# Patient Record
Sex: MALE | Race: WHITE | Employment: OTHER | URBAN - METROPOLITAN AREA
[De-identification: names, ages, dates, MRNs, and addresses within clinical notes are randomized per-mention and may not be internally consistent; named-entity substitution may affect disease eponyms.]

---

## 2017-01-17 RX ORDER — PAROXETINE HYDROCHLORIDE 40 MG/1
TABLET, FILM COATED ORAL
Qty: 30 TABLET | Refills: 11 | Status: SHIPPED | OUTPATIENT
Start: 2017-01-17 | End: 2017-09-06

## 2017-02-09 ENCOUNTER — CARE COORDINATION (OUTPATIENT)
Dept: CARE COORDINATION | Age: 67
End: 2017-02-09

## 2017-02-20 DIAGNOSIS — J43.9 PULMONARY EMPHYSEMA, UNSPECIFIED EMPHYSEMA TYPE (HCC): Primary | ICD-10-CM

## 2017-02-20 RX ORDER — BUDESONIDE AND FORMOTEROL FUMARATE DIHYDRATE 160; 4.5 UG/1; UG/1
2 AEROSOL RESPIRATORY (INHALATION) 2 TIMES DAILY
Qty: 2 INHALER | Refills: 3 | COMMUNITY
Start: 2017-02-20 | End: 2018-08-29

## 2017-06-13 ENCOUNTER — HOSPITAL ENCOUNTER (OUTPATIENT)
Dept: LAB | Age: 67
Discharge: HOME | End: 2017-06-13
Payer: MEDICARE

## 2017-06-13 DIAGNOSIS — G47.10 INCREASED SLEEPING: ICD-10-CM

## 2017-06-13 LAB
ANION GAP SERPL CALCULATED.3IONS-SCNC: 13 MEQ/L (ref 7–13)
BUN BLDV-MCNC: 17 MG/DL (ref 8–23)
CALCIUM SERPL-MCNC: 9.1 MG/DL (ref 8.6–10.2)
CHLORIDE BLD-SCNC: 101 MEQ/L (ref 98–107)
CO2: 25 MEQ/L (ref 22–29)
CREAT SERPL-MCNC: 0.78 MG/DL (ref 0.7–1.2)
GFR AFRICAN AMERICAN: >60
GFR NON-AFRICAN AMERICAN: >60
GLUCOSE BLD-MCNC: 88 MG/DL (ref 74–109)
HCT VFR BLD CALC: 40.8 % (ref 42–52)
HEMOGLOBIN: 13.8 G/DL (ref 14–18)
INR BLD: 1
MCH RBC QN AUTO: 31.6 PG (ref 27–31.3)
MCHC RBC AUTO-ENTMCNC: 33.8 % (ref 33–37)
MCV RBC AUTO: 93.4 FL (ref 80–100)
PDW BLD-RTO: 14.1 % (ref 11.5–14.5)
PLATELET # BLD: 130 K/UL (ref 130–400)
POTASSIUM SERPL-SCNC: 3.9 MEQ/L (ref 3.5–5.1)
PROTHROMBIN TIME: 11.2 SEC (ref 8.1–13.7)
RBC # BLD: 4.37 M/UL (ref 4.7–6.1)
SODIUM BLD-SCNC: 139 MEQ/L (ref 132–144)
WBC # BLD: 5 K/UL (ref 4.8–10.8)

## 2017-06-13 PROCEDURE — 85027 COMPLETE CBC AUTOMATED: CPT

## 2017-06-13 PROCEDURE — 36415 COLL VENOUS BLD VENIPUNCTURE: CPT

## 2017-06-13 PROCEDURE — 85610 PROTHROMBIN TIME: CPT

## 2017-06-13 PROCEDURE — 80048 BASIC METABOLIC PNL TOTAL CA: CPT

## 2017-08-19 ENCOUNTER — HOSPITAL ENCOUNTER (EMERGENCY)
Age: 67
Discharge: HOME | End: 2017-08-19
Attending: EMERGENCY MEDICINE
Payer: MEDICARE

## 2017-08-19 VITALS — TEMPERATURE: 97.8 F | HEIGHT: 73 IN | RESPIRATION RATE: 16 BRPM | OXYGEN SATURATION: 97 % | HEART RATE: 86 BPM

## 2017-08-19 DIAGNOSIS — N39.0 ACUTE UTI: Primary | ICD-10-CM

## 2017-08-19 LAB
BACTERIA: ABNORMAL /HPF
BILIRUBIN URINE: NEGATIVE
BLOOD, URINE: ABNORMAL
CLARITY: ABNORMAL
COLOR: YELLOW
EPITHELIAL CELLS, UA: ABNORMAL /HPF
GLUCOSE URINE: NEGATIVE MG/DL
KETONES, URINE: NEGATIVE MG/DL
LEUKOCYTE ESTERASE, URINE: ABNORMAL
NITRITE, URINE: POSITIVE
PH UA: 5.5 (ref 5–9)
PROTEIN UA: 100 MG/DL
RBC UA: ABNORMAL /HPF (ref 0–2)
RENAL EPITHELIAL, UA: ABNORMAL /HPF
SPECIFIC GRAVITY UA: 1.02 (ref 1–1.03)
URINE REFLEX TO CULTURE: YES
UROBILINOGEN, URINE: 1 E.U./DL
WBC UA: >100 /HPF (ref 0–5)

## 2017-08-19 PROCEDURE — 99283 EMERGENCY DEPT VISIT LOW MDM: CPT

## 2017-08-19 PROCEDURE — 6360000002 HC RX W HCPCS: Performed by: EMERGENCY MEDICINE

## 2017-08-19 PROCEDURE — 87077 CULTURE AEROBIC IDENTIFY: CPT

## 2017-08-19 PROCEDURE — 96372 THER/PROPH/DIAG INJ SC/IM: CPT

## 2017-08-19 PROCEDURE — 87086 URINE CULTURE/COLONY COUNT: CPT

## 2017-08-19 PROCEDURE — 2500000003 HC RX 250 WO HCPCS

## 2017-08-19 PROCEDURE — 81001 URINALYSIS AUTO W/SCOPE: CPT

## 2017-08-19 PROCEDURE — 87186 SC STD MICRODIL/AGAR DIL: CPT

## 2017-08-19 RX ORDER — PHENAZOPYRIDINE HYDROCHLORIDE 200 MG/1
200 TABLET, FILM COATED ORAL 3 TIMES DAILY PRN
Qty: 6 TABLET | Refills: 0 | Status: SHIPPED | OUTPATIENT
Start: 2017-08-19 | End: 2017-08-22

## 2017-08-19 RX ORDER — AMOXICILLIN AND CLAVULANATE POTASSIUM 875; 125 MG/1; MG/1
1 TABLET, FILM COATED ORAL 2 TIMES DAILY
Qty: 20 TABLET | Refills: 0 | Status: SHIPPED | OUTPATIENT
Start: 2017-08-19 | End: 2017-08-29

## 2017-08-19 RX ORDER — CEFTRIAXONE 1 G/1
1 INJECTION, POWDER, FOR SOLUTION INTRAMUSCULAR; INTRAVENOUS ONCE
Status: COMPLETED | OUTPATIENT
Start: 2017-08-19 | End: 2017-08-19

## 2017-08-19 RX ORDER — LIDOCAINE HYDROCHLORIDE 20 MG/ML
INJECTION, SOLUTION EPIDURAL; INFILTRATION; INTRACAUDAL; PERINEURAL
Status: COMPLETED
Start: 2017-08-19 | End: 2017-08-19

## 2017-08-19 RX ADMIN — LIDOCAINE HYDROCHLORIDE 100 MG: 20 INJECTION, SOLUTION EPIDURAL; INFILTRATION; INTRACAUDAL; PERINEURAL at 18:17

## 2017-08-19 RX ADMIN — CEFTRIAXONE 1 G: 1 INJECTION, POWDER, FOR SOLUTION INTRAMUSCULAR; INTRAVENOUS at 18:16

## 2017-08-19 ASSESSMENT — ENCOUNTER SYMPTOMS
NAUSEA: 0
SHORTNESS OF BREATH: 0
RHINORRHEA: 0
PHOTOPHOBIA: 0
VOMITING: 0
WHEEZING: 0
ABDOMINAL DISTENTION: 0
SINUS PRESSURE: 0
ABDOMINAL PAIN: 0
BACK PAIN: 0
EYE PAIN: 0
COUGH: 0
DIARRHEA: 0
COLOR CHANGE: 0
CONSTIPATION: 0
SORE THROAT: 0
APNEA: 0

## 2017-08-19 ASSESSMENT — PAIN DESCRIPTION - DESCRIPTORS: DESCRIPTORS: ACHING

## 2017-08-19 ASSESSMENT — PAIN SCALES - GENERAL: PAINLEVEL_OUTOF10: 9

## 2017-08-19 ASSESSMENT — PAIN DESCRIPTION - LOCATION: LOCATION: BACK

## 2017-08-22 LAB
ORGANISM: ABNORMAL
URINE CULTURE, ROUTINE: ABNORMAL

## 2017-08-31 ENCOUNTER — HOSPITAL ENCOUNTER (OUTPATIENT)
Dept: LAB | Age: 67
Discharge: HOME | End: 2017-08-31
Payer: MEDICARE

## 2017-08-31 LAB
INR BLD: 1.1
PROTHROMBIN TIME: 11.9 SEC (ref 8.1–13.7)

## 2017-08-31 PROCEDURE — 36415 COLL VENOUS BLD VENIPUNCTURE: CPT

## 2017-08-31 PROCEDURE — 85610 PROTHROMBIN TIME: CPT

## 2017-09-06 ENCOUNTER — HOSPITAL ENCOUNTER (OUTPATIENT)
Age: 67
Setting detail: SPECIMEN
Discharge: HOME | End: 2017-09-06
Payer: MEDICARE

## 2017-09-06 ENCOUNTER — OFFICE VISIT (OUTPATIENT)
Dept: FAMILY MEDICINE CLINIC | Age: 67
End: 2017-09-06

## 2017-09-06 VITALS
DIASTOLIC BLOOD PRESSURE: 60 MMHG | WEIGHT: 238 LBS | TEMPERATURE: 97.8 F | SYSTOLIC BLOOD PRESSURE: 118 MMHG | BODY MASS INDEX: 31.54 KG/M2 | HEIGHT: 73 IN | HEART RATE: 75 BPM | OXYGEN SATURATION: 96 %

## 2017-09-06 DIAGNOSIS — M25.561 CHRONIC PAIN OF BOTH KNEES: ICD-10-CM

## 2017-09-06 DIAGNOSIS — I48.91 ATRIAL FIBRILLATION, UNSPECIFIED TYPE (HCC): ICD-10-CM

## 2017-09-06 DIAGNOSIS — R39.9 LOWER URINARY TRACT SYMPTOMS (LUTS): Primary | ICD-10-CM

## 2017-09-06 DIAGNOSIS — Z23 NEED FOR VACCINATION: ICD-10-CM

## 2017-09-06 DIAGNOSIS — J44.9 CHRONIC OBSTRUCTIVE PULMONARY DISEASE, UNSPECIFIED COPD TYPE (HCC): ICD-10-CM

## 2017-09-06 DIAGNOSIS — R39.9 LOWER URINARY TRACT SYMPTOMS (LUTS): ICD-10-CM

## 2017-09-06 DIAGNOSIS — F32.A DEPRESSION, UNSPECIFIED DEPRESSION TYPE: ICD-10-CM

## 2017-09-06 DIAGNOSIS — M25.562 CHRONIC PAIN OF BOTH KNEES: ICD-10-CM

## 2017-09-06 DIAGNOSIS — G89.29 CHRONIC PAIN OF BOTH KNEES: ICD-10-CM

## 2017-09-06 DIAGNOSIS — I50.9 CHRONIC CONGESTIVE HEART FAILURE, UNSPECIFIED CONGESTIVE HEART FAILURE TYPE: ICD-10-CM

## 2017-09-06 LAB
APPEARANCE FLUID: ABNORMAL
BILIRUBIN, POC: ABNORMAL
BLOOD URINE, POC: ABNORMAL
CLARITY, POC: CLEAR
COLOR, POC: ABNORMAL
GLUCOSE URINE, POC: ABNORMAL
KETONES, POC: ABNORMAL
LEUKOCYTE EST, POC: ABNORMAL
NITRITE, POC: ABNORMAL
PH, POC: 5.5
PROSTATE SPECIFIC ANTIGEN: 0.04 NG/ML (ref 0–5.4)
PROTEIN, POC: ABNORMAL
SPECIFIC GRAVITY, POC: 1.02
UROBILINOGEN, POC: ABNORMAL

## 2017-09-06 PROCEDURE — 4040F PNEUMOC VAC/ADMIN/RCVD: CPT | Performed by: FAMILY MEDICINE

## 2017-09-06 PROCEDURE — 3017F COLORECTAL CA SCREEN DOC REV: CPT | Performed by: FAMILY MEDICINE

## 2017-09-06 PROCEDURE — 3023F SPIROM DOC REV: CPT | Performed by: FAMILY MEDICINE

## 2017-09-06 PROCEDURE — G8417 CALC BMI ABV UP PARAM F/U: HCPCS | Performed by: FAMILY MEDICINE

## 2017-09-06 PROCEDURE — 1123F ACP DISCUSS/DSCN MKR DOCD: CPT | Performed by: FAMILY MEDICINE

## 2017-09-06 PROCEDURE — 84153 ASSAY OF PSA TOTAL: CPT

## 2017-09-06 PROCEDURE — 99214 OFFICE O/P EST MOD 30 MIN: CPT | Performed by: FAMILY MEDICINE

## 2017-09-06 PROCEDURE — G8926 SPIRO NO PERF OR DOC: HCPCS | Performed by: FAMILY MEDICINE

## 2017-09-06 PROCEDURE — G8427 DOCREV CUR MEDS BY ELIG CLIN: HCPCS | Performed by: FAMILY MEDICINE

## 2017-09-06 PROCEDURE — 87086 URINE CULTURE/COLONY COUNT: CPT

## 2017-09-06 PROCEDURE — 1036F TOBACCO NON-USER: CPT | Performed by: FAMILY MEDICINE

## 2017-09-06 PROCEDURE — 81002 URINALYSIS NONAUTO W/O SCOPE: CPT | Performed by: FAMILY MEDICINE

## 2017-09-06 RX ORDER — ESCITALOPRAM OXALATE 20 MG/1
20 TABLET ORAL DAILY
Qty: 30 TABLET | Refills: 3 | Status: SHIPPED | OUTPATIENT
Start: 2017-09-06 | End: 2018-08-29

## 2017-09-06 RX ORDER — TAMSULOSIN HYDROCHLORIDE 0.4 MG/1
0.4 CAPSULE ORAL DAILY
Qty: 30 CAPSULE | Refills: 3 | Status: SHIPPED | OUTPATIENT
Start: 2017-09-06 | End: 2017-11-01 | Stop reason: ALTCHOICE

## 2017-09-06 RX ORDER — BUDESONIDE AND FORMOTEROL FUMARATE DIHYDRATE 160; 4.5 UG/1; UG/1
2 AEROSOL RESPIRATORY (INHALATION) 2 TIMES DAILY
Qty: 1 INHALER | Refills: 0 | COMMUNITY
Start: 2017-09-06

## 2017-09-06 ASSESSMENT — ENCOUNTER SYMPTOMS
CONSTIPATION: 0
DIARRHEA: 0
COUGH: 0
RHINORRHEA: 0
SORE THROAT: 0
ABDOMINAL PAIN: 0
WHEEZING: 0
SHORTNESS OF BREATH: 0

## 2017-09-08 LAB — URINE CULTURE, ROUTINE: NORMAL

## 2017-09-20 ENCOUNTER — OFFICE VISIT (OUTPATIENT)
Dept: FAMILY MEDICINE CLINIC | Age: 67
End: 2017-09-20

## 2017-09-20 ENCOUNTER — HOSPITAL ENCOUNTER (OUTPATIENT)
Age: 67
Setting detail: SPECIMEN
Discharge: HOME | End: 2017-09-20
Payer: MEDICARE

## 2017-09-20 VITALS
SYSTOLIC BLOOD PRESSURE: 116 MMHG | TEMPERATURE: 97.4 F | OXYGEN SATURATION: 95 % | DIASTOLIC BLOOD PRESSURE: 72 MMHG | RESPIRATION RATE: 12 BRPM | HEIGHT: 73 IN | HEART RATE: 73 BPM

## 2017-09-20 DIAGNOSIS — Z23 ENCOUNTER FOR VACCINATION: ICD-10-CM

## 2017-09-20 DIAGNOSIS — R35.0 URINARY FREQUENCY: Primary | ICD-10-CM

## 2017-09-20 DIAGNOSIS — R31.9 HEMATURIA: ICD-10-CM

## 2017-09-20 DIAGNOSIS — N30.01 ACUTE CYSTITIS WITH HEMATURIA: ICD-10-CM

## 2017-09-20 DIAGNOSIS — B37.2 CANDIDAL SKIN INFECTION: ICD-10-CM

## 2017-09-20 DIAGNOSIS — R35.0 URINARY FREQUENCY: ICD-10-CM

## 2017-09-20 PROCEDURE — 87186 SC STD MICRODIL/AGAR DIL: CPT

## 2017-09-20 PROCEDURE — G8427 DOCREV CUR MEDS BY ELIG CLIN: HCPCS | Performed by: FAMILY MEDICINE

## 2017-09-20 PROCEDURE — G0009 ADMIN PNEUMOCOCCAL VACCINE: HCPCS | Performed by: FAMILY MEDICINE

## 2017-09-20 PROCEDURE — 99214 OFFICE O/P EST MOD 30 MIN: CPT | Performed by: FAMILY MEDICINE

## 2017-09-20 PROCEDURE — 90662 IIV NO PRSV INCREASED AG IM: CPT | Performed by: FAMILY MEDICINE

## 2017-09-20 PROCEDURE — 4040F PNEUMOC VAC/ADMIN/RCVD: CPT | Performed by: FAMILY MEDICINE

## 2017-09-20 PROCEDURE — G0008 ADMIN INFLUENZA VIRUS VAC: HCPCS | Performed by: FAMILY MEDICINE

## 2017-09-20 PROCEDURE — G8417 CALC BMI ABV UP PARAM F/U: HCPCS | Performed by: FAMILY MEDICINE

## 2017-09-20 PROCEDURE — 90670 PCV13 VACCINE IM: CPT | Performed by: FAMILY MEDICINE

## 2017-09-20 PROCEDURE — 87077 CULTURE AEROBIC IDENTIFY: CPT

## 2017-09-20 PROCEDURE — 1123F ACP DISCUSS/DSCN MKR DOCD: CPT | Performed by: FAMILY MEDICINE

## 2017-09-20 PROCEDURE — 87086 URINE CULTURE/COLONY COUNT: CPT

## 2017-09-20 PROCEDURE — 1036F TOBACCO NON-USER: CPT | Performed by: FAMILY MEDICINE

## 2017-09-20 PROCEDURE — 3017F COLORECTAL CA SCREEN DOC REV: CPT | Performed by: FAMILY MEDICINE

## 2017-09-20 RX ORDER — NYSTATIN 100000 U/G
CREAM TOPICAL
Qty: 1 TUBE | Refills: 3 | Status: SHIPPED | OUTPATIENT
Start: 2017-09-20 | End: 2018-08-29

## 2017-09-20 ASSESSMENT — ENCOUNTER SYMPTOMS
COUGH: 0
DIARRHEA: 0
ABDOMINAL PAIN: 0
CONSTIPATION: 0
WHEEZING: 0
SORE THROAT: 0
RHINORRHEA: 0
SHORTNESS OF BREATH: 0

## 2017-09-20 ASSESSMENT — PATIENT HEALTH QUESTIONNAIRE - PHQ9
2. FEELING DOWN, DEPRESSED OR HOPELESS: 0
1. LITTLE INTEREST OR PLEASURE IN DOING THINGS: 0
SUM OF ALL RESPONSES TO PHQ9 QUESTIONS 1 & 2: 0
SUM OF ALL RESPONSES TO PHQ QUESTIONS 1-9: 0

## 2017-09-22 DIAGNOSIS — N30.01 ACUTE CYSTITIS WITH HEMATURIA: Primary | ICD-10-CM

## 2017-09-22 RX ORDER — NITROFURANTOIN 25; 75 MG/1; MG/1
100 CAPSULE ORAL 2 TIMES DAILY
Qty: 14 CAPSULE | Refills: 0 | Status: SHIPPED | OUTPATIENT
Start: 2017-09-22 | End: 2017-09-29

## 2017-09-22 RX ORDER — SULFAMETHOXAZOLE AND TRIMETHOPRIM 800; 160 MG/1; MG/1
1 TABLET ORAL 2 TIMES DAILY
Qty: 10 TABLET | Refills: 0 | Status: SHIPPED | OUTPATIENT
Start: 2017-09-22 | End: 2017-09-27

## 2017-09-23 LAB
ORGANISM: ABNORMAL
URINE CULTURE, ROUTINE: ABNORMAL

## 2017-10-02 ENCOUNTER — OFFICE VISIT (OUTPATIENT)
Dept: UROLOGY | Age: 67
End: 2017-10-02

## 2017-10-02 VITALS
HEIGHT: 73 IN | SYSTOLIC BLOOD PRESSURE: 110 MMHG | WEIGHT: 238 LBS | BODY MASS INDEX: 31.54 KG/M2 | DIASTOLIC BLOOD PRESSURE: 80 MMHG | HEART RATE: 69 BPM

## 2017-10-02 DIAGNOSIS — R33.9 URINARY RETENTION: Primary | ICD-10-CM

## 2017-10-02 LAB
BILIRUBIN, POC: ABNORMAL
BLOOD URINE, POC: ABNORMAL
CLARITY, POC: CLEAR
COLOR, POC: YELLOW
GLUCOSE URINE, POC: ABNORMAL
KETONES, POC: ABNORMAL
LEUKOCYTE EST, POC: ABNORMAL
NITRITE, POC: ABNORMAL
PH, POC: 5
POST VOID RESIDUAL (PVR): 141 ML
PROTEIN, POC: ABNORMAL
SPECIFIC GRAVITY, POC: 1.01
UROBILINOGEN, POC: 1

## 2017-10-02 PROCEDURE — 99214 OFFICE O/P EST MOD 30 MIN: CPT | Performed by: UROLOGY

## 2017-10-02 PROCEDURE — 51798 US URINE CAPACITY MEASURE: CPT | Performed by: UROLOGY

## 2017-10-02 PROCEDURE — 1036F TOBACCO NON-USER: CPT | Performed by: UROLOGY

## 2017-10-02 PROCEDURE — 1123F ACP DISCUSS/DSCN MKR DOCD: CPT | Performed by: UROLOGY

## 2017-10-02 PROCEDURE — G8417 CALC BMI ABV UP PARAM F/U: HCPCS | Performed by: UROLOGY

## 2017-10-02 PROCEDURE — 3017F COLORECTAL CA SCREEN DOC REV: CPT | Performed by: UROLOGY

## 2017-10-02 PROCEDURE — 81003 URINALYSIS AUTO W/O SCOPE: CPT | Performed by: UROLOGY

## 2017-10-02 PROCEDURE — G8427 DOCREV CUR MEDS BY ELIG CLIN: HCPCS | Performed by: UROLOGY

## 2017-10-02 PROCEDURE — 4040F PNEUMOC VAC/ADMIN/RCVD: CPT | Performed by: UROLOGY

## 2017-10-02 PROCEDURE — G8484 FLU IMMUNIZE NO ADMIN: HCPCS | Performed by: UROLOGY

## 2017-10-02 NOTE — PROGRESS NOTES
[facility] EVALUATION NOTE                                                 H&P                                                                                                                                                 Reason for Visit  Post infectious cystitis with microhematuria    History of Present Illness  Patient recently treated for UTI  Greater than 100,000 E. Coli  Residual hematuria most likely secondary to the UTI  Patient also has detrusor instability      Urologic Review of Systems/Symptoms  Denies hematuria  Denies dysuria  Denies incontinence  Denies flank pain  Other Urologic: Urge incontinence troubles does not get wet does not use pads  Long-term history of back issues  Long-term history of narcotic use for severe chronic back pain    Review of Systems  Head and neck: No issues/reviewed  Cardiac: No recent issues/reviewed  Pulmonary: No issues/reviewed  Gastrointestinal: No issues/reviewed  Neurologic: No recent issues/reviewed  Extremities: No issues/reviewed  Lymphatics: No lymphadenopathy no change  Genitourinary: See above  Skin: No issues/reviewed  Hospitalization: Long hospitalization history  Medications reviewed  All 14 categories of Review of Systems otherwise reviewed no other findings reported.     Past Medical History:   Diagnosis Date    Allergic rhinitis     Asthma     asthmatic bronchitis    Atrial fibrillation (HCC)     CAD (coronary artery disease)     Chronic back pain     COPD (chronic obstructive pulmonary disease) (HCC)     CVA (cerebral infarction)     Depression     Edema     GERD (gastroesophageal reflux disease)     ulcer    Glucose intolerance (impaired glucose tolerance) 11/06    Hypertension     ICD (implantable cardiac defibrillator) in place 2010    OMAR (obstructive sleep apnea)     UTI (urinary tract infection)      Past Surgical History:   Procedure Laterality Date    BACK SURGERY      ensed   Katie Reddish FRACTURE SURGERY  7/06    l/shoulder   Katie Reddish GLOSSECTOMY  12/09/14    nasal endoscopy    GLOSSECTOMY  07/31/15    W/COBLATION,NASAL ENDOSCOPY    PACEMAKER PLACEMENT  2010    SKIN BIOPSY  2/13/12    right leg-Dr. Gil Hernandez     Social History     Social History    Marital status:      Spouse name: N/A    Number of children: N/A    Years of education: N/A     Social History Main Topics    Smoking status: Never Smoker    Smokeless tobacco: Never Used    Alcohol use No    Drug use: No    Sexual activity: Not Asked     Other Topics Concern    None     Social History Narrative     Family History   Problem Relation Age of Onset    Hypertension Mother     Stroke Mother     Coronary Art Dis Mother     High Cholesterol Mother      Current Outpatient Prescriptions   Medication Sig Dispense Refill    nystatin (MYCOSTATIN) 484571 UNIT/GM cream Apply topically 2 times daily. 1 Tube 3    escitalopram (LEXAPRO) 20 MG tablet Take 1 tablet by mouth daily 30 tablet 3    budesonide-formoterol (SYMBICORT) 160-4.5 MCG/ACT AERO Inhale 2 puffs into the lungs 2 times daily LOT [id number] exp 10/2018 1 Inhaler 0    budesonide-formoterol (SYMBICORT) 160-4.5 MCG/ACT AERO Inhale 2 puffs into the lungs 2 times daily Lot # [id number] Exp 11/2017 2 Inhaler 3    metoclopramide (REGLAN) 10 MG tablet Take 1 tablet by mouth 3 times daily 90 tablet 5    morphine (MS CONTIN) 60 MG extended release tablet       tiotropium (SPIRIVA RESPIMAT) 2.5 MCG/ACT AERS inhaler Lot: [id number] EXP: 2/2018 1 Inhaler 0    diclofenac (VOLTAREN) 75 MG EC tablet TAKE ONE TABLET BY MOUTH TWICE DAILY 180 tablet 1    lidocaine (XYLOCAINE) 5 % ointment Apply topically every 2 hours as needed.  1 Tube 2    albuterol (PROVENTIL) (2.5 MG/3ML) 0.083% nebulizer solution USE ONE VIAL IN NEBULIZER EVERY 4 HOURS AS NEEDED FOR WHEEZING AND FOR SHORTNESS OF BREATH 75 vial 5    magnesium hydroxide (MILK OF MAGNESIA) 400 MG/5ML suspension 30-60 ml PO daily prn constipation 900 mL 0    on today's visit    PSA   Date Value Ref Range Status   09/06/2017 0.04 0.00 - 5.40 ng/mL Final   02/10/2015 0.49 0.00 - 5.40 ng/mL Final   02/04/2014 0.42 0.00 - 5.40 ng/mL Final     Comment:     When the Total PSA is between 3.00 and 10.00 ng/mL, consider  requesting a Free PSA to aid in diagnosis. Effective 12/4/2013  Methodology and/or Reference Range has changed. 02/27/2013 0.21 0.00 - 4.00 ng/mL Final     Comment:     When the Total PSA is between 3.00 and 10.00 ng/mL, consider requesting a  Free PSA to aid in diagnosis. Free PSA is measured, as necessary, when Directed PSA is requested. Results for POC orders placed in visit on 10/02/17   POCT Urinalysis No Micro (Auto)   Result Value Ref Range    Color, UA yellow     Clarity, UA clear     Glucose, UA POC neg     Bilirubin, UA small     Ketones, UA trace     Spec Grav, UA 1.010     Blood, UA POC large     pH, UA 5.0     Protein, UA POC 30 mg/dL     Urobilinogen, UA 1.0     Leukocytes, UA neg     Nitrite, UA neg    poct post void residual   Result Value Ref Range    post void residual 141 ml    Narrative    A point of care test   Post Void Residual was completed by performing  ultrasound scan of the bladder and  reviewed by Dr Colby Ayon       Physical Exam  Vitals:    10/02/17 1510   BP: 110/80   Pulse: 69   Weight: 238 lb (108 kg)   Height: 6' 1\" (1.854 m)     Constitutional: patient is oriented to person, place, and time. patient appears well-developed. patient not in distress and wheelchair difficult to ambulate. Ears: Adequate hearing/no hearing loss  Head: Normocephalic. Atraumatic  Neck: Normal range of motion. Cardiovascular: Normal rate, BP reviewed. Pulmonary/Chest: Normal respiratory effort  no shortness of breath  Abdominal: Not distended.   Patient has significant amount of skin secondary to weight loss large pannus no evidence of inguinal hernia  Urologic Exam  Buried penis and fat pad cannot be exposed the meatus not visualized. Testis are atrophic. Poor rectal tone, minimal prostatic tissue no nodules post residual 141 mL  Urinalysis shows blood most likely secondary to postinfectious cystitis  Last PSA was essentially normal at 0 . Musculoskeletal: Normal range of motion. Patient  ambulates with difficulty. Extremities: No evidence of lymphedema   Neurological: Cranial nerves intact no deficits patient does have movement disorder most likely secondary to back issues   Skin: Skin is warm and dry. No lesions. Significant amount of loose skin secondary to weight loss   Psychiatric:  Alert and oriented ×3 flat affect. Assessment  Detrusor instability  No evidence of urinary retention  Post infectious cystitis with mild microhematuria  Plan  Recommend rechecking urinalysis in 3 months if clear no further workup is needed  Urgency and urge incontinence probable neurogenic cause from spinal stenosis  Follow-up p.r.n. Greater than 50% of 30 minutes spent consulting patient face-to-face  Orders Placed This Encounter   Procedures    poct post void residual    POCT Urinalysis No Micro (Auto)     No orders of the defined types were placed in this encounter. Ramila Asher MD       Please note this report has been partially produced using speech recognition software  And may cause contain errors related to that system including grammar, punctuation and spelling as well as words and phrases that may seem inappropriate. If there are questions or concerns please feel free to contact me to clarify.

## 2017-10-02 NOTE — MR AVS SNAPSHOT
After Visit Summary             Henrik Lara   10/2/2017 3:00 PM   Office Visit    Description:  Male : 1950   Provider:  Arina Molina MD   Department:  [facility] EMERGENCY [facility] AT [facility]              Your Follow-Up and Future Appointments         Below is a list of your follow-up and future appointments. This may not be a complete list as you may have made appointments directly with providers that we are not aware of or your providers may have made some for you. Please call your providers to confirm appointments. It is important to keep your appointments. Please bring your current insurance card, photo ID, co-pay, and all medication bottles to your appointment. If self-pay, payment is expected at the time of service. Information from Your Visit        Department     Name Address Phone Fax    [facility] AT [facility] [address]  [address],[facility] Floor [phone]      You Were Seen for:         Comments    Urinary retention   [[id number]]         Vital Signs     Blood Pressure Pulse Height Weight Body Mass Index Smoking Status    110/80 71 6' 1\" (1.854 m) 238 lb (108 kg) 31.4 kg/m2 Never Smoker      Additional Information about your Body Mass Index (BMI)           Your BMI as listed above is considered obese (30 or more). BMI is an estimate of body fat, calculated from your height and weight. The higher your BMI, the greater your risk of heart disease, high blood pressure, type 2 diabetes, stroke, gallstones, arthritis, sleep apnea, and certain cancers. BMI is not perfect. It may overestimate body fat in athletes and people who are more muscular. Even a small weight loss (between 5 and 10 percent of your current weight) by decreasing your calorie intake and becoming more physically active will help lower your risk of developing or worsening diseases associated with obesity.      Learn more at: [email or web address] Medications and Orders      Your Current Medications Are              nystatin (MYCOSTATIN) 522293 UNIT/GM cream Apply topically 2 times daily. escitalopram (LEXAPRO) 20 MG tablet Take 1 tablet by mouth daily    budesonide-formoterol (SYMBICORT) 160-4.5 MCG/ACT AERO Inhale 2 puffs into the lungs 2 times daily LOT [id number] exp 10/2018    budesonide-formoterol (SYMBICORT) 160-4.5 MCG/ACT AERO Inhale 2 puffs into the lungs 2 times daily Lot # [id number] Exp 11/2017    metoclopramide (REGLAN) 10 MG tablet Take 1 tablet by mouth 3 times daily    morphine (MS CONTIN) 60 MG extended release tablet     tiotropium (SPIRIVA RESPIMAT) 2.5 MCG/ACT AERS inhaler Lot: [id number] EXP: 2/2018    diclofenac (VOLTAREN) 75 MG EC tablet TAKE ONE TABLET BY MOUTH TWICE DAILY    lidocaine (XYLOCAINE) 5 % ointment Apply topically every 2 hours as needed. albuterol (PROVENTIL) (2.5 MG/3ML) 0.083% nebulizer solution USE ONE VIAL IN NEBULIZER EVERY 4 HOURS AS NEEDED FOR WHEEZING AND FOR SHORTNESS OF BREATH    magnesium hydroxide (MILK OF MAGNESIA) 400 MG/5ML suspension 30-60 ml PO daily prn constipation    budesonide-formoterol (SYMBICORT) 160-4.5 MCG/ACT AERO Inhale 2 puffs into the lungs 2 times daily    cetirizine (ZYRTEC ALLERGY) 10 MG tablet Paient takes 40 mg daily. Cholecalciferol (VITAMIN D3) 2000 UNITS CAPS Take  by mouth.    gabapentin (NEURONTIN) 300 MG capsule Take 300 mg by mouth 3 times daily. lactulose 20 GM/30ML SOLN Take  by mouth. OMEGA-3 FATTY ACIDS PO Take  by mouth. Compression Stockings MISC Knee high compression stockings  Dx: LE edema  20-30 mm pressure    SYRINGE-NEEDLE, DISP, 3 ML (B-D INTEGRA SYRINGE) 22G X 1-1/2\" 3 ML MISC 1 each by Does not apply route daily. carvedilol (COREG) 25 MG tablet Take 1 tablet by mouth 2 times daily (with meals). furosemide (LASIX) 20 MG tablet Take 40 mg by mouth daily. Sennosides (SENOKOT PO) Take  by mouth. OMAR (obstructive sleep apnea)    Pulmonary emphysema ([facility]    Pacemaker    Morbid obesity due to excess calories [facility])    Atrial fibrillation (HCC)    CKD (chronic kidney disease)    Hypothyroidism    Hypogonadism male    Congestive heart failure ([facility]      Immunizations as of 10/2/2017     Name Date    H1N1 1/1/2010    Influenza Whole 10/1/2008    Influenza, High Dose 9/20/2017    Influenza, MDCK, Preservative free 9/30/2015    Pneumococcal 13-valent Conjugate (Jvypcut25) 9/20/2017    Pneumococcal Polysaccharide (Imjigwhxu61) 1/1/2008      Preventive Care        Date Due    HIV screening is recommended for all people regardless of risk factors  aged 15-65 years at least once (lifetime) who have never been HIV tested. 8/24/1967    Tetanus Combination Vaccine (1 - Tdap) 8/24/1971    Pneumococcal Vaccines (two) for all adults aged 72 and over (2 of 2 - PPSV23) 9/20/2018    Cholesterol Screening 10/15/2018    Colonoscopy 7/9/2020            MyChart Signup           [employer] allows you to send messages to your doctor, view your test results, renew your prescriptions, schedule appointments, view visit notes, and more. How Do I Sign Up? 1. In your Internet browser, go to [email or web address]  2. Click on the Sign Up Now link in the Sign In box. You will see the New Member Sign Up page. 3. Enter your [employer] Access Code exactly as it appears below. You will not need to use this code after youve completed the sign-up process. If you do not sign up before the expiration date, you must request a new code. [employer] Access Code: [email or web address]  Expires: 10/18/2017  6:11 PM    4. Enter your Social Security Number ([id number]) and Date of Birth (mm/dd/yyyy) as indicated and click Submit. You will be taken to the next sign-up page. 5. Create a [employer] ID. This will be your [employer] login ID and cannot be changed, so think of one that is secure and easy to remember. 6. Create a [employer] password. You can change your password at any time. 7. Enter your Password Reset Question and Answer. This can be used at a later time if you forget your password. 8. Enter your e-mail address. You will receive e-mail notification when new information is available in [address] 9. Click Sign Up. You can now view your medical record. Additional Information  If you have questions, please contact the physician practice where you receive care. Remember, [employer] is NOT to be used for urgent needs. For medical emergencies, dial 911. For questions regarding your [employer] account call [phone]. If you have a clinical question, please call your doctor's office.

## 2017-11-01 ENCOUNTER — OFFICE VISIT (OUTPATIENT)
Dept: FAMILY MEDICINE CLINIC | Age: 67
End: 2017-11-01

## 2017-11-01 ENCOUNTER — HOSPITAL ENCOUNTER (OUTPATIENT)
Dept: LAB | Age: 67
Discharge: HOME | End: 2017-11-01
Payer: MEDICARE

## 2017-11-01 VITALS
SYSTOLIC BLOOD PRESSURE: 110 MMHG | BODY MASS INDEX: 31.38 KG/M2 | HEART RATE: 73 BPM | DIASTOLIC BLOOD PRESSURE: 64 MMHG | HEIGHT: 73 IN | TEMPERATURE: 97.1 F | WEIGHT: 236.8 LBS | OXYGEN SATURATION: 95 % | RESPIRATION RATE: 16 BRPM

## 2017-11-01 DIAGNOSIS — R39.15 URINARY URGENCY: Primary | ICD-10-CM

## 2017-11-01 DIAGNOSIS — N02.9 IDIOPATHIC HEMATURIA, UNSPECIFIED WHETHER GLOMERULAR MORPHOLOGIC CHANGES PRESENT: ICD-10-CM

## 2017-11-01 DIAGNOSIS — E03.9 ACQUIRED HYPOTHYROIDISM: ICD-10-CM

## 2017-11-01 DIAGNOSIS — R61 NIGHT SWEATS: ICD-10-CM

## 2017-11-01 DIAGNOSIS — R63.4 WEIGHT LOSS: ICD-10-CM

## 2017-11-01 LAB
INR BLD: 1.3
PROTHROMBIN TIME: 13.6 SEC (ref 8.1–13.7)

## 2017-11-01 PROCEDURE — G8417 CALC BMI ABV UP PARAM F/U: HCPCS | Performed by: FAMILY MEDICINE

## 2017-11-01 PROCEDURE — 3017F COLORECTAL CA SCREEN DOC REV: CPT | Performed by: FAMILY MEDICINE

## 2017-11-01 PROCEDURE — 36415 COLL VENOUS BLD VENIPUNCTURE: CPT

## 2017-11-01 PROCEDURE — G8427 DOCREV CUR MEDS BY ELIG CLIN: HCPCS | Performed by: FAMILY MEDICINE

## 2017-11-01 PROCEDURE — 1123F ACP DISCUSS/DSCN MKR DOCD: CPT | Performed by: FAMILY MEDICINE

## 2017-11-01 PROCEDURE — 85610 PROTHROMBIN TIME: CPT

## 2017-11-01 PROCEDURE — 1036F TOBACCO NON-USER: CPT | Performed by: FAMILY MEDICINE

## 2017-11-01 PROCEDURE — 99214 OFFICE O/P EST MOD 30 MIN: CPT | Performed by: FAMILY MEDICINE

## 2017-11-01 PROCEDURE — G8484 FLU IMMUNIZE NO ADMIN: HCPCS | Performed by: FAMILY MEDICINE

## 2017-11-01 PROCEDURE — 4040F PNEUMOC VAC/ADMIN/RCVD: CPT | Performed by: FAMILY MEDICINE

## 2017-11-01 RX ORDER — LEVOTHYROXINE SODIUM 0.1 MG/1
100 TABLET ORAL DAILY
Qty: 90 TABLET | Refills: 3 | Status: SHIPPED | OUTPATIENT
Start: 2017-11-01 | End: 2019-09-11 | Stop reason: ALTCHOICE

## 2017-11-01 RX ORDER — OXYBUTYNIN CHLORIDE 10 MG/1
10 TABLET, EXTENDED RELEASE ORAL DAILY
Qty: 30 TABLET | Refills: 3 | Status: SHIPPED | OUTPATIENT
Start: 2017-11-01 | End: 2018-02-27 | Stop reason: SDUPTHER

## 2017-11-01 ASSESSMENT — ENCOUNTER SYMPTOMS
RHINORRHEA: 0
SHORTNESS OF BREATH: 0
WHEEZING: 0
SORE THROAT: 0
ABDOMINAL PAIN: 0
COUGH: 0
DIARRHEA: 0
CONSTIPATION: 0

## 2017-11-01 NOTE — PROGRESS NOTES
[address] [place] [address] PRIMARY CARE [address] [id number]  Dept: [phone]  Dept Fax: : [phone]   Chief Complaint  Chief Complaint   Patient presents with    Urinary Frequency     Presents for f/u on the urinary frequencies - Informs is still not able to hold in urine and would like to discuss a different Rx     Sweats     Also informs has been having sweats in the day and night that are on and off - States has tried Aleve with little to no relief       HPI:  74 y.o. male who presents for LUTS:  (here with son)    LUTS: seen by urology; thinks the hematuria is postinfectious. No hematuria that he has noticed. Weight loss: decreased appetite. Much less eating. He has felt down lately because of a friend's health problems. Decreased hearing: worsening over the year. Nightsweats: new over the past few days. Drenching sweats.      Past Medical History:   Diagnosis Date    Allergic rhinitis     Asthma     asthmatic bronchitis    Atrial fibrillation (HCC)     CAD (coronary artery disease)     Chronic back pain     COPD (chronic obstructive pulmonary disease) (HCC)     CVA (cerebral infarction)     Depression     Edema     GERD (gastroesophageal reflux disease)     ulcer    Glucose intolerance (impaired glucose tolerance) 11/06    Hypertension     ICD (implantable cardiac defibrillator) in place 2010    OMAR (obstructive sleep apnea)     UTI (urinary tract infection)      Past Surgical History:   Procedure Laterality Date    BACK SURGERY      ensed    FRACTURE SURGERY  7/06    l/shoulder    GLOSSECTOMY  12/09/14    nasal endoscopy    GLOSSECTOMY  07/31/15    W/COBLATION,NASAL ENDOSCOPY    PACEMAKER PLACEMENT  2010    SKIN BIOPSY  2/13/12    right leg-Dr. Ruben Hedrick     Social History     Social History    Marital status:      Spouse name: N/A    Number of children: N/A    Years of daily (with meals). 180 tablet 1    furosemide (LASIX) 20 MG tablet Take 40 mg by mouth daily.  sotalol (BETAPACE) 80 MG tablet Take 80 mg by mouth 2 times daily. Take 1/2 tablet twice daily      WARFARIN SODIUM by Does not apply route.  FISH OIL Take  by mouth 2 times daily.  GLUCOSAMINE PO Take  by mouth 2 times daily.  budesonide-formoterol (SYMBICORT) 160-4.5 MCG/ACT AERO Inhale 2 puffs into the lungs 2 times daily LOT [id number] exp 10/2018 1 Inhaler 0    budesonide-formoterol (SYMBICORT) 160-4.5 MCG/ACT AERO Inhale 2 puffs into the lungs 2 times daily Lot # [id number] Exp 11/2017 2 Inhaler 3    budesonide-formoterol (SYMBICORT) 160-4.5 MCG/ACT AERO Inhale 2 puffs into the lungs 2 times daily 2 Inhaler 0    Compression Stockings MISC Knee high compression stockings  Dx: LE edema  20-30 mm pressure 2 each 0    SYRINGE-NEEDLE, DISP, 3 ML (B-D INTEGRA SYRINGE) 22G X 1-1/2\" 3 ML MISC 1 each by Does not apply route daily. 20 each 6     Current Facility-Administered Medications   Medication Dose Route Frequency Provider Last Rate Last Dose    testosterone cypionate (DEPOTESTOTERONE CYPIONATE) injection 200 mg  200 mg Intramuscular Once         testosterone cypionate (DEPOTESTOTERONE CYPIONATE) injection 200 mg  200 mg Intramuscular Once         testosterone cypionate (DEPOTESTOTERONE CYPIONATE) injection 200 mg  200 mg Intramuscular Once            ROS:  Review of Systems   Constitutional: Positive for appetite change and unexpected weight change. Negative for chills and fever. Night sweats   HENT: Negative for rhinorrhea and sore throat. Respiratory: Negative for cough, shortness of breath and wheezing. Gastrointestinal: Negative for abdominal pain, constipation and diarrhea. Endocrine: Negative for polydipsia and polyuria. Genitourinary: Negative for dysuria, frequency and urgency. Neurological: Negative for syncope, light-headedness, numbness and headaches. Psychiatric/Behavioral: Negative for sleep disturbance. The patient is not nervous/anxious. Vitals:    11/01/17 1531   BP: 110/64   Site: Left Arm   Position: Sitting   Cuff Size: Medium Adult   Pulse: 73   Resp: 16   Temp: 97.1 °F (36.2 °C)   TempSrc: Temporal   SpO2: 95%   Weight: 236 lb 12.8 oz (107.4 kg)   Height: 6' 1\" (1.854 m)       Physical exam:  Physical Exam   Constitutional: He is oriented to person, place, and time. He appears well-developed and well-nourished. No distress. HENT:   Head: Normocephalic and atraumatic. Right Ear: Tympanic membrane, external ear and ear canal normal.   Left Ear: Tympanic membrane, external ear and ear canal normal.   Mouth/Throat: No oropharyngeal exudate. Eyes: EOM are normal.   Neck: Normal range of motion. No thyromegaly present. Cardiovascular: Normal rate, regular rhythm and normal heart sounds. No murmur heard. Pulmonary/Chest: Effort normal and breath sounds normal. No respiratory distress. He has no wheezes. Abdominal: Soft. He exhibits no distension. There is no tenderness. There is no rebound and no guarding. Lymphadenopathy:     He has no cervical adenopathy. Neurological: He is alert and oriented to person, place, and time. Skin: Skin is warm and dry. Psychiatric: He has a normal mood and affect. His behavior is normal.   Vitals reviewed. Assessment/Plan:  74 y.o. male here mainly for LUTS:  - LUTS: per urology; probably urge incontinence from the spinal stenosis; will start ditropan trial  - Hematuria: will get repeat urine in 3 months with urology to determine if further w/u is necessary.  - Weight loss and night sweats: I discussed my concerns about this although weight appears to have stabilized. He appeared indifferent. Offered appetite stimulant or to begin w/u for signs of malignancy. He would be ok with imaging and labs. It's possible that his mood is part of the problem.  If night sweats continue then may start w/u with CXR/?CT chest, PPD, CBC diff, TSH, HIV, blood cultures. Otherwise, will wait for urology to complete the investigation of his hematuria. - decreased hearing: he declined referral for hearing aids. 1. Urinary urgency  oxybutynin (DITROPAN XL) 10 MG extended release tablet   2. Acquired hypothyroidism  levothyroxine (SYNTHROID) 100 MCG tablet   3. Weight loss     4. Idiopathic hematuria, unspecified whether glomerular morphologic changes present          Return if symptoms worsen or fail to improve.     Helen Franklin MD

## 2017-11-07 ENCOUNTER — TELEPHONE (OUTPATIENT)
Dept: FAMILY MEDICINE CLINIC | Age: 67
End: 2017-11-07

## 2017-11-07 DIAGNOSIS — R61 NIGHT SWEATS: Primary | ICD-10-CM

## 2017-11-07 DIAGNOSIS — R63.4 WEIGHT LOSS: ICD-10-CM

## 2017-11-08 DIAGNOSIS — R63.4 ABNORMAL WEIGHT LOSS: ICD-10-CM

## 2017-11-08 DIAGNOSIS — R61 NIGHT SWEATS: Primary | ICD-10-CM

## 2017-11-08 NOTE — TELEPHONE ENCOUNTER
Spoke with pt's son and he is aware of the recommendations and states he wants to discuss this with his father and he will call us back.

## 2017-11-15 ENCOUNTER — HOSPITAL ENCOUNTER (OUTPATIENT)
Dept: CT IMAGING | Age: 67
Discharge: HOME | End: 2017-11-15
Payer: MEDICARE

## 2017-11-15 ENCOUNTER — HOSPITAL ENCOUNTER (OUTPATIENT)
Dept: LAB | Age: 67
Discharge: HOME | End: 2017-11-15
Payer: MEDICARE

## 2017-11-15 DIAGNOSIS — R61 NIGHT SWEATS: ICD-10-CM

## 2017-11-15 DIAGNOSIS — R63.4 ABNORMAL WEIGHT LOSS: ICD-10-CM

## 2017-11-15 DIAGNOSIS — R63.4 WEIGHT LOSS: ICD-10-CM

## 2017-11-15 LAB
ALBUMIN SERPL-MCNC: 3.2 G/DL (ref 3.9–4.9)
ALP BLD-CCNC: 86 U/L (ref 35–104)
ALT SERPL-CCNC: 20 U/L (ref 0–41)
ANION GAP SERPL CALCULATED.3IONS-SCNC: 14 MEQ/L (ref 7–13)
AST SERPL-CCNC: 20 U/L (ref 0–40)
BASOPHILS ABSOLUTE: 0.1 K/UL (ref 0–0.2)
BASOPHILS RELATIVE PERCENT: 0.5 %
BILIRUB SERPL-MCNC: 0.5 MG/DL (ref 0–1.2)
BUN BLDV-MCNC: 37 MG/DL (ref 8–23)
CALCIUM SERPL-MCNC: 9.2 MG/DL (ref 8.6–10.2)
CHLORIDE BLD-SCNC: 99 MEQ/L (ref 98–107)
CO2: 25 MEQ/L (ref 22–29)
CREAT SERPL-MCNC: 1.24 MG/DL (ref 0.7–1.2)
EOSINOPHILS ABSOLUTE: 0.2 K/UL (ref 0–0.7)
EOSINOPHILS RELATIVE PERCENT: 2 %
GFR AFRICAN AMERICAN: >60
GFR NON-AFRICAN AMERICAN: 58.5
GLOBULIN: 3.1 G/DL (ref 2.3–3.5)
GLUCOSE BLD-MCNC: 121 MG/DL (ref 74–109)
HCT VFR BLD CALC: 40.9 % (ref 42–52)
HEMOGLOBIN: 13.7 G/DL (ref 14–18)
LYMPHOCYTES ABSOLUTE: 1.9 K/UL (ref 1–4.8)
LYMPHOCYTES RELATIVE PERCENT: 19.2 %
MCH RBC QN AUTO: 31 PG (ref 27–31.3)
MCHC RBC AUTO-ENTMCNC: 33.6 % (ref 33–37)
MCV RBC AUTO: 92.3 FL (ref 80–100)
MONOCYTES ABSOLUTE: 0.6 K/UL (ref 0.2–0.8)
MONOCYTES RELATIVE PERCENT: 5.8 %
NEUTROPHILS ABSOLUTE: 7 K/UL (ref 1.4–6.5)
NEUTROPHILS RELATIVE PERCENT: 72.5 %
PDW BLD-RTO: 13.5 % (ref 11.5–14.5)
PLATELET # BLD: 220 K/UL (ref 130–400)
POTASSIUM SERPL-SCNC: 4.2 MEQ/L (ref 3.5–5.1)
RBC # BLD: 4.43 M/UL (ref 4.7–6.1)
SODIUM BLD-SCNC: 138 MEQ/L (ref 132–144)
TOTAL PROTEIN: 6.3 G/DL (ref 6.4–8.1)
TSH REFLEX: 2.81 UIU/ML (ref 0.27–4.2)
WBC # BLD: 9.7 K/UL (ref 4.8–10.8)

## 2017-11-15 PROCEDURE — 87040 BLOOD CULTURE FOR BACTERIA: CPT

## 2017-11-15 PROCEDURE — 36415 COLL VENOUS BLD VENIPUNCTURE: CPT

## 2017-11-15 PROCEDURE — 71260 CT THORAX DX C+: CPT

## 2017-11-15 PROCEDURE — 84443 ASSAY THYROID STIM HORMONE: CPT

## 2017-11-15 PROCEDURE — 6360000004 HC RX CONTRAST MEDICATION: Performed by: FAMILY MEDICINE

## 2017-11-15 PROCEDURE — 86703 HIV-1/HIV-2 1 RESULT ANTBDY: CPT

## 2017-11-15 PROCEDURE — 80053 COMPREHEN METABOLIC PANEL: CPT

## 2017-11-15 PROCEDURE — 85025 COMPLETE CBC W/AUTO DIFF WBC: CPT

## 2017-11-15 RX ORDER — METOCLOPRAMIDE 10 MG/1
TABLET ORAL
Qty: 90 TABLET | Refills: 5 | Status: SHIPPED | OUTPATIENT
Start: 2017-11-15 | End: 2018-08-29

## 2017-11-15 RX ADMIN — IOPAMIDOL 100 ML: 755 INJECTION, SOLUTION INTRAVENOUS at 15:30

## 2017-11-17 LAB — HIV-1 AND HIV-2 ANTIBODIES: NEGATIVE

## 2017-11-20 LAB
BLOOD CULTURE, ROUTINE: NORMAL
CULTURE, BLOOD 2: NORMAL

## 2017-11-21 PROBLEM — K74.69 OTHER CIRRHOSIS OF LIVER (HCC): Status: ACTIVE | Noted: 2017-11-21

## 2018-01-30 RX ORDER — PAROXETINE HYDROCHLORIDE 40 MG/1
TABLET, FILM COATED ORAL
Qty: 30 TABLET | Refills: 11 | Status: SHIPPED | OUTPATIENT
Start: 2018-01-30 | End: 2018-08-29

## 2018-02-26 ENCOUNTER — TELEPHONE (OUTPATIENT)
Dept: FAMILY MEDICINE CLINIC | Age: 68
End: 2018-02-26

## 2018-02-27 DIAGNOSIS — R39.15 URINARY URGENCY: ICD-10-CM

## 2018-02-28 RX ORDER — OXYBUTYNIN CHLORIDE 10 MG/1
TABLET, EXTENDED RELEASE ORAL
Qty: 30 TABLET | Refills: 8 | Status: SHIPPED | OUTPATIENT
Start: 2018-02-28 | End: 2018-08-29

## 2018-07-16 ENCOUNTER — OFFICE VISIT (OUTPATIENT)
Dept: FAMILY MEDICINE CLINIC | Age: 68
End: 2018-07-16
Payer: MEDICARE

## 2018-07-16 VITALS
BODY MASS INDEX: 33.19 KG/M2 | WEIGHT: 250.4 LBS | SYSTOLIC BLOOD PRESSURE: 116 MMHG | HEIGHT: 73 IN | TEMPERATURE: 98.1 F | DIASTOLIC BLOOD PRESSURE: 70 MMHG | HEART RATE: 72 BPM | OXYGEN SATURATION: 95 %

## 2018-07-16 DIAGNOSIS — R42 LIGHTHEADEDNESS: ICD-10-CM

## 2018-07-16 DIAGNOSIS — J43.9 PULMONARY EMPHYSEMA, UNSPECIFIED EMPHYSEMA TYPE (HCC): ICD-10-CM

## 2018-07-16 DIAGNOSIS — M25.511 CHRONIC RIGHT SHOULDER PAIN: ICD-10-CM

## 2018-07-16 DIAGNOSIS — M25.562 CHRONIC PAIN OF BOTH KNEES: Primary | ICD-10-CM

## 2018-07-16 DIAGNOSIS — G89.29 CHRONIC RIGHT SHOULDER PAIN: ICD-10-CM

## 2018-07-16 DIAGNOSIS — I50.9 CONGESTIVE HEART FAILURE, UNSPECIFIED CONGESTIVE HEART FAILURE CHRONICITY, UNSPECIFIED CONGESTIVE HEART FAILURE TYPE: ICD-10-CM

## 2018-07-16 DIAGNOSIS — G89.29 CHRONIC PAIN OF BOTH KNEES: Primary | ICD-10-CM

## 2018-07-16 DIAGNOSIS — I48.20 CHRONIC ATRIAL FIBRILLATION (HCC): ICD-10-CM

## 2018-07-16 DIAGNOSIS — M25.561 CHRONIC PAIN OF BOTH KNEES: Primary | ICD-10-CM

## 2018-07-16 DIAGNOSIS — K08.9 POOR DENTITION: ICD-10-CM

## 2018-07-16 DIAGNOSIS — K74.69 OTHER CIRRHOSIS OF LIVER (HCC): ICD-10-CM

## 2018-07-16 PROCEDURE — G8926 SPIRO NO PERF OR DOC: HCPCS | Performed by: FAMILY MEDICINE

## 2018-07-16 PROCEDURE — G8417 CALC BMI ABV UP PARAM F/U: HCPCS | Performed by: FAMILY MEDICINE

## 2018-07-16 PROCEDURE — 3023F SPIROM DOC REV: CPT | Performed by: FAMILY MEDICINE

## 2018-07-16 PROCEDURE — 99214 OFFICE O/P EST MOD 30 MIN: CPT | Performed by: FAMILY MEDICINE

## 2018-07-16 PROCEDURE — 4040F PNEUMOC VAC/ADMIN/RCVD: CPT | Performed by: FAMILY MEDICINE

## 2018-07-16 PROCEDURE — 3017F COLORECTAL CA SCREEN DOC REV: CPT | Performed by: FAMILY MEDICINE

## 2018-07-16 PROCEDURE — G8427 DOCREV CUR MEDS BY ELIG CLIN: HCPCS | Performed by: FAMILY MEDICINE

## 2018-07-16 PROCEDURE — 1123F ACP DISCUSS/DSCN MKR DOCD: CPT | Performed by: FAMILY MEDICINE

## 2018-07-16 PROCEDURE — 1101F PT FALLS ASSESS-DOCD LE1/YR: CPT | Performed by: FAMILY MEDICINE

## 2018-07-16 PROCEDURE — 1036F TOBACCO NON-USER: CPT | Performed by: FAMILY MEDICINE

## 2018-07-16 RX ORDER — AMOXICILLIN 500 MG/1
500 CAPSULE ORAL 3 TIMES DAILY
COMMUNITY
End: 2018-08-29

## 2018-07-16 ASSESSMENT — ENCOUNTER SYMPTOMS
ABDOMINAL PAIN: 0
WHEEZING: 0
COUGH: 0
SORE THROAT: 0
DIARRHEA: 0
SHORTNESS OF BREATH: 0
CONSTIPATION: 0
RHINORRHEA: 0

## 2018-07-16 NOTE — PROGRESS NOTES
Number of children: N/A    Years of education: N/A     Occupational History    Not on file. Social History Main Topics    Smoking status: Never Smoker    Smokeless tobacco: Never Used    Alcohol use No    Drug use: No    Sexual activity: Not on file     Other Topics Concern    Not on file     Social History Narrative    No narrative on file     Allergies   Allergen Reactions    Cashew Nut Oil     Dust Mite Extract     Hctz     Other      cashews    Pollen Extract     Thiazide-Type Diuretics      itching     Current Outpatient Prescriptions   Medication Sig Dispense Refill    amoxicillin (AMOXIL) 500 MG capsule Take 500 mg by mouth 3 times daily      oxybutynin (DITROPAN-XL) 10 MG extended release tablet TAKE ONE TABLET BY MOUTH ONCE DAILY 30 tablet 8    PARoxetine (PAXIL) 40 MG tablet TAKE ONE TABLET BY MOUTH ONCE DAILY 30 tablet 11    metoclopramide (REGLAN) 10 MG tablet TAKE ONE TABLET BY MOUTH THREE TIMES DAILY 90 tablet 5    levothyroxine (SYNTHROID) 100 MCG tablet Take 1 tablet by mouth daily 90 tablet 3    nystatin (MYCOSTATIN) 082582 UNIT/GM cream Apply topically 2 times daily. 1 Tube 3    escitalopram (LEXAPRO) 20 MG tablet Take 1 tablet by mouth daily 30 tablet 3    budesonide-formoterol (SYMBICORT) 160-4.5 MCG/ACT AERO Inhale 2 puffs into the lungs 2 times daily LOT [id number] exp 10/2018 1 Inhaler 0    budesonide-formoterol (SYMBICORT) 160-4.5 MCG/ACT AERO Inhale 2 puffs into the lungs 2 times daily Lot # [id number] Exp 11/2017 2 Inhaler 3    morphine (MS CONTIN) 60 MG extended release tablet       tiotropium (SPIRIVA RESPIMAT) 2.5 MCG/ACT AERS inhaler Lot: [id number] EXP: 2/2018 1 Inhaler 0    diclofenac (VOLTAREN) 75 MG EC tablet TAKE ONE TABLET BY MOUTH TWICE DAILY 180 tablet 1    lidocaine (XYLOCAINE) 5 % ointment Apply topically every 2 hours as needed.  1 Tube 2    albuterol (PROVENTIL) (2.5 MG/3ML) 0.083% nebulizer solution USE ONE VIAL IN NEBULIZER EVERY 4 HOURS AS

## 2018-08-23 ENCOUNTER — HOSPITAL ENCOUNTER (OUTPATIENT)
Dept: LAB | Age: 68
Discharge: HOME | End: 2018-08-23
Payer: MEDICARE

## 2018-08-23 LAB
INR BLD: 1.7
PROTHROMBIN TIME: 17.4 SEC (ref 9.6–12.3)

## 2018-08-23 PROCEDURE — 36415 COLL VENOUS BLD VENIPUNCTURE: CPT

## 2018-08-23 PROCEDURE — 85610 PROTHROMBIN TIME: CPT

## 2018-08-29 ENCOUNTER — OFFICE VISIT (OUTPATIENT)
Dept: FAMILY MEDICINE CLINIC | Age: 68
End: 2018-08-29
Payer: MEDICARE

## 2018-08-29 ENCOUNTER — TELEPHONE (OUTPATIENT)
Dept: FAMILY MEDICINE CLINIC | Age: 68
End: 2018-08-29

## 2018-08-29 VITALS
BODY MASS INDEX: 33.13 KG/M2 | HEIGHT: 73 IN | SYSTOLIC BLOOD PRESSURE: 112 MMHG | HEART RATE: 72 BPM | OXYGEN SATURATION: 93 % | TEMPERATURE: 97.8 F | DIASTOLIC BLOOD PRESSURE: 62 MMHG | WEIGHT: 250 LBS

## 2018-08-29 DIAGNOSIS — N30.01 ACUTE CYSTITIS WITH HEMATURIA: ICD-10-CM

## 2018-08-29 DIAGNOSIS — H04.122 INSUFFICIENCY OF TEAR FILM OF LEFT EYE: ICD-10-CM

## 2018-08-29 DIAGNOSIS — R39.15 URINARY URGENCY: Primary | ICD-10-CM

## 2018-08-29 LAB
APPEARANCE FLUID: ABNORMAL
BILIRUBIN, POC: ABNORMAL
BLOOD URINE, POC: ABNORMAL
CLARITY, POC: ABNORMAL
COLOR, POC: YELLOW
GLUCOSE URINE, POC: ABNORMAL
KETONES, POC: ABNORMAL
LEUKOCYTE EST, POC: ABNORMAL
NITRITE, POC: ABNORMAL
PH, POC: 6
PROTEIN, POC: ABNORMAL
SPECIFIC GRAVITY, POC: 1.01
UROBILINOGEN, POC: ABNORMAL

## 2018-08-29 PROCEDURE — 1101F PT FALLS ASSESS-DOCD LE1/YR: CPT | Performed by: FAMILY MEDICINE

## 2018-08-29 PROCEDURE — 1036F TOBACCO NON-USER: CPT | Performed by: FAMILY MEDICINE

## 2018-08-29 PROCEDURE — 4040F PNEUMOC VAC/ADMIN/RCVD: CPT | Performed by: FAMILY MEDICINE

## 2018-08-29 PROCEDURE — G8417 CALC BMI ABV UP PARAM F/U: HCPCS | Performed by: FAMILY MEDICINE

## 2018-08-29 PROCEDURE — G8427 DOCREV CUR MEDS BY ELIG CLIN: HCPCS | Performed by: FAMILY MEDICINE

## 2018-08-29 PROCEDURE — 81002 URINALYSIS NONAUTO W/O SCOPE: CPT | Performed by: FAMILY MEDICINE

## 2018-08-29 PROCEDURE — 99214 OFFICE O/P EST MOD 30 MIN: CPT | Performed by: FAMILY MEDICINE

## 2018-08-29 PROCEDURE — 3017F COLORECTAL CA SCREEN DOC REV: CPT | Performed by: FAMILY MEDICINE

## 2018-08-29 PROCEDURE — 1123F ACP DISCUSS/DSCN MKR DOCD: CPT | Performed by: FAMILY MEDICINE

## 2018-08-29 RX ORDER — OXYBUTYNIN CHLORIDE 10 MG/1
TABLET, EXTENDED RELEASE ORAL
Qty: 30 TABLET | Refills: 8 | Status: CANCELLED | OUTPATIENT
Start: 2018-08-29

## 2018-08-29 RX ORDER — NITROFURANTOIN 25; 75 MG/1; MG/1
100 CAPSULE ORAL 2 TIMES DAILY
Qty: 14 CAPSULE | Refills: 0 | Status: SHIPPED | OUTPATIENT
Start: 2018-08-29 | End: 2018-09-05

## 2018-08-29 RX ORDER — SULFAMETHOXAZOLE AND TRIMETHOPRIM 800; 160 MG/1; MG/1
1 TABLET ORAL 2 TIMES DAILY
Qty: 10 TABLET | Refills: 0 | Status: SHIPPED | OUTPATIENT
Start: 2018-08-29 | End: 2018-08-29

## 2018-08-29 RX ORDER — OXYBUTYNIN CHLORIDE 15 MG/1
15 TABLET, EXTENDED RELEASE ORAL 2 TIMES DAILY
Qty: 60 TABLET | Refills: 3 | Status: SHIPPED | OUTPATIENT
Start: 2018-08-29 | End: 2018-10-02

## 2018-08-29 ASSESSMENT — ENCOUNTER SYMPTOMS
ABDOMINAL PAIN: 0
EYE DISCHARGE: 1
WHEEZING: 0
CONSTIPATION: 0
SORE THROAT: 0
COUGH: 0
SHORTNESS OF BREATH: 0
DIARRHEA: 0
RHINORRHEA: 0

## 2018-08-29 ASSESSMENT — PATIENT HEALTH QUESTIONNAIRE - PHQ9
SUM OF ALL RESPONSES TO PHQ9 QUESTIONS 1 & 2: 0
1. LITTLE INTEREST OR PLEASURE IN DOING THINGS: 0
2. FEELING DOWN, DEPRESSED OR HOPELESS: 0
SUM OF ALL RESPONSES TO PHQ QUESTIONS 1-9: 0
SUM OF ALL RESPONSES TO PHQ QUESTIONS 1-9: 0

## 2018-08-29 NOTE — PROGRESS NOTES
normal mood and affect. His behavior is normal.   Vitals reviewed. Assessment/Plan:  79 y.o. male here mainly for LUTS:  - LUTS: UA suggestive uti; course of macrobid; he is on warfarin; also increased the ditropan to 15mg BID  - Eye problem: encouraged otc wetting drops; benign exam     Diagnosis Orders   1. Urinary urgency  oxybutynin (DITROPAN XL) 15 MG extended release tablet    POCT Urinalysis no Micro   2. Acute cystitis with hematuria  nitrofurantoin, macrocrystal-monohydrate, (MACROBID) 100 MG capsule    Urine Culture    DISCONTINUED: sulfamethoxazole-trimethoprim (BACTRIM DS) 800-160 MG per tablet   3. 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     Josef Amaya MD

## 2018-08-30 ENCOUNTER — HOSPITAL ENCOUNTER (OUTPATIENT)
Age: 68
Setting detail: SPECIMEN
Discharge: HOME | End: 2018-08-30
Payer: MEDICARE

## 2018-08-30 DIAGNOSIS — N30.01 ACUTE CYSTITIS WITH HEMATURIA: ICD-10-CM

## 2018-08-30 PROCEDURE — 87077 CULTURE AEROBIC IDENTIFY: CPT

## 2018-08-30 PROCEDURE — 87186 SC STD MICRODIL/AGAR DIL: CPT

## 2018-08-30 PROCEDURE — 87086 URINE CULTURE/COLONY COUNT: CPT

## 2018-09-02 LAB
ORGANISM: ABNORMAL
URINE CULTURE, ROUTINE: ABNORMAL
URINE CULTURE, ROUTINE: ABNORMAL

## 2018-09-06 ENCOUNTER — OFFICE VISIT (OUTPATIENT)
Dept: FAMILY MEDICINE CLINIC | Age: 68
End: 2018-09-06
Payer: MEDICARE

## 2018-09-06 VITALS
OXYGEN SATURATION: 98 % | DIASTOLIC BLOOD PRESSURE: 58 MMHG | HEART RATE: 71 BPM | TEMPERATURE: 97.8 F | SYSTOLIC BLOOD PRESSURE: 104 MMHG

## 2018-09-06 DIAGNOSIS — Z01.818 PREOP EXAMINATION: Primary | ICD-10-CM

## 2018-09-06 PROCEDURE — 1101F PT FALLS ASSESS-DOCD LE1/YR: CPT | Performed by: FAMILY MEDICINE

## 2018-09-06 PROCEDURE — 3017F COLORECTAL CA SCREEN DOC REV: CPT | Performed by: FAMILY MEDICINE

## 2018-09-06 PROCEDURE — G8427 DOCREV CUR MEDS BY ELIG CLIN: HCPCS | Performed by: FAMILY MEDICINE

## 2018-09-06 PROCEDURE — G8417 CALC BMI ABV UP PARAM F/U: HCPCS | Performed by: FAMILY MEDICINE

## 2018-09-06 PROCEDURE — 99212 OFFICE O/P EST SF 10 MIN: CPT | Performed by: FAMILY MEDICINE

## 2018-09-06 ASSESSMENT — ENCOUNTER SYMPTOMS
RHINORRHEA: 0
WHEEZING: 0
SORE THROAT: 0
ABDOMINAL PAIN: 0
DIARRHEA: 0
SHORTNESS OF BREATH: 0
CONSTIPATION: 0
COUGH: 0

## 2018-09-06 NOTE — PROGRESS NOTES
[address] [facility] PRIMARY CARE  [address] [facility] [address]  Dept: [phone]  Dept Fax: : [phone]   Chief Complaint  Chief Complaint   Patient presents with    Pre-op Exam     Left knee replacement. HPI:  79 y.o. male who presents for preop exam:    Preop exam: planning L TKR w Dr. Rowdy Koenig 9/17/18; Has PSE scheduled for tomorrow. No SOB or CP. Has knee pain. Already seen by cardiology and deemed reasonable risk. Past Medical History:   Diagnosis Date    Allergic rhinitis     Asthma     asthmatic bronchitis    Atrial fibrillation (HCC)     CAD (coronary artery disease)     Chronic back pain     COPD (chronic obstructive pulmonary disease) (HCC)     CVA (cerebral infarction)     Depression     Edema     GERD (gastroesophageal reflux disease)     ulcer    Glucose intolerance (impaired glucose tolerance) 11/06    Hypertension     ICD (implantable cardiac defibrillator) in place 2010    OMAR (obstructive sleep apnea)     UTI (urinary tract infection)      Past Surgical History:   Procedure Laterality Date    BACK SURGERY      ensed    FRACTURE SURGERY  7/06    l/shoulder    GLOSSECTOMY  12/09/14    nasal endoscopy    GLOSSECTOMY  07/31/15    W/COBLATION,NASAL ENDOSCOPY    PACEMAKER PLACEMENT  2010    SKIN BIOPSY  2/13/12    right leg-Dr. Irish Sagastume     Social History     Social History    Marital status:      Spouse name: N/A    Number of children: N/A    Years of education: N/A     Occupational History    Not on file.      Social History Main Topics    Smoking status: Never Smoker    Smokeless tobacco: Never Used    Alcohol use No    Drug use: No    Sexual activity: Not on file     Other Topics Concern    Not on file     Social History Narrative    No narrative on file     Allergies   Allergen Reactions    Cashew Nut Oil     Dust Mite Extract     Hctz     Other

## 2018-09-14 ENCOUNTER — OFFICE VISIT (OUTPATIENT)
Dept: FAMILY MEDICINE CLINIC | Age: 68
End: 2018-09-14
Payer: MEDICARE

## 2018-09-14 VITALS — OXYGEN SATURATION: 98 % | HEART RATE: 72 BPM | SYSTOLIC BLOOD PRESSURE: 110 MMHG | DIASTOLIC BLOOD PRESSURE: 62 MMHG

## 2018-09-14 DIAGNOSIS — F41.0 PANIC ATTACK: Primary | ICD-10-CM

## 2018-09-14 PROCEDURE — 3017F COLORECTAL CA SCREEN DOC REV: CPT | Performed by: FAMILY MEDICINE

## 2018-09-14 PROCEDURE — 1036F TOBACCO NON-USER: CPT | Performed by: FAMILY MEDICINE

## 2018-09-14 PROCEDURE — 1101F PT FALLS ASSESS-DOCD LE1/YR: CPT | Performed by: FAMILY MEDICINE

## 2018-09-14 PROCEDURE — 4040F PNEUMOC VAC/ADMIN/RCVD: CPT | Performed by: FAMILY MEDICINE

## 2018-09-14 PROCEDURE — 1123F ACP DISCUSS/DSCN MKR DOCD: CPT | Performed by: FAMILY MEDICINE

## 2018-09-14 PROCEDURE — G8417 CALC BMI ABV UP PARAM F/U: HCPCS | Performed by: FAMILY MEDICINE

## 2018-09-14 PROCEDURE — G8427 DOCREV CUR MEDS BY ELIG CLIN: HCPCS | Performed by: FAMILY MEDICINE

## 2018-09-14 PROCEDURE — 99213 OFFICE O/P EST LOW 20 MIN: CPT | Performed by: FAMILY MEDICINE

## 2018-09-14 RX ORDER — CLONAZEPAM 0.5 MG/1
0.25 TABLET ORAL DAILY PRN
Qty: 10 TABLET | Refills: 0 | Status: SHIPPED | OUTPATIENT
Start: 2018-09-14 | End: 2019-06-14 | Stop reason: SDUPTHER

## 2018-09-14 ASSESSMENT — ENCOUNTER SYMPTOMS
RHINORRHEA: 0
WHEEZING: 0
SORE THROAT: 0
COUGH: 0
ABDOMINAL PAIN: 0
SHORTNESS OF BREATH: 0
DIARRHEA: 0
CONSTIPATION: 0

## 2018-09-14 NOTE — PROGRESS NOTES
[address] [id number] [facility] PRIMARY CARE  [address] [place] [phone]  Dept: [phone]  Dept Fax: [phone]  Loc: [phone]   Chief Complaint  Chief Complaint   Patient presents with    Anxiety     Pt very anxious for surgery and wants \"nerve pills\"       HPI:  79 y.o. male who presents for anxiety:    Anxiety: planning knee surgery and has been very worried thinking about this over the past week; requesting something to help ease his anxiety until his surgery. Past Medical History:   Diagnosis Date    Allergic rhinitis     Asthma     asthmatic bronchitis    Atrial fibrillation (HCC)     CAD (coronary artery disease)     Chronic back pain     COPD (chronic obstructive pulmonary disease) (HCC)     CVA (cerebral infarction)     Depression     Edema     GERD (gastroesophageal reflux disease)     ulcer    Glucose intolerance (impaired glucose tolerance) 11/06    Hypertension     ICD (implantable cardiac defibrillator) in place 2010    OMAR (obstructive sleep apnea)     UTI (urinary tract infection)      Past Surgical History:   Procedure Laterality Date    BACK SURGERY      ensed    FRACTURE SURGERY  7/06    l/shoulder    GLOSSECTOMY  12/09/14    nasal endoscopy    GLOSSECTOMY  07/31/15    W/COBLATION,NASAL ENDOSCOPY    PACEMAKER PLACEMENT  2010    SKIN BIOPSY  2/13/12    right leg-Dr. Susana Rushing     Social History     Social History    Marital status:      Spouse name: N/A    Number of children: N/A    Years of education: N/A     Occupational History    Not on file.      Social History Main Topics    Smoking status: Never Smoker    Smokeless tobacco: Never Used    Alcohol use No    Drug use: No    Sexual activity: Not on file     Other Topics Concern    Not on file     Social History Narrative    No narrative on file     Allergies   Allergen Reactions    Cashew Nut Oil     Dust Mite Extract     Hctz  Other      cashews    Pollen Extract     Thiazide-Type Diuretics      itching     Current Outpatient Prescriptions   Medication Sig Dispense Refill    clonazePAM (KLONOPIN) 0.5 MG tablet Take 0.5 tablets by mouth daily as needed for Anxiety for up to 30 days. . 10 tablet 0    oxybutynin (DITROPAN XL) 15 MG extended release tablet Take 1 tablet by mouth 2 times daily 60 tablet 3    levothyroxine (SYNTHROID) 100 MCG tablet Take 1 tablet by mouth daily 90 tablet 3    budesonide-formoterol (SYMBICORT) 160-4.5 MCG/ACT AERO Inhale 2 puffs into the lungs 2 times daily LOT [id number] exp 10/2018 1 Inhaler 0    diclofenac (VOLTAREN) 75 MG EC tablet TAKE ONE TABLET BY MOUTH TWICE DAILY 180 tablet 1    lidocaine (XYLOCAINE) 5 % ointment Apply topically every 2 hours as needed. 1 Tube 2    albuterol (PROVENTIL) (2.5 MG/3ML) 0.083% nebulizer solution USE ONE VIAL IN NEBULIZER EVERY 4 HOURS AS NEEDED FOR WHEEZING AND FOR SHORTNESS OF BREATH 75 vial 5    lactulose 20 GM/30ML SOLN Take  by mouth.  Compression Stockings MISC Knee high compression stockings  Dx: LE edema  20-30 mm pressure 2 each 0    SYRINGE-NEEDLE, DISP, 3 ML (B-D INTEGRA SYRINGE) 22G X 1-1/2\" 3 ML MISC 1 each by Does not apply route daily. 20 each 6    carvedilol (COREG) 25 MG tablet Take 1 tablet by mouth 2 times daily (with meals). 180 tablet 1    sotalol (BETAPACE) 80 MG tablet Take 80 mg by mouth 2 times daily. Take 1/2 tablet twice daily      WARFARIN SODIUM by Does not apply route.          Current Facility-Administered Medications   Medication Dose Route Frequency Provider Last Rate Last Dose    testosterone cypionate (DEPOTESTOTERONE CYPIONATE) injection 200 mg  200 mg Intramuscular Once         testosterone cypionate (DEPOTESTOTERONE CYPIONATE) injection 200 mg  200 mg Intramuscular Once         testosterone cypionate (DEPOTESTOTERONE CYPIONATE) injection 200 mg  200 mg Intramuscular Once            ROS:  Review of Systems Constitutional: Negative for chills and fever. HENT: Negative for rhinorrhea and sore throat. Respiratory: Negative for cough, shortness of breath and wheezing. Gastrointestinal: Negative for abdominal pain, constipation and diarrhea. Endocrine: Negative for polydipsia and polyuria. Genitourinary: Negative for dysuria, frequency and urgency. Neurological: Negative for syncope, light-headedness, numbness and headaches. Psychiatric/Behavioral: Negative for sleep disturbance. The patient is nervous/anxious. Vitals:    09/14/18 1352   BP: 110/62   Pulse: 72   SpO2: 98%       Physical exam:  Physical Exam   Constitutional: He is oriented to person, place, and time. He appears well-developed and well-nourished. No distress. HENT:   Head: Normocephalic and atraumatic. Mouth/Throat: No oropharyngeal exudate. Eyes: EOM are normal.   Neck: Normal range of motion. Cardiovascular: Normal rate, regular rhythm and normal heart sounds. No murmur heard. Pulmonary/Chest: Effort normal and breath sounds normal. No respiratory distress. He has no wheezes. Abdominal: Soft. He exhibits no distension. There is no tenderness. There is no rebound and no guarding. Neurological: He is alert and oriented to person, place, and time. Skin: Skin is warm and dry. Psychiatric: He has a normal mood and affect. His behavior is normal.   Vitals reviewed. Assessment/Plan:  79 y.o. male here mainly for panic attacks:  - provided limited clonazepam; discussed risks on this med; should try 1/2 tab first and then full tab if needed. Diagnosis Orders   1. Panic attack  clonazePAM (KLONOPIN) 0.5 MG tablet        Return if symptoms worsen or fail to improve.     Mick Villareal MD

## 2018-09-20 ENCOUNTER — TELEPHONE (OUTPATIENT)
Dept: GERIATRIC MEDICINE | Age: 68
End: 2018-09-20

## 2018-09-20 ENCOUNTER — OFFICE VISIT (OUTPATIENT)
Dept: GERIATRIC MEDICINE | Age: 68
End: 2018-09-20
Payer: MEDICARE

## 2018-09-20 DIAGNOSIS — J43.9 PULMONARY EMPHYSEMA, UNSPECIFIED EMPHYSEMA TYPE (HCC): Primary | ICD-10-CM

## 2018-09-20 DIAGNOSIS — Z96.652 STATUS POST TOTAL LEFT KNEE REPLACEMENT: ICD-10-CM

## 2018-09-20 DIAGNOSIS — I50.9 CONGESTIVE HEART FAILURE, UNSPECIFIED HF CHRONICITY, UNSPECIFIED HEART FAILURE TYPE (HCC): ICD-10-CM

## 2018-09-20 PROCEDURE — 3017F COLORECTAL CA SCREEN DOC REV: CPT | Performed by: NURSE PRACTITIONER

## 2018-09-20 PROCEDURE — 1101F PT FALLS ASSESS-DOCD LE1/YR: CPT | Performed by: NURSE PRACTITIONER

## 2018-09-20 PROCEDURE — 1123F ACP DISCUSS/DSCN MKR DOCD: CPT | Performed by: NURSE PRACTITIONER

## 2018-09-20 PROCEDURE — 99309 SBSQ NF CARE MODERATE MDM 30: CPT | Performed by: NURSE PRACTITIONER

## 2018-09-21 LAB
ANION GAP SERPL CALCULATED.3IONS-SCNC: 11 MEQ/L (ref 7–13)
BUN BLDV-MCNC: 16 MG/DL (ref 8–23)
CALCIUM SERPL-MCNC: 8.6 MG/DL (ref 8.6–10.2)
CHLORIDE BLD-SCNC: 98 MEQ/L (ref 98–107)
CHOLESTEROL, TOTAL: 100 MG/DL (ref 0–199)
CO2: 26 MEQ/L (ref 22–29)
CREAT SERPL-MCNC: 0.88 MG/DL (ref 0.7–1.2)
GFR AFRICAN AMERICAN: >60
GFR NON-AFRICAN AMERICAN: >60
GLUCOSE BLD-MCNC: 82 MG/DL (ref 74–109)
HCT VFR BLD CALC: 32.6 % (ref 42–52)
HDLC SERPL-MCNC: 21 MG/DL (ref 40–59)
HEMOGLOBIN: 11.5 G/DL (ref 14–18)
INR BLD: 2.5
LDL CHOLESTEROL CALCULATED: 56 MG/DL (ref 0–129)
MCH RBC QN AUTO: 31.4 PG (ref 27–31.3)
MCHC RBC AUTO-ENTMCNC: 35.1 % (ref 33–37)
MCV RBC AUTO: 89.4 FL (ref 80–100)
PDW BLD-RTO: 13.7 % (ref 11.5–14.5)
PLATELET # BLD: 96 K/UL (ref 130–400)
PLATELET SLIDE REVIEW: ABNORMAL
POTASSIUM SERPL-SCNC: 3.8 MEQ/L (ref 3.5–5.1)
PROTHROMBIN TIME: 26.6 SEC (ref 9.6–12.3)
RBC # BLD: 3.65 M/UL (ref 4.7–6.1)
SODIUM BLD-SCNC: 135 MEQ/L (ref 132–144)
TRIGL SERPL-MCNC: 114 MG/DL (ref 0–200)
WBC # BLD: 4.8 K/UL (ref 4.8–10.8)

## 2018-09-24 ENCOUNTER — OFFICE VISIT (OUTPATIENT)
Dept: GERIATRIC MEDICINE | Age: 68
End: 2018-09-24
Payer: MEDICARE

## 2018-09-24 DIAGNOSIS — K59.00 CONSTIPATION, UNSPECIFIED CONSTIPATION TYPE: ICD-10-CM

## 2018-09-24 DIAGNOSIS — M15.9 OSTEOARTHRITIS OF MULTIPLE JOINTS, UNSPECIFIED OSTEOARTHRITIS TYPE: Primary | ICD-10-CM

## 2018-09-24 DIAGNOSIS — I10 ESSENTIAL HYPERTENSION: ICD-10-CM

## 2018-09-24 LAB
HCT VFR BLD CALC: 34.9 % (ref 42–52)
HEMOGLOBIN: 12 G/DL (ref 14–18)
INR BLD: 3.2
MCH RBC QN AUTO: 31.2 PG (ref 27–31.3)
MCHC RBC AUTO-ENTMCNC: 34.4 % (ref 33–37)
MCV RBC AUTO: 90.9 FL (ref 80–100)
PDW BLD-RTO: 14 % (ref 11.5–14.5)
PLATELET # BLD: 124 K/UL (ref 130–400)
PROTHROMBIN TIME: 34.1 SEC (ref 9.6–12.3)
RBC # BLD: 3.83 M/UL (ref 4.7–6.1)
WBC # BLD: 5.1 K/UL (ref 4.8–10.8)

## 2018-09-24 PROCEDURE — 1101F PT FALLS ASSESS-DOCD LE1/YR: CPT | Performed by: INTERNAL MEDICINE

## 2018-09-24 PROCEDURE — 99305 1ST NF CARE MODERATE MDM 35: CPT | Performed by: INTERNAL MEDICINE

## 2018-09-24 PROCEDURE — 3017F COLORECTAL CA SCREEN DOC REV: CPT | Performed by: INTERNAL MEDICINE

## 2018-09-24 PROCEDURE — 1123F ACP DISCUSS/DSCN MKR DOCD: CPT | Performed by: INTERNAL MEDICINE

## 2018-10-02 ENCOUNTER — OFFICE VISIT (OUTPATIENT)
Dept: FAMILY MEDICINE CLINIC | Age: 68
End: 2018-10-02
Payer: MEDICARE

## 2018-10-02 ENCOUNTER — HOSPITAL ENCOUNTER (OUTPATIENT)
Age: 68
Setting detail: SPECIMEN
Discharge: HOME | End: 2018-10-02
Payer: MEDICARE

## 2018-10-02 VITALS
OXYGEN SATURATION: 96 % | SYSTOLIC BLOOD PRESSURE: 100 MMHG | DIASTOLIC BLOOD PRESSURE: 60 MMHG | TEMPERATURE: 97.2 F | HEART RATE: 71 BPM

## 2018-10-02 DIAGNOSIS — Z23 NEED FOR PNEUMOCOCCAL VACCINATION: ICD-10-CM

## 2018-10-02 DIAGNOSIS — H10.30 ACUTE BACTERIAL CONJUNCTIVITIS, UNSPECIFIED LATERALITY: ICD-10-CM

## 2018-10-02 DIAGNOSIS — R30.0 DYSURIA: Primary | ICD-10-CM

## 2018-10-02 DIAGNOSIS — Z23 NEED FOR INFLUENZA VACCINATION: ICD-10-CM

## 2018-10-02 DIAGNOSIS — R30.0 DYSURIA: ICD-10-CM

## 2018-10-02 DIAGNOSIS — R39.15 URINARY URGENCY: ICD-10-CM

## 2018-10-02 PROBLEM — R35.81 NOCTURNAL POLYURIA: Status: ACTIVE | Noted: 2018-10-02

## 2018-10-02 LAB
BILIRUBIN, POC: ABNORMAL
BLOOD URINE, POC: ABNORMAL
CLARITY, POC: ABNORMAL
COLOR, POC: YELLOW
GLUCOSE URINE, POC: ABNORMAL
KETONES, POC: ABNORMAL
LEUKOCYTE EST, POC: ABNORMAL
NITRITE, POC: ABNORMAL
PH, POC: 6
PROTEIN, POC: ABNORMAL
SPECIFIC GRAVITY, POC: 1.02
UROBILINOGEN, POC: ABNORMAL

## 2018-10-02 PROCEDURE — G8427 DOCREV CUR MEDS BY ELIG CLIN: HCPCS | Performed by: FAMILY MEDICINE

## 2018-10-02 PROCEDURE — 87186 SC STD MICRODIL/AGAR DIL: CPT

## 2018-10-02 PROCEDURE — 87077 CULTURE AEROBIC IDENTIFY: CPT

## 2018-10-02 PROCEDURE — G8482 FLU IMMUNIZE ORDER/ADMIN: HCPCS | Performed by: FAMILY MEDICINE

## 2018-10-02 PROCEDURE — 87086 URINE CULTURE/COLONY COUNT: CPT

## 2018-10-02 PROCEDURE — 3017F COLORECTAL CA SCREEN DOC REV: CPT | Performed by: FAMILY MEDICINE

## 2018-10-02 PROCEDURE — 81003 URINALYSIS AUTO W/O SCOPE: CPT | Performed by: FAMILY MEDICINE

## 2018-10-02 PROCEDURE — 1101F PT FALLS ASSESS-DOCD LE1/YR: CPT | Performed by: FAMILY MEDICINE

## 2018-10-02 PROCEDURE — G0008 ADMIN INFLUENZA VIRUS VAC: HCPCS | Performed by: FAMILY MEDICINE

## 2018-10-02 PROCEDURE — 1123F ACP DISCUSS/DSCN MKR DOCD: CPT | Performed by: FAMILY MEDICINE

## 2018-10-02 PROCEDURE — G8417 CALC BMI ABV UP PARAM F/U: HCPCS | Performed by: FAMILY MEDICINE

## 2018-10-02 PROCEDURE — 90732 PPSV23 VACC 2 YRS+ SUBQ/IM: CPT | Performed by: FAMILY MEDICINE

## 2018-10-02 PROCEDURE — G0009 ADMIN PNEUMOCOCCAL VACCINE: HCPCS | Performed by: FAMILY MEDICINE

## 2018-10-02 PROCEDURE — 1036F TOBACCO NON-USER: CPT | Performed by: FAMILY MEDICINE

## 2018-10-02 PROCEDURE — 90662 IIV NO PRSV INCREASED AG IM: CPT | Performed by: FAMILY MEDICINE

## 2018-10-02 PROCEDURE — 4040F PNEUMOC VAC/ADMIN/RCVD: CPT | Performed by: FAMILY MEDICINE

## 2018-10-02 PROCEDURE — 99214 OFFICE O/P EST MOD 30 MIN: CPT | Performed by: FAMILY MEDICINE

## 2018-10-02 RX ORDER — SULFAMETHOXAZOLE AND TRIMETHOPRIM 800; 160 MG/1; MG/1
1 TABLET ORAL 2 TIMES DAILY
Qty: 10 TABLET | Refills: 0 | Status: SHIPPED | OUTPATIENT
Start: 2018-10-02 | End: 2018-10-05

## 2018-10-02 RX ORDER — CEPHALEXIN 500 MG/1
500 CAPSULE ORAL 4 TIMES DAILY
Qty: 28 CAPSULE | Refills: 0 | Status: SHIPPED | OUTPATIENT
Start: 2018-10-02 | End: 2018-10-09

## 2018-10-02 RX ORDER — POLYMYXIN B SULFATE AND TRIMETHOPRIM 1; 10000 MG/ML; [USP'U]/ML
1 SOLUTION OPHTHALMIC EVERY 4 HOURS
Qty: 10 ML | Refills: 0 | Status: SHIPPED | OUTPATIENT
Start: 2018-10-02 | End: 2018-10-09

## 2018-10-02 ASSESSMENT — ENCOUNTER SYMPTOMS
RHINORRHEA: 0
EYE DISCHARGE: 1
ABDOMINAL PAIN: 0
EYE PAIN: 0
SHORTNESS OF BREATH: 0
EYE REDNESS: 1
COUGH: 0
CONSTIPATION: 0
PHOTOPHOBIA: 0
WHEEZING: 0
DIARRHEA: 0
SORE THROAT: 0

## 2018-10-03 ENCOUNTER — HOSPITAL ENCOUNTER (OUTPATIENT)
Dept: LAB | Age: 68
Discharge: HOME | End: 2018-10-03
Payer: MEDICARE

## 2018-10-03 ENCOUNTER — HOSPITAL ENCOUNTER (OUTPATIENT)
Dept: PHYSICAL THERAPY | Age: 68
Setting detail: THERAPIES SERIES
Discharge: HOME | End: 2018-10-03
Payer: MEDICARE

## 2018-10-03 LAB
INR BLD: 2
PROTHROMBIN TIME: 20.8 SEC (ref 9.6–12.3)

## 2018-10-03 PROCEDURE — 85610 PROTHROMBIN TIME: CPT

## 2018-10-03 PROCEDURE — 97162 PT EVAL MOD COMPLEX 30 MIN: CPT

## 2018-10-03 PROCEDURE — 36415 COLL VENOUS BLD VENIPUNCTURE: CPT

## 2018-10-03 PROCEDURE — G8978 MOBILITY CURRENT STATUS: HCPCS

## 2018-10-03 PROCEDURE — G8979 MOBILITY GOAL STATUS: HCPCS

## 2018-10-03 PROCEDURE — 97110 THERAPEUTIC EXERCISES: CPT

## 2018-10-03 PROCEDURE — 97116 GAIT TRAINING THERAPY: CPT

## 2018-10-03 ASSESSMENT — PAIN DESCRIPTION - DESCRIPTORS: DESCRIPTORS: ACHING

## 2018-10-03 ASSESSMENT — PAIN DESCRIPTION - ONSET: ONSET: AWAKENED FROM SLEEP

## 2018-10-03 ASSESSMENT — PAIN DESCRIPTION - PAIN TYPE: TYPE: SURGICAL PAIN

## 2018-10-03 ASSESSMENT — PAIN DESCRIPTION - LOCATION: LOCATION: KNEE

## 2018-10-03 ASSESSMENT — PAIN DESCRIPTION - ORIENTATION: ORIENTATION: LEFT

## 2018-10-03 ASSESSMENT — PAIN DESCRIPTION - FREQUENCY: FREQUENCY: CONTINUOUS

## 2018-10-03 ASSESSMENT — PAIN SCALES - GENERAL: PAINLEVEL_OUTOF10: 7

## 2018-10-03 NOTE — PROGRESS NOTES
impaired, limited or restricted    OutComes Score                                           Goals  Short term goals  Time Frame for Short term goals: 1-2 weeks  Short term goal 1: Pt reports L knee pain as 5/10 or less with ADLs and walking in his home  Long term goals  Time Frame for Long term goals : 4-6 weeks  Long term goal 1: Pt reports L knee pain as 2/10 or less with community ambulation  Long term goal 2: Increase L knee AROM 0-120 so pt can do steps reciprocally  Long term goal 3: Increase L knee strength to 4+/5 so pt can ambulate up/down steps reciprocally and safely  Long term goal 4: Improve LEFS to <20% or better  Long term goal 5:  Indep with HEP  Patient Goals   Patient goals : Be able to walk better       Therapy Time   Individual Concurrent Group Co-treatment   Time In  11:00am         Time Out  12:00pm         Minutes  60 min                 Lobito Jo, TR#[id number]

## 2018-10-05 DIAGNOSIS — N30.00 ACUTE CYSTITIS WITHOUT HEMATURIA: Primary | ICD-10-CM

## 2018-10-05 LAB
ORGANISM: ABNORMAL
URINE CULTURE, ROUTINE: ABNORMAL
URINE CULTURE, ROUTINE: ABNORMAL

## 2018-10-05 RX ORDER — NITROFURANTOIN 25; 75 MG/1; MG/1
100 CAPSULE ORAL 2 TIMES DAILY
Qty: 14 CAPSULE | Refills: 0 | Status: SHIPPED | OUTPATIENT
Start: 2018-10-05 | End: 2018-10-05

## 2018-10-08 ENCOUNTER — HOSPITAL ENCOUNTER (OUTPATIENT)
Dept: PHYSICAL THERAPY | Age: 68
Setting detail: THERAPIES SERIES
Discharge: HOME | End: 2018-10-08
Payer: MEDICARE

## 2018-10-08 PROCEDURE — 97116 GAIT TRAINING THERAPY: CPT

## 2018-10-08 PROCEDURE — 97110 THERAPEUTIC EXERCISES: CPT

## 2018-10-08 PROCEDURE — 97140 MANUAL THERAPY 1/> REGIONS: CPT

## 2018-10-08 ASSESSMENT — PAIN DESCRIPTION - LOCATION: LOCATION: KNEE

## 2018-10-08 ASSESSMENT — PAIN DESCRIPTION - DESCRIPTORS: DESCRIPTORS: SORE

## 2018-10-08 ASSESSMENT — PAIN DESCRIPTION - PAIN TYPE: TYPE: SURGICAL PAIN

## 2018-10-08 ASSESSMENT — PAIN SCALES - GENERAL: PAINLEVEL_OUTOF10: 5

## 2018-10-08 ASSESSMENT — PAIN DESCRIPTION - ORIENTATION: ORIENTATION: LEFT

## 2018-10-08 NOTE — PROGRESS NOTES
Physical Therapy  Daily Treatment Note  Date: 10/8/2018  Patient Name: Varsha Beltran  MRN: [id number]     :   1950    Treatment Diagnosis: s/p L TKR on 18     Restrictions: Pacemaker - no estim    Subjective:   General  Chart Reviewed: Yes  Additional Pertinent Hx: **Pt HAS PACEMAKER - NO ESTIM**  Family / Caregiver Present: Yes (son)  Referring Practitioner: Dr Mario Wolf  PT Visit Information  Onset Date: 10/17/18  Total # of Visits Approved: 12  Total # of Visits to Date: 2  Plan of Care/Certification Expiration Date: 18  No Show: 0  Canceled Appointment: 0  Subjective  Subjective: Pt. states his L knee is sore rates 5/10. \"That car ride got me. \" Pt. denies medication prior to PT session. Pain Screening  Patient Currently in Pain: Yes  Pain Assessment  Pain Assessment: 0-10  Pain Level: 5  Pain Type: Surgical pain  Pain Location: Knee  Pain Orientation: Left  Pain Descriptors: Sore  Vital Signs  Patient Currently in Pain: Yes       Treatment Activities:   Manual therapy  Joint mobilization: seated hooklying long leg distraction with oscillations to decrease pain               Ambulation  Ambulation?: Yes  Ambulation 1  Surface: level tile;carpet  Device: Rolling Walker  Assistance: Stand by assistance  Quality of Gait: Pt ambulated WBAT L LE with decreased step length L LE using WW with a flexed forward posture. Pt reports that he normally walks flexed forward do to having a fusion of L5/SI. Distance: 200' and 100'   Comments: decreased step length with increased fatigue.   Pt. requires 1 seated rest, difficulty with upright posture due to fusion           AROM LLE (degrees)  L Knee Flexion 0-145: 111 degrees  L Knee Extension 0: 7 degrees from full extension       Exercises  Exercise 1: Heel slides 2 x10 hold 5 sec   Exercise 2: QS 2x 10 H5   Exercise 3: SLR with abdominal bracing  L x10 hold 3 sec   Exercise 4: VMO SLR x10 hold 2-3 sec   Exercise 5: LAQ x10 hold 3 sec   Exercise 6: Seated

## 2018-10-10 ENCOUNTER — HOSPITAL ENCOUNTER (OUTPATIENT)
Dept: PHYSICAL THERAPY | Age: 68
Setting detail: THERAPIES SERIES
Discharge: HOME | End: 2018-10-10
Payer: MEDICARE

## 2018-10-10 PROCEDURE — 97140 MANUAL THERAPY 1/> REGIONS: CPT

## 2018-10-10 PROCEDURE — 97110 THERAPEUTIC EXERCISES: CPT

## 2018-10-10 ASSESSMENT — PAIN DESCRIPTION - DESCRIPTORS: DESCRIPTORS: ACHING

## 2018-10-10 ASSESSMENT — PAIN DESCRIPTION - ORIENTATION: ORIENTATION: LEFT

## 2018-10-10 ASSESSMENT — PAIN SCALES - GENERAL: PAINLEVEL_OUTOF10: 6

## 2018-10-10 ASSESSMENT — PAIN DESCRIPTION - PAIN TYPE: TYPE: SURGICAL PAIN

## 2018-10-10 ASSESSMENT — PAIN DESCRIPTION - LOCATION: LOCATION: KNEE

## 2018-10-10 NOTE — PROGRESS NOTES
structures, Functions, Activity limitations: Decreased functional mobility ; Decreased ADL status; Decreased ROM; Decreased strength;Decreased endurance  Assessment: Trialed some standing therexthis date to improve endurance and strength, pt. limited by endurance and requires frequent seated RB. Pt. difficulty with upright posture secondary to hx of fusion in lumbosacral spine. Pt. with pain level at 7/10 posyt session. Issued Ice to go. Treatment Diagnosis: s/p L TKR on 9/17/18  Prognosis: Good  REQUIRES PT FOLLOW UP: Yes      G-Code:     OutComes Score                                           Goals:  Short term goals  Time Frame for Short term goals: 1-2 weeks  Short term goal 1: Pt reports L knee pain as 5/10 or less with ADLs and walking in his home  Long term goals  Time Frame for Long term goals : 4-6 weeks  Long term goal 1: Pt reports L knee pain as 2/10 or less with community ambulation  Long term goal 2: Increase L knee AROM 0-120 so pt can do steps reciprocally  Long term goal 3: Increase L knee strength to 4+/5 so pt can ambulate up/down steps reciprocally and safely  Long term goal 4: Improve LEFS to <20% or better  Long term goal 5:  Indep with HEP  Patient Goals   Patient goals : Be able to walk better    Plan:          Times per week: 2-3 x per week  Plan weeks: 4-6 weeks  Current Treatment Recommendations: Strengthening, ROM, Balance Training, Functional Mobility Training, Transfer Training, Gait Training, Stair training, Neuromuscular Re-education, Pain Management, Manual Therapy - Soft Tissue Mobilization, Manual Therapy - Joint Manipulation, Home Exercise Program, Patient/Caregiver Education & Training, Modalities  Plan Comment: **NO ESTIM DUE TO PACEMAKER**  Okay for KT tape, CP/HP    Therapy Time   Individual Concurrent Group Co-treatment   Time In  1300         Time Out  1400         Minutes  Deodnre Mcnair PTA  License and Documentation Cosign  Therapy License Number:

## 2018-10-12 ENCOUNTER — HOSPITAL ENCOUNTER (OUTPATIENT)
Dept: PHYSICAL THERAPY | Age: 68
Setting detail: THERAPIES SERIES
Discharge: HOME | End: 2018-10-12
Payer: MEDICARE

## 2018-10-12 PROCEDURE — 97110 THERAPEUTIC EXERCISES: CPT

## 2018-10-12 PROCEDURE — 97116 GAIT TRAINING THERAPY: CPT

## 2018-10-12 PROCEDURE — 97140 MANUAL THERAPY 1/> REGIONS: CPT

## 2018-10-12 ASSESSMENT — PAIN DESCRIPTION - LOCATION: LOCATION: KNEE

## 2018-10-12 ASSESSMENT — PAIN DESCRIPTION - ORIENTATION: ORIENTATION: LEFT

## 2018-10-12 ASSESSMENT — PAIN DESCRIPTION - DESCRIPTORS: DESCRIPTORS: SORE

## 2018-10-12 ASSESSMENT — PAIN DESCRIPTION - PAIN TYPE: TYPE: SURGICAL PAIN

## 2018-10-12 ASSESSMENT — PAIN SCALES - GENERAL: PAINLEVEL_OUTOF10: 2

## 2018-10-12 NOTE — PROGRESS NOTES
Physical Therapy  Daily Treatment Note  Date: 10/12/2018  Patient Name: Laure Marie  MRN: [id number]     :   1950    Treatment Diagnosis: s/p L TKR on 18     Restrictions: Pacemaker - no estim    Subjective:   General  Chart Reviewed: Yes  Family / Caregiver Present: Yes (son)  Referring Practitioner: Dr Andrew Rankin  PT Visit Information  Onset Date: 10/17/18  Total # of Visits Approved: 12  Total # of Visits to Date: 4  Plan of Care/Certification Expiration Date: 18  No Show: 0  Canceled Appointment: 0  Subjective  Subjective: Pt. states he comes in W/C for conveinience due to R knee and back pain, limited ambulation secondary to pain. Pain Screening  Patient Currently in Pain: Yes  Pain Assessment  Pain Assessment: 0-10  Pain Level: 2  Pain Type: Surgical pain  Pain Location: Knee  Pain Orientation: Left  Pain Descriptors: Sore  Vital Signs  Patient Currently in Pain: Yes       Treatment Activities:   Manual therapy  Joint mobilization: Patellar Mobilizations t o L knee to improve mobility medial/lateral/post/inf/superion  Pt. with mos t restriction with medial and superior glide   Soft Tissue Mobalization: MFR L peroneals to decrease tightness and pain in L knee               Ambulation  Ambulation?: Yes  Ambulation 1  Surface: level tile;carpet  Device: Rolling Walker  Assistance: Stand by assistance  Quality of Gait: Pt. demos decreased step length LLE, limited heelstrike B and toe out positioning  with [address] slightly flexed foward posture secondary t o h/o fusion L5-S1.     Distance: 400'           PROM LLE (degrees)  LLE General PROM: lacking 4 deg with overpressure   AROM LLE (degrees)  L Knee Flexion 0-145: 114 degrees  L Knee Extension 0: 10 deg       Exercises  Exercise 1: Heelslides 2x10 hold 10 sec   Exercise 2: QS 2x10 hold 10 sec   Exercise 3: SLR with abdominal bracing  L 2x10 hold 5  sec   Exercise 4: VMO SLR 2x10 hold 3 sec   Exercise 5: LAQ 2x10 hold 5 sec with ADD  Exercise 7:

## 2018-10-17 ENCOUNTER — HOSPITAL ENCOUNTER (OUTPATIENT)
Dept: PHYSICAL THERAPY | Age: 68
Setting detail: THERAPIES SERIES
Discharge: HOME | End: 2018-10-17
Payer: MEDICARE

## 2018-10-17 PROCEDURE — 97110 THERAPEUTIC EXERCISES: CPT

## 2018-10-17 PROCEDURE — 97140 MANUAL THERAPY 1/> REGIONS: CPT

## 2018-10-17 ASSESSMENT — PAIN DESCRIPTION - DESCRIPTORS: DESCRIPTORS: SORE

## 2018-10-17 ASSESSMENT — PAIN DESCRIPTION - LOCATION: LOCATION: KNEE

## 2018-10-17 ASSESSMENT — PAIN DESCRIPTION - PAIN TYPE: TYPE: SURGICAL PAIN

## 2018-10-17 ASSESSMENT — PAIN DESCRIPTION - ORIENTATION: ORIENTATION: LEFT

## 2018-10-17 ASSESSMENT — PAIN SCALES - GENERAL: PAINLEVEL_OUTOF10: 5

## 2018-10-17 NOTE — PROGRESS NOTES
Physical Therapy  Daily Treatment Note  Date: 10/17/2018  Patient Name: Sharita Huang  MRN: [id number]     :   1950    Treatment Diagnosis: s/p L TKR on 18     Restrictions: Pacemaker - no estim    Subjective:   General  Chart Reviewed: Yes  Family / Caregiver Present: Yes (son)  Referring Practitioner: Dr Marilyn Ramos  PT Visit Information  Onset Date: 10/17/18  Total # of Visits Approved: 12  Total # of Visits to Date: 5  Plan of Care/Certification Expiration Date: 18  No Show: 0  Canceled Appointment: 0  Subjective  Subjective: \"We're not going to push down on my knee no more, that hurt all weekend. \"   Pain Screening  Patient Currently in Pain: Yes  Pain Assessment  Pain Assessment: 0-10  Pain Level: 5  Pain Type: Surgical pain  Pain Location: Knee  Pain Orientation: Left  Pain Descriptors: Sore  Vital Signs  Patient Currently in Pain: Yes       Treatment Activities:   Manual therapy  Joint mobilization: Patellar Mobilizations t o L knee to improve mobility medial/lateral/post/inf/superion  Pt. with mos t restriction with medial and superior glide   Soft Tissue Mobalization: MFR L peroneals to decrease tightness and pain in L knee, Cross Friction massage to L distal HS and IT Band to decrease tightness.                             AROM LLE (degrees)  L Knee Flexion 0-145: 111 degrees  L Knee Extension 0: 8 degrees        Exercises  Exercise 1: Heelslides x10 hold 10 sec   Exercise 2: QS 2x10 hold 10 sec   Exercise 3: SLR with abdominal bracing  L 2x10 hold 3-5  sec 1.5#  Exercise 4: VMO SLR x10 hold 5 sec 1.5#  Exercise 5: LAQ x20 hold 5 sec with ADD  Exercise 7: Seated L HS curl x20 hold 5 sec  GTB (heavy)   Exercise 8: B 90/90 HS stretch hold 60 sec x 3   Exercise 11: Supine L IT band Stretch with sheet hold 60 sec x3   Exercise 20: Recumbant Bike Seat 7 x5 min      Modalities  Cryotherapy (Minutes\Location): issued to go  Other: KT taped 4 V strips surrounding patella to decrease edema 0% Management, Manual Therapy - Soft Tissue Mobilization, Manual Therapy - Joint Manipulation, Home Exercise Program, Patient/Caregiver Education & Training, Modalities  Plan Comment: **NO ESTIM DUE TO PACEMAKER**  Okay for KT tape, CP/HP        Therapy Time   Individual Concurrent Group Co-treatment   Time In  1300         Time Out  1400         Minutes  [facility]  License and [facility] 33 Number: [id number]

## 2018-10-19 ENCOUNTER — HOSPITAL ENCOUNTER (OUTPATIENT)
Dept: PHYSICAL THERAPY | Age: 68
Setting detail: THERAPIES SERIES
Discharge: HOME | End: 2018-10-19
Payer: MEDICARE

## 2018-10-19 PROCEDURE — 97140 MANUAL THERAPY 1/> REGIONS: CPT

## 2018-10-19 PROCEDURE — 97116 GAIT TRAINING THERAPY: CPT

## 2018-10-19 PROCEDURE — 97110 THERAPEUTIC EXERCISES: CPT

## 2018-10-19 ASSESSMENT — PAIN DESCRIPTION - LOCATION: LOCATION: KNEE

## 2018-10-19 ASSESSMENT — PAIN DESCRIPTION - ORIENTATION: ORIENTATION: LEFT

## 2018-10-19 ASSESSMENT — PAIN SCALES - GENERAL: PAINLEVEL_OUTOF10: 5

## 2018-10-19 ASSESSMENT — PAIN DESCRIPTION - PAIN TYPE: TYPE: SURGICAL PAIN

## 2018-10-19 ASSESSMENT — PAIN DESCRIPTION - DESCRIPTORS: DESCRIPTORS: SORE

## 2018-10-19 NOTE — PROGRESS NOTES
(RLE lacking 37 deg , LLE lacking 28 deg )  Exercise 20: Recumbant Bike Seat 7 x5 min      Modalities  Cryotherapy (Minutes\Location): issued to go  Other: KT tape intact   Manual therapy  Joint mobilization: Patellar Mobilizations t o L knee to improve mobility medial/lateral/post/inf/superior . AP pobs to imporve knee extension   PROM: Passive Stretching L HS with contract relax to improve knee extension                           Assessment:   Conditions Requiring Skilled Therapeutic Intervention  Body structures, Functions, Activity limitations: Decreased functional mobility ; Decreased ADL status; Decreased ROM; Decreased strength;Decreased endurance  Assessment: 10 post session. Issued Ice to go. Treatment Diagnosis: s/p L TKR on 9/17/18  Prognosis: Good  Patient Education: Issued Seated HS stretch   REQUIRES PT FOLLOW UP: Yes      G-Code:     OutComes Score                                           Goals:  Short term goals  Time Frame for Short term goals: 1-2 weeks  Short term goal 1: Pt reports L knee pain as 5/10 or less with ADLs and walking in his home  Long term goals  Time Frame for Long term goals : 4-6 weeks  Long term goal 1: Pt reports L knee pain as 2/10 or less with community ambulation  Long term goal 2: Increase L knee AROM 0-120 so pt can do steps reciprocally  Long term goal 3: Increase L knee strength to 4+/5 so pt can ambulate up/down steps reciprocally and safely  Long term goal 4: Improve LEFS to <20% or better  Long term goal 5:  Indep with HEP  Patient Goals   Patient goals : Be able to walk better    Plan:      Times per week: 2-3 x per week  Plan weeks: 4-6 weeks  Current Treatment Recommendations: Strengthening, ROM, Balance Training, Functional Mobility Training, Transfer Training, Gait Training, Stair training, Neuromuscular Re-education, Pain Management, Manual Therapy - Soft Tissue Mobilization, Manual Therapy - Joint Manipulation, Home Exercise Program, Patient/Caregiver

## 2018-10-22 ENCOUNTER — HOSPITAL ENCOUNTER (OUTPATIENT)
Dept: LAB | Age: 68
Discharge: HOME | End: 2018-10-22
Payer: MEDICARE

## 2018-10-22 ENCOUNTER — HOSPITAL ENCOUNTER (OUTPATIENT)
Dept: PHYSICAL THERAPY | Age: 68
Setting detail: THERAPIES SERIES
Discharge: HOME | End: 2018-10-22
Payer: MEDICARE

## 2018-10-22 LAB
INR BLD: 1.7
PROTHROMBIN TIME: 18.1 SEC (ref 9.6–12.3)

## 2018-10-22 PROCEDURE — 85610 PROTHROMBIN TIME: CPT

## 2018-10-22 PROCEDURE — 97110 THERAPEUTIC EXERCISES: CPT

## 2018-10-22 PROCEDURE — 97140 MANUAL THERAPY 1/> REGIONS: CPT

## 2018-10-22 PROCEDURE — 36415 COLL VENOUS BLD VENIPUNCTURE: CPT

## 2018-10-22 ASSESSMENT — PAIN DESCRIPTION - DESCRIPTORS: DESCRIPTORS: SORE;TIGHTNESS

## 2018-10-22 ASSESSMENT — PAIN DESCRIPTION - ORIENTATION: ORIENTATION: LEFT

## 2018-10-22 ASSESSMENT — PAIN DESCRIPTION - LOCATION: LOCATION: KNEE

## 2018-10-22 ASSESSMENT — PAIN SCALES - GENERAL: PAINLEVEL_OUTOF10: 6

## 2018-10-22 NOTE — PROGRESS NOTES
x5 min         Manual therapy  Other: KT tape to L IT band anchored proximal to distal to dec tightness with 50% tension. I strip across distral HS with 100% tension and across distal IT band to dec pain. Assessment:   Conditions Requiring Skilled Therapeutic Intervention  Body structures, Functions, Activity limitations: Decreased functional mobility ; Decreased ADL status; Decreased ROM; Decreased strength;Decreased endurance  Assessment: Pt continues to progress with knee ROM but declined slightly with knee extension. KT tape applied to IT band and HS to dec tightness and inc ROM. Pt able to perform inc reps of LE ther ex seated to inc strength. Pt declined standing this date secondary to back pain. Pain level 5/10 after session. Issued ice to go. Continue to progress as tolerated. Treatment Diagnosis: s/p L TKR on 9/17/18  REQUIRES PT FOLLOW UP: Yes      G-Code:     OutComes Score                                           Goals:  Short term goals  Time Frame for Short term goals: 1-2 weeks  Short term goal 1: Pt reports L knee pain as 5/10 or less with ADLs and walking in his home  Long term goals  Time Frame for Long term goals : 4-6 weeks  Long term goal 1: Pt reports L knee pain as 2/10 or less with community ambulation  Long term goal 2: Increase L knee AROM 0-120 so pt can do steps reciprocally  Long term goal 3: Increase L knee strength to 4+/5 so pt can ambulate up/down steps reciprocally and safely  Long term goal 4: Improve LEFS to <20% or better  Long term goal 5:  Indep with HEP  Patient Goals   Patient goals : Be able to walk better    Plan:      Times per week: 2-3 x per week  Plan weeks: 4-6 weeks  Current Treatment Recommendations: Strengthening, ROM, Balance Training, Functional Mobility Training, Transfer Training, Gait Training, Stair training, Neuromuscular Re-education, Pain Management, Manual Therapy - Soft Tissue Mobilization, Manual Therapy - Joint Manipulation,

## 2018-10-23 VITALS — SYSTOLIC BLOOD PRESSURE: 134 MMHG | TEMPERATURE: 97.7 F | HEART RATE: 82 BPM | DIASTOLIC BLOOD PRESSURE: 68 MMHG

## 2018-10-25 ENCOUNTER — HOSPITAL ENCOUNTER (OUTPATIENT)
Dept: PHYSICAL THERAPY | Age: 68
Setting detail: THERAPIES SERIES
Discharge: HOME | End: 2018-10-25
Payer: MEDICARE

## 2018-10-25 PROBLEM — Z96.652 STATUS POST TOTAL LEFT KNEE REPLACEMENT: Status: ACTIVE | Noted: 2018-09-20

## 2018-10-25 PROCEDURE — 97116 GAIT TRAINING THERAPY: CPT

## 2018-10-25 PROCEDURE — 97110 THERAPEUTIC EXERCISES: CPT

## 2018-10-25 ASSESSMENT — PAIN DESCRIPTION - LOCATION: LOCATION: KNEE

## 2018-10-25 ASSESSMENT — PAIN SCALES - GENERAL: PAINLEVEL_OUTOF10: 3

## 2018-10-25 ASSESSMENT — PAIN DESCRIPTION - ORIENTATION: ORIENTATION: LEFT

## 2018-10-29 ENCOUNTER — HOSPITAL ENCOUNTER (OUTPATIENT)
Dept: PHYSICAL THERAPY | Age: 68
Setting detail: THERAPIES SERIES
Discharge: HOME | End: 2018-10-29
Payer: MEDICARE

## 2018-10-29 PROCEDURE — 97140 MANUAL THERAPY 1/> REGIONS: CPT

## 2018-10-29 PROCEDURE — 97110 THERAPEUTIC EXERCISES: CPT

## 2018-10-29 ASSESSMENT — PAIN DESCRIPTION - PAIN TYPE: TYPE: SURGICAL PAIN

## 2018-10-29 ASSESSMENT — PAIN SCALES - GENERAL: PAINLEVEL_OUTOF10: 5

## 2018-10-29 ASSESSMENT — PAIN DESCRIPTION - LOCATION: LOCATION: KNEE

## 2018-10-29 ASSESSMENT — PAIN DESCRIPTION - ORIENTATION: ORIENTATION: LEFT

## 2018-10-29 NOTE — PROGRESS NOTES
Times per week: 2-3 x per week  Plan weeks: 4-6 weeks  Current Treatment Recommendations: Strengthening, ROM, Balance Training, Functional Mobility Training, Transfer Training, Gait Training, Stair training, Neuromuscular Re-education, Pain Management, Manual Therapy - Soft Tissue Mobilization, Manual Therapy - Joint Manipulation, Home Exercise Program, Patient/Caregiver Education & Training, Modalities  Plan Comment: **NO ESTIM DUE TO PACEMAKER**  Okay for KT tape, CP/HP        Therapy Time   Individual Concurrent Group Co-treatment   Time In  1700         Time Out  1800         Minutes  [address], PTA  License and [place] [address] Number: [id number]

## 2018-10-31 DIAGNOSIS — J43.9 PULMONARY EMPHYSEMA, UNSPECIFIED EMPHYSEMA TYPE (HCC): Primary | ICD-10-CM

## 2018-10-31 RX ORDER — ALBUTEROL SULFATE 2.5 MG/3ML
SOLUTION RESPIRATORY (INHALATION)
Qty: 75 VIAL | Refills: 5 | Status: SHIPPED | OUTPATIENT
Start: 2018-10-31

## 2018-11-01 ENCOUNTER — HOSPITAL ENCOUNTER (OUTPATIENT)
Dept: PHYSICAL THERAPY | Age: 68
Setting detail: THERAPIES SERIES
Discharge: HOME | End: 2018-11-01
Payer: MEDICARE

## 2018-11-01 PROCEDURE — 97110 THERAPEUTIC EXERCISES: CPT

## 2018-11-01 PROCEDURE — 97140 MANUAL THERAPY 1/> REGIONS: CPT

## 2018-11-01 ASSESSMENT — PAIN DESCRIPTION - ORIENTATION: ORIENTATION: LEFT

## 2018-11-01 ASSESSMENT — PAIN SCALES - GENERAL: PAINLEVEL_OUTOF10: 5

## 2018-11-01 ASSESSMENT — PAIN DESCRIPTION - PAIN TYPE: TYPE: SURGICAL PAIN

## 2018-11-01 ASSESSMENT — PAIN DESCRIPTION - LOCATION: LOCATION: KNEE

## 2018-11-05 ENCOUNTER — HOSPITAL ENCOUNTER (OUTPATIENT)
Dept: PHYSICAL THERAPY | Age: 68
Setting detail: THERAPIES SERIES
Discharge: HOME | End: 2018-11-05
Payer: MEDICARE

## 2018-11-07 ENCOUNTER — HOSPITAL ENCOUNTER (OUTPATIENT)
Dept: LAB | Age: 68
Discharge: HOME | End: 2018-11-07
Payer: MEDICARE

## 2018-11-07 ENCOUNTER — HOSPITAL ENCOUNTER (OUTPATIENT)
Dept: PHYSICAL THERAPY | Age: 68
Setting detail: THERAPIES SERIES
Discharge: HOME | End: 2018-11-07
Payer: MEDICARE

## 2018-11-07 LAB
INR BLD: 2.6
PROTHROMBIN TIME: 27.7 SEC (ref 9.6–12.3)

## 2018-11-07 PROCEDURE — 97110 THERAPEUTIC EXERCISES: CPT

## 2018-11-07 PROCEDURE — G8979 MOBILITY GOAL STATUS: HCPCS

## 2018-11-07 PROCEDURE — 97530 THERAPEUTIC ACTIVITIES: CPT

## 2018-11-07 PROCEDURE — 97116 GAIT TRAINING THERAPY: CPT

## 2018-11-07 PROCEDURE — G8978 MOBILITY CURRENT STATUS: HCPCS

## 2018-11-07 PROCEDURE — 85610 PROTHROMBIN TIME: CPT

## 2018-11-07 PROCEDURE — 36415 COLL VENOUS BLD VENIPUNCTURE: CPT

## 2018-11-07 ASSESSMENT — PAIN SCALES - GENERAL: PAINLEVEL_OUTOF10: 4

## 2018-11-07 ASSESSMENT — PAIN DESCRIPTION - ORIENTATION: ORIENTATION: LEFT

## 2018-11-07 ASSESSMENT — PAIN DESCRIPTION - DESCRIPTORS: DESCRIPTORS: ACHING;DULL

## 2018-11-07 ASSESSMENT — PAIN DESCRIPTION - LOCATION: LOCATION: KNEE

## 2018-11-07 ASSESSMENT — PAIN DESCRIPTION - PAIN TYPE: TYPE: SURGICAL PAIN

## 2018-11-21 ENCOUNTER — HOSPITAL ENCOUNTER (OUTPATIENT)
Dept: LAB | Age: 68
Discharge: HOME | End: 2018-11-21
Payer: MEDICARE

## 2018-11-21 ENCOUNTER — HOSPITAL ENCOUNTER (OUTPATIENT)
Dept: PHYSICAL THERAPY | Age: 68
Setting detail: THERAPIES SERIES
Discharge: HOME | End: 2018-11-21
Payer: MEDICARE

## 2018-11-21 LAB
INR BLD: 2.8
PROTHROMBIN TIME: 30.1 SEC (ref 9.6–12.3)

## 2018-11-21 PROCEDURE — 97110 THERAPEUTIC EXERCISES: CPT

## 2018-11-21 PROCEDURE — 85610 PROTHROMBIN TIME: CPT

## 2018-11-21 PROCEDURE — 36415 COLL VENOUS BLD VENIPUNCTURE: CPT

## 2018-11-21 PROCEDURE — 97140 MANUAL THERAPY 1/> REGIONS: CPT

## 2018-11-21 ASSESSMENT — PAIN DESCRIPTION - PAIN TYPE: TYPE: CHRONIC PAIN

## 2018-11-21 ASSESSMENT — PAIN SCALES - GENERAL: PAINLEVEL_OUTOF10: 5

## 2018-11-21 ASSESSMENT — PAIN DESCRIPTION - LOCATION: LOCATION: BACK

## 2018-11-27 ENCOUNTER — HOSPITAL ENCOUNTER (OUTPATIENT)
Dept: PHYSICAL THERAPY | Age: 68
Setting detail: THERAPIES SERIES
Discharge: HOME | End: 2018-11-27
Payer: MEDICARE

## 2018-11-27 PROCEDURE — 97116 GAIT TRAINING THERAPY: CPT

## 2018-11-27 PROCEDURE — 97140 MANUAL THERAPY 1/> REGIONS: CPT

## 2018-11-27 PROCEDURE — 97110 THERAPEUTIC EXERCISES: CPT

## 2018-11-27 ASSESSMENT — PAIN SCALES - GENERAL: PAINLEVEL_OUTOF10: 2

## 2018-11-27 ASSESSMENT — PAIN DESCRIPTION - PAIN TYPE: TYPE: CHRONIC PAIN;SURGICAL PAIN

## 2018-11-27 ASSESSMENT — PAIN DESCRIPTION - LOCATION: LOCATION: BACK;KNEE

## 2018-11-29 ENCOUNTER — HOSPITAL ENCOUNTER (OUTPATIENT)
Dept: PHYSICAL THERAPY | Age: 68
Setting detail: THERAPIES SERIES
Discharge: HOME | End: 2018-11-29
Payer: MEDICARE

## 2018-11-29 PROCEDURE — 97110 THERAPEUTIC EXERCISES: CPT

## 2018-11-29 PROCEDURE — 97140 MANUAL THERAPY 1/> REGIONS: CPT

## 2018-11-29 ASSESSMENT — PAIN DESCRIPTION - LOCATION: LOCATION: KNEE

## 2018-11-29 ASSESSMENT — PAIN SCALES - GENERAL: PAINLEVEL_OUTOF10: 2

## 2018-11-29 ASSESSMENT — PAIN DESCRIPTION - PAIN TYPE: TYPE: CHRONIC PAIN

## 2018-12-02 ENCOUNTER — CARE COORDINATION (OUTPATIENT)
Dept: CARE COORDINATION | Age: 68
End: 2018-12-02

## 2018-12-03 ENCOUNTER — HOSPITAL ENCOUNTER (OUTPATIENT)
Dept: PHYSICAL THERAPY | Age: 68
Setting detail: THERAPIES SERIES
Discharge: HOME | End: 2018-12-03
Payer: MEDICARE

## 2018-12-03 PROCEDURE — 97116 GAIT TRAINING THERAPY: CPT

## 2018-12-03 PROCEDURE — 97140 MANUAL THERAPY 1/> REGIONS: CPT

## 2018-12-03 PROCEDURE — 97110 THERAPEUTIC EXERCISES: CPT

## 2018-12-03 ASSESSMENT — PAIN DESCRIPTION - LOCATION: LOCATION: KNEE

## 2018-12-03 ASSESSMENT — PAIN SCALES - GENERAL: PAINLEVEL_OUTOF10: 2

## 2018-12-03 ASSESSMENT — PAIN DESCRIPTION - PAIN TYPE: TYPE: SURGICAL PAIN

## 2018-12-07 ENCOUNTER — HOSPITAL ENCOUNTER (OUTPATIENT)
Dept: PHYSICAL THERAPY | Age: 68
Setting detail: THERAPIES SERIES
Discharge: HOME | End: 2018-12-07
Payer: MEDICARE

## 2018-12-07 PROCEDURE — G8978 MOBILITY CURRENT STATUS: HCPCS

## 2018-12-07 PROCEDURE — G8979 MOBILITY GOAL STATUS: HCPCS

## 2018-12-07 PROCEDURE — 97116 GAIT TRAINING THERAPY: CPT

## 2018-12-07 PROCEDURE — G8980 MOBILITY D/C STATUS: HCPCS

## 2018-12-07 PROCEDURE — 97530 THERAPEUTIC ACTIVITIES: CPT

## 2018-12-07 NOTE — PROGRESS NOTES
Physical Therapy  Discharge Note   Date: 2018  Patient Name: Yakelin Austin  MRN: [id number]  : 1950     Treatment Diagnosis: s/p L TKR on 18    Subjective   General  Chart Reviewed: Yes  Patient assessed for rehabilitation services?: Yes  Additional Pertinent Hx: **Pt HAS PACEMAKER - NO ESTIM**  Family / Caregiver Present: Yes  Referring Practitioner: Dr Kay Liu  Referral Date : 10/03/18  Diagnosis: s/p Left TKR on 18  General Comment  Comments: Completed DC 18 due to meeting goals and ready for DC  PT Visit Information  Onset Date: 10/17/18  Total # of Visits Approved: 20  Total # of Visits to Date: 12  Plan of Care/Certification Expiration Date: 18  No Show: 0  Canceled Appointment: 1  Subjective  Subjective: Pt reports no c/o of Left knee currently and that his appt with Dr Amanda Morelos  Patient Currently in Pain: No  Pain Assessment  Pain Assessment: 0-10  Vital Signs  Patient Currently in Pain: No    Objective     AROM LLE (degrees)  L Knee Flexion 0-145: 3-120 AROM in sitting    Strength LLE  L Hip Flexion: 4+/5  L Hip Extension: 4+/5  L Hip ABduction: 4+/5  L Hip ADduction: 4+/5  L Knee Flexion: 4+/5  L Knee Extension: 4+/5  L Ankle Dorsiflexion: 4+/5  L Ankle Plantar Flexion: 4+/5                 Ambulation  Ambulation?: Yes  Ambulation 1  Surface: level tile;carpet  Device: No Device  Assistance: Modified Independent  Quality of Gait: Pt ambulated with equal step length B LE and equal stance time but flexed forward at his hip due to pts back issues. Distance: 150' x 1  Comments: Reports of back pain but no left knee pain \"My right knee is sore but that needs replaced too\"  Stairs/Curb  Stairs?: Yes  Stairs  # Steps : 10  Stairs Height:  (7\")  Rails:  (Left on descend)  Device: No Device  Assistance: Supervision  Comment: Pt was able to ambulate up/down 10 steps with a reciprocal gait pattern with slow but steady gait and balance.  Pt reports back pain vs knee pain with steps. Assessment   Conditions Requiring Skilled Therapeutic Intervention  Body structures, Functions, Activity limitations: Decreased functional mobility ; Decreased ADL status; Decreased ROM; Decreased strength;Decreased endurance  Assessment: Completed DC today due to pt meeting his goals and wanting to continue on his own. PT advised pt continue to stay active and keep moving his knee to decrease L knee stiffness and to maintain his strength. Treatment Diagnosis: s/p L TKR on 9/17/18  Prognosis: Good  REQUIRES PT FOLLOW UP: No         Plan DC to HEP        G-Code  PT G-Codes  Functional Assessment Tool Used: LEFs at DC   Score: 29/64=55%   Functional Limitation: Mobility: Walking and moving around  Mobility: Walking and Moving Around Current Status (): At least 40 percent but less than 60 percent impaired, limited or restricted  Mobility: Walking and Moving Around Goal Status (): At least 1 percent but less than 20 percent impaired, limited or restricted  Mobility: Walking and Moving Around Discharge Status (): At least 40 percent but less than 60 percent impaired, limited or restricted    OutComes Score                                           Goals  Short term goals  Time Frame for Short term goals: 1-2 weeks  Short term goal 1: Pt reports L knee pain as 5/10 or less with ADLs and walking in his home-GOAL MET (AChy pain 1-2/10 - GOAL MET)  Long term goals  Time Frame for Long term goals : 4-6 weeks  Long term goal 1: Pt reports L knee pain as 2/10 or less with community ambulation-GOAL MET (GOAL MET)  Long term goal 2: Increase L knee AROM 0-120 so pt can do steps reciprocally (Pt progressing: L knee AROM 3-120 deg -Mostly met)  Long term goal 3:  Increase L knee strength to 4+/5 so pt can ambulate up/down steps reciprocally and safely (Strength is 4+/5 and able to do steps reciproc -GOAL MET)  Long term goal 4: Improve LEFS to <20% or better (LEFS at DC = 29/64=55% -NOT

## 2018-12-12 ENCOUNTER — CARE COORDINATION (OUTPATIENT)
Dept: CARE COORDINATION | Age: 68
End: 2018-12-12

## 2018-12-27 ENCOUNTER — HOSPITAL ENCOUNTER (OUTPATIENT)
Dept: LAB | Age: 68
Discharge: HOME | End: 2018-12-27
Payer: MEDICARE

## 2018-12-27 LAB
INR BLD: 1.4
PROTHROMBIN TIME: 14.4 SEC (ref 9–11.5)

## 2018-12-27 PROCEDURE — 36415 COLL VENOUS BLD VENIPUNCTURE: CPT

## 2018-12-27 PROCEDURE — 85610 PROTHROMBIN TIME: CPT

## 2018-12-31 ENCOUNTER — OFFICE VISIT (OUTPATIENT)
Dept: FAMILY MEDICINE CLINIC | Age: 68
End: 2018-12-31
Payer: MEDICARE

## 2018-12-31 ENCOUNTER — HOSPITAL ENCOUNTER (OUTPATIENT)
Age: 68
Setting detail: SPECIMEN
Discharge: HOME | End: 2018-12-31
Payer: MEDICARE

## 2018-12-31 VITALS
RESPIRATION RATE: 14 BRPM | DIASTOLIC BLOOD PRESSURE: 60 MMHG | HEIGHT: 73 IN | TEMPERATURE: 97.7 F | OXYGEN SATURATION: 97 % | SYSTOLIC BLOOD PRESSURE: 120 MMHG | HEART RATE: 72 BPM | BODY MASS INDEX: 35.12 KG/M2 | WEIGHT: 265 LBS

## 2018-12-31 DIAGNOSIS — N30.01 ACUTE CYSTITIS WITH HEMATURIA: ICD-10-CM

## 2018-12-31 DIAGNOSIS — N30.01 ACUTE CYSTITIS WITH HEMATURIA: Primary | ICD-10-CM

## 2018-12-31 PROCEDURE — 1123F ACP DISCUSS/DSCN MKR DOCD: CPT | Performed by: FAMILY MEDICINE

## 2018-12-31 PROCEDURE — 87086 URINE CULTURE/COLONY COUNT: CPT

## 2018-12-31 PROCEDURE — 4040F PNEUMOC VAC/ADMIN/RCVD: CPT | Performed by: FAMILY MEDICINE

## 2018-12-31 PROCEDURE — 81003 URINALYSIS AUTO W/O SCOPE: CPT | Performed by: FAMILY MEDICINE

## 2018-12-31 PROCEDURE — 87077 CULTURE AEROBIC IDENTIFY: CPT

## 2018-12-31 PROCEDURE — 1036F TOBACCO NON-USER: CPT | Performed by: FAMILY MEDICINE

## 2018-12-31 PROCEDURE — G8427 DOCREV CUR MEDS BY ELIG CLIN: HCPCS | Performed by: FAMILY MEDICINE

## 2018-12-31 PROCEDURE — 99213 OFFICE O/P EST LOW 20 MIN: CPT | Performed by: FAMILY MEDICINE

## 2018-12-31 PROCEDURE — 3017F COLORECTAL CA SCREEN DOC REV: CPT | Performed by: FAMILY MEDICINE

## 2018-12-31 PROCEDURE — G8482 FLU IMMUNIZE ORDER/ADMIN: HCPCS | Performed by: FAMILY MEDICINE

## 2018-12-31 PROCEDURE — G8417 CALC BMI ABV UP PARAM F/U: HCPCS | Performed by: FAMILY MEDICINE

## 2018-12-31 PROCEDURE — 1101F PT FALLS ASSESS-DOCD LE1/YR: CPT | Performed by: FAMILY MEDICINE

## 2018-12-31 RX ORDER — SULFAMETHOXAZOLE AND TRIMETHOPRIM 800; 160 MG/1; MG/1
1 TABLET ORAL 2 TIMES DAILY
Qty: 10 TABLET | Refills: 0 | Status: SHIPPED | OUTPATIENT
Start: 2018-12-31 | End: 2019-01-02

## 2018-12-31 RX ORDER — ERYTHROMYCIN 5 MG/G
OINTMENT OPHTHALMIC
COMMUNITY
Start: 2018-10-16

## 2019-01-02 DIAGNOSIS — N30.01 ACUTE CYSTITIS WITH HEMATURIA: Primary | ICD-10-CM

## 2019-01-02 LAB
ORGANISM: ABNORMAL
URINE CULTURE, ROUTINE: ABNORMAL
URINE CULTURE, ROUTINE: ABNORMAL

## 2019-01-02 RX ORDER — NITROFURANTOIN 25; 75 MG/1; MG/1
100 CAPSULE ORAL 2 TIMES DAILY
Qty: 14 CAPSULE | Refills: 0 | Status: SHIPPED | OUTPATIENT
Start: 2019-01-02 | End: 2019-01-09

## 2019-01-28 ENCOUNTER — HOSPITAL ENCOUNTER (OUTPATIENT)
Dept: LAB | Age: 69
Discharge: HOME | End: 2019-01-28
Payer: MEDICARE

## 2019-01-28 LAB
INR BLD: 2.9
PROTHROMBIN TIME: 27.9 SEC (ref 9–11.5)

## 2019-01-28 PROCEDURE — 85610 PROTHROMBIN TIME: CPT

## 2019-01-28 PROCEDURE — 36415 COLL VENOUS BLD VENIPUNCTURE: CPT

## 2019-02-18 DIAGNOSIS — H10.30 ACUTE BACTERIAL CONJUNCTIVITIS, UNSPECIFIED LATERALITY: ICD-10-CM

## 2019-02-18 RX ORDER — POLYMYXIN B SULFATE AND TRIMETHOPRIM 1; 10000 MG/ML; [USP'U]/ML
SOLUTION OPHTHALMIC
Refills: 0 | OUTPATIENT
Start: 2019-02-18

## 2019-03-20 ENCOUNTER — HOSPITAL ENCOUNTER (OUTPATIENT)
Dept: LAB | Age: 69
Discharge: HOME | End: 2019-03-20
Payer: MEDICARE

## 2019-03-20 LAB
INR BLD: 2
PROTHROMBIN TIME: 19.8 SEC (ref 9–11.5)

## 2019-03-20 PROCEDURE — 85610 PROTHROMBIN TIME: CPT

## 2019-03-20 PROCEDURE — 36415 COLL VENOUS BLD VENIPUNCTURE: CPT

## 2019-04-03 ENCOUNTER — TELEPHONE (OUTPATIENT)
Dept: FAMILY MEDICINE CLINIC | Age: 69
End: 2019-04-03

## 2019-04-03 ENCOUNTER — OFFICE VISIT (OUTPATIENT)
Dept: FAMILY MEDICINE CLINIC | Age: 69
End: 2019-04-03
Payer: MEDICARE

## 2019-04-03 VITALS
TEMPERATURE: 98 F | OXYGEN SATURATION: 97 % | SYSTOLIC BLOOD PRESSURE: 136 MMHG | WEIGHT: 275.4 LBS | BODY MASS INDEX: 36.33 KG/M2 | DIASTOLIC BLOOD PRESSURE: 90 MMHG | HEART RATE: 89 BPM

## 2019-04-03 DIAGNOSIS — B35.1 ONYCHOMYCOSIS OF TOENAIL: ICD-10-CM

## 2019-04-03 DIAGNOSIS — I48.19 PERSISTENT ATRIAL FIBRILLATION (HCC): Primary | ICD-10-CM

## 2019-04-03 DIAGNOSIS — L21.9 SEBORRHEIC DERMATITIS: ICD-10-CM

## 2019-04-03 DIAGNOSIS — J43.9 PULMONARY EMPHYSEMA, UNSPECIFIED EMPHYSEMA TYPE (HCC): ICD-10-CM

## 2019-04-03 DIAGNOSIS — N30.01 ACUTE CYSTITIS WITH HEMATURIA: ICD-10-CM

## 2019-04-03 DIAGNOSIS — K21.9 GASTROESOPHAGEAL REFLUX DISEASE, ESOPHAGITIS PRESENCE NOT SPECIFIED: ICD-10-CM

## 2019-04-03 DIAGNOSIS — R35.81 NOCTURNAL POLYURIA: ICD-10-CM

## 2019-04-03 DIAGNOSIS — R39.9 LOWER URINARY TRACT SYMPTOMS (LUTS): ICD-10-CM

## 2019-04-03 DIAGNOSIS — I50.9 CONGESTIVE HEART FAILURE, UNSPECIFIED HF CHRONICITY, UNSPECIFIED HEART FAILURE TYPE (HCC): ICD-10-CM

## 2019-04-03 DIAGNOSIS — R43.2 DYSGEUSIA: ICD-10-CM

## 2019-04-03 DIAGNOSIS — N30.01 ACUTE CYSTITIS WITH HEMATURIA: Primary | ICD-10-CM

## 2019-04-03 DIAGNOSIS — I48.20 CHRONIC ATRIAL FIBRILLATION (HCC): ICD-10-CM

## 2019-04-03 DIAGNOSIS — R31.29 OTHER MICROSCOPIC HEMATURIA: Primary | ICD-10-CM

## 2019-04-03 DIAGNOSIS — Z13.31 POSITIVE DEPRESSION SCREENING: ICD-10-CM

## 2019-04-03 DIAGNOSIS — K74.69 OTHER CIRRHOSIS OF LIVER (HCC): ICD-10-CM

## 2019-04-03 PROCEDURE — 4040F PNEUMOC VAC/ADMIN/RCVD: CPT | Performed by: FAMILY MEDICINE

## 2019-04-03 PROCEDURE — 3017F COLORECTAL CA SCREEN DOC REV: CPT | Performed by: FAMILY MEDICINE

## 2019-04-03 PROCEDURE — G8417 CALC BMI ABV UP PARAM F/U: HCPCS | Performed by: FAMILY MEDICINE

## 2019-04-03 PROCEDURE — 1036F TOBACCO NON-USER: CPT | Performed by: FAMILY MEDICINE

## 2019-04-03 PROCEDURE — 1123F ACP DISCUSS/DSCN MKR DOCD: CPT | Performed by: FAMILY MEDICINE

## 2019-04-03 PROCEDURE — G8427 DOCREV CUR MEDS BY ELIG CLIN: HCPCS | Performed by: FAMILY MEDICINE

## 2019-04-03 PROCEDURE — 99214 OFFICE O/P EST MOD 30 MIN: CPT | Performed by: FAMILY MEDICINE

## 2019-04-03 PROCEDURE — G0444 DEPRESSION SCREEN ANNUAL: HCPCS | Performed by: FAMILY MEDICINE

## 2019-04-03 PROCEDURE — G8431 POS CLIN DEPRES SCRN F/U DOC: HCPCS | Performed by: FAMILY MEDICINE

## 2019-04-03 PROCEDURE — 81003 URINALYSIS AUTO W/O SCOPE: CPT | Performed by: FAMILY MEDICINE

## 2019-04-03 PROCEDURE — 3023F SPIROM DOC REV: CPT | Performed by: FAMILY MEDICINE

## 2019-04-03 PROCEDURE — G8926 SPIRO NO PERF OR DOC: HCPCS | Performed by: FAMILY MEDICINE

## 2019-04-03 RX ORDER — SULFAMETHOXAZOLE AND TRIMETHOPRIM 800; 160 MG/1; MG/1
1 TABLET ORAL 2 TIMES DAILY
Qty: 10 TABLET | Refills: 0 | Status: SHIPPED | OUTPATIENT
Start: 2019-04-03 | End: 2019-04-03

## 2019-04-03 RX ORDER — ACYCLOVIR 400 MG/1
400 TABLET ORAL
COMMUNITY
Start: 2019-03-19 | End: 2019-04-18

## 2019-04-03 RX ORDER — WARFARIN SODIUM 5 MG/1
TABLET ORAL
Qty: 1 TABLET | Refills: 0 | COMMUNITY
Start: 2019-04-03

## 2019-04-03 RX ORDER — AMOXICILLIN AND CLAVULANATE POTASSIUM 875; 125 MG/1; MG/1
1 TABLET, FILM COATED ORAL 2 TIMES DAILY
Qty: 10 TABLET | Refills: 0 | Status: SHIPPED | OUTPATIENT
Start: 2019-04-03 | End: 2019-04-08

## 2019-04-03 RX ORDER — OXYBUTYNIN CHLORIDE 15 MG/1
15 TABLET, EXTENDED RELEASE ORAL DAILY
Qty: 30 TABLET | Refills: 3 | Status: SHIPPED | OUTPATIENT
Start: 2019-04-03 | End: 2019-04-08

## 2019-04-03 RX ORDER — MORPHINE SULFATE 30 MG/1
TABLET, FILM COATED, EXTENDED RELEASE ORAL
COMMUNITY
Start: 2019-04-02 | End: 2021-01-04 | Stop reason: ALTCHOICE

## 2019-04-03 RX ORDER — OMEPRAZOLE 20 MG/1
20 CAPSULE, DELAYED RELEASE ORAL
Qty: 30 CAPSULE | Refills: 3 | Status: SHIPPED | OUTPATIENT
Start: 2019-04-03 | End: 2019-08-03 | Stop reason: SDUPTHER

## 2019-04-03 ASSESSMENT — ENCOUNTER SYMPTOMS
RHINORRHEA: 0
ABDOMINAL PAIN: 0
CONSTIPATION: 0
SORE THROAT: 0
COUGH: 0
WHEEZING: 0
SHORTNESS OF BREATH: 0
DIARRHEA: 0

## 2019-04-03 ASSESSMENT — PATIENT HEALTH QUESTIONNAIRE - PHQ9
SUM OF ALL RESPONSES TO PHQ9 QUESTIONS 1 & 2: 6
2. FEELING DOWN, DEPRESSED OR HOPELESS: 3
6. FEELING BAD ABOUT YOURSELF - OR THAT YOU ARE A FAILURE OR HAVE LET YOURSELF OR YOUR FAMILY DOWN: 1
9. THOUGHTS THAT YOU WOULD BE BETTER OFF DEAD, OR OF HURTING YOURSELF: 0
10. IF YOU CHECKED OFF ANY PROBLEMS, HOW DIFFICULT HAVE THESE PROBLEMS MADE IT FOR YOU TO DO YOUR WORK, TAKE CARE OF THINGS AT HOME, OR GET ALONG WITH OTHER PEOPLE: 0
7. TROUBLE CONCENTRATING ON THINGS, SUCH AS READING THE NEWSPAPER OR WATCHING TELEVISION: 1
1. LITTLE INTEREST OR PLEASURE IN DOING THINGS: 3
SUM OF ALL RESPONSES TO PHQ QUESTIONS 1-9: 17
5. POOR APPETITE OR OVEREATING: 3
4. FEELING TIRED OR HAVING LITTLE ENERGY: 3
SUM OF ALL RESPONSES TO PHQ QUESTIONS 1-9: 17
3. TROUBLE FALLING OR STAYING ASLEEP: 3
8. MOVING OR SPEAKING SO SLOWLY THAT OTHER PEOPLE COULD HAVE NOTICED. OR THE OPPOSITE, BEING SO FIGETY OR RESTLESS THAT YOU HAVE BEEN MOVING AROUND A LOT MORE THAN USUAL: 0

## 2019-04-03 NOTE — TELEPHONE ENCOUNTER
Pt's son is aware of the message and recommendations. States he will bring him back in a month to give sample.

## 2019-04-03 NOTE — PROGRESS NOTES
2/13/12    right leg-Dr. Nba Leiberman     Social History     Socioeconomic History    Marital status:      Spouse name: Not on file    Number of children: Not on file    Years of education: Not on file    Highest education level: Not on file   Occupational History    Not on file   Social Needs    Financial resource strain: Not on file    Food insecurity:     Worry: Not on file     Inability: Not on file    Transportation needs:     Medical: Not on file     Non-medical: Not on file   Tobacco Use    Smoking status: Never Smoker    Smokeless tobacco: Never Used   Substance and Sexual Activity    Alcohol use: No     Alcohol/week: 0.0 oz    Drug use: No    Sexual activity: Not on file   Lifestyle    Physical activity:     Days per week: Not on file     Minutes per session: Not on file    Stress: Not on file   Relationships    Social connections:     Talks on phone: Not on file     Gets together: Not on file     Attends Church service: Not on file     Active member of club or organization: Not on file     Attends meetings of clubs or organizations: Not on file     Relationship status: Not on file    Intimate partner violence:     Fear of current or ex partner: Not on file     Emotionally abused: Not on file     Physically abused: Not on file     Forced sexual activity: Not on file   Other Topics Concern    Not on file   Social History Narrative    Not on file     Allergies   Allergen Reactions    Cashew Nut Oil     Dust Mite Extract     Hctz     Other      cashews    Pollen Extract     Thiazide-Type Diuretics      itching     Current Outpatient Medications   Medication Sig Dispense Refill    acyclovir (ZOVIRAX) 400 MG tablet Take 400 mg by mouth      morphine (MS CONTIN) 30 MG extended release tablet       triamcinolone (KENALOG) 0.1 % ointment Apply topically 2 times daily for 7 days 30 g 2    omeprazole (PRILOSEC) 20 MG delayed release capsule Take 1 capsule by mouth every morning (before breakfast) 30 capsule 3    oxybutynin (DITROPAN XL) 15 MG extended release tablet Take 1 tablet by mouth daily 30 tablet 3    sulfamethoxazole-trimethoprim (BACTRIM DS;SEPTRA DS) 800-160 MG per tablet Take 1 tablet by mouth 2 times daily for 5 days 10 tablet 0    erythromycin (ROMYCIN) 5 MG/GM ophthalmic ointment Use 1 application in both eyes daily at bedtime.  loteprednol (ALREX) 0.2 % SUSP Use 1 Drop in both eyes three times daily.  albuterol (PROVENTIL) (2.5 MG/3ML) 0.083% nebulizer solution USE ONE VIAL IN NEBULIZER EVERY 4 HOURS AS NEEDED FOR WHEEZING AND FOR SHORTNESS OF BREATH 75 vial 5    budesonide-formoterol (SYMBICORT) 160-4.5 MCG/ACT AERO Inhale 2 puffs into the lungs 2 times daily LOT [id number] exp 10/2018 1 Inhaler 0    diclofenac (VOLTAREN) 75 MG EC tablet TAKE ONE TABLET BY MOUTH TWICE DAILY 180 tablet 1    lidocaine (XYLOCAINE) 5 % ointment Apply topically every 2 hours as needed. 1 Tube 2    lactulose 20 GM/30ML SOLN Take  by mouth.  Compression Stockings MISC Knee high compression stockings  Dx: LE edema  20-30 mm pressure 2 each 0    carvedilol (COREG) 25 MG tablet Take 1 tablet by mouth 2 times daily (with meals). 180 tablet 1    sotalol (BETAPACE) 80 MG tablet Take 80 mg by mouth 2 times daily. Take 1/2 tablet twice daily      WARFARIN SODIUM by Does not apply route.  clonazePAM (KLONOPIN) 0.5 MG tablet Take 0.5 tablets by mouth daily as needed for Anxiety for up to 30 days. . 10 tablet 0    levothyroxine (SYNTHROID) 100 MCG tablet Take 1 tablet by mouth daily 90 tablet 3     Current Facility-Administered Medications   Medication Dose Route Frequency Provider Last Rate Last Dose    testosterone cypionate (DEPOTESTOTERONE CYPIONATE) injection 200 mg  200 mg Intramuscular Once         testosterone cypionate (DEPOTESTOTERONE CYPIONATE) injection 200 mg  200 mg Intramuscular Once         testosterone cypionate (DEPOTESTOTERONE CYPIONATE) injection 200 mg 200 mg Intramuscular Once            ROS:  Review of Systems   Constitutional: Negative for chills and fever. HENT: Negative for rhinorrhea and sore throat. Respiratory: Negative for cough, shortness of breath and wheezing. Gastrointestinal: Negative for abdominal pain, constipation and diarrhea. Endocrine: Negative for polydipsia and polyuria. Genitourinary: Positive for frequency. Negative for dysuria and urgency. Skin: Positive for rash. Neurological: Negative for syncope, light-headedness, numbness and headaches. Psychiatric/Behavioral: Negative for sleep disturbance. The patient is not nervous/anxious. Vitals:    04/03/19 1305 04/03/19 1358   BP: (!) 136/90 (!) 136/90   Site: Right Upper Arm    Position: Sitting    Cuff Size: Large Adult    Pulse: 89    Temp: 98 °F (36.7 °C)    TempSrc: Oral    SpO2: 97%    Weight: 275 lb 6.4 oz (124.9 kg)        Physical exam:  Physical Exam   Constitutional: He is oriented to person, place, and time. He appears well-developed and well-nourished. No distress. HENT:   Head: Normocephalic and atraumatic. Mouth/Throat: No oropharyngeal exudate. Eyes: EOM are normal.   Neck: Normal range of motion. Cardiovascular: Normal rate, regular rhythm and normal heart sounds. No murmur heard. Pulmonary/Chest: Effort normal and breath sounds normal. No respiratory distress. He has no wheezes. Abdominal: Soft. He exhibits no distension. There is no tenderness. There is no rebound and no guarding. Neurological: He is alert and oriented to person, place, and time. Skin: Skin is warm and dry. Psychiatric: He has a normal mood and affect. His behavior is normal.   Vitals reviewed. Assessment/Plan:  79 y.o. male here mainly for LUTS:  - LUTS: POCT UA with 3+ leuk/ 3+ blood; Will treat as UTI. This has happened before. Will get repeat UA w culture in 1mo and if still having blood then will send back to urology to w/u for mass;  Sounds like he still isn't emptying completely. Increased the ditropan from 10 to 15.  - Onychomycosis: sending to podiatry; pt is interested in getting toenails removed  - Face rash: possibly seborrheic dermatitis; med potency steroid  - Dysgeusia: likely part of his reflux; trial of omeprazole     Diagnosis Orders   1. Acute cystitis with hematuria  sulfamethoxazole-trimethoprim (BACTRIM DS;SEPTRA DS) 800-160 MG per tablet   2. Positive depression screening  Positive Screen for Clinical Depression with a Documented Follow-up Plan    3. Pulmonary emphysema, unspecified emphysema type ([facility] [address]     4. Other cirrhosis of liver ([facility] [address]     5. Congestive heart failure, unspecified HF chronicity, unspecified heart failure type ([facility] [address]     6. Chronic atrial fibrillation (HCC)     7. Onychomycosis of toenail  Amb External Referral To Podiatry   8. Lower urinary tract symptoms (LUTS)  POCT Urinalysis No Micro ([facility])   9. Seborrheic dermatitis  triamcinolone (KENALOG) 0.1 % ointment   10. Gastroesophageal reflux disease, esophagitis presence not specified  omeprazole (PRILOSEC) 20 MG delayed release capsule   11. Nocturnal polyuria  oxybutynin (DITROPAN XL) 15 MG extended release tablet   12. Dysgeusia          Return if symptoms worsen or fail to improve. Bowen Melvin MD On the basis of positive PHQ-9 screening (PHQ-9 Total Score: 17), the following plan was implemented: patient declines further evaluation/treatment for depression. Patient will follow-up in with PCP.

## 2019-04-03 NOTE — TELEPHONE ENCOUNTER
Bactrim sent for the possible UTI. There is again blood in the urine. Probably related to the uti but will need a new UA w culture in 1 month (already ordered).

## 2019-04-03 NOTE — PROGRESS NOTES
[address] (Student Note)  [address] [facility] [address] [id number]  Dept: [phone]  Dept Fax: [phone]  Loc: [phone]     Chief Complaint   Patient presents with    Urinary Frequency     x 2 weeks     Nail Problem     B/L great toe pain, would like to talk about getting them removed    Rash     x awhile, red, itches    Taste Change     metalic, acid, bitter       HPI: 79 y.o. male who presents mainly for     Urinary frequency: noticed 2 weeks ago. Going every 1-2 hours, no pain, burning associated with urination. No increased thirst.     Bilateral big toe fungal issues: used topical fungal a couple years ago without relief. Has sen podiatry in the past would like to see someone new. Rash: Facial rash around beard on chin. Noticed it a couple weeks ago. C/o itching. No treatment at home. Taste change: C/o indigestion with acid reflux and alters taste in mouth and cant seem to get rid of it despite brushing teeth.      -    Vitals:    04/03/19 1305   BP: (!) 136/90   Site: Right Upper Arm   Position: Sitting   Cuff Size: Large Adult   Pulse: 89   Temp: 98 °F (36.7 °C)   TempSrc: Oral   SpO2: 97%   Weight: 275 lb 6.4 oz (124.9 kg)       Pertinent Physical exam findings:  -     Tentative plan:  -     Alison Thompson

## 2019-04-08 ENCOUNTER — TELEPHONE (OUTPATIENT)
Dept: FAMILY MEDICINE CLINIC | Age: 69
End: 2019-04-08

## 2019-04-08 DIAGNOSIS — R39.15 URINARY URGENCY: Primary | ICD-10-CM

## 2019-04-08 RX ORDER — OXYBUTYNIN CHLORIDE 15 MG/1
30 TABLET, EXTENDED RELEASE ORAL DAILY
Qty: 60 TABLET | Refills: 3 | Status: SHIPPED | OUTPATIENT
Start: 2019-04-08 | End: 2019-04-12

## 2019-04-08 NOTE — TELEPHONE ENCOUNTER
Pt's son called in today asking if the Oxybutin could be increased or changed? States it not helping .

## 2019-04-11 ENCOUNTER — TELEPHONE (OUTPATIENT)
Dept: FAMILY MEDICINE CLINIC | Age: 69
End: 2019-04-11

## 2019-04-11 DIAGNOSIS — R39.15 URINARY URGENCY: Primary | ICD-10-CM

## 2019-04-11 NOTE — TELEPHONE ENCOUNTER
He was taking 15mg of oxybutinin daily. I have doubled this to 30mg daily. Since there is no 30mg tab, he will have to take 2 tabs at a time. This should be on the new Rx.

## 2019-04-11 NOTE — TELEPHONE ENCOUNTER
In that case, he is already on the max dose. Only other option is to try a different med. The oxybutinin is likely the cheapest one.

## 2019-04-11 NOTE — TELEPHONE ENCOUNTER
Pt last seen by Dr José Miguel Carrillo on 4/3/2019, Pt called requesting a review on his oxybutynin, They thought the dosage was being increased, and when they picked up the Rx it was the same as the old Rx.

## 2019-04-12 RX ORDER — SOLIFENACIN SUCCINATE 5 MG/1
5 TABLET, FILM COATED ORAL DAILY
Qty: 30 TABLET | Refills: 2 | Status: SHIPPED | OUTPATIENT
Start: 2019-04-12 | End: 2019-07-17 | Stop reason: SDUPTHER

## 2019-04-12 NOTE — TELEPHONE ENCOUNTER
I've sent in solifenacin to try. Below is a list of the different meds we can try.  You can also check with your insurance to price check them:  (Darifenacin, fesoterodine, oxybutynin, solifenacin, tolterodine, trospium)

## 2019-04-12 NOTE — TELEPHONE ENCOUNTER
Pt's son called in today to let you know that the pt would like to try a different medication. Pt's son states that Myrbetriq cost to much.

## 2019-04-14 ENCOUNTER — HOSPITAL ENCOUNTER (EMERGENCY)
Age: 69
Discharge: HOME | End: 2019-04-14
Attending: INTERNAL MEDICINE
Payer: MEDICARE

## 2019-04-14 VITALS
SYSTOLIC BLOOD PRESSURE: 154 MMHG | DIASTOLIC BLOOD PRESSURE: 76 MMHG | BODY MASS INDEX: 36.45 KG/M2 | TEMPERATURE: 98.3 F | HEART RATE: 74 BPM | RESPIRATION RATE: 18 BRPM | OXYGEN SATURATION: 96 % | HEIGHT: 73 IN | WEIGHT: 275 LBS

## 2019-04-14 DIAGNOSIS — H54.62 VISION LOSS, LEFT EYE: Primary | ICD-10-CM

## 2019-04-14 DIAGNOSIS — F41.1 ANXIETY STATE: ICD-10-CM

## 2019-04-14 DIAGNOSIS — H54.61 VISION LOSS OF RIGHT EYE: ICD-10-CM

## 2019-04-14 DIAGNOSIS — I10 ESSENTIAL HYPERTENSION: ICD-10-CM

## 2019-04-14 PROCEDURE — 99283 EMERGENCY DEPT VISIT LOW MDM: CPT

## 2019-04-14 PROCEDURE — 6370000000 HC RX 637 (ALT 250 FOR IP): Performed by: INTERNAL MEDICINE

## 2019-04-14 RX ORDER — LORAZEPAM 0.5 MG/1
0.5 TABLET ORAL ONCE
Status: COMPLETED | OUTPATIENT
Start: 2019-04-14 | End: 2019-04-14

## 2019-04-14 RX ORDER — ALPRAZOLAM 0.25 MG/1
0.25 TABLET ORAL NIGHTLY PRN
Qty: 8 TABLET | Refills: 0 | Status: SHIPPED | OUTPATIENT
Start: 2019-04-14 | End: 2019-04-24

## 2019-04-14 RX ORDER — TETRACAINE HYDROCHLORIDE 5 MG/ML
2 SOLUTION OPHTHALMIC ONCE
Status: COMPLETED | OUTPATIENT
Start: 2019-04-14 | End: 2019-04-14

## 2019-04-14 RX ADMIN — TETRACAINE HYDROCHLORIDE 2 DROP: 5 SOLUTION OPHTHALMIC at 14:59

## 2019-04-14 RX ADMIN — LORAZEPAM 0.5 MG: 0.5 TABLET ORAL at 16:05

## 2019-04-14 ASSESSMENT — VISUAL ACUITY
OS: 20/200
OD: 20/200
OU: 20/200

## 2019-04-14 NOTE — ED TRIAGE NOTES
Pt has been using Fluoromethol Hcl 0.1% eye drops since Thursday night. Pt now has problems with his vision and stopped the medication today. Pt to ED 10 with family. Pt is also out of his Klonopin 0.5 mg and wants it filled today.

## 2019-04-16 NOTE — ED PROVIDER NOTES
[address] [place] [address] ED  eMERGENCY dEPARTMENT eNCOUnter      Pt Name: Luda Morrison  MRN: [id number]  [place] 1950  Date of evaluation: 4/14/2019  Provider: Joana Figueroa MD    CHIEF COMPLAINT       Chief Complaint   Patient presents with    Eye Problem     bilateral blurred vision x2 days. pt states he was given eye drops for dry eye then given steroids. pt thinks the steroids are causing him to go \"blind\". HISTORY OF PRESENT ILLNESS   (Location/Symptom, Timing/Onset, Context/Setting, Quality, Duration, Modifying Factors, Severity)  Note limiting factors. Luda Morrison is a 79 y.o. male who presents to the emergency department for evaluation and management of 2 days of decreased vision in his left eye which explains as cloudiness. He was started on Fluoromethalone by his ophthalmologist on Thursday for \"some type of infection\". He has had chronic vision loss on the right side due to a viral infection. He explains that he takes eyedrops for this virus. He denies headache, eye pain and discharge. He denies a foreign body sensation and mechanism of injury. 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 He also states that he has a follow-up department with his ophthalmologist in the middle of next week. He has not contacted his ophthalmologist yet. He stopped his new drops. HPI    Nursing Notes were reviewed. REVIEW OF SYSTEMS    (2-9 systems for level 4, 10 or more for level 5)       REVIEW OF SYSTEMS    Constitutional: Negative for fatigue and fever. HENT: Negative for dental problem, ear pain and sore throat. Eyes: Positive for left eye cloudiness, Negative for pain, drainage, foreign body sensation, and redness. Musculoskeletal: Negative for arthralgias, back pain and neck pain. Neurological: Negative for dizziness, speech difficulty, weakness, numbness and headaches. Hematological: Negative for adenopathy.  Does not bruise/bleed easily. All other systems reviewed and are negative. Except as noted above theremainder of the review of systems was reviewed and negative. PASTMEDICAL HISTORY     Past Medical History:   Diagnosis Date    Allergic rhinitis     Asthma     asthmatic bronchitis    Atrial fibrillation (HCC)     CAD (coronary artery disease)     Chronic back pain     COPD (chronic obstructive pulmonary disease) (HCC)     CVA (cerebral infarction)     Depression     Edema     GERD (gastroesophageal reflux disease)     ulcer    Glucose intolerance (impaired glucose tolerance) 11/06    Hypertension     ICD (implantable cardiac defibrillator) in place 2010    OMAR (obstructive sleep apnea)     UTI (urinary tract infection)          SURGICAL HISTORY       Past Surgical History:   Procedure Laterality Date    BACK SURGERY      ensed    FRACTURE SURGERY  7/06    l/shoulder    GLOSSECTOMY  12/09/14    nasal endoscopy    GLOSSECTOMY  07/31/15    W/COBLATION,NASAL ENDOSCOPY    PACEMAKER PLACEMENT  2010    SKIN BIOPSY  2/13/12    right leg-Dr. Carmen Isabel         CURRENT MEDICATIONS       Discharge Medication List as of 4/14/2019  4:36 PM      CONTINUE these medications which have NOT CHANGED    Details   morphine (MS CONTIN) 30 MG extended release tablet Historical Med      warfarin (COUMADIN) 5 MG tablet Regimen per Dr. Sj Lombardi in cardiology, Disp-1 tablet, R-0Historical Med      albuterol (PROVENTIL) (2.5 MG/3ML) 0.083% nebulizer solution USE ONE VIAL IN NEBULIZER EVERY 4 HOURS AS NEEDED FOR WHEEZING AND FOR SHORTNESS OF BREATH, Disp-75 vial, R-5Normal      budesonide-formoterol (SYMBICORT) 160-4.5 MCG/ACT AERO Inhale 2 puffs into the lungs 2 times daily LOT [id number] exp 10/2018, Disp-1 Inhaler, R-0Sample      lactulose 20 GM/30ML SOLN Take  by mouth. carvedilol (COREG) 25 MG tablet Take 1 tablet by mouth 2 times daily (with meals). , Disp-180 tablet, R-1      sotalol (BETAPACE) 80 MG tablet Take 80 mg Smoker    Smokeless tobacco: Never Used   Substance and Sexual Activity    Alcohol use: No     Alcohol/week: 0.0 oz    Drug use: No    Sexual activity: None   Lifestyle    Physical activity:     Days per week: None     Minutes per session: None    Stress: None   Relationships    Social connections:     Talks on phone: None     Gets together: None     Attends Moravian service: None     Active member of club or organization: None     Attends meetings of clubs or organizations: None     Relationship status: None    Intimate partner violence:     Fear of current or ex partner: None     Emotionally abused: None     Physically abused: None     Forced sexual activity: None   Other Topics Concern    None   Social History Narrative    None       SCREENINGS             PHYSICAL EXAM    (up to 7 for level 4, 8 or more for level 5)     ED Triage Vitals [04/14/19 1307]   BP Temp Temp Source Pulse Resp SpO2 Height Weight   (!) 154/76 98.3 °F (36.8 °C) Oral 74 18 96 % 6' 1\" (1.854 m) 275 lb (124.7 kg)       Physical Exam  Physical Exam   Constitutional:  Appears well, well-developed and well-nourished. No distress noted. Non toxic in appearance. Morbidly obese. Walks with a cane. HENT:     Head: Normocephalic and atraumatic. Right Ear: External ear normal. TM normal pearly light reflex. LeftEar: External ear normal. TM normal pearly light reflex. Nose: Nose normal.     Mouth/Throat: Oropharynx is clear and mucosa moist. No oropharyngeal exudate noted. Posterior pharynx is pink and noninjected. Eyes: Conjunctivae and EOM are normal. He states that his vision bilaterally is limited to making out people and shapes with extensive cloudiness. No papiledema. Pupils are equal, round, and reactive to light. No scleral icterus. Tim-Pen shows a pressure of 45 on the right and 25 on the left. Visual acuity 20/200 bilaterally. Neck: Normal range of motion. Neck supple. No tracheal deviation present.    Cardiovascular: Normal rate, regular rhythm, normal heartsounds and intact distal pulses. Exam reveals no gallop or friction rub. No murmur heard. Pulmonary/Chest: Effort normal and breath sounds are symmetric and normal. No respiratory distress. There are no wheezes, rales or rhonchi. No tenderness is exhibited upon palpation of the chest wall. Abdominal: Soft. Bowel sounds are normal. No distension or no mass exhibitted. There is no tenderness, rebound,rigidity or guarding. Genitourinary:   No CVA tenderness noted on examination. Musculoskeletal: Normal range of motion. No edema, tenderness or deformity. Lymphadenopathy:  No cervical adenopathy. Neurological:   alert and oriented to person, place, and time. Reflexes are normal.  There are no cranial nerve deficits. Normal muscle tone, motor and sensory function exhibitted. Coordination and gait normal.   Skin: Skin is warm and dry. No rash noted. No diaphoresis. No erythema. No pallor. Psychiatric: Pleasant and cooperative. normal mood and affect. Behavior is  normal. Judgment and thought content normal.     DIAGNOSTIC RESULTS     EKG: All EKG's are interpreted by the Emergency Department Physician who either signs or Co-signs this chart in the absence of a cardiologist.     Not indicated. RADIOLOGY:   Non-plain film images such as CT, Ultrasoundand MRI are read by the radiologist. Plain radiographic images are visualized and preliminarily interpreted by the emergency physician with the below findings:     Not indicated. Interpretation per the Radiologist below, if available at the time of this note:    No orders to display         ED BEDSIDE ULTRASOUND:   Performed by ED Physician - none    LABS:  Labs Reviewed - No data to display    All other labs were within normal range or not returned as of this dictation.     EMERGENCY DEPARTMENT COURSE and DIFFERENTIAL DIAGNOSIS/MDM:   Vitals:    Vitals:    04/14/19 1307   BP: (!) 154/76   Pulse: 74   Resp: 18   Temp: 98.3 °F (36.8 °C)   TempSrc: Oral   SpO2: 96%   Weight: 275 lb (124.7 kg)   Height: 6' 1\" (1.854 m)       Noted    MDM    CRITICAL CARE TIME   Total Critical Care time was 0 minutes. [facility]  ED Course as of Apr 16 2249   Sun Apr 14, 2019   1449 Last cleared to contact Dr. Jay Rodriguez    [MS]   [id number] I was informed that Dr. Renee Aquino is covering for Dr. Mayte Romo.    [MS]   [phone] Right 39, L 35    [MS]   [phone] Dr. Renee Aquino returned call. Explained to him that this patient presented with new left-sided vision clouding of 2 days' duration with a history of chronic right-sided vision loss due to a \"viral infection\". He instructed the patient to follow-up in the [place] clinic at 12 noon tomorrow. I also told him that his pressures were increased and He instructed the patient to be given Diamox 250 mg twice a day. [MS]   4624 I contacted Dr. Renee Aquino to inform him that the patient is allergic to hydrochlorothiazide and will not be able to take Diamox. He instructed the patient not to use the Diamox but to continue with the original plan to proceed to the [place] clinic tomorrow afternoon. [MS]      ED Course User Index  [MS] Joana Figueroa MD       CONSULTS:  None    PROCEDURES:  Unlessotherwise noted below, none     Procedures      Summation    Patient with chronic right vision loss due to a viral infection now presents with worsening vision in his left eye. Due to the chronicity of his illness no immediate management was pursued . He was instructed to continue his other medications as prescribed. He is otherwise well, well hydrated, nontoxic, hemodynamically stable and satisfactory for discharge for outpatient management. I instructed the patient to followup with Dr. Soy Romero tomorrow at 11 AM at the [place] office. This information was also conveyed to his son who is present at bedside. He was instructed to follow-up with his primary care provider for further evaluation and management of hypertension.     I instructed the patient to return to the ER if his condition worsens, if there is any concern for altered mental status, difficulty breathing, dehydration or loss of function. Patient Course:   ED Course as of Apr 16 2249   Sun Apr 14, 2019   1449 Last cleared to contact Dr. Poncho Carcamo    [MS]   [id number] I was informed that Dr. Tori Aguilar is covering for Dr. Vidhya Carlos.    [MS]   [phone] Right 39, L 35    [MS]   [phone] Dr. Tori Aguilar returned call. Explained to him that this patient presented with new left-sided vision clouding of 2 days' duration with a history of chronic right-sided vision loss due to a \"viral infection\". He instructed the patient to follow-up in the [place] clinic at 12 noon tomorrow. I also told him that his pressures were increased and He instructed the patient to be given Diamox 250 mg twice a day. [MS]   4284 I contacted Dr. Tori Aguilar to inform him that the patient is allergic to hydrochlorothiazide and will not be able to take Diamox. He instructed the patient not to use the Diamox but to continue with the original plan to proceed to the [place] clinic tomorrow afternoon. [MS]      ED Course User Index  [MS] Tash Woods MD         ED Medicationsadministered this visit:    Medications   tetracaine (TETRAVISC) 0.5 % ophthalmic solution 2 drop (2 drops Both Eyes Given 4/14/19 2405)   LORazepam (ATIVAN) tablet 0.5 mg (0.5 mg Oral Given 4/14/19 1609)       New Prescriptions from this visit:    Discharge Medication List as of 4/14/2019  4:36 PM      START taking these medications    Details   ALPRAZolam (XANAX) 0.25 MG tablet Take 1 tablet by mouth nightly as needed for Sleep for up to 30 days. , Disp-8 tablet, R-0Print             Follow-up:  [facility] ED  [address] [address]  [phone]    As needed, If symptoms worsen      Dr. Vidhya Carlos, tomorrow at [facility] [place]  In 1 day      Ben Meneses MD  [address] [phone]    In 3 days          Final Impression: 1. Vision loss, left eye New Problem   2. Vision loss of right eye Stable   3. Essential hypertension Stable   4. Anxiety state               (Please note that portions of this note werecompleted with a voice recognition program.  Efforts were made to edit the dictations but occasionally words are mis-transcribed.)    FINAL IMPRESSION      1. Vision loss, left eye New Problem   2. Vision loss of right eye Stable   3. Essential hypertension Stable   4. Anxiety state          DISPOSITION/PLAN   DISPOSITION Decision To Discharge 04/14/2019 03:57:48 PM      PATIENT REFERRED TO:  [facility] ED  [address] [address]  [phone]    As needed, If symptoms worsen      Dr. Rhys Morin, tomorrow at [facility] [place]  In 1 day      Terrance Resendez MD  [address] [facility] [phone]    In 3 days        DISCHARGE MEDICATIONS:  Discharge Medication List as of 4/14/2019  4:36 PM      START taking these medications    Details   ALPRAZolam (XANAX) 0.25 MG tablet Take 1 tablet by mouth nightly as needed for Sleep for up to 30 days. , Disp-8 tablet, R-0Print                (Please note that portions of this note were completed with a voice recognition program.  Efforts were made to edit the dictations but occasionally words are mis-transcribed.)    Kun Landeros MD (electronically signed)  Attending Emergency Physician            Kun Landeros MD  04/16/19 3162

## 2019-04-19 ENCOUNTER — OFFICE VISIT (OUTPATIENT)
Dept: FAMILY MEDICINE CLINIC | Age: 69
End: 2019-04-19
Payer: MEDICARE

## 2019-04-19 VITALS — HEART RATE: 88 BPM | OXYGEN SATURATION: 98 % | DIASTOLIC BLOOD PRESSURE: 72 MMHG | SYSTOLIC BLOOD PRESSURE: 124 MMHG

## 2019-04-19 DIAGNOSIS — H53.9 VISION DISTURBANCE: ICD-10-CM

## 2019-04-19 DIAGNOSIS — Z09 HOSPITAL DISCHARGE FOLLOW-UP: Primary | ICD-10-CM

## 2019-04-19 DIAGNOSIS — F41.9 ANXIETY: ICD-10-CM

## 2019-04-19 PROCEDURE — 1123F ACP DISCUSS/DSCN MKR DOCD: CPT | Performed by: FAMILY MEDICINE

## 2019-04-19 PROCEDURE — G8417 CALC BMI ABV UP PARAM F/U: HCPCS | Performed by: FAMILY MEDICINE

## 2019-04-19 PROCEDURE — 99214 OFFICE O/P EST MOD 30 MIN: CPT | Performed by: FAMILY MEDICINE

## 2019-04-19 PROCEDURE — 3017F COLORECTAL CA SCREEN DOC REV: CPT | Performed by: FAMILY MEDICINE

## 2019-04-19 PROCEDURE — 4040F PNEUMOC VAC/ADMIN/RCVD: CPT | Performed by: FAMILY MEDICINE

## 2019-04-19 PROCEDURE — 1036F TOBACCO NON-USER: CPT | Performed by: FAMILY MEDICINE

## 2019-04-19 PROCEDURE — 1111F DSCHRG MED/CURRENT MED MERGE: CPT | Performed by: FAMILY MEDICINE

## 2019-04-19 PROCEDURE — G8427 DOCREV CUR MEDS BY ELIG CLIN: HCPCS | Performed by: FAMILY MEDICINE

## 2019-04-19 RX ORDER — DIAZEPAM 5 MG/1
5 TABLET ORAL 2 TIMES DAILY PRN
Qty: 10 TABLET | Refills: 0 | Status: SHIPPED | OUTPATIENT
Start: 2019-04-19 | End: 2019-04-24

## 2019-04-19 ASSESSMENT — ENCOUNTER SYMPTOMS
COUGH: 0
WHEEZING: 0
EYE PAIN: 1
RHINORRHEA: 0
ABDOMINAL PAIN: 0
EYE REDNESS: 1
CONSTIPATION: 0
EYE DISCHARGE: 1
DIARRHEA: 0
SHORTNESS OF BREATH: 0
SORE THROAT: 0

## 2019-04-19 NOTE — PROGRESS NOTES
Occupational History    Not on file   Social Needs    Financial resource strain: Not on file    Food insecurity:     Worry: Not on file     Inability: Not on file    Transportation needs:     Medical: Not on file     Non-medical: Not on file   Tobacco Use    Smoking status: Never Smoker    Smokeless tobacco: Never Used   Substance and Sexual Activity    Alcohol use: No     Alcohol/week: 0.0 oz    Drug use: No    Sexual activity: Not on file   Lifestyle    Physical activity:     Days per week: Not on file     Minutes per session: Not on file    Stress: Not on file   Relationships    Social connections:     Talks on phone: Not on file     Gets together: Not on file     Attends Latter day service: Not on file     Active member of club or organization: Not on file     Attends meetings of clubs or organizations: Not on file     Relationship status: Not on file    Intimate partner violence:     Fear of current or ex partner: Not on file     Emotionally abused: Not on file     Physically abused: Not on file     Forced sexual activity: Not on file   Other Topics Concern    Not on file   Social History Narrative    Not on file     Allergies   Allergen Reactions    Cashew Nut Oil     Dust Mite Extract     Hctz     Other      cashews    Pollen Extract     Thiazide-Type Diuretics      itching     Current Outpatient Medications   Medication Sig Dispense Refill    diazepam (VALIUM) 5 MG tablet Take 1 tablet by mouth 2 times daily as needed for Anxiety for up to 10 days. 10 tablet 0    ALPRAZolam (XANAX) 0.25 MG tablet Take 1 tablet by mouth nightly as needed for Sleep for up to 30 days.  8 tablet 0    solifenacin (VESICARE) 5 MG tablet Take 1 tablet by mouth daily 30 tablet 2    morphine (MS CONTIN) 30 MG extended release tablet       omeprazole (PRILOSEC) 20 MG delayed release capsule Take 1 capsule by mouth every morning (before breakfast) 30 capsule 3    warfarin (COUMADIN) 5 MG tablet Regimen per OUTPATIENT MED LIST        Return if symptoms worsen or fail to improve.     Angie Katz MD

## 2019-04-23 ENCOUNTER — TELEPHONE (OUTPATIENT)
Dept: FAMILY MEDICINE CLINIC | Age: 69
End: 2019-04-23

## 2019-04-23 DIAGNOSIS — F41.9 ANXIETY: Primary | ICD-10-CM

## 2019-04-24 RX ORDER — DIAZEPAM 5 MG/1
5 TABLET ORAL DAILY PRN
Qty: 30 TABLET | Refills: 0 | Status: SHIPPED | OUTPATIENT
Start: 2019-04-24 | End: 2019-05-24

## 2019-04-24 NOTE — TELEPHONE ENCOUNTER
Pt's son called in stating the pharmacy said the medication cannot be filled until 4/28/2019 unless it is ok with physician. Is it ok to call and let them know its ok for a early refill? Please advise.

## 2019-04-24 NOTE — TELEPHONE ENCOUNTER
I can provide 5mg daily for the next 30 days if needed as a temporary measure. Any additional intervention for the eye discomfort will have to come from the eye clinic.

## 2019-06-13 ENCOUNTER — HOSPITAL ENCOUNTER (OUTPATIENT)
Dept: LAB | Age: 69
Discharge: HOME | End: 2019-06-13
Payer: MEDICARE

## 2019-06-13 DIAGNOSIS — F41.0 PANIC ATTACK: ICD-10-CM

## 2019-06-13 LAB
INR BLD: 2.1
PROTHROMBIN TIME: 20.7 SEC (ref 9–11.5)

## 2019-06-13 PROCEDURE — 85610 PROTHROMBIN TIME: CPT

## 2019-06-13 PROCEDURE — 36415 COLL VENOUS BLD VENIPUNCTURE: CPT

## 2019-06-14 RX ORDER — CLONAZEPAM 0.5 MG/1
0.25 TABLET ORAL DAILY PRN
Qty: 10 TABLET | Refills: 0 | Status: SHIPPED | OUTPATIENT
Start: 2019-06-14 | End: 2019-07-05 | Stop reason: SDUPTHER

## 2019-07-05 DIAGNOSIS — F41.0 PANIC ATTACK: ICD-10-CM

## 2019-07-05 RX ORDER — CLONAZEPAM 0.5 MG/1
0.25 TABLET ORAL DAILY PRN
Qty: 10 TABLET | Refills: 0 | Status: SHIPPED | OUTPATIENT
Start: 2019-07-05 | End: 2019-08-13 | Stop reason: SDUPTHER

## 2019-07-17 DIAGNOSIS — R39.15 URINARY URGENCY: ICD-10-CM

## 2019-07-18 RX ORDER — SOLIFENACIN SUCCINATE 5 MG/1
TABLET, FILM COATED ORAL
Qty: 30 TABLET | Refills: 2 | Status: SHIPPED | OUTPATIENT
Start: 2019-07-18 | End: 2019-11-13 | Stop reason: SDUPTHER

## 2019-08-03 DIAGNOSIS — K21.9 GASTROESOPHAGEAL REFLUX DISEASE, ESOPHAGITIS PRESENCE NOT SPECIFIED: ICD-10-CM

## 2019-08-05 RX ORDER — OMEPRAZOLE 20 MG/1
CAPSULE, DELAYED RELEASE ORAL
Qty: 30 CAPSULE | Refills: 3 | Status: SHIPPED | OUTPATIENT
Start: 2019-08-05 | End: 2019-11-22 | Stop reason: SDUPTHER

## 2019-08-05 NOTE — TELEPHONE ENCOUNTER
Rx requested:  Requested Prescriptions     Pending Prescriptions Disp Refills    omeprazole (PRILOSEC) 20 MG delayed release capsule [Pharmacy Med Name: OMEPRAZOLE 20MG CAP] 30 capsule 3     Sig: TAKE 1 CAPSULE BY MOUTH IN THE MORNING BEFORE BREAKFAST       Last Office Visit:   4/19/2019    Last Labs:  4/3/19    Last filled:  4/3/19    Next Visit Date:  No future appointments.

## 2019-08-13 DIAGNOSIS — F41.0 PANIC ATTACK: ICD-10-CM

## 2019-08-13 RX ORDER — CLONAZEPAM 0.5 MG/1
0.25 TABLET ORAL DAILY PRN
Qty: 10 TABLET | Refills: 0 | Status: SHIPPED | OUTPATIENT
Start: 2019-08-13 | End: 2019-09-11 | Stop reason: SDUPTHER

## 2019-08-13 NOTE — TELEPHONE ENCOUNTER
Rx requested:  Requested Prescriptions     Pending Prescriptions Disp Refills    clonazePAM (KLONOPIN) 0.5 MG tablet 10 tablet 0     Sig: Take 0.5 tablets by mouth daily as needed for Anxiety for up to 30 days. Last Office Visit:   4/19/2019    Last Labs:  9/24/18    Last filled:  7/5/19    Next Visit Date:  No future appointments.

## 2019-08-29 ENCOUNTER — HOSPITAL ENCOUNTER (OUTPATIENT)
Dept: LAB | Age: 69
Discharge: HOME | End: 2019-08-29
Payer: MEDICARE

## 2019-08-29 LAB
INR BLD: 2.3
PROTHROMBIN TIME: 25.9 SEC (ref 12.3–14.9)

## 2019-08-29 PROCEDURE — 36415 COLL VENOUS BLD VENIPUNCTURE: CPT

## 2019-08-29 PROCEDURE — 85610 PROTHROMBIN TIME: CPT

## 2019-09-11 ENCOUNTER — HOSPITAL ENCOUNTER (OUTPATIENT)
Age: 69
Setting detail: SPECIMEN
Discharge: HOME | End: 2019-09-11
Payer: MEDICARE

## 2019-09-11 ENCOUNTER — OFFICE VISIT (OUTPATIENT)
Dept: FAMILY MEDICINE CLINIC | Age: 69
End: 2019-09-11
Payer: MEDICARE

## 2019-09-11 VITALS
DIASTOLIC BLOOD PRESSURE: 62 MMHG | OXYGEN SATURATION: 96 % | HEIGHT: 73 IN | WEIGHT: 286.2 LBS | HEART RATE: 73 BPM | SYSTOLIC BLOOD PRESSURE: 112 MMHG | BODY MASS INDEX: 37.93 KG/M2

## 2019-09-11 DIAGNOSIS — F51.04 PSYCHOPHYSIOLOGICAL INSOMNIA: ICD-10-CM

## 2019-09-11 DIAGNOSIS — F41.0 PANIC ATTACK: ICD-10-CM

## 2019-09-11 DIAGNOSIS — H25.9 AGE-RELATED CATARACT OF BOTH EYES, UNSPECIFIED AGE-RELATED CATARACT TYPE: ICD-10-CM

## 2019-09-11 DIAGNOSIS — Z01.818 PREOP EXAMINATION: Primary | ICD-10-CM

## 2019-09-11 LAB
ALBUMIN SERPL-MCNC: 3.6 G/DL (ref 3.5–4.6)
ALP BLD-CCNC: 117 U/L (ref 35–104)
ALT SERPL-CCNC: 13 U/L (ref 0–41)
ANION GAP SERPL CALCULATED.3IONS-SCNC: 12 MEQ/L (ref 9–15)
AST SERPL-CCNC: 20 U/L (ref 0–40)
BILIRUB SERPL-MCNC: 0.8 MG/DL (ref 0.2–0.7)
BUN BLDV-MCNC: 18 MG/DL (ref 8–23)
CALCIUM SERPL-MCNC: 9.2 MG/DL (ref 8.5–9.9)
CHLORIDE BLD-SCNC: 104 MEQ/L (ref 95–107)
CO2: 27 MEQ/L (ref 20–31)
CREAT SERPL-MCNC: 1.12 MG/DL (ref 0.7–1.2)
GFR AFRICAN AMERICAN: >60
GFR NON-AFRICAN AMERICAN: >60
GLOBULIN: 3.5 G/DL (ref 2.3–3.5)
GLUCOSE BLD-MCNC: 108 MG/DL (ref 70–99)
POTASSIUM SERPL-SCNC: 4.2 MEQ/L (ref 3.4–4.9)
SODIUM BLD-SCNC: 143 MEQ/L (ref 135–144)
TOTAL PROTEIN: 7.1 G/DL (ref 6.3–8)

## 2019-09-11 PROCEDURE — G8417 CALC BMI ABV UP PARAM F/U: HCPCS | Performed by: FAMILY MEDICINE

## 2019-09-11 PROCEDURE — 99212 OFFICE O/P EST SF 10 MIN: CPT | Performed by: FAMILY MEDICINE

## 2019-09-11 PROCEDURE — 80053 COMPREHEN METABOLIC PANEL: CPT

## 2019-09-11 PROCEDURE — G8427 DOCREV CUR MEDS BY ELIG CLIN: HCPCS | Performed by: FAMILY MEDICINE

## 2019-09-11 RX ORDER — ZONISAMIDE 50 MG/1
CAPSULE ORAL
Refills: 1 | COMMUNITY
Start: 2019-07-16

## 2019-09-11 RX ORDER — CLONAZEPAM 0.5 MG/1
0.25 TABLET ORAL DAILY PRN
Qty: 15 TABLET | Refills: 0 | Status: SHIPPED | OUTPATIENT
Start: 2019-09-11 | End: 2019-10-11 | Stop reason: SDUPTHER

## 2019-09-11 RX ORDER — HYDROXYZINE 50 MG/1
50 TABLET, FILM COATED ORAL NIGHTLY PRN
Qty: 30 TABLET | Refills: 1 | Status: SHIPPED | OUTPATIENT
Start: 2019-09-11 | End: 2019-10-11

## 2019-09-11 RX ORDER — DEXAMETHASONE SODIUM PHOSPHATE 1 MG/ML
1 SOLUTION/ DROPS OPHTHALMIC
COMMUNITY
Start: 2019-08-27 | End: 2021-01-04 | Stop reason: ALTCHOICE

## 2019-09-11 ASSESSMENT — ENCOUNTER SYMPTOMS
COUGH: 0
CONSTIPATION: 0
DIARRHEA: 0
RHINORRHEA: 0
SORE THROAT: 0
ABDOMINAL PAIN: 0
WHEEZING: 0
SHORTNESS OF BREATH: 0

## 2019-09-11 NOTE — PROGRESS NOTES
Smokeless tobacco: Never Used   Substance and Sexual Activity    Alcohol use: No     Alcohol/week: 0.0 standard drinks    Drug use: No    Sexual activity: Not on file   Lifestyle    Physical activity:     Days per week: Not on file     Minutes per session: Not on file    Stress: Not on file   Relationships    Social connections:     Talks on phone: Not on file     Gets together: Not on file     Attends Confucianist service: Not on file     Active member of club or organization: Not on file     Attends meetings of clubs or organizations: Not on file     Relationship status: Not on file    Intimate partner violence:     Fear of current or ex partner: Not on file     Emotionally abused: Not on file     Physically abused: Not on file     Forced sexual activity: Not on file   Other Topics Concern    Not on file   Social History Narrative    Not on file     Allergies   Allergen Reactions    Cashew Nut Oil     Dust Mite Extract     Hctz     Other      cashews    Pollen Extract     Thiazide-Type Diuretics      itching     Current Outpatient Medications   Medication Sig Dispense Refill    dexamethasone (DECADRON) 0.1 % ophthalmic solution 1 drop      zonisamide (ZONEGRAN) 50 MG capsule TAKE 2 CAPSULES BY MOUTH AT BEDTIME  1    clonazePAM (KLONOPIN) 0.5 MG tablet Take 0.5 tablets by mouth daily as needed for Anxiety for up to 30 days. 15 tablet 0    hydrOXYzine (ATARAX) 50 MG tablet Take 1 tablet by mouth nightly as needed for Anxiety (insmonia or nighttime allergy) 30 tablet 1    omeprazole (PRILOSEC) 20 MG delayed release capsule TAKE 1 CAPSULE BY MOUTH IN THE MORNING BEFORE BREAKFAST 30 capsule 3    VESICARE 5 MG tablet TAKE 1 TABLET BY MOUTH ONCE DAILY 30 tablet 2    morphine (MS CONTIN) 30 MG extended release tablet       warfarin (COUMADIN) 5 MG tablet Regimen per Dr. Sofia Solano in cardiology 1 tablet 0    erythromycin (ROMYCIN) 5 MG/GM ophthalmic ointment Use 1 application in both eyes daily at bedtime.

## 2019-10-01 ENCOUNTER — OFFICE VISIT (OUTPATIENT)
Dept: INTERNAL MEDICINE | Age: 69
End: 2019-10-01
Payer: MEDICARE

## 2019-10-01 VITALS
BODY MASS INDEX: 38.04 KG/M2 | SYSTOLIC BLOOD PRESSURE: 115 MMHG | OXYGEN SATURATION: 97 % | DIASTOLIC BLOOD PRESSURE: 62 MMHG | WEIGHT: 287 LBS | HEART RATE: 71 BPM | TEMPERATURE: 98.1 F | HEIGHT: 73 IN | RESPIRATION RATE: 20 BRPM

## 2019-10-01 DIAGNOSIS — R09.81 NASAL CONGESTION: Primary | ICD-10-CM

## 2019-10-01 DIAGNOSIS — R05.9 COUGH: ICD-10-CM

## 2019-10-01 PROBLEM — H25.812 COMBINED FORMS OF AGE-RELATED CATARACT OF LEFT EYE: Status: ACTIVE | Noted: 2019-04-15

## 2019-10-01 PROBLEM — R55 SYNCOPE AND COLLAPSE: Status: ACTIVE | Noted: 2019-03-20

## 2019-10-01 PROBLEM — I10 ESSENTIAL HYPERTENSION: Status: ACTIVE | Noted: 2018-08-21

## 2019-10-01 PROBLEM — R42 DIZZINESS AND GIDDINESS: Status: ACTIVE | Noted: 2018-08-21

## 2019-10-01 PROBLEM — F41.0 PANIC ATTACK: Status: ACTIVE | Noted: 2019-10-01

## 2019-10-01 PROCEDURE — 1123F ACP DISCUSS/DSCN MKR DOCD: CPT | Performed by: NURSE PRACTITIONER

## 2019-10-01 PROCEDURE — 3017F COLORECTAL CA SCREEN DOC REV: CPT | Performed by: NURSE PRACTITIONER

## 2019-10-01 PROCEDURE — 99213 OFFICE O/P EST LOW 20 MIN: CPT | Performed by: NURSE PRACTITIONER

## 2019-10-01 PROCEDURE — G8484 FLU IMMUNIZE NO ADMIN: HCPCS | Performed by: NURSE PRACTITIONER

## 2019-10-01 PROCEDURE — G8417 CALC BMI ABV UP PARAM F/U: HCPCS | Performed by: NURSE PRACTITIONER

## 2019-10-01 PROCEDURE — 1036F TOBACCO NON-USER: CPT | Performed by: NURSE PRACTITIONER

## 2019-10-01 PROCEDURE — G8427 DOCREV CUR MEDS BY ELIG CLIN: HCPCS | Performed by: NURSE PRACTITIONER

## 2019-10-01 PROCEDURE — 4040F PNEUMOC VAC/ADMIN/RCVD: CPT | Performed by: NURSE PRACTITIONER

## 2019-10-01 ASSESSMENT — ENCOUNTER SYMPTOMS
SINUS PRESSURE: 1
SINUS PAIN: 0
WHEEZING: 0
COUGH: 1
DIARRHEA: 0
ABDOMINAL PAIN: 0
RHINORRHEA: 0
SORE THROAT: 1
SHORTNESS OF BREATH: 0
NAUSEA: 0
CHEST TIGHTNESS: 0
VOMITING: 0

## 2019-10-09 ENCOUNTER — TELEPHONE (OUTPATIENT)
Dept: FAMILY MEDICINE CLINIC | Age: 69
End: 2019-10-09

## 2019-10-09 ENCOUNTER — OFFICE VISIT (OUTPATIENT)
Dept: FAMILY MEDICINE CLINIC | Age: 69
End: 2019-10-09
Payer: MEDICARE

## 2019-10-09 VITALS
WEIGHT: 285.6 LBS | HEART RATE: 71 BPM | DIASTOLIC BLOOD PRESSURE: 70 MMHG | HEIGHT: 73 IN | SYSTOLIC BLOOD PRESSURE: 132 MMHG | OXYGEN SATURATION: 97 % | BODY MASS INDEX: 37.85 KG/M2 | TEMPERATURE: 98.7 F

## 2019-10-09 DIAGNOSIS — Z23 NEED FOR IMMUNIZATION AGAINST INFLUENZA: ICD-10-CM

## 2019-10-09 DIAGNOSIS — I48.91 ATRIAL FIBRILLATION, UNSPECIFIED TYPE (HCC): ICD-10-CM

## 2019-10-09 DIAGNOSIS — R21 RASH: Primary | ICD-10-CM

## 2019-10-09 PROCEDURE — G0008 ADMIN INFLUENZA VIRUS VAC: HCPCS | Performed by: FAMILY MEDICINE

## 2019-10-09 PROCEDURE — G8482 FLU IMMUNIZE ORDER/ADMIN: HCPCS | Performed by: FAMILY MEDICINE

## 2019-10-09 PROCEDURE — 3017F COLORECTAL CA SCREEN DOC REV: CPT | Performed by: FAMILY MEDICINE

## 2019-10-09 PROCEDURE — G8417 CALC BMI ABV UP PARAM F/U: HCPCS | Performed by: FAMILY MEDICINE

## 2019-10-09 PROCEDURE — 1036F TOBACCO NON-USER: CPT | Performed by: FAMILY MEDICINE

## 2019-10-09 PROCEDURE — 90653 IIV ADJUVANT VACCINE IM: CPT | Performed by: FAMILY MEDICINE

## 2019-10-09 PROCEDURE — 1123F ACP DISCUSS/DSCN MKR DOCD: CPT | Performed by: FAMILY MEDICINE

## 2019-10-09 PROCEDURE — 99214 OFFICE O/P EST MOD 30 MIN: CPT | Performed by: FAMILY MEDICINE

## 2019-10-09 PROCEDURE — 4040F PNEUMOC VAC/ADMIN/RCVD: CPT | Performed by: FAMILY MEDICINE

## 2019-10-09 PROCEDURE — G8427 DOCREV CUR MEDS BY ELIG CLIN: HCPCS | Performed by: FAMILY MEDICINE

## 2019-10-09 ASSESSMENT — ENCOUNTER SYMPTOMS
ABDOMINAL PAIN: 0
RHINORRHEA: 0
SORE THROAT: 0
WHEEZING: 0
DIARRHEA: 0
COUGH: 0
SHORTNESS OF BREATH: 0
CONSTIPATION: 0

## 2019-10-11 DIAGNOSIS — F41.0 PANIC ATTACK: ICD-10-CM

## 2019-10-11 RX ORDER — CLONAZEPAM 0.5 MG/1
TABLET ORAL
Qty: 15 TABLET | Refills: 0 | Status: SHIPPED | OUTPATIENT
Start: 2019-10-11 | End: 2019-10-17 | Stop reason: SDUPTHER

## 2019-10-16 DIAGNOSIS — F41.0 PANIC ATTACK: ICD-10-CM

## 2019-10-16 DIAGNOSIS — F51.04 PSYCHOPHYSIOLOGICAL INSOMNIA: ICD-10-CM

## 2019-10-16 RX ORDER — HYDROXYZINE 50 MG/1
TABLET, FILM COATED ORAL
Qty: 30 TABLET | Refills: 1 | OUTPATIENT
Start: 2019-10-16

## 2019-10-16 RX ORDER — CLONAZEPAM 0.5 MG/1
TABLET ORAL
Qty: 15 TABLET | Refills: 0 | OUTPATIENT
Start: 2019-10-16 | End: 2019-11-15

## 2019-10-17 DIAGNOSIS — F41.0 PANIC ATTACK: ICD-10-CM

## 2019-10-17 RX ORDER — CLONAZEPAM 0.5 MG/1
0.5 TABLET ORAL 2 TIMES DAILY PRN
Qty: 30 TABLET | Refills: 0 | Status: SHIPPED | OUTPATIENT
Start: 2019-10-17 | End: 2022-03-09

## 2019-11-13 DIAGNOSIS — R39.15 URINARY URGENCY: ICD-10-CM

## 2019-11-13 RX ORDER — SOLIFENACIN SUCCINATE 5 MG/1
TABLET, FILM COATED ORAL
Qty: 30 TABLET | Refills: 2 | Status: SHIPPED | OUTPATIENT
Start: 2019-11-13 | End: 2019-12-16 | Stop reason: SDUPTHER

## 2019-11-19 ENCOUNTER — TELEPHONE (OUTPATIENT)
Dept: ENDOCRINOLOGY | Age: 69
End: 2019-11-19

## 2019-11-22 DIAGNOSIS — K21.9 GASTROESOPHAGEAL REFLUX DISEASE, ESOPHAGITIS PRESENCE NOT SPECIFIED: ICD-10-CM

## 2019-11-22 DIAGNOSIS — F41.0 PANIC ATTACK: ICD-10-CM

## 2019-11-22 RX ORDER — CLONAZEPAM 0.5 MG/1
TABLET ORAL
Qty: 30 TABLET | Refills: 0 | OUTPATIENT
Start: 2019-11-22

## 2019-11-22 RX ORDER — OMEPRAZOLE 20 MG/1
CAPSULE, DELAYED RELEASE ORAL
Qty: 30 CAPSULE | Refills: 3 | Status: SHIPPED | OUTPATIENT
Start: 2019-11-22 | End: 2020-04-14

## 2019-12-16 DIAGNOSIS — R39.15 URINARY URGENCY: ICD-10-CM

## 2019-12-16 RX ORDER — SOLIFENACIN SUCCINATE 5 MG/1
5 TABLET, FILM COATED ORAL DAILY
Qty: 90 TABLET | Refills: 3 | Status: SHIPPED | OUTPATIENT
Start: 2019-12-16 | End: 2021-01-11

## 2019-12-16 RX ORDER — SOLIFENACIN SUCCINATE 5 MG/1
TABLET, FILM COATED ORAL
Qty: 30 TABLET | Refills: 2 | Status: SHIPPED | OUTPATIENT
Start: 2019-12-16 | End: 2019-12-16

## 2019-12-26 ENCOUNTER — HOSPITAL ENCOUNTER (OUTPATIENT)
Dept: LAB | Age: 69
Discharge: HOME | End: 2019-12-26
Payer: MEDICARE

## 2019-12-26 LAB
INR BLD: 1.7
PROTHROMBIN TIME: 20.5 SEC (ref 12.3–14.9)

## 2019-12-26 PROCEDURE — 85610 PROTHROMBIN TIME: CPT

## 2019-12-26 PROCEDURE — 36415 COLL VENOUS BLD VENIPUNCTURE: CPT

## 2019-12-27 ENCOUNTER — OFFICE VISIT (OUTPATIENT)
Dept: FAMILY MEDICINE CLINIC | Age: 69
End: 2019-12-27
Payer: MEDICARE

## 2019-12-27 DIAGNOSIS — F41.0 PANIC ATTACK: ICD-10-CM

## 2019-12-27 DIAGNOSIS — S60.511A ABRASION OF RIGHT HAND, INITIAL ENCOUNTER: Primary | ICD-10-CM

## 2019-12-27 DIAGNOSIS — F51.02 ADJUSTMENT INSOMNIA: Primary | ICD-10-CM

## 2019-12-27 PROCEDURE — 1123F ACP DISCUSS/DSCN MKR DOCD: CPT | Performed by: FAMILY MEDICINE

## 2019-12-27 PROCEDURE — G8417 CALC BMI ABV UP PARAM F/U: HCPCS | Performed by: FAMILY MEDICINE

## 2019-12-27 PROCEDURE — 1036F TOBACCO NON-USER: CPT | Performed by: FAMILY MEDICINE

## 2019-12-27 PROCEDURE — 3017F COLORECTAL CA SCREEN DOC REV: CPT | Performed by: FAMILY MEDICINE

## 2019-12-27 PROCEDURE — 99212 OFFICE O/P EST SF 10 MIN: CPT | Performed by: FAMILY MEDICINE

## 2019-12-27 PROCEDURE — G8428 CUR MEDS NOT DOCUMENT: HCPCS | Performed by: FAMILY MEDICINE

## 2019-12-27 PROCEDURE — 4040F PNEUMOC VAC/ADMIN/RCVD: CPT | Performed by: FAMILY MEDICINE

## 2019-12-27 PROCEDURE — G8482 FLU IMMUNIZE ORDER/ADMIN: HCPCS | Performed by: FAMILY MEDICINE

## 2019-12-27 RX ORDER — CLONAZEPAM 0.5 MG/1
0.5 TABLET ORAL 2 TIMES DAILY PRN
Qty: 30 TABLET | Refills: 0 | Status: CANCELLED | OUTPATIENT
Start: 2019-12-27 | End: 2020-01-26

## 2019-12-27 RX ORDER — TRAZODONE HYDROCHLORIDE 50 MG/1
50 TABLET ORAL NIGHTLY PRN
Qty: 30 TABLET | Refills: 0 | Status: SHIPPED | OUTPATIENT
Start: 2019-12-27 | End: 2020-01-28 | Stop reason: SDUPTHER

## 2020-01-23 ENCOUNTER — HOSPITAL ENCOUNTER (OUTPATIENT)
Dept: LAB | Age: 70
Discharge: HOME | End: 2020-01-23
Payer: MEDICARE

## 2020-01-23 LAB
INR BLD: 2.7
PROTHROMBIN TIME: 29.9 SEC (ref 12.3–14.9)

## 2020-01-23 PROCEDURE — 36415 COLL VENOUS BLD VENIPUNCTURE: CPT

## 2020-01-23 PROCEDURE — 85610 PROTHROMBIN TIME: CPT

## 2020-01-28 RX ORDER — TRAZODONE HYDROCHLORIDE 50 MG/1
50 TABLET ORAL NIGHTLY PRN
Qty: 30 TABLET | Refills: 5 | Status: SHIPPED | OUTPATIENT
Start: 2020-01-28 | End: 2020-07-28 | Stop reason: SDUPTHER

## 2020-01-28 NOTE — TELEPHONE ENCOUNTER
Rx requested:  Requested Prescriptions     Pending Prescriptions Disp Refills    traZODone (DESYREL) 50 MG tablet 30 tablet      Sig: Take 1 tablet by mouth nightly as needed for Sleep       Last Office Visit:   12/27/2019      Last filled:  12/27/2019    Next Visit Date:  No future appointments.

## 2020-02-27 ENCOUNTER — HOSPITAL ENCOUNTER (OUTPATIENT)
Dept: LAB | Age: 70
Discharge: HOME | End: 2020-02-27
Payer: MEDICARE

## 2020-02-27 LAB
INR BLD: 2.4
PROTHROMBIN TIME: 26.9 SEC (ref 12.3–14.9)

## 2020-02-27 PROCEDURE — 85610 PROTHROMBIN TIME: CPT

## 2020-02-27 PROCEDURE — 36415 COLL VENOUS BLD VENIPUNCTURE: CPT

## 2020-04-14 RX ORDER — OMEPRAZOLE 20 MG/1
CAPSULE, DELAYED RELEASE ORAL
Qty: 30 CAPSULE | Refills: 0 | Status: SHIPPED | OUTPATIENT
Start: 2020-04-14 | End: 2020-05-18

## 2020-05-18 RX ORDER — OMEPRAZOLE 20 MG/1
CAPSULE, DELAYED RELEASE ORAL
Qty: 30 CAPSULE | Refills: 0 | Status: SHIPPED | OUTPATIENT
Start: 2020-05-18 | End: 2020-06-17

## 2020-06-17 RX ORDER — OMEPRAZOLE 20 MG/1
CAPSULE, DELAYED RELEASE ORAL
Qty: 30 CAPSULE | Refills: 0 | Status: SHIPPED | OUTPATIENT
Start: 2020-06-17 | End: 2020-07-20

## 2020-06-17 NOTE — TELEPHONE ENCOUNTER
Rx requested:  Requested Prescriptions     Pending Prescriptions Disp Refills    omeprazole (PRILOSEC) 20 MG delayed release capsule [Pharmacy Med Name: Omeprazole 20 MG Oral Capsule Delayed Release] 30 capsule 0     Sig: TAKE 1 CAPSULE BY MOUTH IN THE MORNING BEFORE BREAKFAST       Last Office Visit:   12/27/2019      Last filled:  5/18/2020    Next Visit Date:  No future appointments.

## 2020-07-20 RX ORDER — OMEPRAZOLE 20 MG/1
CAPSULE, DELAYED RELEASE ORAL
Qty: 30 CAPSULE | Refills: 0 | Status: SHIPPED | OUTPATIENT
Start: 2020-07-20 | End: 2020-08-17

## 2020-07-20 NOTE — TELEPHONE ENCOUNTER
Rx requested:  Requested Prescriptions     Pending Prescriptions Disp Refills    omeprazole (PRILOSEC) 20 MG delayed release capsule [Pharmacy Med Name: Omeprazole 20 MG Oral Capsule Delayed Release] 30 capsule 0     Sig: TAKE 1 CAPSULE BY MOUTH IN THE MORNING BEFORE BREAKFAST       Last Office Visit:   12/27/2019      Last filled:  6/17/2020    Next Visit Date:  No future appointments.

## 2020-07-28 RX ORDER — TRAZODONE HYDROCHLORIDE 50 MG/1
50 TABLET ORAL NIGHTLY PRN
Qty: 30 TABLET | Refills: 5 | Status: SHIPPED | OUTPATIENT
Start: 2020-07-28 | End: 2021-01-18

## 2020-07-30 ENCOUNTER — HOSPITAL ENCOUNTER (OUTPATIENT)
Dept: LAB | Age: 70
Discharge: HOME | End: 2020-07-30
Payer: MEDICARE

## 2020-07-30 LAB
INR BLD: 3
PROTHROMBIN TIME: 31.1 SEC (ref 12.3–14.9)

## 2020-07-30 PROCEDURE — 36415 COLL VENOUS BLD VENIPUNCTURE: CPT

## 2020-07-30 PROCEDURE — 85610 PROTHROMBIN TIME: CPT

## 2020-08-17 RX ORDER — OMEPRAZOLE 20 MG/1
CAPSULE, DELAYED RELEASE ORAL
Qty: 30 CAPSULE | Refills: 0 | Status: SHIPPED | OUTPATIENT
Start: 2020-08-17 | End: 2020-09-14

## 2020-08-17 NOTE — TELEPHONE ENCOUNTER
Rx requested:  Requested Prescriptions     Pending Prescriptions Disp Refills    omeprazole (PRILOSEC) 20 MG delayed release capsule [Pharmacy Med Name: Omeprazole 20 MG Oral Capsule Delayed Release] 30 capsule 0     Sig: TAKE 1 CAPSULE BY MOUTH IN THE MORNING BEFORE BREAKFAST       Last Office Visit:   12/27/2019      Last filled:  7/20/2020    Next Visit Date:  No future appointments.

## 2020-08-26 ENCOUNTER — TELEPHONE (OUTPATIENT)
Dept: FAMILY MEDICINE CLINIC | Age: 70
End: 2020-08-26

## 2020-09-21 ENCOUNTER — TELEPHONE (OUTPATIENT)
Dept: FAMILY MEDICINE CLINIC | Age: 70
End: 2020-09-21

## 2020-09-21 NOTE — TELEPHONE ENCOUNTER
Scheduling outreach call to review Health Maintenance and / or schedule appointment. AWV due  Colonoscopy due  [facility] [address] GAP YES  Lab work CMP-fasting glucose-TSH  Mammogram n/a  PHQ-9  due  Last appointment 12-  Upcoming appointment no  Scanned and updated HM yes    Spoke with male he will have patient call office for an appointment.

## 2020-10-05 ENCOUNTER — OFFICE VISIT (OUTPATIENT)
Dept: FAMILY MEDICINE CLINIC | Age: 70
End: 2020-10-05
Payer: MEDICARE

## 2020-10-05 VITALS
TEMPERATURE: 96.2 F | WEIGHT: 254 LBS | SYSTOLIC BLOOD PRESSURE: 104 MMHG | OXYGEN SATURATION: 96 % | DIASTOLIC BLOOD PRESSURE: 62 MMHG | HEART RATE: 74 BPM | BODY MASS INDEX: 35.56 KG/M2 | HEIGHT: 71 IN

## 2020-10-05 PROCEDURE — 3017F COLORECTAL CA SCREEN DOC REV: CPT | Performed by: FAMILY MEDICINE

## 2020-10-05 PROCEDURE — 1123F ACP DISCUSS/DSCN MKR DOCD: CPT | Performed by: FAMILY MEDICINE

## 2020-10-05 PROCEDURE — G0438 PPPS, INITIAL VISIT: HCPCS | Performed by: FAMILY MEDICINE

## 2020-10-05 PROCEDURE — G0008 ADMIN INFLUENZA VIRUS VAC: HCPCS | Performed by: FAMILY MEDICINE

## 2020-10-05 PROCEDURE — G8484 FLU IMMUNIZE NO ADMIN: HCPCS | Performed by: FAMILY MEDICINE

## 2020-10-05 PROCEDURE — 90694 VACC AIIV4 NO PRSRV 0.5ML IM: CPT | Performed by: FAMILY MEDICINE

## 2020-10-05 PROCEDURE — 4040F PNEUMOC VAC/ADMIN/RCVD: CPT | Performed by: FAMILY MEDICINE

## 2020-10-05 ASSESSMENT — PATIENT HEALTH QUESTIONNAIRE - PHQ9
2. FEELING DOWN, DEPRESSED OR HOPELESS: 0
SUM OF ALL RESPONSES TO PHQ9 QUESTIONS 1 & 2: 0
SUM OF ALL RESPONSES TO PHQ QUESTIONS 1-9: 0
SUM OF ALL RESPONSES TO PHQ QUESTIONS 1-9: 0
1. LITTLE INTEREST OR PLEASURE IN DOING THINGS: 0

## 2020-10-05 ASSESSMENT — LIFESTYLE VARIABLES: HOW OFTEN DO YOU HAVE A DRINK CONTAINING ALCOHOL: 0

## 2020-10-05 NOTE — PATIENT INSTRUCTIONS
Personalized Preventive Plan for Aniket Murray - 10/5/2020  Medicare offers a range of preventive health benefits. Some of the tests and screenings are paid in full while other may be subject to a deductible, co-insurance, and/or copay. Some of these benefits include a comprehensive review of your medical history including lifestyle, illnesses that may run in your family, and various assessments and screenings as appropriate. After reviewing your medical record and screening and assessments performed today your provider may have ordered immunizations, labs, imaging, and/or referrals for you. A list of these orders (if applicable) as well as your Preventive Care list are included within your After Visit Summary for your review. Other Preventive Recommendations:    · A preventive eye exam performed by an eye specialist is recommended every 1-2 years to screen for glaucoma; cataracts, macular degeneration, and other eye disorders. · A preventive dental visit is recommended every 6 months. · Try to get at least 150 minutes of exercise per week or 10,000 steps per day on a pedometer . · Order or download the FREE \"Exercise & Physical Activity: Your Everyday Guide\" from [employer]. Call 8-957.676.8365 or search [employer] online. · You need 2762-6078 mg of calcium and 0676-2400 IU of vitamin D per day. It is possible to meet your calcium requirement with diet alone, but a vitamin D supplement is usually necessary to meet this goal.  · When exposed to the sun, use a sunscreen that protects against both UVA and UVB radiation with an SPF of 30 or greater. Reapply every 2 to 3 hours or after sweating, drying off with a towel, or swimming. · Always wear a seat belt when traveling in a car. Always wear a helmet when riding a bicycle or motorcycle.

## 2020-10-12 DIAGNOSIS — Z12.5 PROSTATE CANCER SCREENING: ICD-10-CM

## 2020-10-12 DIAGNOSIS — R79.89 ABNORMAL TSH: ICD-10-CM

## 2020-10-12 DIAGNOSIS — Z00.00 ROUTINE GENERAL MEDICAL EXAMINATION AT A HEALTH CARE FACILITY: ICD-10-CM

## 2020-10-12 DIAGNOSIS — R94.6 ABNORMAL RESULTS OF THYROID FUNCTION STUDIES: ICD-10-CM

## 2020-10-12 LAB
ALBUMIN SERPL-MCNC: 3.6 G/DL (ref 3.5–4.6)
ALP BLD-CCNC: 87 U/L (ref 35–104)
ALT SERPL-CCNC: 8 U/L (ref 0–41)
ANION GAP SERPL CALCULATED.3IONS-SCNC: 9 MEQ/L (ref 9–15)
AST SERPL-CCNC: 16 U/L (ref 0–40)
BILIRUB SERPL-MCNC: 0.4 MG/DL (ref 0.2–0.7)
BUN BLDV-MCNC: 16 MG/DL (ref 8–23)
CALCIUM SERPL-MCNC: 9.3 MG/DL (ref 8.5–9.9)
CHLORIDE BLD-SCNC: 101 MEQ/L (ref 95–107)
CHOLESTEROL, FASTING: 119 MG/DL (ref 0–199)
CO2: 28 MEQ/L (ref 20–31)
CREAT SERPL-MCNC: 1.07 MG/DL (ref 0.7–1.2)
GFR AFRICAN AMERICAN: >60
GFR NON-AFRICAN AMERICAN: >60
GLOBULIN: 2.9 G/DL (ref 2.3–3.5)
GLUCOSE FASTING: 97 MG/DL (ref 70–99)
HBA1C MFR BLD: 4.9 % (ref 4.8–5.9)
HDLC SERPL-MCNC: 26 MG/DL (ref 40–59)
INR BLD: 1.9
LDL CHOLESTEROL CALCULATED: 64 MG/DL (ref 0–129)
POTASSIUM SERPL-SCNC: 4.4 MEQ/L (ref 3.4–4.9)
PROSTATE SPECIFIC ANTIGEN: 0.14 NG/ML (ref 0–5.4)
PROTHROMBIN TIME: 22.2 SEC (ref 12.3–14.9)
SODIUM BLD-SCNC: 138 MEQ/L (ref 135–144)
TOTAL PROTEIN: 6.5 G/DL (ref 6.3–8)
TRIGLYCERIDE, FASTING: 143 MG/DL (ref 0–150)
TSH SERPL DL<=0.05 MIU/L-ACNC: 8.6 UIU/ML (ref 0.44–3.86)

## 2020-10-13 ENCOUNTER — TELEPHONE (OUTPATIENT)
Dept: FAMILY MEDICINE CLINIC | Age: 70
End: 2020-10-13

## 2020-10-13 RX ORDER — LEVOTHYROXINE SODIUM 0.07 MG/1
75 TABLET ORAL DAILY
Qty: 30 TABLET | Refills: 5 | Status: SHIPPED | OUTPATIENT
Start: 2020-10-13 | End: 2021-04-13

## 2020-10-13 NOTE — TELEPHONE ENCOUNTER
----- Message from Tyra Siegel MD sent at 10/13/2020  2:26 PM EDT -----  Thyroid level is off. Would he be willing to restart the synthroid?

## 2021-01-04 ENCOUNTER — OFFICE VISIT (OUTPATIENT)
Dept: INTERNAL MEDICINE | Age: 71
End: 2021-01-04
Payer: MEDICARE

## 2021-01-04 ENCOUNTER — NURSE TRIAGE (OUTPATIENT)
Dept: OTHER | Facility: CLINIC | Age: 71
End: 2021-01-04

## 2021-01-04 VITALS
OXYGEN SATURATION: 98 % | TEMPERATURE: 97.2 F | SYSTOLIC BLOOD PRESSURE: 116 MMHG | DIASTOLIC BLOOD PRESSURE: 70 MMHG | RESPIRATION RATE: 14 BRPM | HEART RATE: 73 BPM | WEIGHT: 263.8 LBS | BODY MASS INDEX: 37.32 KG/M2

## 2021-01-04 DIAGNOSIS — H60.502 ACUTE OTITIS EXTERNA OF LEFT EAR, UNSPECIFIED TYPE: Primary | ICD-10-CM

## 2021-01-04 DIAGNOSIS — J34.89 RHINORRHEA: ICD-10-CM

## 2021-01-04 PROCEDURE — G8427 DOCREV CUR MEDS BY ELIG CLIN: HCPCS | Performed by: NURSE PRACTITIONER

## 2021-01-04 PROCEDURE — 99213 OFFICE O/P EST LOW 20 MIN: CPT | Performed by: NURSE PRACTITIONER

## 2021-01-04 PROCEDURE — 1123F ACP DISCUSS/DSCN MKR DOCD: CPT | Performed by: NURSE PRACTITIONER

## 2021-01-04 PROCEDURE — 4130F TOPICAL PREP RX AOE: CPT | Performed by: NURSE PRACTITIONER

## 2021-01-04 PROCEDURE — 1036F TOBACCO NON-USER: CPT | Performed by: NURSE PRACTITIONER

## 2021-01-04 PROCEDURE — G8417 CALC BMI ABV UP PARAM F/U: HCPCS | Performed by: NURSE PRACTITIONER

## 2021-01-04 PROCEDURE — G8510 SCR DEP NEG, NO PLAN REQD: HCPCS | Performed by: NURSE PRACTITIONER

## 2021-01-04 PROCEDURE — 3017F COLORECTAL CA SCREEN DOC REV: CPT | Performed by: NURSE PRACTITIONER

## 2021-01-04 PROCEDURE — G8484 FLU IMMUNIZE NO ADMIN: HCPCS | Performed by: NURSE PRACTITIONER

## 2021-01-04 PROCEDURE — 4040F PNEUMOC VAC/ADMIN/RCVD: CPT | Performed by: NURSE PRACTITIONER

## 2021-01-04 RX ORDER — LORATADINE 10 MG/1
10 TABLET ORAL DAILY
Qty: 30 TABLET | Refills: 0 | Status: SHIPPED | OUTPATIENT
Start: 2021-01-04

## 2021-01-04 RX ORDER — CYCLOSPORINE 0.5 MG/ML
1 EMULSION OPHTHALMIC 2 TIMES DAILY
COMMUNITY
Start: 2020-10-20

## 2021-01-04 ASSESSMENT — ENCOUNTER SYMPTOMS
COUGH: 1
EYE DISCHARGE: 0
SORE THROAT: 0
VOMITING: 0
EYE PAIN: 0
WHEEZING: 0
SHORTNESS OF BREATH: 0
TROUBLE SWALLOWING: 0
EYE REDNESS: 0
NAUSEA: 0
RHINORRHEA: 1
FACIAL SWELLING: 0
DIARRHEA: 0
CHEST TIGHTNESS: 0

## 2021-01-04 ASSESSMENT — PATIENT HEALTH QUESTIONNAIRE - PHQ9
SUM OF ALL RESPONSES TO PHQ9 QUESTIONS 1 & 2: 0
1. LITTLE INTEREST OR PLEASURE IN DOING THINGS: 0
SUM OF ALL RESPONSES TO PHQ QUESTIONS 1-9: 0

## 2021-01-04 NOTE — PROGRESS NOTES
Erwin Carrasquillo (:  1950) is a 78 y.o. male,Established patient, here for evaluation of the following chief complaint(s):  Otalgia (left ear x 3 days, blood and drainage)    Vitals:    21 1132   BP: 116/70   Pulse: 73   Resp: 14   Temp: 97.2 °F (36.2 °C)   SpO2: 98%       ASSESSMENT/PLAN:  1. Acute actinic otitis externa of left ear  -     neomycin-polymyxin-hydrocortisone (CORTISPORIN) 3.5-88411-1 otic solution; Place 3 drops into the left ear 4 times daily, Disp-1 Bottle, R-0Normal  -     Avoid peroxide in the ear  2. Rhinorrhea  -     loratadine (CLARITIN) 10 MG tablet; Take 1 tablet by mouth daily, Disp-30 tablet, R-0, Normal  -     NS nasal spray 2-3 times daily      Return if symptoms worsen or fail to improve. SUBJECTIVE/OBJECTIVE:  Otalgia   There is pain in the left ear. This is a new problem. The current episode started in the past 7 days. The problem has been unchanged. There has been no fever. The pain is at a severity of 4/10. The pain is moderate. Associated symptoms include coughing (post nasal drip), ear discharge (blood and green/yellow drainage) and rhinorrhea. Pertinent negatives include no diarrhea, headaches, neck pain, sore throat or vomiting. Treatments tried: peroxide. Review of Systems   Constitutional: Negative for chills, fatigue and fever. HENT: Positive for ear discharge (blood and green/yellow drainage), ear pain and rhinorrhea. Negative for congestion, facial swelling, sore throat and trouble swallowing. Eyes: Negative for pain, discharge and redness. Respiratory: Positive for cough (post nasal drip). Negative for chest tightness, shortness of breath and wheezing. Cardiovascular: Negative for chest pain and palpitations. Gastrointestinal: Negative for diarrhea, nausea and vomiting. Musculoskeletal: Negative for arthralgias, myalgias and neck pain. Neurological: Negative for dizziness, light-headedness and headaches.        Physical Exam Constitutional:       General: He is not in acute distress. Appearance: Normal appearance. He is not ill-appearing. HENT:      Head: Normocephalic and atraumatic. Right Ear: Tympanic membrane normal.      Left Ear: Drainage (green drainage sitting in left ear canal, no blood or active bleeding noted), swelling and tenderness present. Tympanic membrane is erythematous (left ear canal erythematous). Ears:      Comments: Tenderness during otoscopic exam.  Tenderness also palpated with gentle manipulation of the left tragus and pinna     Nose: Rhinorrhea present. Rhinorrhea is clear. Right Turbinates: Not swollen. Left Turbinates: Not swollen. Right Sinus: No frontal sinus tenderness. Left Sinus: No maxillary sinus tenderness or frontal sinus tenderness. Mouth/Throat:      Lips: Pink. Mouth: Mucous membranes are moist.      Pharynx: Oropharynx is clear. Eyes:      Extraocular Movements: Extraocular movements intact. Conjunctiva/sclera: Conjunctivae normal.      Pupils: Pupils are equal, round, and reactive to light. Cardiovascular:      Rate and Rhythm: Normal rate and regular rhythm. Heart sounds: Normal heart sounds, S1 normal and S2 normal.   Pulmonary:      Effort: Pulmonary effort is normal. No respiratory distress. Breath sounds: Normal air entry. Musculoskeletal: Normal range of motion. Skin:     General: Skin is warm and dry. Neurological:      Mental Status: He is alert and oriented to person, place, and time. Psychiatric:         Attention and Perception: Attention and perception normal.         Mood and Affect: Mood normal.         Speech: Speech normal.         Behavior: Behavior is cooperative. An electronic signature was used to authenticate this note.     --BILLY Rosa

## 2021-01-04 NOTE — TELEPHONE ENCOUNTER
Reason for Disposition   Yellow or green discharge    Answer Assessment - Initial Assessment Questions  1. LOCATION: \"Which ear is involved? \"       Left ear  2. COLOR: \"What is the color of the discharge? \"       Yellowish green discharge - yesterday he had a Bloody discharge  3. CONSISTENCY: \"How runny is the discharge? Could it be water? \"       Thick   4. ONSET: \"When did you first notice the discharge? \"      Discharge started a feel days ago  5. PAIN: \"Is there any earache? \" \"How bad is it? \"  (Scale 1-10; or mild, moderate, severe)      Pain level yesterday and light night 7/10. Current pain level 4/10  6. OBJECTS: [employer] use of q-tips or have you inserted anything else in your ear? \"      He used some q tips yesterday  7. OTHER SYMPTOMS: \"Do you have any other symptoms? \" (e.g., headache, fever, dizziness, vomiting, runny nose)      Can't hear out of his left ear, runny nose, mild cough, ear pain  8. PREGNANCY: \"Is there any chance you are pregnant? \" \"When was your last menstrual period? \"      No    Protocols used: EAR - DISCHARGE-ADULT-OH  Received call from [address]. Call soft transferred to [address] to schedule appointment. Attention Provider: Thank you for allowing me to participate in the care of your patient. The  patient was connected to triage in response to information provided to the [facility]. Please do not respond through this encounter as the response is not directed to a shared pool. Patient reports left ear pain and discharge for the past few days. Discharge is yellowish green and thick. Yesterday the discharge was mixed with blood. Patient also reports runny nose, mild cough and decreased hearing. Patient denies fever. Current pain level is 5/10. Recommended patient be seen today. Care advice provided. Warm transfer to [facility] at the [facility] for physician contact/appointment scheduling.

## 2021-01-04 NOTE — PATIENT INSTRUCTIONS
Patient Education        Swimmer's Ear: Care Instructions  Your Care Instructions     Swimmer's ear (otitis externa) is inflammation or infection of the ear canal. This is the passage that leads from the outer ear to the eardrum. Any water, sand, or other debris that gets into the ear canal and stays there can cause swimmer's ear. Putting cotton swabs or other items in the ear to clean it can also cause this problem. Swimmer's ear can be very painful. But you can treat the pain and infection with medicines. You should feel better in a few days. Follow-up care is a key part of your treatment and safety. Be sure to make and go to all appointments, and call your doctor if you are having problems. It's also a good idea to know your test results and keep a list of the medicines you take. How can you care for yourself at home? Cleaning and care  · Use antibiotic drops as your doctor directs. · Do not insert ear drops (other than the antibiotic ear drops) or anything else into the ear unless your doctor has told you to. · Avoid getting water in the ear until the problem clears up. Use cotton lightly coated with petroleum jelly as an earplug. Do not use plastic earplugs. · Use a hair dryer set on low to carefully dry the ear after you shower. · To ease ear pain, hold a warm washcloth against your ear. · Take pain medicines exactly as directed. ? If the doctor gave you a prescription medicine for pain, take it as prescribed. ? If you are not taking a prescription pain medicine, ask your doctor if you can take an over-the-counter medicine. Inserting ear drops  · Warm the drops to body temperature by rolling the container in your hands. Or you can place it in a cup of warm water for a few minutes. · Lie down, with your ear facing up.

## 2021-01-10 DIAGNOSIS — K21.9 GASTROESOPHAGEAL REFLUX DISEASE: ICD-10-CM

## 2021-01-10 DIAGNOSIS — R39.15 URINARY URGENCY: ICD-10-CM

## 2021-01-11 ENCOUNTER — TELEPHONE (OUTPATIENT)
Dept: FAMILY MEDICINE CLINIC | Age: 71
End: 2021-01-11

## 2021-01-11 RX ORDER — OMEPRAZOLE 20 MG/1
CAPSULE, DELAYED RELEASE ORAL
Qty: 30 CAPSULE | Refills: 0 | Status: SHIPPED | OUTPATIENT
Start: 2021-01-11 | End: 2021-02-16

## 2021-01-11 RX ORDER — SOLIFENACIN SUCCINATE 5 MG/1
TABLET, FILM COATED ORAL
Qty: 90 TABLET | Refills: 0 | Status: SHIPPED | OUTPATIENT
Start: 2021-01-11 | End: 2021-04-13

## 2021-01-11 NOTE — TELEPHONE ENCOUNTER
Rx requested:  Requested Prescriptions     Pending Prescriptions Disp Refills    solifenacin (VESICARE) 5 MG tablet [Pharmacy Med Name: Solifenacin Succinate 5 MG Oral Tablet] 90 tablet 0     Sig: Take 1 tablet by mouth once daily    omeprazole (PRILOSEC) 20 MG delayed release capsule [Pharmacy Med Name: Omeprazole 20 MG Oral Capsule Delayed Release] 30 capsule 0     Sig: TAKE 1 CAPSULE BY MOUTH IN THE MORNING BEFORE BREAKFAST       Last Office Visit:   10/5/2020      Last filled:  9/14/2020    Next Visit Date:  No future appointments.

## 2021-01-12 ENCOUNTER — OFFICE VISIT (OUTPATIENT)
Dept: FAMILY MEDICINE CLINIC | Age: 71
End: 2021-01-12
Payer: MEDICARE

## 2021-01-12 VITALS
TEMPERATURE: 96.6 F | DIASTOLIC BLOOD PRESSURE: 70 MMHG | SYSTOLIC BLOOD PRESSURE: 112 MMHG | WEIGHT: 263 LBS | BODY MASS INDEX: 36.82 KG/M2 | HEIGHT: 71 IN | HEART RATE: 74 BPM | OXYGEN SATURATION: 95 %

## 2021-01-12 DIAGNOSIS — H61.22 IMPACTED CERUMEN OF LEFT EAR: ICD-10-CM

## 2021-01-12 DIAGNOSIS — H65.91 MIDDLE EAR EFFUSION, RIGHT: ICD-10-CM

## 2021-01-12 DIAGNOSIS — H92.03 OTALGIA OF BOTH EARS: Primary | ICD-10-CM

## 2021-01-12 PROCEDURE — 1036F TOBACCO NON-USER: CPT | Performed by: FAMILY MEDICINE

## 2021-01-12 PROCEDURE — 1123F ACP DISCUSS/DSCN MKR DOCD: CPT | Performed by: FAMILY MEDICINE

## 2021-01-12 PROCEDURE — 3017F COLORECTAL CA SCREEN DOC REV: CPT | Performed by: FAMILY MEDICINE

## 2021-01-12 PROCEDURE — G8484 FLU IMMUNIZE NO ADMIN: HCPCS | Performed by: FAMILY MEDICINE

## 2021-01-12 PROCEDURE — G8427 DOCREV CUR MEDS BY ELIG CLIN: HCPCS | Performed by: FAMILY MEDICINE

## 2021-01-12 PROCEDURE — 4040F PNEUMOC VAC/ADMIN/RCVD: CPT | Performed by: FAMILY MEDICINE

## 2021-01-12 PROCEDURE — G8417 CALC BMI ABV UP PARAM F/U: HCPCS | Performed by: FAMILY MEDICINE

## 2021-01-12 PROCEDURE — 99213 OFFICE O/P EST LOW 20 MIN: CPT | Performed by: FAMILY MEDICINE

## 2021-01-12 RX ORDER — GANCICLOVIR 1.5 MG/G
GEL OPHTHALMIC
COMMUNITY
Start: 2020-10-22

## 2021-01-12 RX ORDER — CARVEDILOL 12.5 MG/1
TABLET ORAL
COMMUNITY
Start: 2020-10-05

## 2021-01-12 RX ORDER — TRAZODONE HYDROCHLORIDE 50 MG/1
50 TABLET ORAL NIGHTLY PRN
COMMUNITY
Start: 2020-10-20 | End: 2021-01-18

## 2021-01-12 RX ORDER — ATROPINE SULFATE 10 MG/ML
SOLUTION/ DROPS OPHTHALMIC
COMMUNITY
Start: 2020-10-20

## 2021-01-12 RX ORDER — MOXIFLOXACIN 5 MG/ML
SOLUTION/ DROPS OPHTHALMIC
COMMUNITY
Start: 2020-10-26

## 2021-01-12 RX ORDER — PREDNISOLONE ACETATE 10 MG/ML
SUSPENSION/ DROPS OPHTHALMIC
COMMUNITY
Start: 2020-10-24

## 2021-01-12 ASSESSMENT — ENCOUNTER SYMPTOMS
ABDOMINAL PAIN: 0
SHORTNESS OF BREATH: 0
WHEEZING: 0
SORE THROAT: 0
RHINORRHEA: 0
COUGH: 0
DIARRHEA: 0
CONSTIPATION: 0

## 2021-01-12 NOTE — PROGRESS NOTES
 Smokeless tobacco: Never Used   Substance and Sexual Activity    Alcohol use: No     Alcohol/week: 0.0 standard drinks    Drug use: No    Sexual activity: Not on file   Lifestyle    Physical activity     Days per week: Not on file     Minutes per session: Not on file    Stress: Not on file   Relationships    Social connections     Talks on phone: Not on file     Gets together: Not on file     Attends Caodaism service: Not on file     Active member of club or organization: Not on file     Attends meetings of clubs or organizations: Not on file     Relationship status: Not on file    Intimate partner violence     Fear of current or ex partner: Not on file     Emotionally abused: Not on file     Physically abused: Not on file     Forced sexual activity: Not on file   Other Topics Concern    Not on file   Social History Narrative    Not on file     Allergies   Allergen Reactions    Cashew Nut Oil     Dust Mite Extract     Hctz     Other      cashews    Pollen Extract     Thiazide-Type Diuretics      itching     Current Outpatient Medications   Medication Sig Dispense Refill    traZODone (DESYREL) 50 MG tablet Take 50 mg by mouth nightly as needed      atropine 1 % ophthalmic solution INSTILL 1 DROP INTO LEFT EYE ONCE DAILY      ZIRGAN 0.15 % GEL ophthalmic gel INSTILL 1 DROP INTO LEFT EYE FIVE TIMES DAILY FOR 14 DAYS      moxifloxacin (VIGAMOX) 0.5 % ophthalmic solution INSTILL 1 DROP INTO LEFT EYE EVERY 2 HOURS      prednisoLONE acetate (PRED FORTE) 1 % ophthalmic suspension INSTILL 1 DROP INTO LEFT EYE TWICE DAILY      carvedilol (COREG) 12.5 MG tablet TAKE 1 TABLET BY MOUTH TWICE DAILY      solifenacin (VESICARE) 5 MG tablet Take 1 tablet by mouth once daily 90 tablet 0    omeprazole (PRILOSEC) 20 MG delayed release capsule TAKE 1 CAPSULE BY MOUTH IN THE MORNING BEFORE BREAKFAST 30 capsule 0    cycloSPORINE (RESTASIS) 0.05 % ophthalmic emulsion 1 drop 2 times daily  neomycin-polymyxin-hydrocortisone (CORTISPORIN) 3.5-22068-3 otic solution Place 3 drops into the left ear 4 times daily 1 Bottle 0    loratadine (CLARITIN) 10 MG tablet Take 1 tablet by mouth daily 30 tablet 0    levothyroxine (SYNTHROID) 75 MCG tablet Take 1 tablet by mouth daily 30 tablet 5    Handicap Placard MISC by Does not apply route Good through 8/26/2025 1 each 0    traZODone (DESYREL) 50 MG tablet Take 1 tablet by mouth nightly as needed for Sleep or Depression 30 tablet 5    Carboxymethylcell-Glycerin PF 0.5-0.9 % SOLN 1 drop      zonisamide (ZONEGRAN) 50 MG capsule TAKE 2 CAPSULES BY MOUTH AT BEDTIME  1    warfarin (COUMADIN) 5 MG tablet Regimen per Dr. Samantha Pacheco in cardiology 1 tablet 0    erythromycin (ROMYCIN) 5 MG/GM ophthalmic ointment Use 1 application in both eyes daily at bedtime.  albuterol (PROVENTIL) (2.5 MG/3ML) 0.083% nebulizer solution USE ONE VIAL IN NEBULIZER EVERY 4 HOURS AS NEEDED FOR WHEEZING AND FOR SHORTNESS OF BREATH 75 vial 5    budesonide-formoterol (SYMBICORT) 160-4.5 MCG/ACT AERO Inhale 2 puffs into the lungs 2 times daily LOT [id number] exp 10/2018 1 Inhaler 0    lactulose 20 GM/30ML SOLN Take  by mouth.  Compression Stockings MISC Knee high compression stockings  Dx: LE edema  20-30 mm pressure 2 each 0    carvedilol (COREG) 25 MG tablet Take 1 tablet by mouth 2 times daily (with meals). (Patient taking differently: Take 12.5 mg by mouth 2 times daily (with meals) ) 180 tablet 1    sotalol (BETAPACE) 80 MG tablet Take 80 mg by mouth 2 times daily. Take 1/2 tablet twice daily      WARFARIN SODIUM by Does not apply route.  clonazePAM (KLONOPIN) 0.5 MG tablet Take 1 tablet by mouth 2 times daily as needed for Anxiety for up to 30 days. 30 tablet 0     No current facility-administered medications for this visit. ROS:  Review of Systems   Constitutional: Negative for chills and fever. HENT: Positive for ear discharge and ear pain. Negative for rhinorrhea and sore throat. Respiratory: Negative for cough, shortness of breath and wheezing. Gastrointestinal: Negative for abdominal pain, constipation and diarrhea. Endocrine: Negative for polydipsia and polyuria. Genitourinary: Negative for dysuria, frequency and urgency. Neurological: Negative for syncope, light-headedness, numbness and headaches. Psychiatric/Behavioral: Negative for sleep disturbance. The patient is not nervous/anxious. Vitals:    01/12/21 1123   BP: 112/70   Site: Left Upper Arm   Position: Sitting   Cuff Size: Medium Adult   Pulse: 74   Temp: 96.6 °F (35.9 °C)   TempSrc: Infrared   SpO2: 95%   Weight: 263 lb (119.3 kg)   Height: 5' 10.5\" (1.791 m)       Physical exam:  Physical Exam  Vitals signs reviewed. Constitutional:       General: He is not in acute distress. Appearance: He is well-developed. HENT:      Head: Normocephalic and atraumatic. Right Ear: Tympanic membrane, ear canal and external ear normal.      Left Ear: Tympanic membrane, ear canal and external ear normal.      Mouth/Throat:      Pharynx: No oropharyngeal exudate. Neck:      Musculoskeletal: Normal range of motion. Thyroid: No thyromegaly. Cardiovascular:      Rate and Rhythm: Normal rate and regular rhythm. Heart sounds: Normal heart sounds. No murmur. Pulmonary:      Effort: Pulmonary effort is normal. No respiratory distress. Lymphadenopathy:      Cervical: No cervical adenopathy. Skin:     General: Skin is warm and dry. Neurological:      Mental Status: He is alert and oriented to person, place, and time.    Psychiatric:         Behavior: Behavior normal.         Assessment/Plan:  78 y.o. male here mainly for otalgia:  - R ear: nml TM; sterile effusion; dried blood in the EAC; discussed only gentle cleaning of the ear; can cont the ear drops - L ear: cerumen impaction; can cont the drops and we can consider wash out at some point. Diagnosis Orders   1. Otalgia of both ears     2. Middle ear effusion, right     3. Impacted cerumen of left ear          Return if symptoms worsen or fail to improve.     Kumar Whitlock MD

## 2021-02-01 ENCOUNTER — TELEPHONE (OUTPATIENT)
Dept: FAMILY MEDICINE CLINIC | Age: 71
End: 2021-02-01

## 2021-02-01 NOTE — TELEPHONE ENCOUNTER
Spoke with Andrea Tsang. He said he will discuss scheduling an AWV with pt, and will c/b to schedule.

## 2021-02-02 ENCOUNTER — OFFICE VISIT (OUTPATIENT)
Dept: FAMILY MEDICINE CLINIC | Age: 71
End: 2021-02-02
Payer: MEDICARE

## 2021-02-02 VITALS
WEIGHT: 267 LBS | SYSTOLIC BLOOD PRESSURE: 114 MMHG | TEMPERATURE: 97.9 F | HEIGHT: 71 IN | OXYGEN SATURATION: 97 % | BODY MASS INDEX: 37.38 KG/M2 | DIASTOLIC BLOOD PRESSURE: 72 MMHG | HEART RATE: 73 BPM

## 2021-02-02 DIAGNOSIS — J44.9 CHRONIC OBSTRUCTIVE PULMONARY DISEASE, UNSPECIFIED COPD TYPE (HCC): ICD-10-CM

## 2021-02-02 DIAGNOSIS — I50.9 CONGESTIVE HEART FAILURE, UNSPECIFIED HF CHRONICITY, UNSPECIFIED HEART FAILURE TYPE (HCC): ICD-10-CM

## 2021-02-02 DIAGNOSIS — K74.69 OTHER CIRRHOSIS OF LIVER (HCC): ICD-10-CM

## 2021-02-02 DIAGNOSIS — M35.01 KERATOCONJUNCTIVITIS SICCA (HCC): ICD-10-CM

## 2021-02-02 DIAGNOSIS — I48.91 ATRIAL FIBRILLATION, UNSPECIFIED TYPE (HCC): ICD-10-CM

## 2021-02-02 DIAGNOSIS — H92.02 ACUTE OTALGIA, LEFT: Primary | ICD-10-CM

## 2021-02-02 DIAGNOSIS — E66.01 MORBID OBESITY DUE TO EXCESS CALORIES (HCC): ICD-10-CM

## 2021-02-02 DIAGNOSIS — I77.6 VASCULITIS (HCC): ICD-10-CM

## 2021-02-02 PROBLEM — H16.229 KERATOCONJUNCTIVITIS SICCA: Status: ACTIVE | Noted: 2021-02-02

## 2021-02-02 PROCEDURE — G8484 FLU IMMUNIZE NO ADMIN: HCPCS | Performed by: FAMILY MEDICINE

## 2021-02-02 PROCEDURE — 3017F COLORECTAL CA SCREEN DOC REV: CPT | Performed by: FAMILY MEDICINE

## 2021-02-02 PROCEDURE — 99213 OFFICE O/P EST LOW 20 MIN: CPT | Performed by: FAMILY MEDICINE

## 2021-02-02 PROCEDURE — G8417 CALC BMI ABV UP PARAM F/U: HCPCS | Performed by: FAMILY MEDICINE

## 2021-02-02 PROCEDURE — G8427 DOCREV CUR MEDS BY ELIG CLIN: HCPCS | Performed by: FAMILY MEDICINE

## 2021-02-02 PROCEDURE — 3023F SPIROM DOC REV: CPT | Performed by: FAMILY MEDICINE

## 2021-02-02 PROCEDURE — 1036F TOBACCO NON-USER: CPT | Performed by: FAMILY MEDICINE

## 2021-02-02 PROCEDURE — G8926 SPIRO NO PERF OR DOC: HCPCS | Performed by: FAMILY MEDICINE

## 2021-02-02 PROCEDURE — 4040F PNEUMOC VAC/ADMIN/RCVD: CPT | Performed by: FAMILY MEDICINE

## 2021-02-02 PROCEDURE — 1123F ACP DISCUSS/DSCN MKR DOCD: CPT | Performed by: FAMILY MEDICINE

## 2021-02-02 RX ORDER — AMOXICILLIN AND CLAVULANATE POTASSIUM 875; 125 MG/1; MG/1
1 TABLET, FILM COATED ORAL 2 TIMES DAILY
Qty: 14 TABLET | Refills: 0 | Status: SHIPPED | OUTPATIENT
Start: 2021-02-02 | End: 2021-02-09

## 2021-02-02 RX ORDER — WARFARIN SODIUM 2.5 MG/1
TABLET ORAL
COMMUNITY
Start: 2021-01-12

## 2021-02-02 SDOH — ECONOMIC STABILITY: FOOD INSECURITY: WITHIN THE PAST 12 MONTHS, THE FOOD YOU BOUGHT JUST DIDN'T LAST AND YOU DIDN'T HAVE MONEY TO GET MORE.: NEVER TRUE

## 2021-02-02 SDOH — ECONOMIC STABILITY: FOOD INSECURITY: WITHIN THE PAST 12 MONTHS, YOU WORRIED THAT YOUR FOOD WOULD RUN OUT BEFORE YOU GOT MONEY TO BUY MORE.: NEVER TRUE

## 2021-02-02 SDOH — ECONOMIC STABILITY: INCOME INSECURITY: HOW HARD IS IT FOR YOU TO PAY FOR THE VERY BASICS LIKE FOOD, HOUSING, MEDICAL CARE, AND HEATING?: NOT HARD AT ALL

## 2021-02-02 ASSESSMENT — ENCOUNTER SYMPTOMS
WHEEZING: 0
SHORTNESS OF BREATH: 0
SORE THROAT: 0
RHINORRHEA: 0
ABDOMINAL PAIN: 0
COUGH: 0
CONSTIPATION: 0
DIARRHEA: 0

## 2021-02-02 NOTE — PROGRESS NOTES
[address] [facility] [address] [place] [address] PRIMARY CARE  [phone] [id number]  Dept: [phone]  Dept Fax: [phone]  Loc: [phone]   Chief Complaint  Chief Complaint   Patient presents with    Otalgia     L ear hurts with discharge x1 month finished atb tx        HPI:  78 y.o.male who presents for the following:    Ear pain: seen last month for b/l ear pain; he was using abx ear drops; still has the pain; puts cotton in the ear for some relief. Hx of cerumen impaction. Recall: B/l otalgia: L>R with pain; seen in walkin in clinic; given ear drops for possible OE; he uses peroxide and cotton swabs; can hear popping when he blows nose.      Past Medical History:   Diagnosis Date    Allergic rhinitis     Asthma     asthmatic bronchitis    Atrial fibrillation (HCC)     CAD (coronary artery disease)     Chronic back pain     COPD (chronic obstructive pulmonary disease) (HCC)     CVA (cerebral infarction)     Depression     Edema     GERD (gastroesophageal reflux disease)     ulcer    Glucose intolerance (impaired glucose tolerance) 11/06    Hypertension     ICD (implantable cardiac defibrillator) in place 2010    OMAR (obstructive sleep apnea)     UTI (urinary tract infection)      Past Surgical History:   Procedure Laterality Date    BACK SURGERY      ensed    FRACTURE SURGERY  7/06    l/shoulder    GLOSSECTOMY  12/09/14    nasal endoscopy    GLOSSECTOMY  07/31/15    W/COBLATION,NASAL ENDOSCOPY    PACEMAKER PLACEMENT  2010    SKIN BIOPSY  2/13/12    right leg-Dr. Fonnie Spatz     Social History     Socioeconomic History    Marital status:      Spouse name: Not on file    Number of children: Not on file    Years of education: Not on file    Highest education level: Not on file   Occupational History    Not on file   Social Needs    Financial resource strain: Not hard at all   [employer] insecurity     Worry: Never true  clonazePAM (KLONOPIN) 0.5 MG tablet Take 1 tablet by mouth 2 times daily as needed for Anxiety for up to 30 days. 30 tablet 0     No current facility-administered medications for this visit. ROS:  Review of Systems   Constitutional: Negative for chills and fever. HENT: Positive for ear pain. Negative for rhinorrhea and sore throat. Respiratory: Negative for cough, shortness of breath and wheezing. Gastrointestinal: Negative for abdominal pain, constipation and diarrhea. Endocrine: Negative for polydipsia and polyuria. Genitourinary: Negative for dysuria, frequency and urgency. Neurological: Negative for syncope, light-headedness, numbness and headaches. Psychiatric/Behavioral: Negative for sleep disturbance. The patient is not nervous/anxious. Vitals:    02/02/21 1321   BP: 114/72   Site: Left Upper Arm   Position: Sitting   Cuff Size: Large Adult   Pulse: 73   Temp: 97.9 °F (36.6 °C)   TempSrc: Infrared   SpO2: 97%   Weight: 267 lb (121.1 kg)   Height: 5' 10.5\" (1.791 m)       Physical exam:  Physical Exam  Vitals signs reviewed. Constitutional:       General: He is not in acute distress. Appearance: He is well-developed. HENT:      Head: Normocephalic and atraumatic. Left Ear: Ear canal and external ear normal. There is impacted cerumen. Neck:      Musculoskeletal: Normal range of motion. Cardiovascular:      Rate and Rhythm: Normal rate. Pulmonary:      Effort: Pulmonary effort is normal. No respiratory distress. Skin:     General: Skin is warm and dry. Neurological:      Mental Status: He is alert and oriented to person, place, and time. Psychiatric:         Behavior: Behavior normal.         Assessment/Plan:  78 y.o. male here mainly for L otalgia:  - L otalgia: irrigation today with much cerumen removal; still couldn't see the TM.  Will tx empirically for AOM with augmentin; may send to ENT if this doesn't help     Diagnosis Orders 1. Acute otalgia, left  amoxicillin-clavulanate (AUGMENTIN) 875-125 MG per tablet   2. Keratoconjunctivitis sicca (HCC)     3. Other cirrhosis of liver ([facility]     4. Chronic obstructive pulmonary disease, unspecified COPD type ([facility]     5. Congestive heart failure, unspecified HF chronicity, unspecified heart failure type ([facility]     6. [facility]cul[facility] ([facility]     7. Atrial fibrillation, unspecified type ([facility]     8. Morbid obesity due to excess calories ([facility]          Return if symptoms worsen or fail to improve.     Reggie Ng MD

## 2021-04-12 DIAGNOSIS — R39.15 URINARY URGENCY: ICD-10-CM

## 2021-04-12 DIAGNOSIS — E03.9 ACQUIRED HYPOTHYROIDISM: ICD-10-CM

## 2021-04-13 RX ORDER — LEVOTHYROXINE SODIUM 75 UG/1
TABLET ORAL
Qty: 30 TABLET | Refills: 0 | Status: SHIPPED | OUTPATIENT
Start: 2021-04-13 | End: 2021-05-17 | Stop reason: SDUPTHER

## 2021-04-13 RX ORDER — SOLIFENACIN SUCCINATE 5 MG/1
TABLET, FILM COATED ORAL
Qty: 90 TABLET | Refills: 0 | Status: SHIPPED | OUTPATIENT
Start: 2021-04-13 | End: 2021-07-06

## 2021-04-13 NOTE — TELEPHONE ENCOUNTER
Dr Hall pt: Refill request for Temazepam. LOV 7/16, script written 8/30/16, take one tablet po @ HS PRN sleep #30 with 3 refills. Pt states she is out of this medication, has been unable to sleep and is requesting another Dr to refill it for her as she can not wait until Tuesday when Dr Hall returns. Please advise.    Rx requested:  Requested Prescriptions     Pending Prescriptions Disp Refills    EUTHYROX 75 MCG tablet [Pharmacy Med Name: Euthyrox 75 MCG Oral Tablet] 30 tablet 0     Sig: Take 1 tablet by mouth once daily    solifenacin (VESICARE) 5 MG tablet [Pharmacy Med Name: Solifenacin Succinate 5 MG Oral Tablet] 90 tablet 0     Sig: Take 1 tablet by mouth once daily       Last Office Visit:   2/2/2021      Last filled:  1/11/2021    Next Visit Date:  Future Appointments   Date Time Provider Rachell Mullins   10/6/2021  9:00 AM Alon Elizalde MD [address] [place] [address]

## 2021-04-20 ENCOUNTER — HOSPITAL ENCOUNTER (OUTPATIENT)
Dept: LAB | Age: 71
Discharge: HOME | End: 2021-04-20
Payer: MEDICARE

## 2021-04-20 LAB
INR BLD: 1.5
PROTHROMBIN TIME: 18.5 SEC (ref 12.3–14.9)

## 2021-04-20 PROCEDURE — 36415 COLL VENOUS BLD VENIPUNCTURE: CPT

## 2021-04-20 PROCEDURE — 85610 PROTHROMBIN TIME: CPT

## 2021-05-17 DIAGNOSIS — E03.9 ACQUIRED HYPOTHYROIDISM: ICD-10-CM

## 2021-05-17 RX ORDER — LEVOTHYROXINE SODIUM 0.07 MG/1
TABLET ORAL
Qty: 30 TABLET | Refills: 1 | Status: SHIPPED | OUTPATIENT
Start: 2021-05-17 | End: 2021-07-22

## 2021-05-17 NOTE — TELEPHONE ENCOUNTER
Rx requested:  Requested Prescriptions     Pending Prescriptions Disp Refills    levothyroxine (EUTHYROX) 75 MCG tablet 30 tablet 0       Last Office Visit:   2/2/2021      Last filled:  4/13/2021    Next Visit Date:  Future Appointments   Date Time Provider Rachell Mullins   10/6/2021  9:00 AM Fei Tam MD [address] [place] [address]

## 2021-06-21 ENCOUNTER — HOSPITAL ENCOUNTER (OUTPATIENT)
Dept: LAB | Age: 71
Discharge: HOME | End: 2021-06-21
Payer: MEDICARE

## 2021-06-21 LAB
INR BLD: 1.7
PROTHROMBIN TIME: 19.7 SEC (ref 12.3–14.9)

## 2021-06-21 PROCEDURE — 36415 COLL VENOUS BLD VENIPUNCTURE: CPT

## 2021-06-21 PROCEDURE — 85610 PROTHROMBIN TIME: CPT

## 2021-07-05 DIAGNOSIS — R39.15 URINARY URGENCY: ICD-10-CM

## 2021-07-06 RX ORDER — SOLIFENACIN SUCCINATE 5 MG/1
TABLET, FILM COATED ORAL
Qty: 90 TABLET | Refills: 0 | Status: SHIPPED | OUTPATIENT
Start: 2021-07-06 | End: 2021-10-04

## 2021-07-06 NOTE — TELEPHONE ENCOUNTER
Rx requested:  Requested Prescriptions     Pending Prescriptions Disp Refills    solifenacin (VESICARE) 5 MG tablet [Pharmacy Med Name: Solifenacin Succinate 5 MG Oral Tablet] 90 tablet 0     Sig: Take 1 tablet by mouth once daily       Last Office Visit:   2/2/2021      Last filled:  4/13/2021    Next Visit Date:  Future Appointments   Date Time Provider Rachell Mullins   10/6/2021  9:00 AM Sachi Kamara MD [address] [place] [address]

## 2021-07-21 DIAGNOSIS — F51.02 ADJUSTMENT INSOMNIA: ICD-10-CM

## 2021-07-21 DIAGNOSIS — E03.9 ACQUIRED HYPOTHYROIDISM: ICD-10-CM

## 2021-07-22 RX ORDER — TRAZODONE HYDROCHLORIDE 50 MG/1
50 TABLET ORAL NIGHTLY PRN
Qty: 30 TABLET | Refills: 0 | Status: SHIPPED | OUTPATIENT
Start: 2021-07-22 | End: 2021-08-23

## 2021-07-22 RX ORDER — LEVOTHYROXINE SODIUM 0.07 MG/1
TABLET ORAL
Qty: 30 TABLET | Refills: 0 | Status: SHIPPED | OUTPATIENT
Start: 2021-07-22 | End: 2021-08-19

## 2021-07-27 ENCOUNTER — HOSPITAL ENCOUNTER (OUTPATIENT)
Dept: LAB | Age: 71
Discharge: HOME | End: 2021-07-27
Payer: MEDICARE

## 2021-07-27 LAB
INR BLD: 1.7
PROTHROMBIN TIME: 20.1 SEC (ref 12.3–14.9)

## 2021-07-27 PROCEDURE — 36415 COLL VENOUS BLD VENIPUNCTURE: CPT

## 2021-07-27 PROCEDURE — 85610 PROTHROMBIN TIME: CPT

## 2021-08-26 ENCOUNTER — HOSPITAL ENCOUNTER (OUTPATIENT)
Dept: LAB | Age: 71
Discharge: HOME | End: 2021-08-26
Payer: MEDICARE

## 2021-08-26 LAB
INR BLD: 1.9
PROTHROMBIN TIME: 21.2 SEC (ref 12.3–14.9)

## 2021-08-26 PROCEDURE — 36415 COLL VENOUS BLD VENIPUNCTURE: CPT

## 2021-08-26 PROCEDURE — 85610 PROTHROMBIN TIME: CPT

## 2021-10-04 DIAGNOSIS — R39.15 URINARY URGENCY: ICD-10-CM

## 2021-10-04 RX ORDER — SOLIFENACIN SUCCINATE 5 MG/1
TABLET, FILM COATED ORAL
Qty: 90 TABLET | Refills: 0 | Status: SHIPPED | OUTPATIENT
Start: 2021-10-04 | End: 2022-01-03 | Stop reason: SDUPTHER

## 2021-10-04 NOTE — TELEPHONE ENCOUNTER
Comments: 7/6/2021    Last Office Visit (last PCP visit):   2/2/2021    Next Visit Date:  Future Appointments   Date Time Provider Rachell Mullins   10/6/2021  9:00 AM Gurvinder Easley MD [facility]       **If hasn't been seen in over a year OR hasn't followed up according to last diabetes/ADHD visit, make appointment for patient before sending refill to provider.     Rx requested:  Requested Prescriptions     Pending Prescriptions Disp Refills    solifenacin (VESICARE) 5 MG tablet [Pharmacy Med Name: Solifenacin Succinate 5 MG Oral Tablet] 90 tablet 0     Sig: Take 1 tablet by mouth once daily

## 2021-10-06 ENCOUNTER — OFFICE VISIT (OUTPATIENT)
Dept: INTERNAL MEDICINE | Age: 71
End: 2021-10-06
Payer: MEDICARE

## 2021-10-06 VITALS
DIASTOLIC BLOOD PRESSURE: 70 MMHG | SYSTOLIC BLOOD PRESSURE: 136 MMHG | OXYGEN SATURATION: 97 % | BODY MASS INDEX: 39.18 KG/M2 | HEART RATE: 71 BPM | WEIGHT: 277 LBS | TEMPERATURE: 97.7 F

## 2021-10-06 DIAGNOSIS — H66.005 RECURRENT ACUTE SUPPURATIVE OTITIS MEDIA WITHOUT SPONTANEOUS RUPTURE OF LEFT TYMPANIC MEMBRANE: Primary | ICD-10-CM

## 2021-10-06 PROCEDURE — 3017F COLORECTAL CA SCREEN DOC REV: CPT | Performed by: FAMILY MEDICINE

## 2021-10-06 PROCEDURE — 1123F ACP DISCUSS/DSCN MKR DOCD: CPT | Performed by: FAMILY MEDICINE

## 2021-10-06 PROCEDURE — G8428 CUR MEDS NOT DOCUMENT: HCPCS | Performed by: FAMILY MEDICINE

## 2021-10-06 PROCEDURE — G8417 CALC BMI ABV UP PARAM F/U: HCPCS | Performed by: FAMILY MEDICINE

## 2021-10-06 PROCEDURE — 99213 OFFICE O/P EST LOW 20 MIN: CPT | Performed by: FAMILY MEDICINE

## 2021-10-06 PROCEDURE — G8484 FLU IMMUNIZE NO ADMIN: HCPCS | Performed by: FAMILY MEDICINE

## 2021-10-06 PROCEDURE — 4040F PNEUMOC VAC/ADMIN/RCVD: CPT | Performed by: FAMILY MEDICINE

## 2021-10-06 PROCEDURE — 1036F TOBACCO NON-USER: CPT | Performed by: FAMILY MEDICINE

## 2021-10-06 RX ORDER — AMOXICILLIN AND CLAVULANATE POTASSIUM 875; 125 MG/1; MG/1
1 TABLET, FILM COATED ORAL 2 TIMES DAILY
Qty: 20 TABLET | Refills: 0 | Status: SHIPPED | OUTPATIENT
Start: 2021-10-06 | End: 2021-10-16

## 2021-10-06 ASSESSMENT — ENCOUNTER SYMPTOMS
WHEEZING: 0
DIARRHEA: 0
COUGH: 0
CONSTIPATION: 0
SHORTNESS OF BREATH: 0
ABDOMINAL PAIN: 0
RHINORRHEA: 0
SORE THROAT: 0

## 2021-10-06 NOTE — PROGRESS NOTES
[address] PRIMARY CARE  [address] [place] [address]  Dept: [phone]  Dept Fax: [phone]: [phone]     Chief Complaint  Chief Complaint   Patient presents with   Maria Eugenia Gar       HPI:  73 y.o.male who presents for the following:      L ear pain: intermittent pain over the past 1-2 weeks; sometimes drains foul liquid; this has given him trouble in the past with repeat infections    Review of Systems   Constitutional: Negative for chills and fever. HENT: Negative for congestion, rhinorrhea and sore throat. Respiratory: Negative for cough, shortness of breath and wheezing. Gastrointestinal: Negative for abdominal pain, constipation and diarrhea. Endocrine: Negative for polydipsia and polyuria. Genitourinary: Negative for dysuria, frequency and urgency. Neurological: Negative for syncope, light-headedness, numbness and headaches. Psychiatric/Behavioral: Negative for sleep disturbance. The patient is not nervous/anxious.         Past Medical History:   Diagnosis Date    Allergic rhinitis     Asthma     asthmatic bronchitis    Atrial fibrillation (HCC)     CAD (coronary artery disease)     Chronic back pain     COPD (chronic obstructive pulmonary disease) (HCC)     CVA (cerebral infarction)     Depression     Edema     GERD (gastroesophageal reflux disease)     ulcer    Glucose intolerance (impaired glucose tolerance) 11/06    Hypertension     ICD (implantable cardiac defibrillator) in place 2010    OMAR (obstructive sleep apnea)     UTI (urinary tract infection)      Past Surgical History:   Procedure Laterality Date    BACK SURGERY      ensed    FRACTURE SURGERY  7/06    l/shoulder    GLOSSECTOMY  12/09/14    nasal endoscopy    GLOSSECTOMY  07/31/15    W/COBLATION,NASAL ENDOSCOPY    PACEMAKER PLACEMENT  2010    SKIN BIOPSY  2/13/12    right leg-Dr. Yoandy Wooten     Social History Socioeconomic History    Marital status:      Spouse name: Not on file    Number of children: Not on file    Years of education: Not on file    Highest education level: Not on file   Occupational History    Not on file   Tobacco Use    Smoking status: Never Smoker    Smokeless tobacco: Never Used   Substance and Sexual Activity    Alcohol use: No     Alcohol/week: 0.0 standard drinks    Drug use: No    Sexual activity: Not on file   Other Topics Concern    Not on file   Social History Narrative    Not on file     Social Determinants of Health     Financial Resource Strain: Low Risk     Difficulty of Paying Living Expenses: Not hard at all   Food Insecurity: No Food Insecurity    Worried About [facility] in the Last Year: Never true    [employer] in the Last Year: Never true   Transportation Needs: No Transportation Needs    Lack of Transportation (Medical): No    Lack of Transportation (Non-Medical):  No   Physical Activity:     Days of Exercise per Week:     Minutes of Exercise per Session:    Stress:     Feeling of Stress :    Social Connections:     Frequency of Communication with Friends and Family:     Frequency of Social Gatherings with Friends and Family:     Attends Buddhist Services:     Active Member of Clubs or Organizations:     Attends Club or Organization Meetings:     Marital Status:    Intimate Partner Violence:     Fear of Current or Ex-Partner:     Emotionally Abused:     Physically Abused:     Sexually Abused:      Family History   Problem Relation Age of Onset    Hypertension Mother     Stroke Mother     Coronary Art Dis Mother     High Cholesterol Mother       Allergies   Allergen Reactions    Cashew Nut Oil     Dust Mite Extract     Hctz     Other      cashews    Pollen Extract     Thiazide-Type Diuretics      itching     Current Outpatient Medications   Medication Sig Dispense Refill    amoxicillin-clavulanate (AUGMENTIN) 875-125 MG per tablet Take 1 tablet by mouth 2 times daily for 10 days 20 tablet 0    solifenacin (VESICARE) 5 MG tablet Take 1 tablet by mouth once daily 90 tablet 0    traZODone (DESYREL) 50 MG tablet TAKE 1 TABLET BY MOUTH NIGHTLY AS NEEDED FOR SLEEP OR  DEPRESSION. 30 tablet 5    levothyroxine (EUTHYROX) 75 MCG tablet Take 1 tablet by mouth once daily 30 tablet 1    omeprazole (PRILOSEC) 20 MG delayed release capsule TAKE 1 CAPSULE BY MOUTH IN THE MORNING BEFORE BREAKFAST 30 capsule 11    warfarin (COUMADIN) 2.5 MG tablet       atropine 1 % ophthalmic solution INSTILL 1 DROP INTO LEFT EYE ONCE DAILY      ZIRGAN 0.15 % GEL ophthalmic gel INSTILL 1 DROP INTO LEFT EYE FIVE TIMES DAILY FOR 14 DAYS      moxifloxacin (VIGAMOX) 0.5 % ophthalmic solution INSTILL 1 DROP INTO LEFT EYE EVERY 2 HOURS      prednisoLONE acetate (PRED FORTE) 1 % ophthalmic suspension INSTILL 1 DROP INTO LEFT EYE TWICE DAILY      carvedilol (COREG) 12.5 MG tablet TAKE 1 TABLET BY MOUTH TWICE DAILY      cycloSPORINE (RESTASIS) 0.05 % ophthalmic emulsion 1 drop 2 times daily      loratadine (CLARITIN) 10 MG tablet Take 1 tablet by mouth daily 30 tablet 0    Handicap Placard MISC by Does not apply route Good through 8/26/2025 1 each 0    clonazePAM (KLONOPIN) 0.5 MG tablet Take 1 tablet by mouth 2 times daily as needed for Anxiety for up to 30 days. 30 tablet 0    Carboxymethylcell-Glycerin PF 0.5-0.9 % SOLN 1 drop      zonisamide (ZONEGRAN) 50 MG capsule TAKE 2 CAPSULES BY MOUTH AT BEDTIME  1    warfarin (COUMADIN) 5 MG tablet Regimen per Dr. Bruno Medina in cardiology 1 tablet 0    erythromycin (ROMYCIN) 5 MG/GM ophthalmic ointment Use 1 application in both eyes daily at bedtime.       albuterol (PROVENTIL) (2.5 MG/3ML) 0.083% nebulizer solution USE ONE VIAL IN NEBULIZER EVERY 4 HOURS AS NEEDED FOR WHEEZING AND FOR SHORTNESS OF BREATH 75 vial 5    budesonide-formoterol (SYMBICORT) 160-4.5 MCG/ACT AERO Inhale 2 puffs into the lungs 2 times daily LOT [id number] exp 10/2018 1 Inhaler 0    lactulose 20 GM/30ML SOLN Take  by mouth.  Compression Stockings MISC Knee high compression stockings  Dx: LE edema  20-30 mm pressure 2 each 0    carvedilol (COREG) 25 MG tablet Take 1 tablet by mouth 2 times daily (with meals). (Patient taking differently: Take 12.5 mg by mouth 2 times daily (with meals) ) 180 tablet 1    sotalol (BETAPACE) 80 MG tablet Take 80 mg by mouth 2 times daily. Take 1/2 tablet twice daily      WARFARIN SODIUM by Does not apply route. No current facility-administered medications for this visit. Vitals:    10/06/21 1312   BP: 136/70   Pulse: 71   Temp: 97.7 °F (36.5 °C)   SpO2: 97%   Weight: 277 lb (125.6 kg)        Physical exam:  Physical Exam  Vitals reviewed. Constitutional:       General: He is not in acute distress. Appearance: He is well-developed. HENT:      Head: Normocephalic and atraumatic. Right Ear: Tympanic membrane, ear canal and external ear normal.      Left Ear: External ear normal. Swelling present. A middle ear effusion is present. Tympanic membrane is scarred. Cardiovascular:      Rate and Rhythm: Normal rate. Pulmonary:      Effort: Pulmonary effort is normal. No respiratory distress. Musculoskeletal:      Cervical back: Normal range of motion. Skin:     General: Skin is warm and dry. Neurological:      Mental Status: He is alert and oriented to person, place, and time. Psychiatric:         Behavior: Behavior normal.         Assessment/Plan:  73 y.o. male here mainly for L ear infection:  - course of augmentin     Diagnosis Orders   1. Recurrent acute suppurative otitis media without spontaneous rupture of left tympanic membrane  amoxicillin-clavulanate (AUGMENTIN) 875-125 MG per tablet        Return if symptoms worsen or fail to improve.     Chastity Herrera MD

## 2021-10-18 ENCOUNTER — OFFICE VISIT (OUTPATIENT)
Dept: FAMILY MEDICINE CLINIC | Age: 71
End: 2021-10-18
Payer: MEDICARE

## 2021-10-18 ENCOUNTER — NURSE ONLY (OUTPATIENT)
Dept: FAMILY MEDICINE CLINIC | Age: 71
End: 2021-10-18

## 2021-10-18 VITALS
TEMPERATURE: 97.8 F | SYSTOLIC BLOOD PRESSURE: 118 MMHG | OXYGEN SATURATION: 97 % | HEART RATE: 79 BPM | DIASTOLIC BLOOD PRESSURE: 72 MMHG

## 2021-10-18 DIAGNOSIS — H66.005 RECURRENT ACUTE SUPPURATIVE OTITIS MEDIA WITHOUT SPONTANEOUS RUPTURE OF LEFT TYMPANIC MEMBRANE: Primary | ICD-10-CM

## 2021-10-18 DIAGNOSIS — Z23 ENCOUNTER FOR IMMUNIZATION: Primary | ICD-10-CM

## 2021-10-18 PROCEDURE — G8427 DOCREV CUR MEDS BY ELIG CLIN: HCPCS | Performed by: FAMILY MEDICINE

## 2021-10-18 PROCEDURE — G8484 FLU IMMUNIZE NO ADMIN: HCPCS | Performed by: FAMILY MEDICINE

## 2021-10-18 PROCEDURE — 1123F ACP DISCUSS/DSCN MKR DOCD: CPT | Performed by: FAMILY MEDICINE

## 2021-10-18 PROCEDURE — 1036F TOBACCO NON-USER: CPT | Performed by: FAMILY MEDICINE

## 2021-10-18 PROCEDURE — 99213 OFFICE O/P EST LOW 20 MIN: CPT | Performed by: FAMILY MEDICINE

## 2021-10-18 PROCEDURE — 3017F COLORECTAL CA SCREEN DOC REV: CPT | Performed by: FAMILY MEDICINE

## 2021-10-18 PROCEDURE — G0008 ADMIN INFLUENZA VIRUS VAC: HCPCS | Performed by: FAMILY MEDICINE

## 2021-10-18 PROCEDURE — 90694 VACC AIIV4 NO PRSRV 0.5ML IM: CPT | Performed by: FAMILY MEDICINE

## 2021-10-18 PROCEDURE — 4040F PNEUMOC VAC/ADMIN/RCVD: CPT | Performed by: FAMILY MEDICINE

## 2021-10-18 PROCEDURE — G8417 CALC BMI ABV UP PARAM F/U: HCPCS | Performed by: FAMILY MEDICINE

## 2021-10-18 RX ORDER — VALACYCLOVIR HYDROCHLORIDE 500 MG/1
500 TABLET, FILM COATED ORAL 2 TIMES DAILY
COMMUNITY
Start: 2021-10-14 | End: 2021-11-13

## 2021-10-18 RX ORDER — DOXYCYCLINE HYCLATE 100 MG/1
CAPSULE ORAL
COMMUNITY
Start: 2021-09-01 | End: 2021-10-18

## 2021-10-18 RX ORDER — LANOLIN/MINERAL OIL/PETROLATUM
OINTMENT (GRAM) OPHTHALMIC (EYE) 4 TIMES DAILY
COMMUNITY
Start: 2021-02-17

## 2021-10-18 RX ORDER — FLUOROMETHOLONE 0.1 %
SUSPENSION, DROPS(FINAL DOSAGE FORM)(ML) OPHTHALMIC (EYE)
COMMUNITY
Start: 2021-09-01

## 2021-10-18 RX ORDER — DOXYCYCLINE HYCLATE 100 MG
100 TABLET ORAL 2 TIMES DAILY
Qty: 20 TABLET | Refills: 0 | Status: SHIPPED | OUTPATIENT
Start: 2021-10-18 | End: 2021-10-28

## 2021-10-18 RX ORDER — MORPHINE SULFATE 30 MG/1
TABLET, FILM COATED, EXTENDED RELEASE ORAL
COMMUNITY

## 2021-10-18 ASSESSMENT — ENCOUNTER SYMPTOMS
SHORTNESS OF BREATH: 0
SORE THROAT: 0
COUGH: 0
RHINORRHEA: 0
WHEEZING: 0
CONSTIPATION: 0
DIARRHEA: 0
ABDOMINAL PAIN: 0

## 2021-10-18 NOTE — PROGRESS NOTES
Vaccine Information Sheet, \"Influenza - Inactivated\" OR \"Live - Intranasal\"  given to Hebert Campbell, or parent/legal guardian of  Hebert Campbell and verbalized understanding. Patient responses:    Have you ever had a reaction to a flu vaccine? No  Are you able to eat eggs without adverse effects? Yes  Do you have any current illness? No  Have you ever had [place] Syndrome? No    Flu vaccine given per order. Please see immunization tab.

## 2021-10-18 NOTE — PROGRESS NOTES
[address] [facility] [address] [facility] PRIMARY CARE  [address] [place] [id number]  Dept: [phone]  Dept Fax: [phone]  Loc: [phone]     Chief Complaint  Chief Complaint   Patient presents with   Iris Cartwright       HPI:  73 y.o.male who presents for the following:      Has longstanding intermittent infections of the ears; keeps cotton in the ears for comfort; uses hearing aids; finished course of Augmentin this month for this without relief; still has drainage    Review of Systems   Constitutional: Negative for chills and fever. HENT: Positive for ear discharge and ear pain. Negative for congestion, rhinorrhea and sore throat. Respiratory: Negative for cough, shortness of breath and wheezing. Gastrointestinal: Negative for abdominal pain, constipation and diarrhea. Endocrine: Negative for polydipsia and polyuria. Genitourinary: Negative for dysuria, frequency and urgency. Neurological: Negative for syncope, light-headedness, numbness and headaches. Psychiatric/Behavioral: Negative for sleep disturbance. The patient is not nervous/anxious.         Past Medical History:   Diagnosis Date    Allergic rhinitis     Asthma     asthmatic bronchitis    Atrial fibrillation (HCC)     CAD (coronary artery disease)     Chronic back pain     COPD (chronic obstructive pulmonary disease) (HCC)     CVA (cerebral infarction)     Depression     Edema     GERD (gastroesophageal reflux disease)     ulcer    Glucose intolerance (impaired glucose tolerance) 11/06    Hypertension     ICD (implantable cardiac defibrillator) in place 2010    OMAR (obstructive sleep apnea)     UTI (urinary tract infection)      Past Surgical History:   Procedure Laterality Date    BACK SURGERY      ensed    FRACTURE SURGERY  7/06    l/shoulder    GLOSSECTOMY  12/09/14    nasal endoscopy    GLOSSECTOMY  07/31/15    W/COBLATION,NASAL ENDOSCOPY    PACEMAKER PLACEMENT  2010    SKIN BIOPSY  2/13/12    right leg-Dr. Claude Gory     Social History     Socioeconomic History    Marital status:      Spouse name: Not on file    Number of children: Not on file    Years of education: Not on file    Highest education level: Not on file   Occupational History    Not on file   Tobacco Use    Smoking status: Never Smoker    Smokeless tobacco: Never Used   Substance and Sexual Activity    Alcohol use: No     Alcohol/week: 0.0 standard drinks    Drug use: No    Sexual activity: Not on file   Other Topics Concern    Not on file   Social History Narrative    Not on file     Social Determinants of Health     Financial Resource Strain: Low Risk     Difficulty of Paying Living Expenses: Not hard at all   Food Insecurity: No Food Insecurity    Worried About [address] in the Last Year: Never true    [address] [employer] in the Last Year: Never true   Transportation Needs: No Transportation Needs    Lack of Transportation (Medical): No    Lack of Transportation (Non-Medical):  No   Physical Activity:     Days of Exercise per Week:     Minutes of Exercise per Session:    Stress:     Feeling of Stress :    Social Connections:     Frequency of Communication with Friends and Family:     Frequency of Social Gatherings with Friends and Family:     Attends [employer] Services:     Active Member of Clubs or Organizations:     Attends Club or Organization Meetings:     Marital Status:    Intimate Partner Violence:     Fear of Current or Ex-Partner:     Emotionally Abused:     Physically Abused:     Sexually Abused:      Family History   Problem Relation Age of Onset    Hypertension Mother     Stroke Mother     Coronary Art Dis Mother     High Cholesterol Mother       Allergies   Allergen Reactions    Cashew Nut Oil     Dust Mite Extract     Hctz     Other      cashews    Pollen Extract     Thiazide-Type Diuretics      itching     Current Outpatient Medications   Medication Sig Dispense Refill    ascorbic acid (VITAMIN C) 1000 MG tablet Take 1,000 mg by mouth daily      fluorometholone (FML) 0.1 % ophthalmic suspension INSTILL 1 DROP INTO EACH EYE TWICE DAILY      morphine (MS CONTIN) 30 MG extended release tablet Take by mouth.  valACYclovir (VALTREX) 500 MG tablet Take 500 mg by mouth 2 times daily      White Petrolatum-Mineral Oil ([address] [place] [address] PETROL-MINERAL OIL-LANOLIN) 0.1-0.1 % OINT 4 times daily      doxycycline hyclate (VIBRA-TABS) 100 MG tablet Take 1 tablet by mouth 2 times daily for 10 days 20 tablet 0    solifenacin (VESICARE) 5 MG tablet Take 1 tablet by mouth once daily 90 tablet 0    traZODone (DESYREL) 50 MG tablet TAKE 1 TABLET BY MOUTH NIGHTLY AS NEEDED FOR SLEEP OR  DEPRESSION.  30 tablet 5    levothyroxine (EUTHYROX) 75 MCG tablet Take 1 tablet by mouth once daily 30 tablet 1    omeprazole (PRILOSEC) 20 MG delayed release capsule TAKE 1 CAPSULE BY MOUTH IN THE MORNING BEFORE BREAKFAST 30 capsule 11    warfarin (COUMADIN) 2.5 MG tablet       atropine 1 % ophthalmic solution INSTILL 1 DROP INTO LEFT EYE ONCE DAILY      ZIRGAN 0.15 % GEL ophthalmic gel INSTILL 1 DROP INTO LEFT EYE FIVE TIMES DAILY FOR 14 DAYS      moxifloxacin (VIGAMOX) 0.5 % ophthalmic solution INSTILL 1 DROP INTO LEFT EYE EVERY 2 HOURS      prednisoLONE acetate (PRED FORTE) 1 % ophthalmic suspension INSTILL 1 DROP INTO LEFT EYE TWICE DAILY      carvedilol (COREG) 12.5 MG tablet TAKE 1 TABLET BY MOUTH TWICE DAILY      cycloSPORINE (RESTASIS) 0.05 % ophthalmic emulsion 1 drop 2 times daily      loratadine (CLARITIN) 10 MG tablet Take 1 tablet by mouth daily 30 tablet 0    Handicap Placard MISC by Does not apply route Good through 8/26/2025 1 each 0    Carboxymethylcell-Glycerin PF 0.5-0.9 % SOLN 1 drop      zonisamide (ZONEGRAN) 50 MG capsule TAKE 2 CAPSULES BY MOUTH AT BEDTIME  1    warfarin (COUMADIN) 5 MG tablet Regimen per Dr. Justino Villalpando in cardiology 1 tablet 0  erythromycin (ROMYCIN) 5 MG/GM ophthalmic ointment Use 1 application in both eyes daily at bedtime.  albuterol (PROVENTIL) (2.5 MG/3ML) 0.083% nebulizer solution USE ONE VIAL IN NEBULIZER EVERY 4 HOURS AS NEEDED FOR WHEEZING AND FOR SHORTNESS OF BREATH 75 vial 5    budesonide-formoterol (SYMBICORT) 160-4.5 MCG/ACT AERO Inhale 2 puffs into the lungs 2 times daily LOT [id number] exp 10/2018 1 Inhaler 0    lactulose 20 GM/30ML SOLN Take  by mouth.  Compression Stockings MISC Knee high compression stockings  Dx: LE edema  20-30 mm pressure 2 each 0    carvedilol (COREG) 25 MG tablet Take 1 tablet by mouth 2 times daily (with meals). (Patient taking differently: Take 12.5 mg by mouth 2 times daily (with meals) ) 180 tablet 1    sotalol (BETAPACE) 80 MG tablet Take 80 mg by mouth 2 times daily. Take 1/2 tablet twice daily      WARFARIN SODIUM by Does not apply route.  clonazePAM (KLONOPIN) 0.5 MG tablet Take 1 tablet by mouth 2 times daily as needed for Anxiety for up to 30 days. 30 tablet 0     No current facility-administered medications for this visit. There were no vitals filed for this visit. Physical exam:  Physical Exam  Vitals reviewed. Constitutional:       General: He is not in acute distress. Appearance: He is well-developed. HENT:      Head: Normocephalic and atraumatic. Right Ear: Tympanic membrane, ear canal and external ear normal.      Left Ear: Ear canal and external ear normal. Drainage present. A middle ear effusion is present. Tympanic membrane is injected, scarred, erythematous and bulging. Cardiovascular:      Rate and Rhythm: Normal rate. Pulmonary:      Effort: Pulmonary effort is normal. No respiratory distress. Musculoskeletal:      Cervical back: Normal range of motion. Skin:     General: Skin is warm and dry. Neurological:      Mental Status: He is alert and oriented to person, place, and time.    Psychiatric:         Behavior: Behavior normal.         Assessment/Plan:  73 y.o. male here mainly for L AOM:  - L AOM: course of doxy; if no improvement then will go with fluoroquinolone and send to ENT for opinion on PET tubes     Diagnosis Orders   1. Recurrent acute suppurative otitis media without spontaneous rupture of left tympanic membrane  doxycycline hyclate (VIBRA-TABS) 100 MG tablet        Return if symptoms worsen or fail to improve.     Alaina Cornejo MD

## 2021-11-01 ENCOUNTER — TELEPHONE (OUTPATIENT)
Dept: FAMILY MEDICINE CLINIC | Age: 71
End: 2021-11-01

## 2021-11-01 NOTE — TELEPHONE ENCOUNTER
Leg cramps have many causes. Dehydration is a common cause. Abnormal electrolytes is possible. Staying hydrated can help both of these. Abnormal blood sugars can cause this so checking the sugar might be helpful. Also cramps can happen after over use or due to deconditioning. Stretching the muscle can be helpful.

## 2021-11-01 NOTE — TELEPHONE ENCOUNTER
Nick Harley called in stating that Atul Mccracken has been having some leg cramps for a few days now and he is wondering what he can do for these?

## 2022-01-03 DIAGNOSIS — R39.15 URINARY URGENCY: ICD-10-CM

## 2022-01-03 RX ORDER — SOLIFENACIN SUCCINATE 5 MG/1
TABLET, FILM COATED ORAL
Qty: 90 TABLET | Refills: 0 | Status: SHIPPED | OUTPATIENT
Start: 2022-01-03 | End: 2022-04-04 | Stop reason: SDUPTHER

## 2022-01-03 NOTE — TELEPHONE ENCOUNTER
Comments: Last filled 10/4/21    Last Office Visit (last PCP visit):   10/18/2021    Next Visit Date:  No future appointments. **If hasn't been seen in over a year OR hasn't followed up according to last diabetes/ADHD visit, make appointment for patient before sending refill to provider.     Rx requested:  Requested Prescriptions     Pending Prescriptions Disp Refills    solifenacin (VESICARE) 5 MG tablet 90 tablet 0     Sig: Take 1 tablet by mouth once daily

## 2022-03-09 ENCOUNTER — OFFICE VISIT (OUTPATIENT)
Dept: INTERNAL MEDICINE | Age: 72
End: 2022-03-09
Payer: MEDICARE

## 2022-03-09 VITALS
WEIGHT: 287 LBS | OXYGEN SATURATION: 95 % | HEIGHT: 72 IN | HEART RATE: 76 BPM | TEMPERATURE: 97.8 F | DIASTOLIC BLOOD PRESSURE: 78 MMHG | BODY MASS INDEX: 38.87 KG/M2 | SYSTOLIC BLOOD PRESSURE: 136 MMHG

## 2022-03-09 DIAGNOSIS — Z13.1 SCREENING FOR DIABETES MELLITUS (DM): ICD-10-CM

## 2022-03-09 DIAGNOSIS — F32.A DEPRESSION, UNSPECIFIED DEPRESSION TYPE: ICD-10-CM

## 2022-03-09 DIAGNOSIS — J44.9 CHRONIC OBSTRUCTIVE PULMONARY DISEASE, UNSPECIFIED COPD TYPE (HCC): ICD-10-CM

## 2022-03-09 DIAGNOSIS — I48.91 ATRIAL FIBRILLATION, UNSPECIFIED TYPE (HCC): ICD-10-CM

## 2022-03-09 DIAGNOSIS — K74.69 OTHER CIRRHOSIS OF LIVER (HCC): ICD-10-CM

## 2022-03-09 DIAGNOSIS — I77.6 VASCULITIS (HCC): ICD-10-CM

## 2022-03-09 DIAGNOSIS — M35.01 KERATOCONJUNCTIVITIS SICCA (HCC): ICD-10-CM

## 2022-03-09 DIAGNOSIS — J06.9 ACUTE URI: Primary | ICD-10-CM

## 2022-03-09 DIAGNOSIS — R79.9 ABNORMAL FINDING OF BLOOD CHEMISTRY, UNSPECIFIED: ICD-10-CM

## 2022-03-09 DIAGNOSIS — I50.9 CONGESTIVE HEART FAILURE, UNSPECIFIED HF CHRONICITY, UNSPECIFIED HEART FAILURE TYPE (HCC): ICD-10-CM

## 2022-03-09 LAB — HBA1C MFR BLD: 5.2 % (ref 4.8–5.9)

## 2022-03-09 PROCEDURE — G8417 CALC BMI ABV UP PARAM F/U: HCPCS | Performed by: FAMILY MEDICINE

## 2022-03-09 PROCEDURE — 4040F PNEUMOC VAC/ADMIN/RCVD: CPT | Performed by: FAMILY MEDICINE

## 2022-03-09 PROCEDURE — G8484 FLU IMMUNIZE NO ADMIN: HCPCS | Performed by: FAMILY MEDICINE

## 2022-03-09 PROCEDURE — 99214 OFFICE O/P EST MOD 30 MIN: CPT | Performed by: FAMILY MEDICINE

## 2022-03-09 PROCEDURE — 1123F ACP DISCUSS/DSCN MKR DOCD: CPT | Performed by: FAMILY MEDICINE

## 2022-03-09 PROCEDURE — G8427 DOCREV CUR MEDS BY ELIG CLIN: HCPCS | Performed by: FAMILY MEDICINE

## 2022-03-09 PROCEDURE — 3023F SPIROM DOC REV: CPT | Performed by: FAMILY MEDICINE

## 2022-03-09 PROCEDURE — 3017F COLORECTAL CA SCREEN DOC REV: CPT | Performed by: FAMILY MEDICINE

## 2022-03-09 PROCEDURE — 36415 COLL VENOUS BLD VENIPUNCTURE: CPT | Performed by: FAMILY MEDICINE

## 2022-03-09 PROCEDURE — 1036F TOBACCO NON-USER: CPT | Performed by: FAMILY MEDICINE

## 2022-03-09 RX ORDER — SODIUM CHLORIDE 9 MG/ML
INJECTION INTRAVENOUS
COMMUNITY
Start: 2021-12-20

## 2022-03-09 RX ORDER — BACITRACIN ZINC AND POLYMYXIN B SULFATE 500; 10000 [USP'U]/G; [USP'U]/G
OINTMENT OPHTHALMIC
COMMUNITY
Start: 2022-01-30

## 2022-03-09 RX ORDER — TOBRAMYCIN 3 MG/ML
SOLUTION/ DROPS OPHTHALMIC
COMMUNITY
Start: 2021-12-20

## 2022-03-09 RX ORDER — CEFAZOLIN SODIUM 1 G/3ML
INJECTION, POWDER, FOR SOLUTION INTRAMUSCULAR; INTRAVENOUS
COMMUNITY
Start: 2021-12-21

## 2022-03-09 RX ORDER — CENEGERMIN-BKBJ 20 UG/ML
SOLUTION/ DROPS OPHTHALMIC
COMMUNITY
Start: 2022-01-06

## 2022-03-09 RX ORDER — CYCLOPENTOLATE HYDROCHLORIDE 10 MG/ML
SOLUTION/ DROPS OPHTHALMIC
COMMUNITY
Start: 2022-01-12

## 2022-03-09 RX ORDER — ESCITALOPRAM OXALATE 20 MG/1
20 TABLET ORAL DAILY
Qty: 30 TABLET | Refills: 3 | Status: SHIPPED | OUTPATIENT
Start: 2022-03-09

## 2022-03-09 RX ORDER — DOXYCYCLINE HYCLATE 100 MG
TABLET ORAL
COMMUNITY
Start: 2022-03-03

## 2022-03-09 SDOH — ECONOMIC STABILITY: FOOD INSECURITY: WITHIN THE PAST 12 MONTHS, YOU WORRIED THAT YOUR FOOD WOULD RUN OUT BEFORE YOU GOT MONEY TO BUY MORE.: NEVER TRUE

## 2022-03-09 SDOH — ECONOMIC STABILITY: FOOD INSECURITY: WITHIN THE PAST 12 MONTHS, THE FOOD YOU BOUGHT JUST DIDN'T LAST AND YOU DIDN'T HAVE MONEY TO GET MORE.: NEVER TRUE

## 2022-03-09 ASSESSMENT — PATIENT HEALTH QUESTIONNAIRE - PHQ9
5. POOR APPETITE OR OVEREATING: 1
SUM OF ALL RESPONSES TO PHQ QUESTIONS 1-9: 19
SUM OF ALL RESPONSES TO PHQ QUESTIONS 1-9: 19
8. MOVING OR SPEAKING SO SLOWLY THAT OTHER PEOPLE COULD HAVE NOTICED. OR THE OPPOSITE, BEING SO FIGETY OR RESTLESS THAT YOU HAVE BEEN MOVING AROUND A LOT MORE THAN USUAL: 2
3. TROUBLE FALLING OR STAYING ASLEEP: 3
SUM OF ALL RESPONSES TO PHQ QUESTIONS 1-9: 19
1. LITTLE INTEREST OR PLEASURE IN DOING THINGS: 2
9. THOUGHTS THAT YOU WOULD BE BETTER OFF DEAD, OR OF HURTING YOURSELF: 1
7. TROUBLE CONCENTRATING ON THINGS, SUCH AS READING THE NEWSPAPER OR WATCHING TELEVISION: 2
2. FEELING DOWN, DEPRESSED OR HOPELESS: 3
10. IF YOU CHECKED OFF ANY PROBLEMS, HOW DIFFICULT HAVE THESE PROBLEMS MADE IT FOR YOU TO DO YOUR WORK, TAKE CARE OF THINGS AT HOME, OR GET ALONG WITH OTHER PEOPLE: 3
SUM OF ALL RESPONSES TO PHQ QUESTIONS 1-9: 18
4. FEELING TIRED OR HAVING LITTLE ENERGY: 3
6. FEELING BAD ABOUT YOURSELF - OR THAT YOU ARE A FAILURE OR HAVE LET YOURSELF OR YOUR FAMILY DOWN: 2
SUM OF ALL RESPONSES TO PHQ9 QUESTIONS 1 & 2: 5

## 2022-03-09 ASSESSMENT — ENCOUNTER SYMPTOMS
WHEEZING: 0
COUGH: 0
RHINORRHEA: 1
SORE THROAT: 0
SINUS PRESSURE: 1
SHORTNESS OF BREATH: 0
DIARRHEA: 0
ABDOMINAL PAIN: 0
CONSTIPATION: 0

## 2022-03-09 ASSESSMENT — SOCIAL DETERMINANTS OF HEALTH (SDOH): HOW HARD IS IT FOR YOU TO PAY FOR THE VERY BASICS LIKE FOOD, HOUSING, MEDICAL CARE, AND HEATING?: NOT HARD AT ALL

## 2022-03-09 ASSESSMENT — COLUMBIA-SUICIDE SEVERITY RATING SCALE - C-SSRS
6. HAVE YOU EVER DONE ANYTHING, STARTED TO DO ANYTHING, OR PREPARED TO DO ANYTHING TO END YOUR LIFE?: NO
2. HAVE YOU ACTUALLY HAD ANY THOUGHTS OF KILLING YOURSELF?: NO
1. WITHIN THE PAST MONTH, HAVE YOU WISHED YOU WERE DEAD OR WISHED YOU COULD GO TO SLEEP AND NOT WAKE UP?: YES

## 2022-03-09 NOTE — PROGRESS NOTES
[address] PRIMARY CARE  [address] [place] [address]  Dept: [phone]  Dept Fax: [phone]: [phone]     Chief Complaint  Chief Complaint   Patient presents with    Nasal Congestion     yellow, bloody, mucus, cough, x1 week    Depression       HPI:  73 y.o.male who presents for the following:      Mood: worsening depression; having eye problems and losing sight which is disturbing his mood and he feels bad about this    URI symptoms: x1 week with face pressure/pain periorbitally only on the L; no fevers; no ear pain; no n/v. Taking zyrtec;tolerating PO; no sick contacts    Review of Systems   Constitutional: Negative for chills and fever. HENT: Positive for congestion, rhinorrhea and sinus pressure. Negative for sore throat. Respiratory: Negative for cough, shortness of breath and wheezing. Gastrointestinal: Negative for abdominal pain, constipation and diarrhea. Endocrine: Negative for polydipsia and polyuria. Genitourinary: Negative for dysuria, frequency and urgency. Neurological: Negative for syncope, light-headedness, numbness and headaches. Psychiatric/Behavioral: Negative for sleep disturbance. The patient is not nervous/anxious.         Past Medical History:   Diagnosis Date    Allergic rhinitis     Asthma     asthmatic bronchitis    Atrial fibrillation (HCC)     CAD (coronary artery disease)     Chronic back pain     COPD (chronic obstructive pulmonary disease) (HCC)     CVA (cerebral infarction)     Depression     Edema     GERD (gastroesophageal reflux disease)     ulcer    Glucose intolerance (impaired glucose tolerance) 11/06    Hypertension     ICD (implantable cardiac defibrillator) in place 2010    OMAR (obstructive sleep apnea)     UTI (urinary tract infection)      Past Surgical History:   Procedure Laterality Date    BACK SURGERY      ensed    FRACTURE SURGERY 7/06    l/shoulder    GLOSSECTOMY  12/09/14    nasal endoscopy    GLOSSECTOMY  07/31/15    W/COBLATION,NASAL ENDOSCOPY    PACEMAKER PLACEMENT  2010    SKIN BIOPSY  2/13/12    right leg-Dr. Luna Church     Social History     Socioeconomic History    Marital status:      Spouse name: Not on file    Number of children: Not on file    Years of education: Not on file    Highest education level: Not on file   Occupational History    Not on file   Tobacco Use    Smoking status: Never Smoker    Smokeless tobacco: Never Used   Substance and Sexual Activity    Alcohol use: No     Alcohol/week: 0.0 standard drinks    Drug use: No    Sexual activity: Not on file   Other Topics Concern    Not on file   Social History Narrative    Not on file     Social Determinants of Health     Financial Resource Strain: Low Risk     Difficulty of Paying Living Expenses: Not hard at all   Food Insecurity: No Food Insecurity    Worried About [address] in the Last Year: Never true    [address] [facility] [address] in the Last Year: Never true   Transportation Needs:     Lack of Transportation (Medical): Not on file    Lack of Transportation (Non-Medical):  Not on file   Physical Activity:     Days of Exercise per Week: Not on file    Minutes of Exercise per Session: Not on file   Stress:     Feeling of Stress : Not on file   Social Connections:     Frequency of Communication with Friends and Family: Not on file    Frequency of Social Gatherings with Friends and Family: Not on file    Attends Catholic Services: Not on file    Active Member of Clubs or Organizations: Not on file    Attends Club or Organization Meetings: Not on file    Marital Status: Not on file   Intimate Partner Violence:     Fear of Current or Ex-Partner: Not on file    Emotionally Abused: Not on file    Physically Abused: Not on file    Sexually Abused: Not on file   Housing Stability:     Unable to Pay for Housing in the Last Year: Not on file  Number of Places Lived in the Last Year: Not on file    Unstable Housing in the Last Year: Not on file     Family History   Problem Relation Age of Onset    Hypertension Mother     Stroke Mother     Coronary Art Dis Mother     High Cholesterol Mother       Allergies   Allergen Reactions    Cashew Nut Oil     Dust Mite Extract     Hctz     Other      cashews    Pollen Extract     Thiazide-Type Diuretics      itching     Current Outpatient Medications   Medication Sig Dispense Refill    bacitracin-polymyxin b (POLYSPORIN) [id number] UNIT/GM ophthalmic ointment USE ONE APPLICATION IN THE RIGHT EYE DAILY AT BEDTIME      ceFAZolin (ANCEF) 1 g injection       OXERVATE 0.002 % SOLN       cyclopentolate (CYCLOGYL) 1 % ophthalmic solution INSTILL 1 DROP INTO LEFT EYE ONCE DAILY      doxycycline hyclate (VIBRA-TABS) 100 MG tablet       sodium chloride, PF, 0.9 % injection       tobramycin (TOBREX) 0.3 % ophthalmic solution       escitalopram (LEXAPRO) 20 MG tablet Take 1 tablet by mouth daily 30 tablet 3    traZODone (DESYREL) 50 MG tablet TAKE 1 TABLET BY MOUTH NIGHTLY AS NEEDED FOR SLEEP OR  DEPRESSION. 30 tablet 5    omeprazole (PRILOSEC) 20 MG delayed release capsule TAKE 1 CAPSULE BY MOUTH IN THE MORNING BEFORE BREAKFAST 30 capsule 5    solifenacin (VESICARE) 5 MG tablet Take 1 tablet by mouth once daily 90 tablet 0    levothyroxine (EUTHYROX) 75 MCG tablet Take 1 tablet by mouth once daily 30 tablet 5    ascorbic acid (VITAMIN C) 1000 MG tablet Take 1,000 mg by mouth daily      fluorometholone (FML) 0.1 % ophthalmic suspension INSTILL 1 DROP INTO EACH EYE TWICE DAILY      morphine (MS CONTIN) 30 MG extended release tablet Take by mouth.       White Petrolatum-Mineral Oil ([address] [place] [address] PETROL-MINERAL OIL-LANOLIN) 0.1-0.1 % OINT 4 times daily      warfarin (COUMADIN) 2.5 MG tablet       atropine 1 % ophthalmic solution INSTILL 1 DROP INTO LEFT EYE ONCE DAILY      ZIRGAN 0.15 % GEL ophthalmic gel INSTILL 1 DROP INTO LEFT EYE FIVE TIMES DAILY FOR 14 DAYS      moxifloxacin (VIGAMOX) 0.5 % ophthalmic solution INSTILL 1 DROP INTO LEFT EYE EVERY 2 HOURS      prednisoLONE acetate (PRED FORTE) 1 % ophthalmic suspension INSTILL 1 DROP INTO LEFT EYE TWICE DAILY      carvedilol (COREG) 12.5 MG tablet TAKE 1 TABLET BY MOUTH TWICE DAILY      cycloSPORINE (RESTASIS) 0.05 % ophthalmic emulsion 1 drop 2 times daily      loratadine (CLARITIN) 10 MG tablet Take 1 tablet by mouth daily 30 tablet 0    Handicap Placard MISC by Does not apply route Good through 8/26/2025 1 each 0    clonazePAM (KLONOPIN) 0.5 MG tablet Take 1 tablet by mouth 2 times daily as needed for Anxiety for up to 30 days. 30 tablet 0    Carboxymethylcell-Glycerin PF 0.5-0.9 % SOLN 1 drop      zonisamide (ZONEGRAN) 50 MG capsule TAKE 2 CAPSULES BY MOUTH AT BEDTIME  1    warfarin (COUMADIN) 5 MG tablet Regimen per Dr. Michelle Marino in cardiology 1 tablet 0    erythromycin (ROMYCIN) 5 MG/GM ophthalmic ointment Use 1 application in both eyes daily at bedtime.  albuterol (PROVENTIL) (2.5 MG/3ML) 0.083% nebulizer solution USE ONE VIAL IN NEBULIZER EVERY 4 HOURS AS NEEDED FOR WHEEZING AND FOR SHORTNESS OF BREATH 75 vial 5    budesonide-formoterol (SYMBICORT) 160-4.5 MCG/ACT AERO Inhale 2 puffs into the lungs 2 times daily LOT [id number] exp 10/2018 1 Inhaler 0    lactulose 20 GM/30ML SOLN Take  by mouth.  Compression Stockings MISC Knee high compression stockings  Dx: LE edema  20-30 mm pressure 2 each 0    carvedilol (COREG) 25 MG tablet Take 1 tablet by mouth 2 times daily (with meals). (Patient taking differently: Take 12.5 mg by mouth 2 times daily (with meals) ) 180 tablet 1    sotalol (BETAPACE) 80 MG tablet Take 80 mg by mouth 2 times daily. Take 1/2 tablet twice daily      WARFARIN SODIUM by Does not apply route. No current facility-administered medications for this visit.          Vitals:    03/09/22 1019   BP: 136/78   Pulse: 76 Temp: 97.8 °F (36.6 °C)   TempSrc: Infrared   SpO2: 95%   Weight: 287 lb (130.2 kg)   Height: 6' (1.829 m)       Physical exam:  Physical Exam  Vitals reviewed. Constitutional:       General: He is not in acute distress. Appearance: He is well-developed. HENT:      Head: Normocephalic and atraumatic. Right Ear: Tympanic membrane, ear canal and external ear normal.      Left Ear: Tympanic membrane, ear canal and external ear normal.      Mouth/Throat:      Pharynx: No oropharyngeal exudate. Neck:      Thyroid: No thyromegaly. Cardiovascular:      Rate and Rhythm: Normal rate and regular rhythm. Heart sounds: Normal heart sounds. No murmur heard. Pulmonary:      Effort: Pulmonary effort is normal. No respiratory distress. Breath sounds: Normal breath sounds. No wheezing. Abdominal:      General: There is no distension. Palpations: Abdomen is soft. Tenderness: There is no abdominal tenderness. There is no guarding or rebound. Musculoskeletal:      Cervical back: Normal range of motion. Lymphadenopathy:      Cervical: No cervical adenopathy. Skin:     General: Skin is warm and dry. Neurological:      Mental Status: He is alert and oriented to person, place, and time. Psychiatric:         Behavior: Behavior normal.         Assessment/Plan:  73 y.o. male here mainly for URI:  - URI symptoms: benign exam; he declined COVID test; his eye doctor just put him on an extended course of doxycycline so the sinuses are covered in any case  - Mood: adding lexapro today for the mood  - He wants to know if he has DM2; checking A1c today     Diagnosis Orders   1. Acute URI     2. Depression, unspecified depression type  escitalopram (LEXAPRO) 20 MG tablet   3. Keratoconjunctivitis sicca (HCC)     4. Other cirrhosis of liver ([facility]     5. Chronic obstructive pulmonary disease, unspecified COPD type ([facility] [facility]     6.  Congestive heart failure, unspecified HF chronicity, unspecified heart failure type ([facility]     7. Vasculitis ([facility]     8. Atrial fibrillation, unspecified type ([facility] [facility]     9. Screening for diabetes mellitus (DM)  Hemoglobin A1C   10. Abnormal finding of blood chemistry, unspecified   Hemoglobin A1C        Return if symptoms worsen or fail to improve.     Ankur Hernandez MD

## 2022-04-04 DIAGNOSIS — R39.15 URINARY URGENCY: ICD-10-CM

## 2022-04-04 RX ORDER — SOLIFENACIN SUCCINATE 5 MG/1
TABLET, FILM COATED ORAL
Qty: 90 TABLET | Refills: 0 | Status: SHIPPED | OUTPATIENT
Start: 2022-04-04 | End: 2022-07-06 | Stop reason: SDUPTHER

## 2022-04-20 DIAGNOSIS — E03.9 ACQUIRED HYPOTHYROIDISM: ICD-10-CM

## 2022-04-20 RX ORDER — LEVOTHYROXINE SODIUM 0.07 MG/1
TABLET ORAL
Qty: 30 TABLET | Refills: 5 | Status: SHIPPED | OUTPATIENT
Start: 2022-04-20

## 2022-04-20 NOTE — TELEPHONE ENCOUNTER
Comments:       Last Office Visit (last PCP visit):   3/9/2022    Next Visit Date:  No future appointments. **If hasn't been seen in over a year OR hasn't followed up according to last diabetes/ADHD visit, make appointment for patient before sending refill to provider.     Rx requested:  Requested Prescriptions     Pending Prescriptions Disp Refills    levothyroxine (EUTHYROX) 75 MCG tablet 30 tablet 5     Sig: Take 1 tablet by mouth once daily

## 2022-04-21 ENCOUNTER — HOSPITAL ENCOUNTER (OUTPATIENT)
Dept: LAB | Age: 72
Discharge: HOME | End: 2022-04-21
Payer: MEDICARE

## 2022-04-21 LAB
INR BLD: 3.1
PROTHROMBIN TIME: 31.2 SEC (ref 12.3–14.9)

## 2022-04-21 PROCEDURE — 85610 PROTHROMBIN TIME: CPT

## 2022-05-19 ENCOUNTER — HOSPITAL ENCOUNTER (OUTPATIENT)
Dept: LAB | Age: 72
Discharge: HOME | End: 2022-05-19
Payer: MEDICARE

## 2022-05-19 LAB
INR BLD: 2.8
PROTHROMBIN TIME: 28.7 SEC (ref 12.3–14.9)

## 2022-05-19 PROCEDURE — 85610 PROTHROMBIN TIME: CPT

## 2022-05-19 PROCEDURE — 36415 COLL VENOUS BLD VENIPUNCTURE: CPT

## 2022-05-26 ENCOUNTER — OFFICE VISIT (OUTPATIENT)
Dept: INTERNAL MEDICINE | Age: 72
End: 2022-05-26
Payer: MEDICARE

## 2022-05-26 VITALS
HEART RATE: 72 BPM | HEIGHT: 72 IN | DIASTOLIC BLOOD PRESSURE: 72 MMHG | OXYGEN SATURATION: 98 % | WEIGHT: 289 LBS | BODY MASS INDEX: 39.14 KG/M2 | SYSTOLIC BLOOD PRESSURE: 136 MMHG

## 2022-05-26 DIAGNOSIS — E66.01 SEVERE OBESITY (BMI 35.0-39.9) WITH COMORBIDITY (HCC): ICD-10-CM
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
## 2022-05-26 DIAGNOSIS — H65.192 ACUTE EFFUSION OF LEFT EAR: Primary | ICD-10-CM

## 2022-05-26 PROCEDURE — 1123F ACP DISCUSS/DSCN MKR DOCD: CPT | Performed by: PHYSICIAN ASSISTANT

## 2022-05-26 PROCEDURE — G8417 CALC BMI ABV UP PARAM F/U: HCPCS | Performed by: PHYSICIAN ASSISTANT

## 2022-05-26 PROCEDURE — G8427 DOCREV CUR MEDS BY ELIG CLIN: HCPCS | Performed by: PHYSICIAN ASSISTANT

## 2022-05-26 PROCEDURE — 99213 OFFICE O/P EST LOW 20 MIN: CPT | Performed by: PHYSICIAN ASSISTANT

## 2022-05-26 PROCEDURE — 1036F TOBACCO NON-USER: CPT | Performed by: PHYSICIAN ASSISTANT

## 2022-05-26 PROCEDURE — 3017F COLORECTAL CA SCREEN DOC REV: CPT | Performed by: PHYSICIAN ASSISTANT

## 2022-05-26 RX ORDER — FLUTICASONE PROPIONATE 50 MCG
2 SPRAY, SUSPENSION (ML) NASAL DAILY
Qty: 16 G | Refills: 0 | Status: SHIPPED | OUTPATIENT
Start: 2022-05-26

## 2022-05-26 NOTE — PROGRESS NOTES
Evaristo Wolfe (: 1950) is a 73 y.o. male, Established patient, here for evaluation of the following chief complaint(s):  Dizziness (When pt bends back or forward he gets dizzy- thinks he has an inner ear infect- Hx of ear probelms)        ASSESSMENT/PLAN:  1. Acute effusion of left ear  - dizziness likely related to ear fluids  - will treat with flonase, and have him f/u if no improvement    - fluticasone (FLONASE) 50 MCG/ACT nasal spray; 2 sprays by Each Nostril route daily  Dispense: 16 g; Refill: 0    2. Insect bite, unspecified site, initial encounter  - healing, no signs of infection     3. Severe obesity (BMI 35.0-39. 9) with comorbidity ([address]  - discussed health diet and exercise         No follow-ups on file. SUBJECTIVE/OBJECTIVE:  HPI      Dizziness, when lying head back  He has to put in eye drops several times a time, and getting dizzy and feeling like he may pass out       Insect  bite  Healing but still having itching   No signs of infection . No drainage      Review of Systems   Constitutional: Negative. HENT: Negative. Respiratory: Negative. Cardiovascular: Negative. Gastrointestinal: Negative. Genitourinary: Negative. Skin: Positive for wound. Neurological: Positive for dizziness. Physical Exam  Vitals reviewed. Constitutional:       Appearance: Normal appearance. HENT:      Head: Normocephalic and atraumatic. Right Ear: A middle ear effusion is present. Left Ear: Tympanic membrane normal.   Cardiovascular:      Rate and Rhythm: Normal rate and regular rhythm. Heart sounds: Normal heart sounds. Pulmonary:      Effort: Pulmonary effort is normal.      Breath sounds: Normal breath sounds. Musculoskeletal:         General: Normal range of motion. Neurological:      General: No focal deficit present. Mental Status: He is alert and oriented to person, place, and time.          Vitals:    22 1159   BP: 136/72   Site: Left Upper Arm   Position: Sitting   Cuff Size: Large Adult   Pulse: 72   SpO2: 98%   Weight: 289 lb (131.1 kg)   Height: 6' (1.829 m)                 An electronic signature was used to authenticate this note.     --NANCY Campbell

## 2022-06-03 NOTE — PROGRESS NOTES
Medicare Annual Wellness Visit  Name: Cora Bird Date: 10/5/2020   MRN: [id number] Sex: Male   Age: 78 y.o. Ethnicity: Non-/Non    : 1950 Race: Christine Santos is here for Medicare AWV    Screenings for behavioral, psychosocial and functional/safety risks, and cognitive dysfunction are all negative except as indicated below. These results, as well as other patient data from the [address] form, are documented in Flowsheets linked to this Encounter. Allergies   Allergen Reactions    Cashew Nut Oil     Dust Mite Extract     Hctz     Other      cashews    Pollen Extract     Thiazide-Type Diuretics      itching         Prior to Visit Medications    Medication Sig Taking? Authorizing Provider   omeprazole (PRILOSEC) 20 MG delayed release capsule TAKE 1 CAPSULE BY MOUTH IN THE MORNING BEFORE BREAKFAST Yes MD [facility] [address] [facility] by Does not apply route Good through 2025 Yes Breanna Pool MD   traZODone (DESYREL) 50 MG tablet Take 1 tablet by mouth nightly as needed for Sleep or Depression Yes Breanna Pool MD   solifenacin (VESICARE) 5 MG tablet Take 1 tablet by mouth daily Yes Breanna Pool MD   Carboxymethylcell-Glycerin PF 0.5-0.9 % SOLN 1 drop Yes Historical Provider, MD   dexamethasone (DECADRON) 0.1 % ophthalmic solution 1 drop Yes Historical Provider, MD   zonisamide (ZONEGRAN) 50 MG capsule TAKE 2 CAPSULES BY MOUTH AT BEDTIME Yes Historical Provider, MD   morphine (MS CONTIN) 30 MG extended release tablet  Yes Historical Provider, MD   warfarin (COUMADIN) 5 MG tablet Regimen per Dr. Donald Hansen in cardiology Yes Breanna Pool MD   erythromycin [facility]) 5 MG/GM ophthalmic ointment Use 1 application in both eyes daily at bedtime.  Yes Historical Provider, MD   albuterol (PROVENTIL) (2.5 MG/3ML) 0.083% nebulizer solution USE ONE VIAL IN NEBULIZER EVERY 4 HOURS AS NEEDED FOR WHEEZING AND FOR SHORTNESS OF BREATH Yes Breanna Pool MD   budesonide-formoterol (SYMBICORT) 160-4.5 MCG/ACT AERO Inhale 2 puffs into the lungs 2 times daily LOT [id number] exp 10/2018 Yes Rica Nieto MD   lidocaine (XYLOCAINE) 5 % ointment Apply topically every 2 hours as needed. Yes Glorianne Brands, DO   lactulose 20 GM/30ML SOLN Take  by mouth. Yes Historical Provider, MD   Compression Stockings MISC Knee high compression stockings  Dx: LE edema  20-30 mm pressure Yes Glorianne Brands, DO   carvedilol (COREG) 25 MG tablet Take 1 tablet by mouth 2 times daily (with meals). Patient taking differently: Take 12.5 mg by mouth 2 times daily (with meals)  Yes Glorianne Brands, DO   sotalol (BETAPACE) 80 MG tablet Take 80 mg by mouth 2 times daily. Take 1/2 tablet twice daily Yes Historical Provider, MD   WARFARIN SODIUM by Does not apply route. Yes Historical Provider, MD   clonazePAM (KLONOPIN) 0.5 MG tablet Take 1 tablet by mouth 2 times daily as needed for Anxiety for up to 30 days.   Rica Nieto MD         Past Medical History:   Diagnosis Date    Allergic rhinitis     Asthma     asthmatic bronchitis    Atrial fibrillation ([facility] [address]     CAD (coronary artery disease)     Chronic back pain     COPD [facility]) [facility]     CVA (cerebral infarction)     Depression     Edema     GERD (gastroesophageal reflux disease)     ulcer    Glucose intolerance (impaired glucose tolerance) 11/06    Hypertension     ICD (implantable cardiac defibrillator) in place 2010    OMAR (obstructive sleep apnea)     UTI (urinary tract infection)        Past Surgical History:   Procedure Laterality Date    BACK SURGERY      ensed    FRACTURE SURGERY  7/06    l/shoulder    GLOSSECTOMY  12/09/14    nasal endoscopy    GLOSSECTOMY  07/31/15    W/COBLATION,NASAL ENDOSCOPY    PACEMAKER PLACEMENT  2010    SKIN BIOPSY  2/13/12    right leg-Dr. Madison Fajardo         Family History   Problem Relation Age of Onset    Hypertension Mother     Stroke Mother  Coronary Art Dis Mother     High Cholesterol Mother        CareTeam (Including outside providers/suppliers regularly involved in providing care):   Patient Care Team:  Abran Jones MD as PCP - General (Family Medicine)  Abran Jones MD [facility]  BILLY Garcia - CNP (Nurse Practitioner)  Shannan Mares MD (Cardiac Electrophysiology)  Goble Lanes, MD (Cardiology)  Juan Manuel Garcia MD (Otolaryngology)  Shasha Vazquez MD (Cardiology)  Jed Fried MD as Consulting Physician (Pulmonology)    Wt Readings from Last 3 Encounters:   10/05/20 254 lb (115.2 kg)   10/09/19 285 lb 9.6 oz (129.5 kg)   10/01/19 287 lb (130.2 kg)     Vitals:    10/05/20 1405   BP: 104/62   Site: Left Upper Arm   Position: Sitting   Cuff Size: Large Adult   Pulse: 74   Temp: 96.2 °F (35.7 °C)   TempSrc: Infrared   SpO2: 96%   Weight: 254 lb (115.2 kg)   Height: 5' 10.5\" (1.791 m)     Body mass index is 35.93 kg/m². Based upon direct observation of the patient, evaluation of cognition reveals recent and remote memory intact. Patient's complete Health Risk Assessment and screening values have been reviewed and are found in Flowsheets. The following problems were reviewed today and where indicated follow up appointments were made and/or referrals ordered. Positive Risk Factor Screenings with Interventions:     General Health:  General  In general, how would you say your health is?: Fair  In the past 7 days, have you experienced any of the following?  New or Increased Pain, New or Increased Fatigue, Loneliness, Social Isolation, Stress or Anger?: None of These  Do you get the social and emotional support that you need?: Yes  Do you have a Living Will?: (!) No  General Health Risk Interventions:  · No Living Will: patient declined    Health Habits/Nutrition:  Health Habits/Nutrition  Do you exercise for at least 20 minutes 2-3 times per week?: (!) No  Have you lost any weight without trying in the past 3 months?: No  Do you eat fewer than 2 meals per day?: No  Have you seen a dentist within the past year?: (!) No  Body mass index is 35.93 kg/m².   Health Habits/Nutrition Interventions:  · Inadequate physical activity:  patient is not ready to increase his/her physical activity level at this time  · Dental exam overdue:  interested in dentures; worried about the price    Hearing/Vision:  No exam data present  Hearing/Vision  Do you or your family notice any trouble with your hearing?: (!) Yes  Do you have difficulty driving, watching TV, or doing any of your daily activities because of your eyesight?: (!) Yes  Have you had an eye exam within the past year?: Yes  Hearing/Vision Interventions:  · Hearing concerns:  interested in hearing aids but worries about the cost  · Vision concerns:  patient encouraged to make appointment with his/her eye specialist     Personalized Preventive Plan   Current Health Maintenance Status  Immunization History   Administered Date(s) Administered    Influenza A (N9B6-18) Vaccine IM 01/01/2010    Influenza Virus Vaccine 11/08/2007    Influenza Whole 10/01/2008    Influenza, High Dose (Fluzone 65 yrs and older) 09/20/2017, 10/02/2018    Influenza, MDCK, Preservative free 09/30/2015    Influenza, Triv, inactivated, subunit, adjuvanted, IM (Fluad 65 yrs and older) 10/09/2019    Pneumococcal Conjugate 13-valent (Ephriam Hefty) 09/20/2017    Pneumococcal Polysaccharide ([address]) 01/01/2008, 10/02/2018        Health Maintenance   Topic Date Due    Hepatitis A vaccine (1 of 2 - Risk 2-dose series) 08/24/1951    Hepatitis B vaccine (1 of 3 - Risk 3-dose series) 08/24/1971    DTaP/Tdap/Td vaccine (1 - Tdap) 08/24/1971    Shingles Vaccine (1 of 2) 08/24/2002    Diabetes screen  03/01/2018    TSH testing  11/15/2018    Annual Wellness Visit (AWV)  05/29/2019    Colon cancer screen colonoscopy  07/09/2020    Flu vaccine (1) 09/01/2020    Potassium [id number]

## 2022-06-06 ASSESSMENT — ENCOUNTER SYMPTOMS
GASTROINTESTINAL NEGATIVE: 1
RESPIRATORY NEGATIVE: 1

## 2022-06-10 ENCOUNTER — COMMUNITY OUTREACH (OUTPATIENT)
Dept: INTERNAL MEDICINE | Age: 72
End: 2022-06-10

## 2022-06-10 NOTE — PROGRESS NOTES
Patient's HM shows they are overdue for Colorectal Screening and AWV. [employer] and  files searched without success. No results to attach to order nor HM updated. Spoke with son, Michelle Mace. He will talk to patient about Cologuard and scheduling AWV.

## 2022-06-13 ENCOUNTER — TELEPHONE (OUTPATIENT)
Dept: GASTROENTEROLOGY | Age: 72
End: 2022-06-13

## 2022-06-27 ENCOUNTER — HOSPITAL ENCOUNTER (OUTPATIENT)
Dept: LAB | Age: 72
Discharge: HOME | End: 2022-06-27
Payer: MEDICARE

## 2022-06-27 LAB
INR BLD: 2.8
PROTHROMBIN TIME: 28.4 SEC (ref 12.3–14.9)

## 2022-06-27 PROCEDURE — 36415 COLL VENOUS BLD VENIPUNCTURE: CPT

## 2022-06-27 PROCEDURE — 85610 PROTHROMBIN TIME: CPT

## 2022-07-06 DIAGNOSIS — R39.15 URINARY URGENCY: ICD-10-CM

## 2022-07-06 RX ORDER — SOLIFENACIN SUCCINATE 5 MG/1
TABLET, FILM COATED ORAL
Qty: 90 TABLET | Refills: 0 | Status: SHIPPED | OUTPATIENT
Start: 2022-07-06 | End: 2022-10-04 | Stop reason: SDUPTHER

## 2022-07-06 NOTE — TELEPHONE ENCOUNTER
Comments:    Last Office Visit (last PCP visit):   3/9/2022    Next Visit Date:  No future appointments. **If hasn't been seen in over a year OR hasn't followed up according to last diabetes/ADHD visit, make appointment for patient before sending refill to provider.     Rx requested:  Requested Prescriptions     Pending Prescriptions Disp Refills    solifenacin (VESICARE) 5 MG tablet 90 tablet 0     Sig: Take 1 tablet by mouth once daily

## 2022-08-05 ENCOUNTER — TELEPHONE (OUTPATIENT)
Dept: GASTROENTEROLOGY | Age: 72
End: 2022-08-05

## 2022-08-06 DIAGNOSIS — K21.9 GASTROESOPHAGEAL REFLUX DISEASE: ICD-10-CM

## 2022-08-06 DIAGNOSIS — F51.02 ADJUSTMENT INSOMNIA: ICD-10-CM

## 2022-08-08 RX ORDER — OMEPRAZOLE 20 MG/1
CAPSULE, DELAYED RELEASE ORAL
Qty: 25 CAPSULE | Refills: 0 | Status: SHIPPED | OUTPATIENT
Start: 2022-08-08 | End: 2022-09-02

## 2022-08-08 RX ORDER — TRAZODONE HYDROCHLORIDE 50 MG/1
50 TABLET ORAL NIGHTLY PRN
Qty: 25 TABLET | Refills: 0 | Status: SHIPPED | OUTPATIENT
Start: 2022-08-08 | End: 2022-09-02

## 2022-08-08 NOTE — TELEPHONE ENCOUNTER
Comments:     Last Office Visit (last PCP visit):   3/9/2022    Next Visit Date:  No future appointments. **If hasn't been seen in over a year OR hasn't followed up according to last diabetes/ADHD visit, make appointment for patient before sending refill to provider. Rx requested:  Requested Prescriptions     Pending Prescriptions Disp Refills    traZODone (DESYREL) 50 MG tablet [Pharmacy Med Name: traZODone HCl 50 MG Oral Tablet] 25 tablet 0     Sig: TAKE 1 TABLET BY MOUTH NIGHTLY AS NEEDED FOR SLEEP OR  DEPRESSION.     omeprazole (PRILOSEC) 20 MG delayed release capsule [Pharmacy Med Name: Omeprazole 20 MG Oral Capsule Delayed Release] 25 capsule 0     Sig: TAKE 1 CAPSULE BY MOUTH IN THE MORNING BEFORE BREAKFAST

## 2022-09-02 DIAGNOSIS — K21.9 GASTROESOPHAGEAL REFLUX DISEASE: ICD-10-CM

## 2022-09-02 DIAGNOSIS — F51.02 ADJUSTMENT INSOMNIA: ICD-10-CM

## 2022-09-02 RX ORDER — OMEPRAZOLE 20 MG/1
CAPSULE, DELAYED RELEASE ORAL
Qty: 30 CAPSULE | Refills: 5 | Status: SHIPPED | OUTPATIENT
Start: 2022-09-02

## 2022-09-02 RX ORDER — TRAZODONE HYDROCHLORIDE 50 MG/1
50 TABLET ORAL NIGHTLY PRN
Qty: 30 TABLET | Refills: 5 | Status: SHIPPED | OUTPATIENT
Start: 2022-09-02

## 2022-09-02 NOTE — TELEPHONE ENCOUNTER
Comments:     Last Office Visit (last PCP visit):   3/9/2022    Next Visit Date:  No future appointments. **If hasn't been seen in over a year OR hasn't followed up according to last diabetes/ADHD visit, make appointment for patient before sending refill to provider.     Rx requested:  Requested Prescriptions     Pending Prescriptions Disp Refills    traZODone (DESYREL) 50 MG tablet [Pharmacy Med Name: traZODone HCl 50 MG Oral Tablet] 30 tablet 0     Sig: TAKE 1 TABLET BY MOUTH NIGHTLY AS NEEDED FOR SLEEP OR  DEPRESSION    omeprazole (PRILOSEC) 20 MG delayed release capsule [Pharmacy Med Name: Omeprazole 20 MG Oral Capsule Delayed Release] 30 capsule 0     Sig: TAKE 1 CAPSULE BY MOUTH IN THE MORNING BEFORE BREAKFAST

## 2022-10-04 DIAGNOSIS — R39.15 URINARY URGENCY: ICD-10-CM

## 2022-10-04 RX ORDER — SOLIFENACIN SUCCINATE 5 MG/1
TABLET, FILM COATED ORAL
Qty: 90 TABLET | Refills: 0 | OUTPATIENT
Start: 2022-10-04

## 2022-10-04 RX ORDER — SOLIFENACIN SUCCINATE 5 MG/1
TABLET, FILM COATED ORAL
Qty: 90 TABLET | Refills: 0 | Status: SHIPPED | OUTPATIENT
Start: 2022-10-04

## 2022-10-04 NOTE — TELEPHONE ENCOUNTER
Comments:     Last Office Visit (last PCP visit):   3/9/2022    Next Visit Date:  Future Appointments   Date Time Provider Rachell Mullins   10/31/2022  2:30 PM Mary Sanchez MD [address]       **If hasn't been seen in over a year OR hasn't followed up according to last diabetes/ADHD visit, make appointment for patient before sending refill to provider.     Rx requested:  Requested Prescriptions     Pending Prescriptions Disp Refills    solifenacin (VESICARE) 5 MG tablet 90 tablet 0     Sig: Take 1 tablet by mouth once daily

## 2022-10-05 ENCOUNTER — OFFICE VISIT (OUTPATIENT)
Dept: INTERNAL MEDICINE | Age: 72
End: 2022-10-05
Payer: MEDICARE

## 2022-10-05 VITALS
WEIGHT: 280 LBS | TEMPERATURE: 97 F | BODY MASS INDEX: 37.97 KG/M2 | DIASTOLIC BLOOD PRESSURE: 64 MMHG | SYSTOLIC BLOOD PRESSURE: 122 MMHG | HEART RATE: 71 BPM | OXYGEN SATURATION: 95 %

## 2022-10-05 DIAGNOSIS — J06.9 ACUTE URI: ICD-10-CM

## 2022-10-05 DIAGNOSIS — H66.92 LEFT ACUTE OTITIS MEDIA: Primary | ICD-10-CM

## 2022-10-05 LAB
Lab: NORMAL
PERFORMING INSTRUMENT: NORMAL
QC PASS/FAIL: NORMAL
SARS-COV-2, POC: NORMAL

## 2022-10-05 PROCEDURE — G8484 FLU IMMUNIZE NO ADMIN: HCPCS | Performed by: FAMILY MEDICINE

## 2022-10-05 PROCEDURE — 87426 SARSCOV CORONAVIRUS AG IA: CPT | Performed by: FAMILY MEDICINE

## 2022-10-05 PROCEDURE — G8427 DOCREV CUR MEDS BY ELIG CLIN: HCPCS | Performed by: FAMILY MEDICINE

## 2022-10-05 PROCEDURE — 1123F ACP DISCUSS/DSCN MKR DOCD: CPT | Performed by: FAMILY MEDICINE

## 2022-10-05 PROCEDURE — 3017F COLORECTAL CA SCREEN DOC REV: CPT | Performed by: FAMILY MEDICINE

## 2022-10-05 PROCEDURE — 99213 OFFICE O/P EST LOW 20 MIN: CPT | Performed by: FAMILY MEDICINE

## 2022-10-05 PROCEDURE — G8417 CALC BMI ABV UP PARAM F/U: HCPCS | Performed by: FAMILY MEDICINE

## 2022-10-05 PROCEDURE — 1036F TOBACCO NON-USER: CPT | Performed by: FAMILY MEDICINE

## 2022-10-05 RX ORDER — AMOXICILLIN AND CLAVULANATE POTASSIUM 875; 125 MG/1; MG/1
1 TABLET, FILM COATED ORAL 2 TIMES DAILY
Qty: 14 TABLET | Refills: 0 | Status: SHIPPED | OUTPATIENT
Start: 2022-10-05 | End: 2022-10-12

## 2022-10-05 RX ORDER — BENZONATATE 100 MG/1
100 CAPSULE ORAL 3 TIMES DAILY PRN
Qty: 21 CAPSULE | Refills: 0 | Status: SHIPPED | OUTPATIENT
Start: 2022-10-05 | End: 2022-10-12

## 2022-10-05 ASSESSMENT — ENCOUNTER SYMPTOMS
DIARRHEA: 0
RHINORRHEA: 0
CONSTIPATION: 0
SORE THROAT: 0
WHEEZING: 0
SHORTNESS OF BREATH: 0
ABDOMINAL PAIN: 0
COUGH: 1

## 2022-10-05 NOTE — PROGRESS NOTES
[address] PRIMARY CARE  [address] [place] [address]  Dept: [phone]  Dept Fax: [phone]: [phone]     Chief Complaint  Chief Complaint   Patient presents with    Otalgia     On and off since summer, left     Nasal Congestion     X 1 week, chest congestion       HPI:  81 y.o.male who presents for the following:      URI symptoms: x1 week with cough, congestion, pressure in L ear; no fevers; no n/v. No sick contacts; not taking anything for this    Review of Systems   Constitutional:  Negative for chills and fever. HENT:  Positive for congestion, ear discharge and ear pain. Negative for rhinorrhea and sore throat. Respiratory:  Positive for cough. Negative for shortness of breath and wheezing. Gastrointestinal:  Negative for abdominal pain, constipation and diarrhea. Endocrine: Negative for polydipsia and polyuria. Genitourinary:  Negative for dysuria, frequency and urgency. Neurological:  Negative for syncope, light-headedness, numbness and headaches. Psychiatric/Behavioral:  Negative for sleep disturbance. The patient is not nervous/anxious.       Past Medical History:   Diagnosis Date    Allergic rhinitis     Asthma     asthmatic bronchitis    Atrial fibrillation (HCC)     CAD (coronary artery disease)     Chronic back pain     COPD (chronic obstructive pulmonary disease) (HCC)     CVA (cerebral infarction)     Depression     Edema     GERD (gastroesophageal reflux disease)     ulcer    Glucose intolerance (impaired glucose tolerance) 11/06    Hypertension     ICD (implantable cardiac defibrillator) in place 2010    OMAR (obstructive sleep apnea)     UTI (urinary tract infection)      Past Surgical History:   Procedure Laterality Date    BACK SURGERY      ensed    FRACTURE SURGERY  7/06    l/shoulder    GLOSSECTOMY  12/09/14    nasal endoscopy    GLOSSECTOMY  07/31/15    [facility]    PACEMAKER PLACEMENT  2010    SKIN BIOPSY  2/13/12    right leg-Dr. José Miguel Cho     Social History     Socioeconomic History    Marital status:      Spouse name: Not on file    Number of children: Not on file    Years of education: Not on file    Highest education level: Not on file   Occupational History    Not on file   Tobacco Use    Smoking status: Never    Smokeless tobacco: Never   Substance and Sexual Activity    Alcohol use: No     Alcohol/week: 0.0 standard drinks    Drug use: No    Sexual activity: Not on file   Other Topics Concern    Not on file   Social History Narrative    Not on file     Social Determinants of Health     Financial Resource Strain: Low Risk     Difficulty of Paying Living Expenses: Not hard at all   Food Insecurity: No Food Insecurity    Worried About Running Out of Food in the Last Year: Never true    Ran Out of Food in the Last Year: Never true   Transportation Needs: Not on file   Physical Activity: Not on file   Stress: Not on file   Social Connections: Not on file   Intimate Partner Violence: Not on file   Housing Stability: Not on file     Family History   Problem Relation Age of Onset    Hypertension Mother     Stroke Mother     Coronary Art Dis Mother     High Cholesterol Mother       Allergies   Allergen Reactions    Cashew Nut Oil     Dust Mite Extract     Hctz     Other      cashews    Pollen Extract     Thiazide-Type Diuretics      itching     Current Outpatient Medications   Medication Sig Dispense Refill    amoxicillin-clavulanate (AUGMENTIN) 875-125 MG per tablet Take 1 tablet by mouth 2 times daily for 7 days 14 tablet 0    benzonatate (TESSALON PERLES) 100 MG capsule Take 1 capsule by mouth 3 times daily as needed for Cough 21 capsule 0    solifenacin (VESICARE) 5 MG tablet Take 1 tablet by mouth once daily 90 tablet 0    traZODone (DESYREL) 50 MG tablet TAKE 1 TABLET BY MOUTH NIGHTLY AS NEEDED FOR SLEEP OR  DEPRESSION 30 tablet 5    omeprazole (PRILOSEC) 20 MG delayed release capsule TAKE 1 CAPSULE BY MOUTH IN THE MORNING BEFORE BREAKFAST 30 capsule 5    fluticasone (FLONASE) 50 MCG/ACT nasal spray 2 sprays by Each Nostril route daily 16 g 0    levothyroxine (EUTHYROX) 75 MCG tablet Take 1 tablet by mouth once daily 30 tablet 5    bacitracin-polymyxin b (POLYSPORIN) 500-78250 UNIT/GM ophthalmic ointment USE ONE APPLICATION IN THE RIGHT EYE DAILY AT BEDTIME      ceFAZolin (ANCEF) 1 g injection       OXERVATE 0.002 % SOLN       cyclopentolate (CYCLOGYL) 1 % ophthalmic solution INSTILL 1 DROP INTO LEFT EYE ONCE DAILY      doxycycline hyclate (VIBRA-TABS) 100 MG tablet       sodium chloride, PF, 0.9 % injection       tobramycin (TOBREX) 0.3 % ophthalmic solution       escitalopram (LEXAPRO) 20 MG tablet Take 1 tablet by mouth daily 30 tablet 3    ascorbic acid (VITAMIN C) 1000 MG tablet Take 1,000 mg by mouth daily      fluorometholone (FML) 0.1 % ophthalmic suspension INSTILL 1 DROP INTO EACH EYE TWICE DAILY      morphine (MS CONTIN) 30 MG extended release tablet Take by mouth.       White Petrolatum-Mineral Oil ([facility] PETROL-MINERAL OIL-LANOLIN) 0.1-0.1 % OINT 4 times daily      warfarin (COUMADIN) 2.5 MG tablet       atropine 1 % ophthalmic solution INSTILL 1 DROP INTO LEFT EYE ONCE DAILY      ZIRGAN 0.15 % GEL ophthalmic gel INSTILL 1 DROP INTO LEFT EYE FIVE TIMES DAILY FOR 14 DAYS      moxifloxacin (VIGAMOX) 0.5 % ophthalmic solution INSTILL 1 DROP INTO LEFT EYE EVERY 2 HOURS      prednisoLONE acetate (PRED FORTE) 1 % ophthalmic suspension INSTILL 1 DROP INTO LEFT EYE TWICE DAILY      carvedilol (COREG) 12.5 MG tablet TAKE 1 TABLET BY MOUTH TWICE DAILY      cycloSPORINE (RESTASIS) 0.05 % ophthalmic emulsion 1 drop 2 times daily      loratadine (CLARITIN) 10 MG tablet Take 1 tablet by mouth daily 30 tablet 0    Handicap Placard MISC by Does not apply route Good through 8/26/2025 1 each 0    Carboxymethylcell-Glycerin PF 0.5-0.9 % SOLN 1 drop      zonisamide (ZONEGRAN) 50 MG capsule TAKE 2 CAPSULES BY MOUTH AT BEDTIME  1    warfarin (COUMADIN) 5 MG tablet Regimen per Dr. Jennifer Montilla in cardiology 1 tablet 0    erythromycin (ROMYCIN) 5 MG/GM ophthalmic ointment Use 1 application in both eyes daily at bedtime. albuterol (PROVENTIL) (2.5 MG/3ML) 0.083% nebulizer solution USE ONE VIAL IN NEBULIZER EVERY 4 HOURS AS NEEDED FOR WHEEZING AND FOR SHORTNESS OF BREATH 75 vial 5    budesonide-formoterol (SYMBICORT) 160-4.5 MCG/ACT AERO Inhale 2 puffs into the lungs 2 times daily LOT [id number] exp 10/2018 1 Inhaler 0    lactulose 20 GM/30ML SOLN Take  by mouth. Compression Stockings MISC Knee high compression stockings  Dx: LE edema  20-30 mm pressure 2 each 0    carvedilol (COREG) 25 MG tablet Take 1 tablet by mouth 2 times daily (with meals). (Patient taking differently: Take 12.5 mg by mouth 2 times daily (with meals)) 180 tablet 1    sotalol (BETAPACE) 80 MG tablet Take 80 mg by mouth 2 times daily. Take 1/2 tablet twice daily      WARFARIN SODIUM by Does not apply route. clonazePAM (KLONOPIN) 0.5 MG tablet Take 1 tablet by mouth 2 times daily as needed for Anxiety for up to 30 days. 30 tablet 0     No current facility-administered medications for this visit. Vitals:    10/05/22 1035   BP: 122/64   Site: Right Upper Arm   Position: Sitting   Cuff Size: Medium Adult   Pulse: 71   Temp: 97 °F (36.1 °C)   TempSrc: Infrared   SpO2: 95%   Weight: 280 lb (127 kg)       Physical exam:  Physical Exam  Vitals reviewed. Constitutional:       General: He is not in acute distress. Appearance: He is well-developed. HENT:      Head: Normocephalic and atraumatic. Right Ear: Tympanic membrane, ear canal and external ear normal.      Left Ear: Ear canal and external ear normal. Drainage present. Tympanic membrane is erythematous and bulging. Mouth/Throat:      Pharynx: No oropharyngeal exudate. Neck:      Thyroid: No thyromegaly.    Cardiovascular:      Rate and Rhythm: Normal rate and regular rhythm. Heart sounds: Normal heart sounds. No murmur heard. Pulmonary:      Effort: Pulmonary effort is normal. No respiratory distress. Breath sounds: Normal breath sounds. No wheezing. Abdominal:      General: There is no distension. Palpations: Abdomen is soft. Tenderness: There is no abdominal tenderness. There is no guarding or rebound. Musculoskeletal:      Cervical back: Normal range of motion. Lymphadenopathy:      Cervical: No cervical adenopathy. Skin:     General: Skin is warm and dry. Neurological:      Mental Status: He is alert and oriented to person, place, and time. Psychiatric:         Behavior: Behavior normal.       Assessment/Plan:  81 y.o. male here mainly for L AOM:  - Augmentin for L AOM; COVID test neg     Diagnosis Orders   1. Left acute otitis media  amoxicillin-clavulanate (AUGMENTIN) 875-125 MG per tablet      2. Acute URI  POCT COVID-19, Antigen    benzonatate (TESSALON PERLES) 100 MG capsule           Return if symptoms worsen or fail to improve.     Tavares Lopez MD

## 2022-10-31 ENCOUNTER — OFFICE VISIT (OUTPATIENT)
Dept: PULMONOLOGY | Age: 72
End: 2022-10-31
Payer: MEDICARE

## 2022-10-31 VITALS
HEIGHT: 72 IN | DIASTOLIC BLOOD PRESSURE: 64 MMHG | BODY MASS INDEX: 37.93 KG/M2 | OXYGEN SATURATION: 97 % | SYSTOLIC BLOOD PRESSURE: 110 MMHG | TEMPERATURE: 98.9 F | HEART RATE: 72 BPM | WEIGHT: 280 LBS

## 2022-10-31 DIAGNOSIS — G47.33 OSA (OBSTRUCTIVE SLEEP APNEA): ICD-10-CM

## 2022-10-31 DIAGNOSIS — I51.89 DIASTOLIC DYSFUNCTION: ICD-10-CM

## 2022-10-31 DIAGNOSIS — E66.09 CLASS 2 OBESITY DUE TO EXCESS CALORIES WITHOUT SERIOUS COMORBIDITY WITH BODY MASS INDEX (BMI) OF 37.0 TO 37.9 IN ADULT: ICD-10-CM

## 2022-10-31 DIAGNOSIS — I27.20 PULMONARY HYPERTENSION (HCC): ICD-10-CM

## 2022-10-31 DIAGNOSIS — R06.02 SOB (SHORTNESS OF BREATH): Primary | ICD-10-CM

## 2022-10-31 PROCEDURE — G8484 FLU IMMUNIZE NO ADMIN: HCPCS | Performed by: INTERNAL MEDICINE

## 2022-10-31 PROCEDURE — 99204 OFFICE O/P NEW MOD 45 MIN: CPT | Performed by: INTERNAL MEDICINE

## 2022-10-31 PROCEDURE — G8417 CALC BMI ABV UP PARAM F/U: HCPCS | Performed by: INTERNAL MEDICINE

## 2022-10-31 PROCEDURE — 3074F SYST BP LT 130 MM HG: CPT | Performed by: INTERNAL MEDICINE

## 2022-10-31 PROCEDURE — 1123F ACP DISCUSS/DSCN MKR DOCD: CPT | Performed by: INTERNAL MEDICINE

## 2022-10-31 PROCEDURE — 1036F TOBACCO NON-USER: CPT | Performed by: INTERNAL MEDICINE

## 2022-10-31 PROCEDURE — G8427 DOCREV CUR MEDS BY ELIG CLIN: HCPCS | Performed by: INTERNAL MEDICINE

## 2022-10-31 PROCEDURE — 3078F DIAST BP <80 MM HG: CPT | Performed by: INTERNAL MEDICINE

## 2022-10-31 PROCEDURE — 3017F COLORECTAL CA SCREEN DOC REV: CPT | Performed by: INTERNAL MEDICINE

## 2022-10-31 NOTE — PROGRESS NOTES
Subjective: Ascencion Kay is a 81 y.o. male who complains today of:     Chief Complaint   Patient presents with    New Patient     Pt states a few months ago he started to notice shortness of breath when walking. He states when walking the oxygen will drop the the 80s & heart rate will rise to 95. HPI  Patient presents for mass of breath      Patient presents for evaluation of shortness of breath, mainly exertional, no chest pain, no coughing, no lower extremity edema, no fever no chills, his weight is stable, no nasal congestion or postnasal drip and no heartburn. Symptoms started 2 to 3 months ago, progressively getting worse. He has history of smoking quit in the 90S , smoked for 20 years almost 1 pack/day, no history of asthma, family history positive for asbestosis in patient's father. Patient has heart disease including A. fib, and he follows with [facility] cardiology.   He has a history of sleep apnea, cannot tolerate CPAP, currently uses O2 while asleep            Allergies:  Cashew nut oil, Dust mite extract, Hctz, Other, Pollen extract, and Thiazide-type diuretics  Past Medical History:   Diagnosis Date    Allergic rhinitis     Asthma     asthmatic bronchitis    Atrial fibrillation (HCC)     CAD (coronary artery disease)     Chronic back pain     COPD (chronic obstructive pulmonary disease) (HCC)     CVA (cerebral infarction)     Depression     Edema     GERD (gastroesophageal reflux disease)     ulcer    Glucose intolerance (impaired glucose tolerance) 11/06    Hypertension     ICD (implantable cardiac defibrillator) in place 2010    OMAR (obstructive sleep apnea)     UTI (urinary tract infection)      Past Surgical History:   Procedure Laterality Date    BACK SURGERY      ensed    FRACTURE SURGERY  7/06    l/shoulder    GLOSSECTOMY  12/09/14    nasal endoscopy    GLOSSECTOMY  07/31/15    W/COBLATION,[facility] ENDOSCOPY    PACEMAKER PLACEMENT  2010    SKIN BIOPSY  2/13/12    right leg- [facility]     Family History   Problem Relation Age of Onset    Hypertension Mother     Stroke Mother     Coronary Art Dis Mother     High Cholesterol Mother      Social History     Socioeconomic History    Marital status:      Spouse name: Not on file    Number of children: Not on file    Years of education: Not on file    Highest education level: Not on file   Occupational History    Not on file   Tobacco Use    Smoking status: Never    Smokeless tobacco: Never   Substance and Sexual Activity    Alcohol use: No     Alcohol/week: 0.0 standard drinks    Drug use: No    Sexual activity: Not on file   Other Topics Concern    Not on file   Social History Narrative    Not on file     Social Determinants of Health     Financial Resource Strain: Low Risk     Difficulty of Paying Living Expenses: Not hard at all   Food Insecurity: No Food Insecurity    Worried About Running Out of Food in the Last Year: Never true    Ran Out of Food in the Last Year: Never true   Transportation Needs: Not on file   Physical Activity: Not on file   Stress: Not on file   Social Connections: Not on file   Intimate Partner Violence: Not on file   Housing Stability: Not on file         Review of Systems      ROS: 10 organs review of system is done including general, psychological, ENT, hematological, endocrine, respiratory, cardiovascular, gastrointestinal,musculoskeletal, neurological,  allergy and Immunology is done and is otherwise negative.     Current Outpatient Medications   Medication Sig Dispense Refill    solifenacin (VESICARE) 5 MG tablet Take 1 tablet by mouth once daily 90 tablet 0    traZODone (DESYREL) 50 MG tablet TAKE 1 TABLET BY MOUTH NIGHTLY AS NEEDED FOR SLEEP OR  DEPRESSION 30 tablet 5    omeprazole (PRILOSEC) 20 MG delayed release capsule TAKE 1 CAPSULE BY MOUTH IN THE MORNING BEFORE BREAKFAST 30 capsule 5    fluticasone (FLONASE) 50 MCG/ACT nasal spray 2 sprays by Each Nostril route daily 16 g 0    levothyroxine (EUTHYROX) 75 MCG tablet Take 1 tablet by mouth once daily 30 tablet 5    bacitracin-polymyxin b (POLYSPORIN) 500-34731 UNIT/GM ophthalmic ointment USE ONE APPLICATION IN THE RIGHT EYE DAILY AT BEDTIME      ceFAZolin (ANCEF) 1 g injection       OXERVATE 0.002 % SOLN       cyclopentolate (CYCLOGYL) 1 % ophthalmic solution INSTILL 1 DROP INTO LEFT EYE ONCE DAILY      doxycycline hyclate (VIBRA-TABS) 100 MG tablet       sodium chloride, PF, 0.9 % injection       tobramycin (TOBREX) 0.3 % ophthalmic solution       escitalopram (LEXAPRO) 20 MG tablet Take 1 tablet by mouth daily 30 tablet 3    ascorbic acid (VITAMIN C) 1000 MG tablet Take 1,000 mg by mouth daily      fluorometholone (FML) 0.1 % ophthalmic suspension INSTILL 1 DROP INTO EACH EYE TWICE DAILY      morphine (MS CONTIN) 30 MG extended release tablet Take by mouth.       White Petrolatum-Mineral Oil ([facility] PETROL-MINERAL OIL-LANOLIN) 0.1-0.1 % OINT 4 times daily      warfarin (COUMADIN) 2.5 MG tablet       atropine 1 % ophthalmic solution INSTILL 1 DROP INTO LEFT EYE ONCE DAILY      ZIRGAN 0.15 % GEL ophthalmic gel INSTILL 1 DROP INTO LEFT EYE FIVE TIMES DAILY FOR 14 DAYS      moxifloxacin (VIGAMOX) 0.5 % ophthalmic solution INSTILL 1 DROP INTO LEFT EYE EVERY 2 HOURS      prednisoLONE acetate (PRED FORTE) 1 % ophthalmic suspension INSTILL 1 DROP INTO LEFT EYE TWICE DAILY      carvedilol (COREG) 12.5 MG tablet TAKE 1 TABLET BY MOUTH TWICE DAILY      cycloSPORINE (RESTASIS) 0.05 % ophthalmic emulsion 1 drop 2 times daily      loratadine (CLARITIN) 10 MG tablet Take 1 tablet by mouth daily 30 tablet 0    Handicap Placard MISC by Does not apply route Good through 8/26/2025 1 each 0    Carboxymethylcell-Glycerin PF 0.5-0.9 % SOLN 1 drop      zonisamide (ZONEGRAN) 50 MG capsule TAKE 2 CAPSULES BY MOUTH AT BEDTIME  1    warfarin (COUMADIN) 5 MG tablet Regimen per Dr. Marino Meng in cardiology 1 tablet 0    erythromycin (ROMYCIN) 5 MG/GM ophthalmic ointment Use 1 application in both eyes daily at bedtime. albuterol (PROVENTIL) (2.5 MG/3ML) 0.083% nebulizer solution USE ONE VIAL IN NEBULIZER EVERY 4 HOURS AS NEEDED FOR WHEEZING AND FOR SHORTNESS OF BREATH 75 vial 5    budesonide-formoterol (SYMBICORT) 160-4.5 MCG/ACT AERO Inhale 2 puffs into the lungs 2 times daily LOT [id number] exp 10/2018 1 Inhaler 0    lactulose 20 GM/30ML SOLN Take  by mouth. Compression Stockings MISC Knee high compression stockings  Dx: LE edema  20-30 mm pressure 2 each 0    carvedilol (COREG) 25 MG tablet Take 1 tablet by mouth 2 times daily (with meals). (Patient taking differently: Take 12.5 mg by mouth 2 times daily (with meals)) 180 tablet 1    sotalol (BETAPACE) 80 MG tablet Take 80 mg by mouth 2 times daily. Take 1/2 tablet twice daily      WARFARIN SODIUM by Does not apply route. clonazePAM (KLONOPIN) 0.5 MG tablet Take 1 tablet by mouth 2 times daily as needed for Anxiety for up to 30 days. 30 tablet 0     No current facility-administered medications for this visit. Objective:     Vitals:    10/31/22 1455   BP: 110/64   Pulse: 72   Temp: 98.9 °F (37.2 °C)   TempSrc: Temporal   SpO2: 97%   Weight: 280 lb (127 kg)   Height: 6' (1.829 m)         Physical Exam  Constitutional:       Appearance: He is well-developed. HENT:      Head: Normocephalic and atraumatic. Eyes:      General:         Left eye: No discharge. Conjunctiva/sclera: Conjunctivae normal.      Pupils: Pupils are equal, round, and reactive to light. Neck:      Vascular: No JVD. Cardiovascular:      Rate and Rhythm: Normal rate and regular rhythm. Heart sounds: Normal heart sounds. No murmur heard. No friction rub. No gallop. Pulmonary:      Effort: Pulmonary effort is normal. No respiratory distress. Breath sounds: Normal breath sounds. No wheezing or rales. Chest:      Chest wall: No tenderness. Abdominal:      General: Bowel sounds are normal.      Palpations: Abdomen is soft. Musculoskeletal:         General: No tenderness or deformity. Cervical back: Normal range of motion and neck supple. Right lower leg: No edema. Left lower leg: No edema. Skin:     General: Skin is warm and dry. Neurological:      Mental Status: He is alert and oriented to person, place, and time. Psychiatric:         Judgment: Judgment normal.       Imaging studies reviewed and interpreted by me CT chest 2017, no mass or nodule, possible portal hypertension, small hiatal hernia  Lab results reviewed in chart  PFT 2016 FEV1 96%, FEV1/FVC 0.81  ECHO: 7812, EF 92%, diastolic dysfunction, RVSP 33    Assessment and Plan       Diagnosis Orders   1. SOB (shortness of breath)  XR CHEST (2 VW)    Full PFT Study With Bronchodilator      2. OMAR (obstructive sleep apnea)  Sleep Study with PAP Titration      3. Class 2 obesity due to excess calories without serious comorbidity with body mass index (BMI) of 37.0 to 37.9 in adult        4. Diastolic dysfunction        5. Pulmonary hypertension (HCC)          Shortness of breath, likely multifactorial secondary to obesity, untreated OMAR, and diastolic dysfunction with pulmonary hypertension. Will obtain PFT rule out COPD, will obtain chest x-ray. OMAR, noncompliant, uses O2, will repeat sleep study and reevaluate after that. Discussed with the patient risk of stroke, heart failure pulmonary complication and other comorbidities in patient with untreated moderate to severe sleep apnea.   Weight loss is recommended  Continue cardioprotective medications and avoid volume overload  Pulmonary hypertension is group 2 and 3      Orders Placed This Encounter   Procedures    XR CHEST (2 VW)     Standing Status:   Future     Standing Expiration Date:   10/31/2023    Full PFT Study With Bronchodilator     Standing Status:   Future     Standing Expiration Date:   4/30/2024    Sleep Study with PAP Titration     Standing Status:   Future     Standing Expiration Date: 4/30/2024     Order Specific Question:   Sleep Study Titration Type     Answer:   Split Night Study (Baseline followed by PAP Titration)     Order Specific Question:   Location For Sleep Study     Answer:   [facility]     Order Specific Question:   Select Sleep Lab Location     Answer:   [facility]     No orders of the defined types were placed in this encounter. Discussed with patient the importance of exercise and weight control and  overall health and well-being. Reviewed with the patient: current clinical status, medications, activities and diet. Side effects, adverse effects of the medication prescribed today, as well as treatment plan and result expectations have been discussed with the patient who expresses understanding and desires to proceed. Return in about 6 weeks (around 12/12/2022).       Sarah Loe MD

## 2022-11-07 DIAGNOSIS — E03.9 ACQUIRED HYPOTHYROIDISM: ICD-10-CM

## 2022-11-07 RX ORDER — LEVOTHYROXINE SODIUM 0.07 MG/1
TABLET ORAL
Qty: 30 TABLET | Refills: 0 | Status: SHIPPED | OUTPATIENT
Start: 2022-11-07

## 2022-11-07 NOTE — TELEPHONE ENCOUNTER
Comments:     Last Office Visit (last PCP visit):   10/5/2022    Next Visit Date:  Future Appointments   Date Time Provider Rachell Susanna   12/19/2022  3:00 PM Narcisa Pritchard MD [address]       **If hasn't been seen in over a year OR hasn't followed up according to last diabetes/ADHD visit, make appointment for patient before sending refill to provider.     Rx requested:  Requested Prescriptions     Pending Prescriptions Disp Refills    levothyroxine (SYNTHROID) 75 MCG tablet [Pharmacy Med Name: Levothyroxine Sodium 75 MCG Oral Tablet] 30 tablet 0     Sig: Take 1 tablet by mouth once daily

## 2022-12-05 DIAGNOSIS — E03.9 ACQUIRED HYPOTHYROIDISM: ICD-10-CM

## 2022-12-05 RX ORDER — LEVOTHYROXINE SODIUM 0.07 MG/1
TABLET ORAL
Qty: 30 TABLET | Refills: 0 | OUTPATIENT
Start: 2022-12-05

## 2022-12-05 RX ORDER — VARENICLINE 0.03 MG/.05ML
SPRAY NASAL
COMMUNITY
Start: 2022-10-31

## 2022-12-05 NOTE — TELEPHONE ENCOUNTER
Spoke with the patients son letting him know the patient is due for his AWV and TSH. States he will talk with the patient and call back in to schedule an appointment.

## 2022-12-14 ENCOUNTER — OFFICE VISIT (OUTPATIENT)
Dept: INTERNAL MEDICINE | Age: 72
End: 2022-12-14

## 2022-12-14 VITALS
SYSTOLIC BLOOD PRESSURE: 110 MMHG | HEIGHT: 71 IN | TEMPERATURE: 97.4 F | DIASTOLIC BLOOD PRESSURE: 68 MMHG | OXYGEN SATURATION: 94 % | HEART RATE: 81 BPM | WEIGHT: 277.4 LBS | BODY MASS INDEX: 38.84 KG/M2

## 2022-12-14 DIAGNOSIS — E03.9 ACQUIRED HYPOTHYROIDISM: ICD-10-CM

## 2022-12-14 DIAGNOSIS — Z23 ENCOUNTER FOR IMMUNIZATION: ICD-10-CM

## 2022-12-14 DIAGNOSIS — I48.91 ATRIAL FIBRILLATION, UNSPECIFIED TYPE (HCC): ICD-10-CM

## 2022-12-14 DIAGNOSIS — Z00.00 MEDICARE ANNUAL WELLNESS VISIT, SUBSEQUENT: ICD-10-CM

## 2022-12-14 DIAGNOSIS — Z00.00 MEDICARE ANNUAL WELLNESS VISIT, SUBSEQUENT: Primary | ICD-10-CM

## 2022-12-14 LAB
ALBUMIN SERPL-MCNC: 3.4 G/DL (ref 3.5–4.6)
ALP BLD-CCNC: 111 U/L (ref 35–104)
ALT SERPL-CCNC: 11 U/L (ref 0–41)
ANION GAP SERPL CALCULATED.3IONS-SCNC: 9 MEQ/L (ref 9–15)
AST SERPL-CCNC: 18 U/L (ref 0–40)
BILIRUB SERPL-MCNC: 0.7 MG/DL (ref 0.2–0.7)
BUN BLDV-MCNC: 14 MG/DL (ref 8–23)
CALCIUM SERPL-MCNC: 8.9 MG/DL (ref 8.5–9.9)
CHLORIDE BLD-SCNC: 100 MEQ/L (ref 95–107)
CHOLESTEROL, FASTING: 137 MG/DL (ref 0–199)
CO2: 30 MEQ/L (ref 20–31)
CREAT SERPL-MCNC: 0.89 MG/DL (ref 0.7–1.2)
GFR SERPL CREATININE-BSD FRML MDRD: >60 ML/MIN/{1.73_M2}
GLOBULIN: 3.1 G/DL (ref 2.3–3.5)
GLUCOSE FASTING: 109 MG/DL (ref 70–99)
HDLC SERPL-MCNC: 26 MG/DL (ref 40–59)
INR BLD: 4.4
LDL CHOLESTEROL CALCULATED: 89 MG/DL (ref 0–129)
POTASSIUM SERPL-SCNC: 4.3 MEQ/L (ref 3.4–4.9)
PROTHROMBIN TIME: 42 SEC (ref 12.3–14.9)
SODIUM BLD-SCNC: 139 MEQ/L (ref 135–144)
TOTAL PROTEIN: 6.5 G/DL (ref 6.3–8)
TRIGLYCERIDE, FASTING: 109 MG/DL (ref 0–150)
TSH SERPL DL<=0.05 MIU/L-ACNC: 4.32 UIU/ML (ref 0.44–3.86)

## 2022-12-14 RX ORDER — LEVOTHYROXINE SODIUM 0.07 MG/1
75 TABLET ORAL DAILY
Qty: 30 TABLET | Refills: 0 | Status: SHIPPED | OUTPATIENT
Start: 2022-12-14 | End: 2022-12-14 | Stop reason: SDUPTHER

## 2022-12-14 RX ORDER — LEVOTHYROXINE SODIUM 0.07 MG/1
75 TABLET ORAL DAILY
Qty: 30 TABLET | Refills: 11 | Status: SHIPPED | OUTPATIENT
Start: 2022-12-14 | End: 2022-12-15

## 2022-12-14 ASSESSMENT — PATIENT HEALTH QUESTIONNAIRE - PHQ9
SUM OF ALL RESPONSES TO PHQ QUESTIONS 1-9: 4
1. LITTLE INTEREST OR PLEASURE IN DOING THINGS: 2
2. FEELING DOWN, DEPRESSED OR HOPELESS: 2
SUM OF ALL RESPONSES TO PHQ QUESTIONS 1-9: 4
3. TROUBLE FALLING OR STAYING ASLEEP: 0
10. IF YOU CHECKED OFF ANY PROBLEMS, HOW DIFFICULT HAVE THESE PROBLEMS MADE IT FOR YOU TO DO YOUR WORK, TAKE CARE OF THINGS AT HOME, OR GET ALONG WITH OTHER PEOPLE: 0
5. POOR APPETITE OR OVEREATING: 0
SUM OF ALL RESPONSES TO PHQ9 QUESTIONS 1 & 2: 4
SUM OF ALL RESPONSES TO PHQ QUESTIONS 1-9: 4
6. FEELING BAD ABOUT YOURSELF - OR THAT YOU ARE A FAILURE OR HAVE LET YOURSELF OR YOUR FAMILY DOWN: 0
8. MOVING OR SPEAKING SO SLOWLY THAT OTHER PEOPLE COULD HAVE NOTICED. OR THE OPPOSITE, BEING SO FIGETY OR RESTLESS THAT YOU HAVE BEEN MOVING AROUND A LOT MORE THAN USUAL: 0
9. THOUGHTS THAT YOU WOULD BE BETTER OFF DEAD, OR OF HURTING YOURSELF: 0
7. TROUBLE CONCENTRATING ON THINGS, SUCH AS READING THE NEWSPAPER OR WATCHING TELEVISION: 0
4. FEELING TIRED OR HAVING LITTLE ENERGY: 0
SUM OF ALL RESPONSES TO PHQ QUESTIONS 1-9: 4

## 2022-12-14 ASSESSMENT — LIFESTYLE VARIABLES
HOW MANY STANDARD DRINKS CONTAINING ALCOHOL DO YOU HAVE ON A TYPICAL DAY: 1 OR 2
HOW OFTEN DO YOU HAVE A DRINK CONTAINING ALCOHOL: NEVER

## 2022-12-14 NOTE — PROGRESS NOTES
Medicare Annual Wellness Visit    Susan Fontaine is here for Medicare AWV (Not fasting for labs today, but is asking for TSH to be drawn for medication refills. )    Assessment & Plan   Medicare annual wellness visit, subsequent  Acquired hypothyroidism  The following orders have not been finalized:  -     levothyroxine (SYNTHROID) 75 MCG tablet      Recommendations for Preventive Services Due: see orders and patient instructions/AVS.  Recommended screening schedule for the next 5-10 years is provided to the patient in written form: see Patient Instructions/AVS.     Return for Medicare Annual Wellness Visit in 1 year. Subjective       Patient's complete Health Risk Assessment and screening values have been reviewed and are found in Flowsheets. The following problems were reviewed today and where indicated follow up appointments were made and/or referrals ordered. Positive Risk Factor Screenings with Interventions:                Opioid Risk: (Low risk score <55) Opioid risk score: 28    Patient is low risk for opioid use disorder or overdose.   Last PDMP Nirav as Reviewed:  Review User Review Instant Review Result                  General HRA Questions:  Select all that apply: (!) Social Isolation, Anger    Social Isolation Interventions:  See AVS for additional education material  See A/P for plan and any pertinent orders    Anger Interventions:  See AVS for additional education material  See A/P for plan and any pertinent orders       Weight and Activity:  Physical Activity: Inactive    Days of Exercise per Week: 0 days    Minutes of Exercise per Session: 0 min     On average, how many days per week do you engage in moderate to strenuous exercise (like a brisk walk)?: 0 days  Have you lost any weight without trying in the past 3 months?: (!) Yes  Body mass index: (!) 38.69      Inactivity Interventions:  See AVS for additional education material  See A/P for plan and any pertinent orders    Unintentional Weight Loss Interventions:  See AVS for additional education material  See A/P for plan and any pertinent orders  Obesity Interventions:  See AVS for additional education material  See A/P for plan and any pertinent orders          Dentist Screen:  Have you seen the dentist within the past year?: (!) No    Intervention:  See AVS for additional education material  See A/P for any pertinent orders    Hearing Screen:  Do you or your family notice any trouble with your hearing that hasn't been managed with hearing aids?: (!) Yes    Interventions:  See AVS for additional education material  See A/P for any pertinent orders     Safety:  Do you always fasten your seatbelt when you are in a car?: (!) No  Interventions:  See AVS for additional education material  See A/P for plan and any pertinent orders     Advanced Directives:  Do you have a Living Will?: (!) No    Intervention:                         Objective   Vitals:    12/14/22 1323   BP: 110/68   Site: Right Upper Arm   Position: Sitting   Cuff Size: Medium Adult   Pulse: 81   Temp: 97.4 °F (36.3 °C)   TempSrc: Infrared   SpO2: 94%   Weight: 277 lb 6.4 oz (125.8 kg)   Height: 5' 11\" (1.803 m)      Body mass index is 38.69 kg/m². Allergies   Allergen Reactions    Cashew Nut Oil     Dust Mite Extract     Hctz     Other      cashews    Pollen Extract     Thiazide-Type Diuretics      itching     Prior to Visit Medications    Medication Sig Taking?  Authorizing Provider   TYRVAYA 0.03 MG/ACT SOLN USE 1 SPRAY IN EACH NOSTRIL EVERY 12 HOURS Yes Historical Provider, MD   levothyroxine (SYNTHROID) 75 MCG tablet Take 1 tablet by mouth once daily Yes Tony Cosby MD   solifenacin (VESICARE) 5 MG tablet Take 1 tablet by mouth once daily Yes Tony Cosby MD   traZODone (DESYREL) 50 MG tablet TAKE 1 TABLET BY MOUTH NIGHTLY AS NEEDED FOR SLEEP OR  DEPRESSION Yes Tony Cosby MD   omeprazole (PRILOSEC) 20 MG delayed release capsule TAKE 1 Harry Appiah Yes Alvina Carrera MD   fluticasone (FLONASE) 50 MCG/ACT nasal spray 2 sprays by Each Nostril route daily Yes Kathyrn Rubinstein, PA   bacitracin-polymyxin b (POLYSPORIN) 500-95210 UNIT/GM ophthalmic ointment USE ONE APPLICATION IN THE RIGHT EYE DAILY AT BEDTIME Yes Historical Provider, MD   ceFAZolin (ANCEF) 1 g injection  Yes Historical Provider, MD   OXERVATE 0.002 % SOLN  Yes Historical Provider, MD   cyclopentolate (CYCLOGYL) 1 % ophthalmic solution INSTILL 1 DROP INTO LEFT EYE ONCE DAILY Yes Historical Provider, MD   doxycycline hyclate (VIBRA-TABS) 100 MG tablet  Yes Historical Provider, MD   sodium chloride, PF, 0.9 % injection  Yes Historical Provider, MD   tobramycin (TOBREX) 0.3 % ophthalmic solution  Yes Historical Provider, MD   escitalopram (LEXAPRO) 20 MG tablet Take 1 tablet by mouth daily Yes Alvina Carrera MD   ascorbic acid (VITAMIN C) 1000 MG tablet Take 1,000 mg by mouth daily Yes Historical Provider, MD   fluorometholone (FML) 0.1 % ophthalmic suspension INSTILL 1 DROP INTO EACH EYE TWICE DAILY Yes Historical Provider, MD   morphine (MS CONTIN) 30 MG extended release tablet Take by mouth.  Yes Historical Provider, MD   White Petrolatum-Mineral Oil ([facility] PETROL-MINERAL OIL-LANOLIN) 0.1-0.1 % OINT 4 times daily Yes Historical Provider, MD   warfarin (COUMADIN) 2.5 MG tablet  Yes Historical Provider, MD   atropine 1 % ophthalmic solution INSTILL 1 DROP INTO LEFT EYE ONCE DAILY Yes Historical Provider, MD   Cindy Nett 0.15 % GEL ophthalmic gel INSTILL 1 DROP INTO LEFT EYE FIVE TIMES DAILY FOR 14 DAYS Yes Historical Provider, MD   moxifloxacin (VIGAMOX) 0.5 % ophthalmic solution INSTILL 1 DROP INTO LEFT EYE EVERY 2 HOURS Yes Historical Provider, MD   prednisoLONE acetate (PRED FORTE) 1 % ophthalmic suspension INSTILL 1 DROP INTO LEFT EYE TWICE DAILY Yes Historical Provider, MD   carvedilol (COREG) 12.5 MG tablet TAKE 1 TABLET BY MOUTH TWICE DAILY Yes Historical Provider, MD   cycloSPORINE (RESTASIS) 0.05 % ophthalmic emulsion 1 drop 2 times daily Yes Historical Provider, MD   loratadine (CLARITIN) 10 MG tablet Take 1 tablet by mouth daily Yes Nasim Alee BILLY   Handicap Placard MISC by Does not apply route Good through 8/26/2025 Yes Ned Maurice MD   clonazePAM (KLONOPIN) 0.5 MG tablet Take 1 tablet by mouth 2 times daily as needed for Anxiety for up to 30 days. Yes Ned Maurice MD   Carboxymethylcell-Glycerin PF 0.5-0.9 % SOLN 1 drop Yes Historical Provider, MD   zonisamide (ZONEGRAN) 50 MG capsule TAKE 2 CAPSULES BY MOUTH AT BEDTIME Yes Historical Provider, MD   warfarin (COUMADIN) 5 MG tablet Regimen per Dr. Tana Delgado in cardiology Yes Ned Maurice MD   erythromycin [facility]) 5 MG/GM ophthalmic ointment Use 1 application in both eyes daily at bedtime. Yes Historical Provider, MD   albuterol (PROVENTIL) (2.5 MG/3ML) 0.083% nebulizer solution USE ONE VIAL IN NEBULIZER EVERY 4 HOURS AS NEEDED FOR WHEEZING AND FOR SHORTNESS OF BREATH Yes Ned Maurice MD   budesonide-formoterol [facility]) 160-4.5 MCG/ACT AERO Inhale 2 puffs into the lungs 2 times daily LOT [id number] exp 10/2018 Yes Ned Maurice MD   lactulose 20 GM/30ML SOLN Take  by mouth. Yes Historical Provider, MD   Compression Stockings MISC Knee high compression stockings  Dx: LE edema  20-30 mm pressure Yes Milton Andres DO   carvedilol (COREG) 25 MG tablet Take 1 tablet by mouth 2 times daily (with meals). Patient taking differently: Take 12.5 mg by mouth 2 times daily (with meals) Yes Milton Andres DO   sotalol (BETAPACE) 80 MG tablet Take 80 mg by mouth 2 times daily. Take 1/2 tablet twice daily Yes Historical Provider, MD   WARFARIN SODIUM by Does not apply route.    Yes Historical Provider, MD [place] (Including outside providers/suppliers regularly involved in providing care):   Patient Care Team:  Ned Maurice MD as PCP - General (Family Medicine)  Carolyn Casillas Josephine Best MD as P[facility] Empaneled Provider  BILLY Burns - CNP (Nurse Practitioner)  Jenna Sharif MD (Cardiac Electrophysiology)  Ryan Cisse MD (Cardiology)  Bennett Christensen MD (Otolaryngology)  Gayla Zelaya MD (Cardiology)  Anatoliy Dixon MD as Consulting Physician (Pulmonology)     Reviewed and updated this visit:  Tobacco  Allergies  Meds  Problems  Med Hx  Surg Hx  Soc Hx  Fam Hx

## 2022-12-14 NOTE — PATIENT INSTRUCTIONS
Learning About Emotional Support  When do you need emotional support? You might find getting support from others helpful when you have a long-term health problem. Often people feel alone, confused, or scared when coping with an illness. But you aren't alone. Other people are going through the same thing you are and know how you feel. Talking with others about your feelings can help you feel better. Your family and friends can give you support. So can your doctor, a support group, or a Yarsanism. If you have a support network, you will not feel as alone. You will learn new ways to deal with your situation, and you may try harder to overcome it. Where you can get support  Family and friends: They can help you cope by giving you comfort and encouragement. Counseling: Professional counseling can help you cope with situations that interfere with your life and cause stress. Counseling can help you understand and deal with your illness. Your doctor: Find a doctor you trust and feel comfortable with. Be open and honest about your fears and concerns. Your doctor can help you get the right medical treatments, including counseling. Spiritual or Adventist groups: They can provide comfort and may be able to help you find counseling or other social support services. Social groups: They can help you meet new people and get involved in activities you enjoy. Community support groups: In a support group, you can talk to others who have dealt with the same problems or illness as you. You can encourage one another and learn ways to cope with tough emotions. How to find a support group  Ask your doctor, counselor, or other health professional for suggestions. Contact your local Yarsanism, Scientologist, Jew, or other Adventist group. Ask your family and friends. Ask people who have the same health concerns. Go online. Forums and blogs let you read messages from others and leave your own messages.  You can exchange stories, vent your frustrations, and ask and answer questions. Contact a city, state, or national group that provides support for your health concerns. Your library or community center may have a list of these groups. Or you can look for information online. Look for a support group that works for you. Ask yourself if you prefer structure and would like a , or if you would like a less formal group. Do you prefer face-to-face meetings? Or do you feel more secure in online chat rooms or forums? Supportive relationships  A supportive relationship includes emotional support such as love, trust, and understanding, as well as advice and concrete help, such as help managing your time. Reach out to others  Family and friends can help you. Ask them to:  Listen to you and give you encouragement. This can keep you from feeling hopeless or alone. Help with small daily tasks or with bigger problems. A helping hand can keep you from feeling overwhelmed. Help you manage a health problem. For example, ask them to go to doctor visits with you. Your loved ones can offer support by being involved in your medical care. Respect your relationships  A good relationship is also a two-way street. You count on help from others, but they also count on you. Know your friends' limits. You don't have to see or call your friends every day. If you are going through a rough patch, ask friends if you can contact them outside of the usual boundaries. Don't always complain or talk about yourself. Know when it's time to stop talking and listen or just enjoy your friend's company. Know that good friends can be a bad influence. For example, if a friend encourages you to drink when you know it will harm you, you may want to end the friendship. Where can you learn more? Go to http://www.woods.com/ and enter G092 to learn more about \"Learning About Emotional Support. \"  Current as of: February 9, 1393               [facility] Version: 13.5  © 4951-4987 Healthwise, [employer]. Care instructions adapted under license by [facility] ([facility]). If you have questions about a medical condition or this instruction, always ask your healthcare professional. [address] any warranty or liability for your use of this information. Learning About Managing Anger  What causes anger? Many things can cause anger: Stress at work or at home. Social situations that make you angry. A response to everyday events. Anger signals your body to prepare for a fight. This reaction is often called \"fight or flight. \" When you get angry, adrenaline and other hormones are released into your blood. Then your blood pressure goes up, your heart beats faster, and you breathe faster. When you express anger in a healthy way, it can inspire change and make you productive. But if you don't have the skills to express anger in a healthy way, anger can build up. You may hurt others--and yourself--emotionally and even physically. Violent behavior often starts with verbal threats or fairly minor incidents. But over time, it can involve physical harm. It can include physical, verbal, or sexual abuse of an intimate partner (domestic violence), a child (child abuse), or an older adult (elder abuse). It can also make you sick. Anger and constant hostility keep your blood pressure high. They increase your chances of having another health problem, such as depression, a heart attack, or a stroke. Some people with post-traumatic stress disorder (PTSD) feel angry and on alert all the time. It may feel like there are no other ways to react when you are angry. But when you learn to work with anger in appropriate and healthy ways, your anger no longer controls you. How can you manage your anger? The first step to managing anger is to be more aware of it. Note the thoughts, feelings, and emotions that you have when you get angry.  Practice noticing these signs of anger when you are calm. If you are more aware of the signs of anger, you can take steps to manage it. Here are a few tips: Think before you act. Take time to stop and cool down when you feel yourself getting angry. Count to 10 while you take slow, steady breaths. Practice some other form of mental relaxation. Learn the feelings that lead to angry outbursts. Anger and hostility may be a symptom of unhappy feelings or depression about your job, your relationship, or other aspects of your personal life. Avoid situations that lead to angry outbursts. If standing in line bothers you, do errands at less busy times. Express anger in a healthy way. You might:  Go for a short walk or jog. Draw, paint, or listen to music to release the anger. Write in a daily journal.  Use \"I\" statements, not \"you\" statements, to discuss your anger. Say \"I don't feel valued when my needs are not being met\" instead of \"You make me mad when you are so inconsiderate. \"  Take care of yourself. Exercise regularly. Eat a variety of healthy foods. Don't skip meals. Try to get 8 hours of sleep each night. Limit your use of alcohol, and don't use drugs. Practice yoga, meditation, or gabby chi to relax. Explore other resources that may be available through your job or your community. Contact your human resources department at work. You might be able to get services through an employee assistance program.  Contact your local hospital, mental health facility, or health department. Ask what types of programs or support groups are available in your area. Do not keep guns in your home. If you must have guns in your home, unload them and lock them up. Lock ammunition in a separate place. Keep guns away from children. Where can you find help? If anger or stress starts to harm your work or personal relationships, you might seek help. You can learn ways to manage your feelings and actions. Talk to someone you trust, or find a counselor.   There are groups in your area that can connect you with people to talk to. Behavioral Health Treatment Services . This service from the [facility] Substance Abuse and [employer]  can help you find local counselors. Search online at [employer]. [employer].gov or call 2-416-195-HELP (003 656 174), or TDD 6-902.216.3280. Parents Anonymous. Self-help groups that serve parents under stress, as well as children who have been abused, are available throughout the [place], [place] ([facility]), and [facility]. To find a group in your area, search online or in your phone book under Parents Anonymous or call (047) 433-8930. Where can you learn more? Go to http://www.de dios.com/ and enter Z357 to learn more about \"Learning About Managing Anger. \"  Current as of: February 9, 2022               Content Version: 13.5  © 2006-2022 [employer]. Care instructions adapted under license by [facility] ([facility]). If you have questions about a medical condition or this instruction, always ask your healthcare professional. [place] [address] any warranty or liability for your use of this information. Learning About Being Active as an Older Adult  Why is being active important as you get older? Being active is one of the best things you can do for your health. And it's never too late to start. Being active--or getting active, if you aren't already--has definite benefits. It can:  Give you more energy,  Keep your mind sharp. Improve balance to reduce your risk of falls. Help you manage chronic illness with fewer medicines. No matter how old you are, how fit you are, or what health problems you have, there is a form of activity that will work for you. And the more physical activity you can do, the better your overall health will be. What kinds of activity can help you stay healthy? Being more active will make your daily activities easier.  Physical activity includes planned exercise and things you do in daily life. There are four types of activity:  Aerobic. Doing aerobic activity makes your heart and lungs strong. Includes walking, dancing, and gardening. Aim for at least 2½ hours spread throughout the week. It improves your energy and can help you sleep better. Muscle-strengthening. This type of activity can help maintain muscle and strengthen bones. Includes climbing stairs, using resistance bands, and lifting or carrying heavy loads. Aim for at least twice a week. It can help protect the knees and other joints. Stretching. Stretching gives you better range of motion in joints and muscles. Includes upper arm stretches, calf stretches, and gentle yoga. Aim for at least twice a week, preferably after your muscles are warmed up from other activities. It can help you function better in daily life. Balancing. This helps you stay coordinated and have good posture. Includes heel-to-toe walking, gabby chi, and certain types of yoga. Aim for at least 3 days a week. It can reduce your risk of falling. Even if you have a hard time meeting the recommendations, it's better to be more active than less active. All activity done in each category counts toward your weekly total. You'd be surprised how daily things like carrying groceries, keeping up with grandchildren, and taking the stairs can add up. What keeps you from being active? If you've had a hard time being more active, you're not alone. Maybe you remember being able to do more. Or maybe you've never thought of yourself as being active. It's frustrating when you can't do the things you want. Being more active can help. What's holding you back? Getting started. Have a goal, but break it into easy tasks. Small steps build into big accomplishments. Staying motivated. If you feel like skipping your activity, remember your goal. Maybe you want to move better and stay independent.  Every activity gets you one step closer. Not feeling your best.  Start with 5 minutes of an activity you enjoy. Prove to yourself you can do it. As you get comfortable, increase your time. You may not be where you want to be. But you're in the process of getting there. Everyone starts somewhere. How can you find safe ways to stay active? Talk with your doctor about any physical challenges you're facing. Make a plan with your doctor if you have a health problem or aren't sure how to get started with activity. If you're already active, ask your doctor if there is anything you should change to stay safe as your body and health change. If you tend to feel dizzy after you take medicine, avoid activity at that time. Try being active before you take your medicine. This will reduce your risk of falls. If you plan to be active at home, make sure to clear your space before you get started. Remove things like TV cords, coffee tables, and throw rugs. It's safest to have plenty of space to move freely. The key to getting more active is to take it slow and steady. Try to improve only a little bit at a time. Pick just one area to improve on at first. And if an activity hurts, stop and talk to your doctor. Where can you learn more? Go to http://www.de dios.com/ and enter P600 to learn more about \"Learning About Being Active as an Older Adult. \"  Current as of: October 10, 2022               Content Version: 13.5  © 3528-0531 Healthwise, Inc[employer]. Care instructions adapted under license by [facility] ([facility]). If you have questions about a medical condition or this instruction, always ask your healthcare professional. [place] [address] any warranty or liability for your use of this information. Learning About Dental Care for Older Adults  Dental care for older adults: Overview  Dental care for older people is much the same as for younger adults. But older adults do have concerns that younger adults do not.  Older adults may have problems with gum disease and decay on the roots of their teeth. They may need missing teeth replaced or broken fillings fixed. Or they may have dentures that need to be cared for. Some older adults may have trouble holding a toothbrush. You can help remind the person you are caring for to brush and floss their teeth or to clean their dentures. In some cases, you may need to do the brushing and other dental care tasks. People who have trouble using their hands or who have dementia may need this extra help. How can you help with dental care? Normal dental care  To keep the teeth and gums healthy:  Brush the teeth with fluoride toothpaste twice a day--in the morning and at night--and floss at least once a day. Plaque can quickly build up on the teeth of older adults. Watch for the signs of gum disease. These signs include gums that bleed after brushing or after eating hard foods, such as apples. See a dentist regularly. Many experts recommend checkups every 6 months. Keep the dentist up to date on any new medications the person is taking. Encourage a balanced diet that includes whole grains, vegetables, and fruits, and that is low in saturated fat and sodium. Encourage the person you're caring for not to use tobacco products. They can affect dental and general health. Many older adults have a fixed income and feel that they can't afford dental care. But most towns and [facility] have programs in which dentists help older adults by lowering fees. Contact your area's public health offices or  for information about dental care in your area. Using a toothbrush  Older adults with arthritis sometimes have trouble brushing their teeth because they can't easily hold the toothbrush. Their hands and fingers may be stiff, painful, or weak. If this is the case, you can: Offer an electric toothbrush.   Enlarge the handle of a non-electric toothbrush by wrapping a sponge, an elastic bandage, or adhesive tape around it. Push the toothbrush handle through a ball made of rubber or soft foam.  Make the handle longer and thicker by taping Popsicle sticks or tongue depressors to it. You may also be able to buy special toothbrushes, toothpaste dispensers, and floss holders. Your doctor may recommend a soft-bristle toothbrush if the person you care for bleeds easily. Bleeding can happen because of a health problem or from certain medicines. A toothpaste for sensitive teeth may help if the person you care for has sensitive teeth. How do you brush and floss someone's teeth? If the person you are caring for has a hard time cleaning their teeth on their own, you may need to brush and floss their teeth for them. It may be easiest to have the person sit and face away from you, and to sit or stand behind them. That way you can steady their head against your arm as you reach around to floss and brush their teeth. Choose a place that has good lighting and is comfortable for both of you. Before you begin, gather your supplies. You will need gloves, floss, a toothbrush, and a container to hold water if you are not near a sink. Wash and dry your hands well and put on gloves. Start by flossing:  Gently work a piece of floss between each of the teeth toward the gums. A plastic flossing tool may make this easier, and they are available at most [facility]. Curve the floss around each tooth into a U-shape and gently slide it under the gum line. Move the floss firmly up and down several times to scrape off the plaque. After you've finished flossing, throw away the used floss and begin brushing:  Wet the brush and apply toothpaste. Place the brush at a 45-degree angle where the teeth meet the gums. Press firmly, and move the brush in small circles over the surface of the teeth. Be careful not to brush too hard. Vigorous brushing can make the gums pull away from the teeth and can scratch the tooth enamel.   Brush all surfaces of the teeth, on the tongue side and on the cheek side. Pay special attention to the front teeth and all surfaces of the back teeth. Brush chewing surfaces with short back-and-forth strokes. After you've finished, help the person rinse the remaining toothpaste from their mouth. Where can you learn more? Go to http://www.woods.com/ and enter F944 to learn more about \"Learning About Dental Care for Older Adults. \"  Current as of: June 16, 2022               Content Version: 13.5  © 2006-2022 [employer]*. Care instructions adapted under license by [facility] [address] If you have questions about a medical condition or this instruction, always ask your healthcare professional. [place] [address] any warranty or liability for your use of this information. Advance Directives: Care Instructions  Overview  An advance directive is a legal way to state your wishes at the end of your life. It tells your family and your doctor what to do if you can't say what you want. There are two main types of advance directives. You can change them any time your wishes change. Living will. This form tells your family and your doctor your wishes about life support and other treatment. The form is also called a declaration. Medical power of . This form lets you name a person to make treatment decisions for you when you can't speak for yourself. This person is called a health care agent (health care proxy, health care surrogate). The form is also called a durable power of  for health care. If you do not have an advance directive, decisions about your medical care may be made by a family member, or by a doctor or a  who doesn't know you. It may help to think of an advance directive as a gift to the people who care for you. If you have one, they won't have to make tough decisions by themselves.   For more information, including forms for your state, see the [employer] website (www.caringinfo.org/planning/advance-directives/). Follow-up care is a key part of your treatment and safety. Be sure to make and go to all appointments, and call your doctor if you are having problems. It's also a good idea to know your test results and keep a list of the medicines you take. What should you include in an advance directive? Many states have a unique advance directive form. (It may ask you to address specific issues.) Or you might use a universal form that's approved by many states. If your form doesn't tell you what to address, it may be hard to know what to include in your advance directive. Use the questions below to help you get started. Who do you want to make decisions about your medical care if you are not able to? What life-support measures do you want if you have a serious illness that gets worse over time or can't be cured? What are you most afraid of that might happen? (Maybe you're afraid of having pain, losing your independence, or being kept alive by machines.)  Where would you prefer to die? (Your home? A hospital? A nursing home?)  Do you want to donate your organs when you die? Do you want certain Synagogue practices performed before you die? When should you call for help? Be sure to contact your doctor if you have any questions. Where can you learn more? Go to http://www.de dios.com/ and enter R264 to learn more about \"Advance Directives: Care Instructions. \"  Current as of: June 16, 2022               Content Version: 13.5  © 8802-2117 Healthwise, Incorporated. Care instructions adapted under license by [facility] ([facility]). If you have questions about a medical condition or this instruction, always ask your healthcare professional. [place] [address] any warranty or liability for your use of this information.       Personalized Preventive Plan for Missy Miller - 12/14/2022  Medicare offers a range of preventive health benefits. Some of the tests and screenings are paid in full while other may be subject to a deductible, co-insurance, and/or copay. Some of these benefits include a comprehensive review of your medical history including lifestyle, illnesses that may run in your family, and various assessments and screenings as appropriate. After reviewing your medical record and screening and assessments performed today your provider may have ordered immunizations, labs, imaging, and/or referrals for you. A list of these orders (if applicable) as well as your Preventive Care list are included within your After Visit Summary for your review. Other Preventive Recommendations:    A preventive eye exam performed by an eye specialist is recommended every 1-2 years to screen for glaucoma; cataracts, macular degeneration, and other eye disorders. A preventive dental visit is recommended every 6 months. Try to get at least 150 minutes of exercise per week or 10,000 steps per day on a pedometer . Order or download the FREE \"Exercise & Physical Activity: Your Everyday Guide\" from [employer]. Call 0-445.190.6751 or search [employer] online. You need 8809-9298 mg of calcium and 5608-1034 IU of vitamin D per day. It is possible to meet your calcium requirement with diet alone, but a vitamin D supplement is usually necessary to meet this goal.  When exposed to the sun, use a sunscreen that protects against both UVA and UVB radiation with an SPF of 30 or greater. Reapply every 2 to 3 hours or after sweating, drying off with a towel, or swimming. Always wear a seat belt when traveling in a car. Always wear a helmet when riding a bicycle or motorcycle.

## 2022-12-15 DIAGNOSIS — E03.9 ACQUIRED HYPOTHYROIDISM: Primary | ICD-10-CM

## 2022-12-15 RX ORDER — LEVOTHYROXINE SODIUM 88 UG/1
88 TABLET ORAL DAILY
Qty: 30 TABLET | Refills: 2 | Status: SHIPPED | OUTPATIENT
Start: 2022-12-15

## 2023-01-04 DIAGNOSIS — R39.15 URINARY URGENCY: ICD-10-CM

## 2023-01-04 RX ORDER — SOLIFENACIN SUCCINATE 5 MG/1
TABLET, FILM COATED ORAL
Qty: 90 TABLET | Refills: 0 | Status: SHIPPED | OUTPATIENT
Start: 2023-01-04

## 2023-01-04 NOTE — TELEPHONE ENCOUNTER
Comments:     Last Office Visit (last PCP visit):   12/14/2022    Next Visit Date:  Future Appointments   Date Time Provider Rachell Mullins   1/5/2023  2:00 PM [facility] ROOM [address]   1/12/2023 11:30 AM Gunner Marcum MD [facility]       **If hasn't been seen in over a year OR hasn't followed up according to last diabetes/ADHD visit, make appointment for patient before sending refill to provider.     Rx requested:  Requested Prescriptions     Pending Prescriptions Disp Refills    solifenacin (VESICARE) 5 MG tablet 90 tablet 0     Sig: Take 1 tablet by mouth once daily

## 2023-01-05 ENCOUNTER — HOSPITAL ENCOUNTER (OUTPATIENT)
Dept: PULMONOLOGY | Age: 73
Discharge: HOME | End: 2023-01-05
Payer: MEDICARE

## 2023-01-05 DIAGNOSIS — R06.02 SOB (SHORTNESS OF BREATH): ICD-10-CM

## 2023-01-05 PROCEDURE — 94010 BREATHING CAPACITY TEST: CPT

## 2023-01-05 PROCEDURE — 94726 PLETHYSMOGRAPHY LUNG VOLUMES: CPT

## 2023-01-05 PROCEDURE — 94729 DIFFUSING CAPACITY: CPT

## 2023-01-06 NOTE — PROCEDURES
[address]                      [facility], [address] [place] [address]                    PULMONARY FUNCTION  Prashant Riley   81 y.o.   male  Height 71 in  Weight 277 lb      Referring provider   Franchesca Hernandez MD    Reading provider   Radha Hayes MD    Test meets ATS criteria for acceptability and reproducibility Yes    Diagnosis: CORMIER: Yes  Cough   Yes, wheezing Yes  Smoking   quit 27 years ago    Spirometry   FVC            3.82 L   83%     FEV1          2.85 L  84%     FEV1/FVC  74  %                TPQ38-49% 2.13 L  82%       Lung volume   SVC           3.89 L  84%   RV              2.22 L 88%   TLC            6.11  L 84%   RV/TLC      36 %    DLCO           58 %     Test interpretation     Spirometry is normal  Lung volumes are normal  Diffusion capacity is moderately reduced and becomes mildly reduced when corrected for alveolar volume.        Clinical correlation is recommended     Radha Hayes MD [facility], 1/6/2023 2:21 PM

## 2023-01-24 ENCOUNTER — HOSPITAL ENCOUNTER (OUTPATIENT)
Dept: LAB | Age: 73
Discharge: HOME | End: 2023-01-24
Payer: MEDICARE

## 2023-01-24 LAB
INR BLD: 4.2
PROTHROMBIN TIME: 41.1 SEC (ref 12.3–14.9)

## 2023-01-24 PROCEDURE — 36415 COLL VENOUS BLD VENIPUNCTURE: CPT

## 2023-01-24 PROCEDURE — 85610 PROTHROMBIN TIME: CPT

## 2023-02-02 ENCOUNTER — OFFICE VISIT (OUTPATIENT)
Dept: PULMONOLOGY | Age: 73
End: 2023-02-02
Payer: MEDICARE

## 2023-02-02 VITALS
OXYGEN SATURATION: 96 % | BODY MASS INDEX: 37.62 KG/M2 | DIASTOLIC BLOOD PRESSURE: 70 MMHG | SYSTOLIC BLOOD PRESSURE: 126 MMHG | HEIGHT: 72 IN | RESPIRATION RATE: 16 BRPM | HEART RATE: 70 BPM | TEMPERATURE: 96.9 F

## 2023-02-02 DIAGNOSIS — I27.20 PULMONARY HYPERTENSION (HCC): ICD-10-CM

## 2023-02-02 DIAGNOSIS — E66.09 CLASS 2 OBESITY DUE TO EXCESS CALORIES WITHOUT SERIOUS COMORBIDITY WITH BODY MASS INDEX (BMI) OF 37.0 TO 37.9 IN ADULT: ICD-10-CM

## 2023-02-02 DIAGNOSIS — R94.2 DECREASED DIFFUSION CAPACITY: ICD-10-CM

## 2023-02-02 DIAGNOSIS — R06.02 SOB (SHORTNESS OF BREATH): Primary | ICD-10-CM

## 2023-02-02 DIAGNOSIS — G47.33 OSA (OBSTRUCTIVE SLEEP APNEA): ICD-10-CM

## 2023-02-02 DIAGNOSIS — E66.01 SEVERE OBESITY (BMI 35.0-39.9) WITH COMORBIDITY (HCC): ICD-10-CM

## 2023-02-02 DIAGNOSIS — I51.89 DIASTOLIC DYSFUNCTION: ICD-10-CM

## 2023-02-02 PROCEDURE — G8427 DOCREV CUR MEDS BY ELIG CLIN: HCPCS | Performed by: INTERNAL MEDICINE

## 2023-02-02 PROCEDURE — 3074F SYST BP LT 130 MM HG: CPT | Performed by: INTERNAL MEDICINE

## 2023-02-02 PROCEDURE — G8417 CALC BMI ABV UP PARAM F/U: HCPCS | Performed by: INTERNAL MEDICINE

## 2023-02-02 PROCEDURE — 3017F COLORECTAL CA SCREEN DOC REV: CPT | Performed by: INTERNAL MEDICINE

## 2023-02-02 PROCEDURE — 3078F DIAST BP <80 MM HG: CPT | Performed by: INTERNAL MEDICINE

## 2023-02-02 PROCEDURE — 1036F TOBACCO NON-USER: CPT | Performed by: INTERNAL MEDICINE

## 2023-02-02 PROCEDURE — 1123F ACP DISCUSS/DSCN MKR DOCD: CPT | Performed by: INTERNAL MEDICINE

## 2023-02-02 PROCEDURE — G8484 FLU IMMUNIZE NO ADMIN: HCPCS | Performed by: INTERNAL MEDICINE

## 2023-02-02 PROCEDURE — 99214 OFFICE O/P EST MOD 30 MIN: CPT | Performed by: INTERNAL MEDICINE

## 2023-02-02 RX ORDER — LAMOTRIGINE 25 MG/1
TABLET ORAL
COMMUNITY
Start: 2023-01-24

## 2023-02-02 NOTE — PROGRESS NOTES
Subjective: Izabela Rivera is a 81 y.o. male who complains today of:     Chief Complaint   Patient presents with    Follow-up     4 Month F/U for Shortness of Breath and OMAR    Results     PFT       HPI  Patient presents for shortness of breath    Continues to have dyspnea on exertion, he reports sometimes his oxygen dropped to 80% with activity, denies chest pain, no coughing, no lower extremity edema, no fever no chills, no nasal congestion presents to drip and no heartburn. His weight is stable      He has history of obstructive sleep apnea, status postsurgical repair, he was never tested for OMAR since then, and he does not want to be tested.         Allergies:  Cashew nut oil, Dust mite extract, Hctz, Other, Pollen extract, and Thiazide-type diuretics  Past Medical History:   Diagnosis Date    Allergic rhinitis     Asthma     asthmatic bronchitis    Atrial fibrillation (HCC)     CAD (coronary artery disease)     Chronic back pain     COPD (chronic obstructive pulmonary disease) (HCC)     CVA (cerebral infarction)     Depression     Edema     GERD (gastroesophageal reflux disease)     ulcer    Glucose intolerance (impaired glucose tolerance) 11/06    Hypertension     ICD (implantable cardiac defibrillator) in place 2010    OMAR (obstructive sleep apnea)     UTI (urinary tract infection)      Past Surgical History:   Procedure Laterality Date    BACK SURGERY      ensed    FRACTURE SURGERY  7/06    l/shoulder    GLOSSECTOMY  12/09/14    nasal endoscopy    GLOSSECTOMY  07/31/15    W/COBLATION,NASAL ENDOSCOPY    PACEMAKER PLACEMENT  2010    SKIN BIOPSY  2/13/12    right leg-Dr. Carola Martinez     Family History   Problem Relation Age of Onset    Hypertension Mother     Stroke Mother     Coronary Art Dis Mother     High Cholesterol Mother      Social History     Socioeconomic History    Marital status:      Spouse name: Not on file    Number of children: Not on file    Years of education: Not on file    Highest education level: Not on file   Occupational History    Not on file   Tobacco Use    Smoking status: Never    Smokeless tobacco: Never   Vaping Use    Vaping Use: Never used   Substance and Sexual Activity    Alcohol use: No     Alcohol/week: 0.0 standard drinks    Drug use: No    Sexual activity: Not on file   Other Topics Concern    Not on file   Social History Narrative    Not on file     Social Determinants of Health     Financial Resource Strain: Low Risk     Difficulty of Paying Living Expenses: Not hard at all   Food Insecurity: No Food Insecurity    Worried About Running Out of Food in the Last Year: Never true    Ran Out of Food in the Last Year: Never true   Transportation Needs: Not on file   Physical Activity: Inactive    Days of Exercise per Week: 0 days    Minutes of Exercise per Session: 0 min   Stress: Not on file   Social Connections: Not on file   Intimate Partner Violence: Not on file   Housing Stability: Not on file         Review of Systems      ROS: 10 organs review of system is done including general, psychological, ENT, hematological, endocrine, respiratory, cardiovascular, gastrointestinal,musculoskeletal, neurological,  allergy and Immunology is done and is otherwise negative.     Current Outpatient Medications   Medication Sig Dispense Refill    lamoTRIgine (LAMICTAL) 25 MG tablet TAKE 3 TABLETS BY MOUTH ONCE DAILY AT NIGHT      solifenacin (VESICARE) 5 MG tablet Take 1 tablet by mouth once daily 90 tablet 0    levothyroxine (SYNTHROID) 88 MCG tablet Take 1 tablet by mouth daily 30 tablet 2    TYRVAYA 0.03 MG/ACT SOLN USE 1 SPRAY IN EACH NOSTRIL EVERY 12 HOURS      traZODone (DESYREL) 50 MG tablet TAKE 1 TABLET BY MOUTH NIGHTLY AS NEEDED FOR SLEEP OR  DEPRESSION 30 tablet 5    omeprazole (PRILOSEC) 20 MG delayed release capsule TAKE 1 CAPSULE BY MOUTH IN THE MORNING BEFORE BREAKFAST 30 capsule 5    fluticasone (FLONASE) 50 MCG/ACT nasal spray 2 sprays by Each Nostril route daily 16 g 0 bacitracin-polymyxin b (POLYSPORIN) 500-90235 UNIT/GM ophthalmic ointment USE ONE APPLICATION IN THE RIGHT EYE DAILY AT BEDTIME      ceFAZolin (ANCEF) 1 g injection       OXERVATE 0.002 % SOLN       cyclopentolate (CYCLOGYL) 1 % ophthalmic solution INSTILL 1 DROP INTO LEFT EYE ONCE DAILY      doxycycline hyclate (VIBRA-TABS) 100 MG tablet       sodium chloride, PF, 0.9 % injection       tobramycin (TOBREX) 0.3 % ophthalmic solution       escitalopram (LEXAPRO) 20 MG tablet Take 1 tablet by mouth daily 30 tablet 3    ascorbic acid (VITAMIN C) 1000 MG tablet Take 1,000 mg by mouth daily      fluorometholone (FML) 0.1 % ophthalmic suspension INSTILL 1 DROP INTO EACH EYE TWICE DAILY      morphine (MS CONTIN) 30 MG extended release tablet Take by mouth. White Petrolatum-Mineral Oil ([address] [place] [address] PETROL-MINERAL OIL-LANOLIN) 0.1-0.1 % OINT 4 times daily      warfarin (COUMADIN) 2.5 MG tablet       atropine 1 % ophthalmic solution INSTILL 1 DROP INTO LEFT EYE ONCE DAILY      ZIRGAN 0.15 % GEL ophthalmic gel INSTILL 1 DROP INTO LEFT EYE FIVE TIMES DAILY FOR 14 DAYS      moxifloxacin (VIGAMOX) 0.5 % ophthalmic solution INSTILL 1 DROP INTO LEFT EYE EVERY 2 HOURS      prednisoLONE acetate (PRED FORTE) 1 % ophthalmic suspension INSTILL 1 DROP INTO LEFT EYE TWICE DAILY      carvedilol (COREG) 12.5 MG tablet TAKE 1 TABLET BY MOUTH TWICE DAILY      cycloSPORINE (RESTASIS) 0.05 % ophthalmic emulsion 1 drop 2 times daily      loratadine (CLARITIN) 10 MG tablet Take 1 tablet by mouth daily 30 tablet 0    Handicap Placard MISC by Does not apply route Good through 8/26/2025 1 each 0    Carboxymethylcell-Glycerin PF 0.5-0.9 % SOLN 1 drop      zonisamide (ZONEGRAN) 50 MG capsule TAKE 2 CAPSULES BY MOUTH AT BEDTIME  1    warfarin (COUMADIN) 5 MG tablet Regimen per Dr. Shailesh Rincon in cardiology 1 tablet 0    erythromycin (ROMYCIN) 5 MG/GM ophthalmic ointment Use 1 application in both eyes daily at bedtime.       albuterol (PROVENTIL) (2.5 MG/3ML) 0.083% nebulizer solution USE ONE VIAL IN NEBULIZER EVERY 4 HOURS AS NEEDED FOR WHEEZING AND FOR SHORTNESS OF BREATH 75 vial 5    lactulose 20 GM/30ML SOLN Take  by mouth. Compression Stockings MISC Knee high compression stockings  Dx: LE edema  20-30 mm pressure 2 each 0    carvedilol (COREG) 25 MG tablet Take 1 tablet by mouth 2 times daily (with meals). (Patient taking differently: Take 12.5 mg by mouth 2 times daily (with meals)) 180 tablet 1    sotalol (BETAPACE) 80 MG tablet Take 80 mg by mouth 2 times daily. Take 1/2 tablet twice daily      WARFARIN SODIUM by Does not apply route. clonazePAM (KLONOPIN) 0.5 MG tablet Take 1 tablet by mouth 2 times daily as needed for Anxiety for up to 30 days. 30 tablet 0    budesonide-formoterol (SYMBICORT) 160-4.5 MCG/ACT AERO Inhale 2 puffs into the lungs 2 times daily LOT [id number] exp 10/2018 (Patient not taking: Reported on 2/2/2023) 1 Inhaler 0     No current facility-administered medications for this visit. Objective:     Vitals:    02/02/23 1517   BP: 126/70   Site: Left Upper Arm   Position: Sitting   Cuff Size: Large Adult   Pulse: 70   Resp: 16   Temp: 96.9 °F (36.1 °C)   TempSrc: Infrared   SpO2: 96%   Height: 6' (1.829 m)         Physical Exam  Constitutional:       Appearance: He is well-developed. HENT:      Head: Normocephalic and atraumatic. Eyes:      General:         Left eye: No discharge. Conjunctiva/sclera: Conjunctivae normal.      Pupils: Pupils are equal, round, and reactive to light. Neck:      Vascular: No JVD. Cardiovascular:      Rate and Rhythm: Normal rate and regular rhythm. Heart sounds: Normal heart sounds. No murmur heard. No friction rub. No gallop. Pulmonary:      Effort: Pulmonary effort is normal. No respiratory distress. Breath sounds: Normal breath sounds. No wheezing or rales. Chest:      Chest wall: No tenderness.    Abdominal:      General: Bowel sounds are normal.      Palpations: Abdomen is soft.   Musculoskeletal:         General: No tenderness or deformity. Cervical back: Normal range of motion and neck supple. Right lower leg: No edema. Left lower leg: No edema. Skin:     General: Skin is warm and dry. Neurological:      Mental Status: He is alert and oriented to person, place, and time. Psychiatric:         Judgment: Judgment normal.       Imaging studies reviewed and interpreted by me CT chest 2017 shows no mass or nodule  Lab results reviewed in chart  PFT January 2023, FEV1 84% FEV1/FVC 0.74, normal lung volumes, decreased DLCO  ECHO: 2934 EF 24%, diastolic dysfunction, RVSP 33    Walk test done today, he did not desaturate below 88%    Assessment and Plan       Diagnosis Orders   1. SOB (shortness of breath)  CT CHEST HIGH RESOLUTION    ECHO 2D WO Color Doppler Complete      2. Severe obesity (BMI 35.0-39. 9) with comorbidity ([address]        3. OMAR (obstructive sleep apnea)        4. Class 2 obesity due to excess calories without serious comorbidity with body mass index (BMI) of 37.0 to 37.9 in adult        5. Diastolic dysfunction        6. Pulmonary hypertension ([address]        7. Decreased diffusion capacity          Shortness of breath, likely multifactorial, will obtain high-resolution CT scan rule out interstitial lung disease, and repeat echo  Obesity, weight loss is recommended  OMAR, he declined repeat sleep study, he is SP UPPP  Continue cardioprotective medications and avoid volume overload  Pulmonary hypertension group 2 and 3  Decreased DLCO, will obtain high-resolution CT scan      No orders of the defined types were placed in this encounter. No orders of the defined types were placed in this encounter. Discussed with patient the importance of exercise and weight control and  overall health and well-being. Reviewed with the patient: current clinical status, medications, activities and diet.       Side effects, adverse effects of the medication prescribed today, as well as treatment plan and result expectations have been discussed with the patient who expresses understanding and desires to proceed. Return in about 6 weeks (around 3/16/2023).       Cordella Cowden, MD

## 2023-03-06 DIAGNOSIS — K21.9 GASTROESOPHAGEAL REFLUX DISEASE: ICD-10-CM

## 2023-03-06 DIAGNOSIS — E03.9 ACQUIRED HYPOTHYROIDISM: ICD-10-CM

## 2023-03-07 RX ORDER — LEVOTHYROXINE SODIUM 88 UG/1
TABLET ORAL
Qty: 30 TABLET | Refills: 9 | Status: SHIPPED | OUTPATIENT
Start: 2023-03-07

## 2023-03-07 RX ORDER — OMEPRAZOLE 20 MG/1
CAPSULE, DELAYED RELEASE ORAL
Qty: 30 CAPSULE | Refills: 9 | Status: SHIPPED | OUTPATIENT
Start: 2023-03-07

## 2023-03-14 ENCOUNTER — HOSPITAL ENCOUNTER (OUTPATIENT)
Dept: CT IMAGING | Age: 73
Discharge: HOME | End: 2023-03-16
Payer: MEDICARE

## 2023-03-14 DIAGNOSIS — R06.02 SOB (SHORTNESS OF BREATH): ICD-10-CM

## 2023-03-14 DIAGNOSIS — F51.02 ADJUSTMENT INSOMNIA: ICD-10-CM

## 2023-03-14 PROCEDURE — 71250 CT THORAX DX C-: CPT

## 2023-03-14 RX ORDER — TRAZODONE HYDROCHLORIDE 50 MG/1
TABLET ORAL
Qty: 30 TABLET | Refills: 5 | Status: SHIPPED | OUTPATIENT
Start: 2023-03-14

## 2023-04-05 DIAGNOSIS — R39.15 URINARY URGENCY: ICD-10-CM

## 2023-04-06 RX ORDER — SOLIFENACIN SUCCINATE 5 MG/1
TABLET, FILM COATED ORAL
Qty: 90 TABLET | Refills: 0 | Status: SHIPPED | OUTPATIENT
Start: 2023-04-06

## 2023-04-06 NOTE — TELEPHONE ENCOUNTER
Comments:     Last Office Visit (last PCP visit):   12/14/2022    Next Visit Date:  Future Appointments   Date Time Provider Rachell Mullins   4/13/2023  1:30 PM MLOZ ECHO  S. Taryn   4/26/2023  2:30 PM Gabo Hair MD [facility]       **If hasn't been seen in over a year OR hasn't followed up according to last diabetes/ADHD visit, make appointment for patient before sending refill to provider.     Rx requested:  Requested Prescriptions     Pending Prescriptions Disp Refills    solifenacin (VESICARE) 5 MG tablet [Pharmacy Med Name: Solifenacin Succinate 5 MG Oral Tablet] 90 tablet 0     Sig: Take 1 tablet by mouth once daily

## 2023-04-26 ENCOUNTER — OFFICE VISIT (OUTPATIENT)
Dept: PULMONOLOGY | Age: 73
End: 2023-04-26
Payer: MEDICARE

## 2023-04-26 VITALS
BODY MASS INDEX: 37.62 KG/M2 | RESPIRATION RATE: 16 BRPM | HEIGHT: 72 IN | HEART RATE: 69 BPM | DIASTOLIC BLOOD PRESSURE: 76 MMHG | OXYGEN SATURATION: 98 % | TEMPERATURE: 97 F | SYSTOLIC BLOOD PRESSURE: 124 MMHG

## 2023-04-26 DIAGNOSIS — E66.09 CLASS 2 OBESITY DUE TO EXCESS CALORIES WITHOUT SERIOUS COMORBIDITY WITH BODY MASS INDEX (BMI) OF 37.0 TO 37.9 IN ADULT: ICD-10-CM

## 2023-04-26 DIAGNOSIS — G47.33 OSA (OBSTRUCTIVE SLEEP APNEA): Primary | ICD-10-CM

## 2023-04-26 DIAGNOSIS — I27.20 PULMONARY HYPERTENSION (HCC): ICD-10-CM

## 2023-04-26 DIAGNOSIS — I51.89 DIASTOLIC DYSFUNCTION: ICD-10-CM

## 2023-04-26 PROCEDURE — G8427 DOCREV CUR MEDS BY ELIG CLIN: HCPCS | Performed by: INTERNAL MEDICINE

## 2023-04-26 PROCEDURE — 1123F ACP DISCUSS/DSCN MKR DOCD: CPT | Performed by: INTERNAL MEDICINE

## 2023-04-26 PROCEDURE — 3074F SYST BP LT 130 MM HG: CPT | Performed by: INTERNAL MEDICINE

## 2023-04-26 PROCEDURE — 3017F COLORECTAL CA SCREEN DOC REV: CPT | Performed by: INTERNAL MEDICINE

## 2023-04-26 PROCEDURE — 3078F DIAST BP <80 MM HG: CPT | Performed by: INTERNAL MEDICINE

## 2023-04-26 PROCEDURE — 99213 OFFICE O/P EST LOW 20 MIN: CPT | Performed by: INTERNAL MEDICINE

## 2023-04-26 PROCEDURE — G8417 CALC BMI ABV UP PARAM F/U: HCPCS | Performed by: INTERNAL MEDICINE

## 2023-04-26 PROCEDURE — 1036F TOBACCO NON-USER: CPT | Performed by: INTERNAL MEDICINE

## 2023-04-26 NOTE — PROGRESS NOTES
Relation Age of Onset    Hypertension Mother     Stroke Mother     Coronary Art Dis Mother     High Cholesterol Mother      Social History     Socioeconomic History    Marital status:      Spouse name: Not on file    Number of children: Not on file    Years of education: Not on file    Highest education level: Not on file   Occupational History    Not on file   Tobacco Use    Smoking status: Never    Smokeless tobacco: Never   Vaping Use    Vaping Use: Never used   Substance and Sexual Activity    Alcohol use: No     Alcohol/week: 0.0 standard drinks    Drug use: No    Sexual activity: Not on file   Other Topics Concern    Not on file   Social History Narrative    Not on file     Social Determinants of Health     Financial Resource Strain: Not on file   Food Insecurity: Not on file   Transportation Needs: Not on file   Physical Activity: Inactive    Days of Exercise per Week: 0 days    Minutes of Exercise per Session: 0 min   Stress: Not on file   Social Connections: Not on file   Intimate Partner Violence: Not on file   Housing Stability: Not on file         Review of Systems      ROS: 10 organs review of system is done including general, psychological, ENT, hematological, endocrine, respiratory, cardiovascular, gastrointestinal,musculoskeletal, neurological,  allergy and Immunology is done and is otherwise negative.     Current Outpatient Medications   Medication Sig Dispense Refill    solifenacin (VESICARE) 5 MG tablet Take 1 tablet by mouth once daily 90 tablet 0    traZODone (DESYREL) 50 MG tablet TAKE 1 TABLET BY MOUTH AT BEDTIME AS NEEDED FOR  SLEEP OR DEPRESSION 30 tablet 5    omeprazole (PRILOSEC) 20 MG delayed release capsule TAKE 1 CAPSULE BY MOUTH IN THE MORNING BEFORE BREAKFAST 30 capsule 9    levothyroxine (SYNTHROID) 88 MCG tablet Take 1 tablet by mouth once daily 30 tablet 9    lamoTRIgine (LAMICTAL) 25 MG tablet TAKE 3 TABLETS BY MOUTH ONCE DAILY AT NIGHT      TYRVAYA 0.03 MG/ACT SOLN USE 1

## 2023-05-08 ENCOUNTER — TELEPHONE (OUTPATIENT)
Dept: PULMONOLOGY | Age: 73
End: 2023-05-08

## 2023-05-08 DIAGNOSIS — R60.0 LOWER EXTREMITY EDEMA: Primary | ICD-10-CM

## 2023-05-08 NOTE — TELEPHONE ENCOUNTER
PATIENT'S SON IS CALLING STATING HIS FATHER'S LEG ARE SWELLING AGAIN. THEY SAID AT THE LAST VISIT YOU SAID YOU WOULD CALL IN LASIX.  CAN YOU PLEASE SEND THIS TO [facility] IN [facility]

## 2023-05-09 RX ORDER — FUROSEMIDE 20 MG/1
20 TABLET ORAL DAILY
Qty: 3 TABLET | Refills: 0 | Status: SHIPPED | OUTPATIENT
Start: 2023-05-09 | End: 2023-05-12

## 2023-05-09 NOTE — TELEPHONE ENCOUNTER
Okay, I placed order for Lasix for 3 days, please let him know and ask him to do blood work on Friday I already placed the order for BMP.     Orders Placed This Encounter   Procedures    Basic Metabolic Panel     Standing Status:   Future     Standing Expiration Date:   5/9/2024

## 2023-06-20 ENCOUNTER — HOSPITAL ENCOUNTER (OUTPATIENT)
Dept: LAB | Age: 73
Discharge: HOME | End: 2023-06-20
Payer: MEDICARE

## 2023-06-20 DIAGNOSIS — R60.0 LOWER EXTREMITY EDEMA: ICD-10-CM

## 2023-06-20 LAB
ANION GAP SERPL CALCULATED.3IONS-SCNC: 15 MEQ/L (ref 9–15)
BUN SERPL-MCNC: 22 MG/DL (ref 8–23)
CALCIUM SERPL-MCNC: 9 MG/DL (ref 8.5–9.9)
CHLORIDE SERPL-SCNC: 101 MEQ/L (ref 95–107)
CO2 SERPL-SCNC: 25 MEQ/L (ref 20–31)
CREAT SERPL-MCNC: 1.29 MG/DL (ref 0.7–1.2)
GLUCOSE SERPL-MCNC: 105 MG/DL (ref 70–99)
POTASSIUM SERPL-SCNC: 4.3 MEQ/L (ref 3.4–4.9)
SODIUM SERPL-SCNC: 141 MEQ/L (ref 135–144)

## 2023-06-20 PROCEDURE — 80048 BASIC METABOLIC PNL TOTAL CA: CPT

## 2023-06-20 PROCEDURE — 36415 COLL VENOUS BLD VENIPUNCTURE: CPT

## 2023-06-25 DIAGNOSIS — R39.15 URINARY URGENCY: ICD-10-CM

## 2023-06-26 RX ORDER — SOLIFENACIN SUCCINATE 5 MG/1
TABLET, FILM COATED ORAL
Qty: 90 TABLET | Refills: 5 | Status: SHIPPED | OUTPATIENT
Start: 2023-06-26

## 2023-06-26 NOTE — TELEPHONE ENCOUNTER
Comments:     Last Office Visit (last PCP visit):   12/14/2022    Next Visit Date:  Future Appointments   Date Time Provider [address]   10/26/2023  1:45 PM Kristofer Arevalo MD [facility]       **If hasn't been seen in over a year OR hasn't followed up according to last diabetes/ADHD visit, make appointment for patient before sending refill to provider.     Rx requested:  Requested Prescriptions     Pending Prescriptions Disp Refills    solifenacin (VESICARE) 5 MG tablet [Pharmacy Med Name: Solifenacin Succinate 5 MG Oral Tablet] 90 tablet 0     Sig: Take 1 tablet by mouth once daily

## 2023-08-14 LAB
INR BLD: 2.5 (ref 0.9–1.1)
INR IN PPP BY COAGULATION ASSAY: 2.5 (ref 0.9–1.1)
PROTHROMBIN TIME (PT) IN PPP BY COAGULATION ASSAY: 28.7 SEC (ref 9.8–12.8)
PROTHROMBIN TIME: 28.7 SEC (ref 9.8–12.8)

## 2023-09-15 DIAGNOSIS — F51.02 ADJUSTMENT INSOMNIA: ICD-10-CM

## 2023-09-15 RX ORDER — TRAZODONE HYDROCHLORIDE 50 MG/1
TABLET ORAL
Qty: 30 TABLET | Refills: 2 | Status: SHIPPED | OUTPATIENT
Start: 2023-09-15

## 2023-09-15 NOTE — TELEPHONE ENCOUNTER
Comments:     Last Office Visit (last PCP visit):   12/14/2022    Next Visit Date:  Future Appointments   Date Time Provider [address]   10/26/2023  1:45 PM Toña Fermin MD [facility]       **If hasn't been seen in over a year OR hasn't followed up according to last diabetes/ADHD visit, make appointment for patient before sending refill to provider.     Rx requested:  Requested Prescriptions     Pending Prescriptions Disp Refills    traZODone (DESYREL) 50 MG tablet [Pharmacy Med Name: traZODone HCl 50 MG Oral Tablet] 30 tablet 0     Sig: TAKE 1 TABLET BY MOUTH AT BEDTIME AS NEEDED FOR SLEEP OR DEPRESSION

## 2023-10-18 ENCOUNTER — OFFICE VISIT (OUTPATIENT)
Dept: FAMILY MEDICINE CLINIC | Age: 73
End: 2023-10-18
Payer: MEDICARE

## 2023-10-18 VITALS
TEMPERATURE: 97.8 F | SYSTOLIC BLOOD PRESSURE: 126 MMHG | BODY MASS INDEX: 34.27 KG/M2 | HEIGHT: 72 IN | HEART RATE: 74 BPM | OXYGEN SATURATION: 96 % | WEIGHT: 253 LBS | DIASTOLIC BLOOD PRESSURE: 68 MMHG

## 2023-10-18 DIAGNOSIS — Z23 NEED FOR IMMUNIZATION AGAINST INFLUENZA: ICD-10-CM

## 2023-10-18 DIAGNOSIS — L30.9 DERMATITIS: Primary | ICD-10-CM

## 2023-10-18 PROCEDURE — 96372 THER/PROPH/DIAG INJ SC/IM: CPT | Performed by: NURSE PRACTITIONER

## 2023-10-18 PROCEDURE — 3078F DIAST BP <80 MM HG: CPT | Performed by: NURSE PRACTITIONER

## 2023-10-18 PROCEDURE — G0008 ADMIN INFLUENZA VIRUS VAC: HCPCS | Performed by: NURSE PRACTITIONER

## 2023-10-18 PROCEDURE — 1036F TOBACCO NON-USER: CPT | Performed by: NURSE PRACTITIONER

## 2023-10-18 PROCEDURE — 3074F SYST BP LT 130 MM HG: CPT | Performed by: NURSE PRACTITIONER

## 2023-10-18 PROCEDURE — G8484 FLU IMMUNIZE NO ADMIN: HCPCS | Performed by: NURSE PRACTITIONER

## 2023-10-18 PROCEDURE — G8427 DOCREV CUR MEDS BY ELIG CLIN: HCPCS | Performed by: NURSE PRACTITIONER

## 2023-10-18 PROCEDURE — 1123F ACP DISCUSS/DSCN MKR DOCD: CPT | Performed by: NURSE PRACTITIONER

## 2023-10-18 PROCEDURE — G8417 CALC BMI ABV UP PARAM F/U: HCPCS | Performed by: NURSE PRACTITIONER

## 2023-10-18 PROCEDURE — 3017F COLORECTAL CA SCREEN DOC REV: CPT | Performed by: NURSE PRACTITIONER

## 2023-10-18 PROCEDURE — 99213 OFFICE O/P EST LOW 20 MIN: CPT | Performed by: NURSE PRACTITIONER

## 2023-10-18 PROCEDURE — 90694 VACC AIIV4 NO PRSRV 0.5ML IM: CPT | Performed by: NURSE PRACTITIONER

## 2023-10-18 RX ORDER — METHYLPREDNISOLONE ACETATE 80 MG/ML
60 INJECTION, SUSPENSION INTRA-ARTICULAR; INTRALESIONAL; INTRAMUSCULAR; SOFT TISSUE ONCE
Status: COMPLETED | OUTPATIENT
Start: 2023-10-18 | End: 2023-10-18

## 2023-10-18 RX ADMIN — METHYLPREDNISOLONE ACETATE 60 MG: 80 INJECTION, SUSPENSION INTRA-ARTICULAR; INTRALESIONAL; INTRAMUSCULAR; SOFT TISSUE at 12:47

## 2023-10-18 SDOH — ECONOMIC STABILITY: HOUSING INSECURITY
IN THE LAST 12 MONTHS, WAS THERE A TIME WHEN YOU DID NOT HAVE A STEADY PLACE TO SLEEP OR SLEPT IN A SHELTER (INCLUDING NOW)?: NO

## 2023-10-18 SDOH — ECONOMIC STABILITY: FOOD INSECURITY: WITHIN THE PAST 12 MONTHS, THE FOOD YOU BOUGHT JUST DIDN'T LAST AND YOU DIDN'T HAVE MONEY TO GET MORE.: NEVER TRUE

## 2023-10-18 SDOH — ECONOMIC STABILITY: FOOD INSECURITY: WITHIN THE PAST 12 MONTHS, YOU WORRIED THAT YOUR FOOD WOULD RUN OUT BEFORE YOU GOT MONEY TO BUY MORE.: NEVER TRUE

## 2023-10-18 SDOH — ECONOMIC STABILITY: INCOME INSECURITY: HOW HARD IS IT FOR YOU TO PAY FOR THE VERY BASICS LIKE FOOD, HOUSING, MEDICAL CARE, AND HEATING?: NOT HARD AT ALL

## 2023-10-18 ASSESSMENT — PATIENT HEALTH QUESTIONNAIRE - PHQ9
SUM OF ALL RESPONSES TO PHQ9 QUESTIONS 1 & 2: 0
5. POOR APPETITE OR OVEREATING: 0
4. FEELING TIRED OR HAVING LITTLE ENERGY: 0
7. TROUBLE CONCENTRATING ON THINGS, SUCH AS READING THE NEWSPAPER OR WATCHING TELEVISION: 0
3. TROUBLE FALLING OR STAYING ASLEEP: 0
10. IF YOU CHECKED OFF ANY PROBLEMS, HOW DIFFICULT HAVE THESE PROBLEMS MADE IT FOR YOU TO DO YOUR WORK, TAKE CARE OF THINGS AT HOME, OR GET ALONG WITH OTHER PEOPLE: 0
8. MOVING OR SPEAKING SO SLOWLY THAT OTHER PEOPLE COULD HAVE NOTICED. OR THE OPPOSITE, BEING SO FIGETY OR RESTLESS THAT YOU HAVE BEEN MOVING AROUND A LOT MORE THAN USUAL: 0
6. FEELING BAD ABOUT YOURSELF - OR THAT YOU ARE A FAILURE OR HAVE LET YOURSELF OR YOUR FAMILY DOWN: 0
2. FEELING DOWN, DEPRESSED OR HOPELESS: 0
SUM OF ALL RESPONSES TO PHQ QUESTIONS 1-9: 0
9. THOUGHTS THAT YOU WOULD BE BETTER OFF DEAD, OR OF HURTING YOURSELF: 0
SUM OF ALL RESPONSES TO PHQ QUESTIONS 1-9: 0
1. LITTLE INTEREST OR PLEASURE IN DOING THINGS: 0

## 2023-10-18 ASSESSMENT — ENCOUNTER SYMPTOMS
TROUBLE SWALLOWING: 0
CHEST TIGHTNESS: 0
COUGH: 0
WHEEZING: 0
SHORTNESS OF BREATH: 0
SORE THROAT: 0
FACIAL SWELLING: 0

## 2023-10-18 ASSESSMENT — COLUMBIA-SUICIDE SEVERITY RATING SCALE - C-SSRS
3. HAVE YOU BEEN THINKING ABOUT HOW YOU MIGHT KILL YOURSELF?: NO
4. HAVE YOU HAD THESE THOUGHTS AND HAD SOME INTENTION OF ACTING ON THEM?: NO
7. DID THIS OCCUR IN THE LAST THREE MONTHS: NO
5. HAVE YOU STARTED TO WORK OUT OR WORKED OUT THE DETAILS OF HOW TO KILL YOURSELF? DO YOU INTEND TO CARRY OUT THIS PLAN?: NO

## 2023-11-21 ENCOUNTER — HOSPITAL ENCOUNTER (OUTPATIENT)
Dept: LAB | Age: 73
Discharge: HOME | End: 2023-11-21
Payer: MEDICARE

## 2023-11-21 LAB
INR IN PPP BY COAGULATION ASSAY EXTERNAL: 2
INR PPP: 2
PROTHROMBIN TIME (PT) IN PPP BY COAGULATION ASSAY EXTERNAL: NORMAL SECONDS
PROTHROMBIN TIME: 23.2 SEC (ref 12.3–14.9)

## 2023-11-21 PROCEDURE — 85610 PROTHROMBIN TIME: CPT

## 2023-11-21 PROCEDURE — 36415 COLL VENOUS BLD VENIPUNCTURE: CPT

## 2023-11-22 ENCOUNTER — ANTICOAGULATION - WARFARIN VISIT (OUTPATIENT)
Dept: CARDIOLOGY | Facility: CLINIC | Age: 73
End: 2023-11-22
Payer: MEDICARE

## 2023-11-28 DIAGNOSIS — I48.0 PAROXYSMAL ATRIAL FIBRILLATION (MULTI): ICD-10-CM

## 2023-11-28 DIAGNOSIS — K21.9 GASTROESOPHAGEAL REFLUX DISEASE: ICD-10-CM

## 2023-11-28 DIAGNOSIS — E03.9 ACQUIRED HYPOTHYROIDISM: ICD-10-CM

## 2023-11-29 RX ORDER — LEVOTHYROXINE SODIUM 88 UG/1
TABLET ORAL
Qty: 90 TABLET | Refills: 0 | Status: SHIPPED | OUTPATIENT
Start: 2023-11-29

## 2023-11-29 RX ORDER — OMEPRAZOLE 20 MG/1
CAPSULE, DELAYED RELEASE ORAL
Qty: 90 CAPSULE | Refills: 0 | Status: SHIPPED | OUTPATIENT
Start: 2023-11-29

## 2023-11-30 PROBLEM — G47.30 SLEEP APNEA: Status: ACTIVE | Noted: 2023-11-30

## 2023-11-30 PROBLEM — N19 RENAL FAILURE: Status: ACTIVE | Noted: 2023-11-30

## 2023-11-30 PROBLEM — R42 DIZZINESS: Status: ACTIVE | Noted: 2023-11-30

## 2023-11-30 PROBLEM — I10 BENIGN ESSENTIAL HYPERTENSION: Status: ACTIVE | Noted: 2023-11-30

## 2023-11-30 PROBLEM — R06.02 SHORTNESS OF BREATH: Status: ACTIVE | Noted: 2023-11-30

## 2023-11-30 PROBLEM — R55 SYNCOPE: Status: ACTIVE | Noted: 2023-11-30

## 2023-11-30 PROBLEM — I48.0 PAROXYSMAL ATRIAL FIBRILLATION (MULTI): Status: ACTIVE | Noted: 2023-11-30

## 2023-11-30 PROBLEM — R94.31 PROLONGED QT INTERVAL: Status: ACTIVE | Noted: 2023-11-30

## 2023-11-30 PROBLEM — Z95.0 PACEMAKER: Status: ACTIVE | Noted: 2023-11-30

## 2023-11-30 PROBLEM — Z86.79 HISTORY OF SICK SINUS SYNDROME: Status: ACTIVE | Noted: 2023-11-30

## 2023-11-30 RX ORDER — CENEGERMIN-BKBJ 20 UG/ML
SOLUTION/ DROPS OPHTHALMIC
COMMUNITY
Start: 2023-09-12

## 2023-11-30 RX ORDER — WARFARIN 2.5 MG/1
TABLET ORAL
COMMUNITY
End: 2024-01-18

## 2023-11-30 RX ORDER — FUROSEMIDE 40 MG/1
40 TABLET ORAL DAILY
COMMUNITY
Start: 2023-11-16

## 2023-11-30 RX ORDER — SOTALOL HYDROCHLORIDE 80 MG/1
0.5 TABLET ORAL 2 TIMES DAILY
COMMUNITY
End: 2024-03-23 | Stop reason: WASHOUT

## 2023-11-30 RX ORDER — CARVEDILOL 12.5 MG/1
12.5 TABLET ORAL 2 TIMES DAILY
COMMUNITY
End: 2024-01-22

## 2023-11-30 RX ORDER — SOLIFENACIN SUCCINATE 5 MG/1
5 TABLET, FILM COATED ORAL DAILY
COMMUNITY

## 2023-11-30 RX ORDER — TRAZODONE HYDROCHLORIDE 50 MG/1
50 TABLET ORAL NIGHTLY
COMMUNITY

## 2023-11-30 RX ORDER — SOTALOL HYDROCHLORIDE 80 MG/1
TABLET ORAL 2 TIMES DAILY
Qty: 90 TABLET | Refills: 0 | Status: SHIPPED | OUTPATIENT
Start: 2023-11-30 | End: 2023-12-22

## 2023-11-30 RX ORDER — LEVOTHYROXINE SODIUM 88 UG/1
88 TABLET ORAL DAILY
COMMUNITY
Start: 2023-09-13

## 2023-11-30 RX ORDER — MORPHINE SULFATE 30 MG/1
30 TABLET, FILM COATED, EXTENDED RELEASE ORAL EVERY 8 HOURS
COMMUNITY

## 2023-11-30 RX ORDER — ALBUTEROL SULFATE 0.83 MG/ML
SOLUTION RESPIRATORY (INHALATION)
COMMUNITY

## 2023-11-30 RX ORDER — OMEPRAZOLE 20 MG/1
60 CAPSULE, DELAYED RELEASE ORAL
COMMUNITY

## 2023-12-14 ENCOUNTER — HOSPITAL ENCOUNTER (OUTPATIENT)
Dept: CARDIOLOGY | Facility: HOSPITAL | Age: 73
Discharge: HOME | End: 2023-12-14
Payer: MEDICARE

## 2023-12-14 DIAGNOSIS — Z95.0 PACEMAKER: ICD-10-CM

## 2023-12-14 DIAGNOSIS — I49.5 SICK SINUS SYNDROME (MULTI): ICD-10-CM

## 2023-12-14 PROCEDURE — 93290 INTERROG DEV EVAL ICPMS IP: CPT | Performed by: INTERNAL MEDICINE

## 2023-12-14 PROCEDURE — 93280 PM DEVICE PROGR EVAL DUAL: CPT | Performed by: INTERNAL MEDICINE

## 2023-12-14 PROCEDURE — 93280 PM DEVICE PROGR EVAL DUAL: CPT

## 2023-12-18 DIAGNOSIS — I48.0 PAROXYSMAL ATRIAL FIBRILLATION (MULTI): ICD-10-CM

## 2023-12-22 PROBLEM — R42 DIZZINESS AND GIDDINESS: Status: ACTIVE | Noted: 2018-08-21

## 2023-12-22 PROBLEM — R39.15 URINARY URGENCY: Status: ACTIVE | Noted: 2018-10-02

## 2023-12-22 PROBLEM — Z95.0 PRESENCE OF CARDIAC PACEMAKER: Status: ACTIVE | Noted: 2018-09-17

## 2023-12-22 PROBLEM — H52.203 HYPEROPIA OF BOTH EYES WITH ASTIGMATISM AND PRESBYOPIA: Status: ACTIVE | Noted: 2020-01-28

## 2023-12-22 PROBLEM — H16.229 KERATOCONJUNCTIVITIS SICCA: Status: ACTIVE | Noted: 2019-04-22

## 2023-12-22 PROBLEM — H16.021: Status: ACTIVE | Noted: 2021-12-07

## 2023-12-22 PROBLEM — R35.81 NOCTURNAL POLYURIA: Status: ACTIVE | Noted: 2018-10-02

## 2023-12-22 PROBLEM — Z96.652 STATUS POST TOTAL LEFT KNEE REPLACEMENT: Status: ACTIVE | Noted: 2018-09-20

## 2023-12-22 PROBLEM — H16.8 NEUROTROPHIC KERATITIS: Status: ACTIVE | Noted: 2021-12-27

## 2023-12-22 PROBLEM — H43.393 VITREOUS FLOATERS OF BOTH EYES: Status: ACTIVE | Noted: 2018-10-16

## 2023-12-22 PROBLEM — K74.69 OTHER CIRRHOSIS OF LIVER (MULTI): Status: ACTIVE | Noted: 2017-11-21

## 2023-12-22 PROBLEM — H10.502 BLEPHAROCONJUNCTIVITIS OF LEFT EYE: Status: ACTIVE | Noted: 2019-07-10

## 2023-12-22 PROBLEM — H04.123 DRY EYE SYNDROME OF BOTH EYES: Status: ACTIVE | Noted: 2019-04-15

## 2023-12-22 PROBLEM — R55 SYNCOPE AND COLLAPSE: Status: ACTIVE | Noted: 2019-03-20

## 2023-12-22 PROBLEM — Z96.1 PSEUDOPHAKIA OF BOTH EYES: Status: ACTIVE | Noted: 2020-03-17

## 2023-12-22 PROBLEM — H18.899: Status: ACTIVE | Noted: 2019-02-20

## 2023-12-22 PROBLEM — Z98.890 HISTORY OF SURGICAL PROCEDURE: Status: ACTIVE | Noted: 2021-12-17

## 2023-12-22 PROBLEM — M19.90 ARTHRITIS: Status: ACTIVE | Noted: 2022-03-01

## 2023-12-22 PROBLEM — H16.012 CENTRAL CORNEAL ULCER OF LEFT EYE: Status: ACTIVE | Noted: 2020-10-20

## 2023-12-22 PROBLEM — H52.03 HYPEROPIA OF BOTH EYES WITH ASTIGMATISM AND PRESBYOPIA: Status: ACTIVE | Noted: 2020-01-28

## 2023-12-22 PROBLEM — H16.232 NEUROTROPHIC KERATOCONJUNCTIVITIS, LEFT EYE: Status: ACTIVE | Noted: 2021-12-27

## 2023-12-22 PROBLEM — M35.01 KERATOCONJUNCTIVITIS SICCA (MULTI): Status: ACTIVE | Noted: 2019-04-22

## 2023-12-22 PROBLEM — H02.88B MEIBOMIAN GLAND DYSFUNCTION (MGD) OF UPPER AND LOWER LIDS OF BOTH EYES: Status: ACTIVE | Noted: 2018-10-16

## 2023-12-22 PROBLEM — H18.832 RECURRENT CORNEAL EROSION, LEFT: Status: ACTIVE | Noted: 2022-01-03

## 2023-12-22 PROBLEM — F41.0 PANIC ATTACK: Status: ACTIVE | Noted: 2019-10-01

## 2023-12-22 PROBLEM — I77.6 VASCULITIS (CMS-HCC): Status: ACTIVE | Noted: 2021-02-02

## 2023-12-22 PROBLEM — H52.4 HYPEROPIA OF BOTH EYES WITH ASTIGMATISM AND PRESBYOPIA: Status: ACTIVE | Noted: 2020-01-28

## 2023-12-22 PROBLEM — H16.142 PUNCTATE KERATITIS OF LEFT EYE: Status: ACTIVE | Noted: 2022-01-03

## 2023-12-22 PROBLEM — H16.143 PUNCTATE KERATITIS OF BOTH EYES: Status: ACTIVE | Noted: 2018-10-16

## 2023-12-22 PROBLEM — H25.812 COMBINED FORMS OF AGE-RELATED CATARACT OF LEFT EYE: Status: ACTIVE | Noted: 2019-04-15

## 2023-12-22 PROBLEM — H02.88A MEIBOMIAN GLAND DYSFUNCTION (MGD) OF UPPER AND LOWER LIDS OF BOTH EYES: Status: ACTIVE | Noted: 2018-10-16

## 2023-12-22 PROBLEM — J44.9 CHRONIC OBSTRUCTIVE PULMONARY DISEASE (MULTI): Status: ACTIVE | Noted: 2019-06-05

## 2023-12-22 PROBLEM — B00.52 HSV (HERPES SIMPLEX VIRUS) DENDRITIC KERATITIS: Status: ACTIVE | Noted: 2019-03-11

## 2023-12-22 PROBLEM — I10 ESSENTIAL HYPERTENSION: Status: ACTIVE | Noted: 2018-08-21

## 2023-12-22 PROBLEM — H18.833 RECURRENT EROSION OF BOTH CORNEAS: Status: ACTIVE | Noted: 2021-10-28

## 2023-12-22 RX ORDER — SOTALOL HYDROCHLORIDE 80 MG/1
TABLET ORAL 2 TIMES DAILY
Qty: 90 TABLET | Refills: 0 | Status: SHIPPED | OUTPATIENT
Start: 2023-12-22 | End: 2024-03-23

## 2023-12-26 DIAGNOSIS — F51.02 ADJUSTMENT INSOMNIA: ICD-10-CM

## 2023-12-26 NOTE — TELEPHONE ENCOUNTER
Comments:     Last Office Visit (last PCP visit):   12/14/2022    Next Visit Date:  Future Appointments   Date Time Provider [address]   1/12/2024 11:30 AM Latoya Blake MD [facility]       **If hasn't been seen in over a year OR hasn't followed up according to last diabetes/ADHD visit, make appointment for patient before sending refill to provider. Rx requested:  Requested Prescriptions     Pending Prescriptions Disp Refills    traZODone (DESYREL) 50 MG tablet [Pharmacy Med Name: traZODone HCl 50 MG Oral Tablet] 30 tablet 0     Sig: TAKE 1 TABLET BY MOUTH ONCE DAILY AT BEDTIME AS NEEDED FOR  SLEEP  OR  DEPRESSION.

## 2023-12-28 RX ORDER — TRAZODONE HYDROCHLORIDE 50 MG/1
TABLET ORAL
Qty: 15 TABLET | Refills: 0 | Status: SHIPPED | OUTPATIENT
Start: 2023-12-28

## 2024-01-01 ENCOUNTER — APPOINTMENT (OUTPATIENT)
Dept: CARDIOLOGY | Facility: HOSPITAL | Age: 74
End: 2024-01-01
Payer: MEDICARE

## 2024-01-01 ENCOUNTER — APPOINTMENT (OUTPATIENT)
Dept: RADIOLOGY | Facility: HOSPITAL | Age: 74
End: 2024-01-01
Payer: MEDICARE

## 2024-01-01 ENCOUNTER — TELEPHONE (OUTPATIENT)
Dept: FAMILY MEDICINE CLINIC | Age: 74
End: 2024-01-01

## 2024-01-01 DIAGNOSIS — J44.9 CHRONIC OBSTRUCTIVE PULMONARY DISEASE, UNSPECIFIED COPD TYPE (HCC): Primary | ICD-10-CM

## 2024-01-01 PROCEDURE — 93284 PRGRMG EVAL IMPLANTABLE DFB: CPT

## 2024-01-01 PROCEDURE — 71045 X-RAY EXAM CHEST 1 VIEW: CPT

## 2024-01-01 PROCEDURE — 93005 ELECTROCARDIOGRAM TRACING: CPT

## 2024-01-01 PROCEDURE — 71046 X-RAY EXAM CHEST 2 VIEWS: CPT

## 2024-01-01 PROCEDURE — 93284 PRGRMG EVAL IMPLANTABLE DFB: CPT | Performed by: INTERNAL MEDICINE

## 2024-01-01 PROCEDURE — 93281 PM DEVICE PROGR EVAL MULTI: CPT | Performed by: INTERNAL MEDICINE

## 2024-01-01 PROCEDURE — 74230 X-RAY XM SWLNG FUNCJ C+: CPT

## 2024-01-01 PROCEDURE — 71250 CT THORAX DX C-: CPT

## 2024-01-01 PROCEDURE — 70486 CT MAXILLOFACIAL W/O DYE: CPT

## 2024-01-01 PROCEDURE — 70450 CT HEAD/BRAIN W/O DYE: CPT

## 2024-01-01 RX ORDER — ALBUTEROL SULFATE 90 UG/1
2 INHALANT RESPIRATORY (INHALATION) EVERY 4 HOURS PRN
Qty: 18 G | Refills: 3 | Status: SHIPPED | OUTPATIENT
Start: 2024-01-01 | End: 2024-12-03

## 2024-01-03 DIAGNOSIS — I48.0 PAROXYSMAL ATRIAL FIBRILLATION (MULTI): ICD-10-CM

## 2024-01-12 ENCOUNTER — OFFICE VISIT (OUTPATIENT)
Dept: INTERNAL MEDICINE | Age: 74
End: 2024-01-12

## 2024-01-12 VITALS
HEART RATE: 76 BPM | TEMPERATURE: 97.1 F | BODY MASS INDEX: 34.67 KG/M2 | WEIGHT: 256 LBS | OXYGEN SATURATION: 96 % | SYSTOLIC BLOOD PRESSURE: 136 MMHG | DIASTOLIC BLOOD PRESSURE: 76 MMHG | HEIGHT: 72 IN

## 2024-01-12 DIAGNOSIS — I27.20 PULMONARY HYPERTENSION (HCC): ICD-10-CM

## 2024-01-12 DIAGNOSIS — Z00.00 MEDICARE ANNUAL WELLNESS VISIT, SUBSEQUENT: ICD-10-CM

## 2024-01-12 DIAGNOSIS — H66.92 LEFT ACUTE OTITIS MEDIA: ICD-10-CM

## 2024-01-12 DIAGNOSIS — E03.9 ACQUIRED HYPOTHYROIDISM: ICD-10-CM

## 2024-01-12 DIAGNOSIS — Z00.00 ENCOUNTER FOR ANNUAL WELLNESS EXAM IN MEDICARE PATIENT: ICD-10-CM

## 2024-01-12 DIAGNOSIS — M35.01 KERATOCONJUNCTIVITIS SICCA (HCC): ICD-10-CM

## 2024-01-12 DIAGNOSIS — Z00.00 ENCOUNTER FOR ANNUAL WELLNESS EXAM IN MEDICARE PATIENT: Primary | ICD-10-CM

## 2024-01-12 DIAGNOSIS — I77.6 VASCULITIS (HCC): ICD-10-CM

## 2024-01-12 DIAGNOSIS — K74.69 OTHER CIRRHOSIS OF LIVER (HCC): ICD-10-CM

## 2024-01-12 DIAGNOSIS — F51.02 ADJUSTMENT INSOMNIA: ICD-10-CM

## 2024-01-12 DIAGNOSIS — F32.A DEPRESSION, UNSPECIFIED DEPRESSION TYPE: ICD-10-CM

## 2024-01-12 DIAGNOSIS — R39.15 URINARY URGENCY: ICD-10-CM

## 2024-01-12 DIAGNOSIS — I48.91 ATRIAL FIBRILLATION, UNSPECIFIED TYPE (HCC): ICD-10-CM

## 2024-01-12 PROBLEM — R55 SYNCOPE AND COLLAPSE: Status: RESOLVED | Noted: 2019-03-20 | Resolved: 2024-01-12

## 2024-01-12 PROBLEM — R42 DIZZINESS AND GIDDINESS: Status: RESOLVED | Noted: 2018-08-21 | Resolved: 2024-01-12

## 2024-01-12 PROBLEM — F41.0 PANIC ATTACK: Status: RESOLVED | Noted: 2019-10-01 | Resolved: 2024-01-12

## 2024-01-12 LAB
ALBUMIN SERPL-MCNC: 3.4 G/DL (ref 3.5–4.6)
ALP SERPL-CCNC: 96 U/L (ref 35–104)
ALT SERPL-CCNC: 9 U/L (ref 0–41)
ANION GAP SERPL CALCULATED.3IONS-SCNC: 9 MEQ/L (ref 9–15)
AST SERPL-CCNC: 18 U/L (ref 0–40)
BILIRUB SERPL-MCNC: 0.6 MG/DL (ref 0.2–0.7)
BUN SERPL-MCNC: 22 MG/DL (ref 8–23)
CALCIUM SERPL-MCNC: 8.9 MG/DL (ref 8.5–9.9)
CHLORIDE SERPL-SCNC: 102 MEQ/L (ref 95–107)
CHOLEST SERPL-MCNC: 134 MG/DL (ref 0–199)
CO2 SERPL-SCNC: 29 MEQ/L (ref 20–31)
CREAT SERPL-MCNC: 1.1 MG/DL (ref 0.7–1.2)
GLOBULIN SER CALC-MCNC: 3.2 G/DL (ref 2.3–3.5)
GLUCOSE FASTING: 97 MG/DL (ref 70–99)
HDLC SERPL-MCNC: 28 MG/DL (ref 40–59)
LDL CHOLESTEROL CALCULATED: 94 MG/DL (ref 0–129)
POTASSIUM SERPL-SCNC: 4.3 MEQ/L (ref 3.4–4.9)
PROT SERPL-MCNC: 6.6 G/DL (ref 6.3–8)
SODIUM SERPL-SCNC: 140 MEQ/L (ref 135–144)
TRIGLYCERIDE, FASTING: 61 MG/DL (ref 0–150)
TSH SERPL-MCNC: 2.95 UIU/ML (ref 0.44–3.86)

## 2024-01-12 RX ORDER — DOXYCYCLINE HYCLATE 100 MG
100 TABLET ORAL 2 TIMES DAILY
Qty: 14 TABLET | Refills: 0 | Status: SHIPPED | OUTPATIENT
Start: 2024-01-12 | End: 2024-01-19

## 2024-01-12 RX ORDER — LAMOTRIGINE 100 MG/1
100 TABLET ORAL NIGHTLY
COMMUNITY
Start: 2023-12-23

## 2024-01-12 RX ORDER — FUROSEMIDE 40 MG/1
TABLET ORAL
COMMUNITY
Start: 2023-11-16

## 2024-01-12 RX ORDER — ESCITALOPRAM OXALATE 20 MG/1
20 TABLET ORAL DAILY
Qty: 30 TABLET | Refills: 3 | Status: SHIPPED | OUTPATIENT
Start: 2024-01-12

## 2024-01-12 RX ORDER — TRAZODONE HYDROCHLORIDE 50 MG/1
50 TABLET ORAL NIGHTLY
Qty: 30 TABLET | Refills: 11 | Status: SHIPPED | OUTPATIENT
Start: 2024-01-12

## 2024-01-12 RX ORDER — SOLIFENACIN SUCCINATE 5 MG/1
5 TABLET, FILM COATED ORAL 2 TIMES DAILY
Qty: 180 TABLET | Refills: 3 | Status: SHIPPED | OUTPATIENT
Start: 2024-01-12

## 2024-01-12 ASSESSMENT — PATIENT HEALTH QUESTIONNAIRE - PHQ9
2. FEELING DOWN, DEPRESSED OR HOPELESS: 3
1. LITTLE INTEREST OR PLEASURE IN DOING THINGS: 3
SUM OF ALL RESPONSES TO PHQ9 QUESTIONS 1 & 2: 6
SUM OF ALL RESPONSES TO PHQ QUESTIONS 1-9: 6
SUM OF ALL RESPONSES TO PHQ QUESTIONS 1-9: 12
8. MOVING OR SPEAKING SO SLOWLY THAT OTHER PEOPLE COULD HAVE NOTICED. OR THE OPPOSITE, BEING SO FIGETY OR RESTLESS THAT YOU HAVE BEEN MOVING AROUND A LOT MORE THAN USUAL: 0
SUM OF ALL RESPONSES TO PHQ QUESTIONS 1-9: 6
5. POOR APPETITE OR OVEREATING: 0
7. TROUBLE CONCENTRATING ON THINGS, SUCH AS READING THE NEWSPAPER OR WATCHING TELEVISION: 0
SUM OF ALL RESPONSES TO PHQ QUESTIONS 1-9: 12
10. IF YOU CHECKED OFF ANY PROBLEMS, HOW DIFFICULT HAVE THESE PROBLEMS MADE IT FOR YOU TO DO YOUR WORK, TAKE CARE OF THINGS AT HOME, OR GET ALONG WITH OTHER PEOPLE: 3
10. IF YOU CHECKED OFF ANY PROBLEMS, HOW DIFFICULT HAVE THESE PROBLEMS MADE IT FOR YOU TO DO YOUR WORK, TAKE CARE OF THINGS AT HOME, OR GET ALONG WITH OTHER PEOPLE: 3
SUM OF ALL RESPONSES TO PHQ QUESTIONS 1-9: 12
9. THOUGHTS THAT YOU WOULD BE BETTER OFF DEAD, OR OF HURTING YOURSELF: 0
1. LITTLE INTEREST OR PLEASURE IN DOING THINGS: 3
6. FEELING BAD ABOUT YOURSELF - OR THAT YOU ARE A FAILURE OR HAVE LET YOURSELF OR YOUR FAMILY DOWN: 0
SUM OF ALL RESPONSES TO PHQ9 QUESTIONS 1 & 2: 6
SUM OF ALL RESPONSES TO PHQ QUESTIONS 1-9: 12
3. TROUBLE FALLING OR STAYING ASLEEP: 3
4. FEELING TIRED OR HAVING LITTLE ENERGY: 3
2. FEELING DOWN, DEPRESSED OR HOPELESS: 3

## 2024-01-12 ASSESSMENT — ENCOUNTER SYMPTOMS
SHORTNESS OF BREATH: 0
COUGH: 0
WHEEZING: 0
ABDOMINAL PAIN: 0
CONSTIPATION: 0
SORE THROAT: 0
RHINORRHEA: 0
DIARRHEA: 0

## 2024-01-12 ASSESSMENT — COLUMBIA-SUICIDE SEVERITY RATING SCALE - C-SSRS
6. HAVE YOU EVER DONE ANYTHING, STARTED TO DO ANYTHING, OR PREPARED TO DO ANYTHING TO END YOUR LIFE?: NO
1. WITHIN THE PAST MONTH, HAVE YOU WISHED YOU WERE DEAD OR WISHED YOU COULD GO TO SLEEP AND NOT WAKE UP?: YES
2. HAVE YOU ACTUALLY HAD ANY THOUGHTS OF KILLING YOURSELF?: NO

## 2024-01-12 ASSESSMENT — LIFESTYLE VARIABLES
HOW OFTEN DO YOU HAVE A DRINK CONTAINING ALCOHOL: NEVER
HOW MANY STANDARD DRINKS CONTAINING ALCOHOL DO YOU HAVE ON A TYPICAL DAY: PATIENT DOES NOT DRINK

## 2024-01-12 NOTE — PROGRESS NOTES
[facility]  [address] [id number]  Dept: [phone]  Dept Fax: [phone]  Loc: [phone]     Chief Complaint  Chief Complaint   Patient presents with    Medicare AWV    Depression    Congestion     Sinuses, b/l ear pain, headache, on and off, x1 week       HPI:  73 y.o.male who presents for the following:      Mood: more depressed lately related to losing his vision; would like to start a med for this; hasn't been taking lexapro. Having trouble sleeping; hasn't been taking the trazodone    Urinary urgency: the vesicare helped the daytime symptoms but still bothering him./    URI symptoms: L ear pain x2 weeks; has HA in the temples and cheeks; no fevers; more mucus; hx of many ear infections growing up    Review of Systems   Constitutional:  Negative for chills and fever.   HENT:  Negative for congestion, rhinorrhea and sore throat.    Respiratory:  Negative for cough, shortness of breath and wheezing.    Gastrointestinal:  Negative for abdominal pain, constipation and diarrhea.   Endocrine: Negative for polydipsia and polyuria.   Genitourinary:  Negative for dysuria, frequency and urgency.   Neurological:  Negative for syncope, light-headedness, numbness and headaches.   Psychiatric/Behavioral:  Positive for dysphoric mood. Negative for sleep disturbance. The patient is not nervous/anxious.        Past Medical History:   Diagnosis Date    Allergic rhinitis     Asthma     asthmatic bronchitis    Atrial fibrillation (HCC)     CAD (coronary artery disease)     Chronic back pain     COPD (chronic obstructive pulmonary disease) (HCC)     CVA (cerebral infarction)     Depression     Edema     GERD (gastroesophageal reflux disease)     ulcer    Glucose intolerance (impaired glucose tolerance) 11/06    Hypertension     ICD (implantable cardiac defibrillator) in place 2010    OMAR (obstructive sleep apnea)

## 2024-01-12 NOTE — PATIENT INSTRUCTIONS
acetaminophen (Tylenol).   When should you call for help?   Call 911 if you have symptoms of a heart attack. These may include:    Chest pain or pressure, or a strange feeling in the chest.     Sweating.     Shortness of breath.     Pain, pressure, or a strange feeling in the back, neck, jaw, or upper belly or in one or both shoulders or arms.     Lightheadedness or sudden weakness.     A fast or irregular heartbeat.   After you call 911, the  may tell you to chew 1 adult-strength or 2 to 4 low-dose aspirin. Wait for an ambulance. Do not try to drive yourself.  Watch closely for changes in your health, and be sure to contact your doctor if you have any problems.  Where can you learn more?  Go to https://www.[employer].net/patientEd and enter F075 to learn more about \"A Healthy Heart: Care Instructions.\"  Current as of: June 25, 2023               Content Version: 13.9  © 6988-4121 "[employer].   Care instructions adapted under license by [employer]. If you have questions about a medical condition or this instruction, always ask your healthcare professional. "[employer] disclaims any warranty or liability for your use of this information.      Personalized Preventive Plan for Jonah Alvarez - 1/12/2024  Medicare offers a range of preventive health benefits. Some of the tests and screenings are paid in full while other may be subject to a deductible, co-insurance, and/or copay.    Some of these benefits include a comprehensive review of your medical history including lifestyle, illnesses that may run in your family, and various assessments and screenings as appropriate.    After reviewing your medical record and screening and assessments performed today your provider may have ordered immunizations, labs, imaging, and/or referrals for you.  A list of these orders (if applicable) as well as your Preventive Care list are included within your After Visit Summary for your

## 2024-01-18 DIAGNOSIS — I48.0 PAROXYSMAL ATRIAL FIBRILLATION (MULTI): ICD-10-CM

## 2024-01-18 RX ORDER — WARFARIN 2.5 MG/1
TABLET ORAL
Qty: 180 TABLET | Refills: 0 | Status: SHIPPED | OUTPATIENT
Start: 2024-01-18

## 2024-01-22 DIAGNOSIS — I50.9 CONGESTIVE HEART FAILURE, UNSPECIFIED HF CHRONICITY, UNSPECIFIED HEART FAILURE TYPE (MULTI): ICD-10-CM

## 2024-01-22 RX ORDER — CARVEDILOL 12.5 MG/1
12.5 TABLET ORAL 2 TIMES DAILY
Qty: 180 TABLET | Refills: 3 | Status: SHIPPED | OUTPATIENT
Start: 2024-01-22

## 2024-02-07 ENCOUNTER — LAB (OUTPATIENT)
Dept: LAB | Facility: LAB | Age: 74
End: 2024-02-07
Payer: MEDICARE

## 2024-02-07 ENCOUNTER — OFFICE VISIT (OUTPATIENT)
Dept: CARDIOLOGY | Facility: CLINIC | Age: 74
End: 2024-02-07
Payer: MEDICARE

## 2024-02-07 ENCOUNTER — HOSPITAL ENCOUNTER (OUTPATIENT)
Dept: CARDIOLOGY | Facility: HOSPITAL | Age: 74
Discharge: HOME | End: 2024-02-07
Payer: MEDICARE

## 2024-02-07 VITALS
DIASTOLIC BLOOD PRESSURE: 64 MMHG | HEIGHT: 72 IN | WEIGHT: 274 LBS | BODY MASS INDEX: 37.11 KG/M2 | HEART RATE: 73 BPM | SYSTOLIC BLOOD PRESSURE: 102 MMHG

## 2024-02-07 DIAGNOSIS — Z79.01 ANTICOAGULATION MANAGEMENT ENCOUNTER: ICD-10-CM

## 2024-02-07 DIAGNOSIS — Z87.891 FORMER SMOKER: ICD-10-CM

## 2024-02-07 DIAGNOSIS — I48.11 LONGSTANDING PERSISTENT ATRIAL FIBRILLATION (MULTI): Primary | ICD-10-CM

## 2024-02-07 DIAGNOSIS — Z95.0 PACEMAKER: ICD-10-CM

## 2024-02-07 DIAGNOSIS — R06.02 SHORTNESS OF BREATH: ICD-10-CM

## 2024-02-07 DIAGNOSIS — I49.5 SSS (SICK SINUS SYNDROME) (MULTI): ICD-10-CM

## 2024-02-07 DIAGNOSIS — I48.0 PAROXYSMAL ATRIAL FIBRILLATION (MULTI): ICD-10-CM

## 2024-02-07 DIAGNOSIS — Z51.81 ANTICOAGULATION MANAGEMENT ENCOUNTER: ICD-10-CM

## 2024-02-07 DIAGNOSIS — R94.31 ABNORMAL EKG: ICD-10-CM

## 2024-02-07 LAB
INR PPP: 2.8 (ref 0.9–1.1)
PROTHROMBIN TIME: 31.5 SECONDS (ref 9.8–12.8)

## 2024-02-07 PROCEDURE — 3074F SYST BP LT 130 MM HG: CPT | Performed by: INTERNAL MEDICINE

## 2024-02-07 PROCEDURE — 85610 PROTHROMBIN TIME: CPT

## 2024-02-07 PROCEDURE — 3078F DIAST BP <80 MM HG: CPT | Performed by: INTERNAL MEDICINE

## 2024-02-07 PROCEDURE — 93290 INTERROG DEV EVAL ICPMS IP: CPT | Performed by: INTERNAL MEDICINE

## 2024-02-07 PROCEDURE — 93000 ELECTROCARDIOGRAM COMPLETE: CPT | Performed by: INTERNAL MEDICINE

## 2024-02-07 PROCEDURE — 1159F MED LIST DOCD IN RCRD: CPT | Performed by: INTERNAL MEDICINE

## 2024-02-07 PROCEDURE — 93280 PM DEVICE PROGR EVAL DUAL: CPT | Performed by: INTERNAL MEDICINE

## 2024-02-07 PROCEDURE — 99214 OFFICE O/P EST MOD 30 MIN: CPT | Performed by: INTERNAL MEDICINE

## 2024-02-07 PROCEDURE — 93280 PM DEVICE PROGR EVAL DUAL: CPT

## 2024-02-07 PROCEDURE — 3008F BODY MASS INDEX DOCD: CPT | Performed by: INTERNAL MEDICINE

## 2024-02-07 RX ORDER — DABIGATRAN ETEXILATE 150 MG/1
150 CAPSULE ORAL 2 TIMES DAILY
Qty: 180 CAPSULE | Refills: 3 | Status: SHIPPED | OUTPATIENT
Start: 2024-02-07

## 2024-02-07 RX ORDER — ESCITALOPRAM OXALATE 20 MG/1
20 TABLET ORAL DAILY
COMMUNITY
Start: 2024-01-12

## 2024-02-07 RX ORDER — LAMOTRIGINE 100 MG/1
1 TABLET ORAL NIGHTLY
COMMUNITY
Start: 2024-01-18

## 2024-02-07 ASSESSMENT — ENCOUNTER SYMPTOMS
DYSPNEA ON EXERTION: 0
PALPITATIONS: 0

## 2024-02-07 NOTE — PROGRESS NOTES
CARDIOLOGY OFFICE VISIT      CHIEF COMPLAINT  Chief Complaint   Patient presents with    Device Check     Rapid Battery depletion       HISTORY OF PRESENT ILLNESS  HPI  73-year-old  male who is followed for persistent atrial fibrillation controlled on low-dose sotalol and carvedilol and anticoagulated with warfarin, sinus node dysfunction status post dual-chamber pacemaker implant on October 12, 2010 and generator change out on June 20, 2017 for MADHAV parameters, valvular heart disease, obstructive sleep apnea, hypertension, hypothyroidism on replacement therapy, and shortness of breath on supplemental oxygen as needed.    Since the last office visit he has been doing well.  He denies any symptoms of chest pain or shortness breath or palpitations.    EKG performed today shows atrial ventricular paced rhythm at rate of 73 bpm QRS duration 204 ms QT corrected 546 ms.  Rhythm strip shows the same pattern.    Patient had a device interrogation performed today at the device clinic and shows adequate sensing, capture and impedances of all leads.  [place] of atrial fibrillation 0%.      Past Medical History  Past Medical History:   Diagnosis Date    Encounter for preprocedural cardiovascular examination     Preop cardiovascular exam    Encounter for screening for lipoid disorders     Screening for lipoid disorders    Personal history of other diseases of the circulatory system 10/05/2021    History of cardiac murmur    Personal history of other endocrine, nutritional and metabolic disease     History of hypokalemia    Personal history of other endocrine, nutritional and metabolic disease     History of obesity    Personal history of other endocrine, nutritional and metabolic disease 08/03/2022    History of obesity    Personal history of other specified conditions     History of chest pain    Personal history of other specified conditions     History of fatigue    Personal history of other specified conditions      History of palpitations    Presence of cardiac pacemaker     History of cardiac pacemaker    Repeated falls     Multiple falls    Tinnitus, left ear 10/05/2021    Tinnitus of left ear       Social History  Social History     Tobacco Use    Smoking status: Former     Types: Cigarettes    Smokeless tobacco: Never   Substance Use Topics    Alcohol use: Not Currently    Drug use: Not Currently       Family History     Family History   Problem Relation Name Age of Onset    Heart failure Mother      Other (asbestosis) Father      Lung disease Father      Thyroid disease Sister          Allergies:  No Known Allergies     Outpatient Medications:  Current Outpatient Medications   Medication Instructions    albuterol 2.5 mg /3 mL (0.083 %) nebulizer solution inhalation, Use as directed PRN    carvedilol (COREG) 12.5 mg, oral, 2 times daily    escitalopram (LEXAPRO) 20 mg, oral, Daily    furosemide (LASIX) 40 mg, oral, Daily, PRN    lamoTRIgine (LaMICtal) 100 mg tablet 1 tablet, oral, Nightly    levothyroxine (SYNTHROID, LEVOXYL) 88 mcg, oral, Daily    morphine CR (MS CONTIN) 30 mg, oral, Every 8 hours, PRN    omeprazole (PRILOSEC) 60 mg, oral, Daily before breakfast    Oxervate 0.002 % ophthalmic solution     solifenacin (VESICARE) 5 mg, oral, Daily    sotalol (Betapace) 80 mg tablet 0.5 tablets, oral, 2 times daily    sotalol (Betapace) 80 mg tablet oral, 2 times daily    traZODone (DESYREL) 50 mg, oral, Nightly, PRN    warfarin (Coumadin) 2.5 mg tablet TAKE 1 TO 2 TABLETS BY MOUTH ONCE DAILY,  OR  AS  DIRECTED  BY  [facility]  PROTIME  DEPT.          REVIEW OF SYSTEMS  Review of Systems   Cardiovascular:  Negative for chest pain, dyspnea on exertion and palpitations.   All other systems reviewed and are negative.        VITALS  Vitals:    02/07/24 1336   BP: 102/64   Pulse: 73       PHYSICAL EXAM  Constitutional:       General: Awake.      Appearance: Normal and healthy appearance. Well-developed and not in distress.   Neck:       Vascular: No JVR. JVD normal.   Pulmonary:      Effort: Pulmonary effort is normal.      Breath sounds: Normal breath sounds. No wheezing. No rhonchi. No rales.   Chest:      Chest wall: Not tender to palpatation.      Comments: Left sided device pocket- healed and well approximated. No swelling or hematoma      Cardiovascular:      PMI at left midclavicular line. Normal rate. Regular rhythm. Normal S1. Normal S2.       Murmurs: There is no murmur.      No gallop.  No click. No rub.   Pulses:     Intact distal pulses.   Edema:     Peripheral edema absent.   Abdominal:      Tenderness: There is no abdominal tenderness.   Musculoskeletal: Normal range of motion.         General: No tenderness. Skin:     General: Skin is warm and dry.   Neurological:      General: No focal deficit present.      Mental Status: Alert and oriented to person, place and time.           ASSESSMENT AND PLAN]    Clinical impressions:  1. Persistent atrial fibrillation controlled on low-dose sotalol and carvedilol and anticoagulated with warfarin.  2. Sinus node dysfunction status post dual-chamber pacemaker implant on October 12, 2010 and generator change out on June 20, 2017 for MADHAV parameters ([employer] DR).  3. History of QTC prolongation on sotalol.  4. Intolerance of Multaq secondary to cough.  5. Normal left ventricular function per the echocardiogram dated July 27, 2022.  6. Valvular heart disease consisting of mild to moderate MR, moderate TR, mild AS, moderate aortic valve cusp calcification, and moderate AR per 2D echocardiogram dated July 27, 2022.  7. Lexiscan stress test dated July 26, 2022 revealing an ejection fraction of 57% with no acute ischemic changes or infarct patterns.  8. Obstructive sleep apnea on CPAP status post septoplasty on December 13, 2013.  9. Hypertension, controlled .  10. Esophageal reflux disease.  11. Hypothyroidism on replacement therapy.  12. Shortness of breath on supplemental oxygen at 2.5 to 3  L nasal cannula as needed.      Plan-recommendations    Patient is doing well from the electrophysiology standpoint.  He is maintaining sinus rhythm.  Continue with high risk medication (sotalol).    Patient would like to change prescriptions from warfarin to Pradaxa generic form.    Follow my office every 6 months or sooner needed.    Follow device clinic as scheduled.    Risk factor modification and lifestyle modification discussed with patient. Diet , exercise and hydration discussed with patient.    I have personally review with patient during this office visit, laboratory data, echocardiogram results, stress test results, Holter-event monitor results prior and after the last electrophysiology visit. All questions has been answered.    Please excuse any errors in grammar or translation related to this dictation.  Voice recognition software was utilized to prepare this document.

## 2024-02-08 ENCOUNTER — ANTICOAGULATION - WARFARIN VISIT (OUTPATIENT)
Dept: CARDIOLOGY | Facility: CLINIC | Age: 74
End: 2024-02-08
Payer: MEDICARE

## 2024-03-08 DIAGNOSIS — K21.9 GASTROESOPHAGEAL REFLUX DISEASE: ICD-10-CM

## 2024-03-08 DIAGNOSIS — E03.9 ACQUIRED HYPOTHYROIDISM: ICD-10-CM

## 2024-03-08 RX ORDER — OMEPRAZOLE 20 MG/1
CAPSULE, DELAYED RELEASE ORAL
Qty: 90 CAPSULE | Refills: 0 | Status: SHIPPED | OUTPATIENT
Start: 2024-03-08

## 2024-03-08 RX ORDER — LEVOTHYROXINE SODIUM 88 UG/1
TABLET ORAL
Qty: 90 TABLET | Refills: 0 | Status: SHIPPED | OUTPATIENT
Start: 2024-03-08

## 2024-03-11 DIAGNOSIS — K21.9 GASTROESOPHAGEAL REFLUX DISEASE: ICD-10-CM

## 2024-03-11 DIAGNOSIS — E03.9 ACQUIRED HYPOTHYROIDISM: ICD-10-CM

## 2024-03-11 RX ORDER — OMEPRAZOLE 20 MG/1
CAPSULE, DELAYED RELEASE ORAL
Qty: 90 CAPSULE | Refills: 0 | OUTPATIENT
Start: 2024-03-11

## 2024-03-11 RX ORDER — LEVOTHYROXINE SODIUM 88 UG/1
88 TABLET ORAL DAILY
Qty: 90 TABLET | Refills: 0 | OUTPATIENT
Start: 2024-03-11

## 2024-03-11 NOTE — TELEPHONE ENCOUNTER
Comments:     Last Office Visit (last PCP visit):   1/12/2024    Next Visit Date:  Future Appointments   Date Time Provider Department Center   3/13/2024  2:15 PM [facility] [place]       **If hasn't been seen in over a year OR hasn't followed up according to last diabetes/ADHD visit, make appointment for patient before sending refill to provider.    Rx requested:  Requested Prescriptions     Pending Prescriptions Disp Refills    omeprazole (PRILOSEC) 20 MG delayed release capsule 90 capsule 0     Sig: TAKE 1 CAPSULE BY MOUTH IN THE MORNING BEFORE BREAKFAST    levothyroxine (SYNTHROID) 88 MCG tablet 90 tablet 0     Sig: Take 1 tablet by mouth daily

## 2024-03-13 ENCOUNTER — OFFICE VISIT (OUTPATIENT)
Dept: INTERNAL MEDICINE | Age: 74
End: 2024-03-13
Payer: MEDICARE

## 2024-03-13 VITALS
DIASTOLIC BLOOD PRESSURE: 66 MMHG | HEART RATE: 97 BPM | WEIGHT: 267 LBS | OXYGEN SATURATION: 90 % | BODY MASS INDEX: 36.21 KG/M2 | SYSTOLIC BLOOD PRESSURE: 106 MMHG | RESPIRATION RATE: 20 BRPM | TEMPERATURE: 97.2 F

## 2024-03-13 DIAGNOSIS — B34.9 VIRAL ILLNESS: ICD-10-CM

## 2024-03-13 DIAGNOSIS — R06.09 DOE (DYSPNEA ON EXERTION): Primary | ICD-10-CM

## 2024-03-13 DIAGNOSIS — R09.02 HYPOXIA: ICD-10-CM

## 2024-03-13 LAB
INFLUENZA A ANTIBODY: NEGATIVE
INFLUENZA B ANTIBODY: NEGATIVE
Lab: NORMAL
PERFORMING INSTRUMENT: NORMAL
QC PASS/FAIL: NORMAL
RSV ANTIGEN: NEGATIVE
SARS-COV-2, POC: NORMAL

## 2024-03-13 PROCEDURE — 87804 INFLUENZA ASSAY W/OPTIC: CPT | Performed by: FAMILY MEDICINE

## 2024-03-13 PROCEDURE — 86756 RESPIRATORY VIRUS ANTIBODY: CPT | Performed by: FAMILY MEDICINE

## 2024-03-13 PROCEDURE — 87426 SARSCOV CORONAVIRUS AG IA: CPT | Performed by: FAMILY MEDICINE

## 2024-03-13 ASSESSMENT — ENCOUNTER SYMPTOMS
WHEEZING: 0
DIARRHEA: 0
SHORTNESS OF BREATH: 1
RHINORRHEA: 0
ABDOMINAL PAIN: 0
SORE THROAT: 0
COUGH: 0
CONSTIPATION: 0

## 2024-03-13 NOTE — PROGRESS NOTES
[facility] PRIMARY CARE  [address] [id number]  Dept: [phone]  Dept Fax: [phone]  Loc: [phone]     Chief Complaint  Chief Complaint   Patient presents with    OTHER     SOB with walking and diarrhea. Started 2 weeks ago.        HPI:  73 y.o.male who presents for the following:      SOB: hx of SSS (pacer), OMAR but doesn't use cpap, multiple valve dysfunction, afib and HF seeing cardiology; taking warfarin; hx of SOB but doesn't use his oxygen; x2 weeks with [facility]; even walking to the bathroom leads to SOB for 10min; had a cough/congestion/ increased phlegm and loose stools last week which have resolved; subjective fever last week; tolerating PO      Review of Systems   Constitutional:  Negative for chills and fever.   HENT:  Negative for congestion, rhinorrhea and sore throat.    Respiratory:  Positive for shortness of breath. Negative for cough and wheezing.    Gastrointestinal:  Negative for abdominal pain, constipation and diarrhea.   Endocrine: Negative for polydipsia and polyuria.   Genitourinary:  Negative for dysuria, frequency and urgency.   Neurological:  Negative for syncope, light-headedness, numbness and headaches.   Psychiatric/Behavioral:  Negative for sleep disturbance. The patient is not nervous/anxious.        Past Medical History:   Diagnosis Date    Allergic rhinitis     Asthma     asthmatic bronchitis    Atrial fibrillation (HCC)     CAD (coronary artery disease)     Chronic back pain     COPD (chronic obstructive pulmonary disease) (HCC)     CVA (cerebral infarction)     Depression     Edema     GERD (gastroesophageal reflux disease)     ulcer    Glucose intolerance (impaired glucose tolerance) 11/06    Hypertension     ICD (implantable cardiac defibrillator) in place 2010    OMAR (obstructive sleep apnea)     UTI (urinary tract infection)      Past Surgical History:   Procedure Laterality

## 2024-03-15 ENCOUNTER — TELEPHONE (OUTPATIENT)
Dept: FAMILY MEDICINE CLINIC | Age: 74
End: 2024-03-15

## 2024-03-15 DIAGNOSIS — R06.09 DOE (DYSPNEA ON EXERTION): ICD-10-CM

## 2024-03-15 DIAGNOSIS — R09.02 HYPOXIA: ICD-10-CM

## 2024-03-15 NOTE — TELEPHONE ENCOUNTER
DME company cannot accept prescriptions without a duration on them (continuous, nocturnal, exertion). They are requesting a new prescription for the oxygen concentrator including the duration.

## 2024-03-16 DIAGNOSIS — I48.0 PAROXYSMAL ATRIAL FIBRILLATION (MULTI): ICD-10-CM

## 2024-03-18 ENCOUNTER — APPOINTMENT (OUTPATIENT)
Dept: GENERAL RADIOLOGY | Age: 74
End: 2024-03-18
Payer: MEDICARE

## 2024-03-18 ENCOUNTER — HOSPITAL ENCOUNTER (INPATIENT)
Age: 74
LOS: 3 days | Discharge: ANOTHER HOSPITAL | End: 2024-03-21
Attending: EMERGENCY MEDICINE | Admitting: INTERNAL MEDICINE
Payer: MEDICARE

## 2024-03-18 DIAGNOSIS — I50.9 CONGESTIVE HEART FAILURE, UNSPECIFIED HF CHRONICITY, UNSPECIFIED HEART FAILURE TYPE (HCC): ICD-10-CM

## 2024-03-18 DIAGNOSIS — Z99.81 OXYGEN DEPENDENT: ICD-10-CM

## 2024-03-18 DIAGNOSIS — R06.02 SOB (SHORTNESS OF BREATH) ON EXERTION: Primary | ICD-10-CM

## 2024-03-18 DIAGNOSIS — D68.9 COAGULOPATHY (HCC): ICD-10-CM

## 2024-03-18 PROBLEM — I50.43 CHF (CONGESTIVE HEART FAILURE), NYHA CLASS I, ACUTE ON CHRONIC, COMBINED (HCC): Status: ACTIVE | Noted: 2024-03-18

## 2024-03-18 LAB
ALBUMIN SERPL-MCNC: 2.8 G/DL (ref 3.5–4.6)
ALP SERPL-CCNC: 156 U/L (ref 35–104)
ALT SERPL-CCNC: 19 U/L (ref 0–41)
AMORPH SED URNS QL MICRO: ABNORMAL
ANION GAP SERPL CALCULATED.3IONS-SCNC: 13 MEQ/L (ref 9–15)
AST SERPL-CCNC: 31 U/L (ref 0–40)
BACTERIA URNS QL MICRO: ABNORMAL /HPF
BASOPHILS # BLD: 0.1 K/UL (ref 0–0.1)
BASOPHILS NFR BLD: 0.6 % (ref 0.2–1.2)
BILIRUB SERPL-MCNC: 0.8 MG/DL (ref 0.2–0.7)
BILIRUB UR QL STRIP: NEGATIVE
BNP BLD-MCNC: 7220 PG/ML
BUN SERPL-MCNC: 31 MG/DL (ref 8–23)
CALCIUM SERPL-MCNC: 9 MG/DL (ref 8.5–9.9)
CHLORIDE SERPL-SCNC: 100 MEQ/L (ref 95–107)
CLARITY UR: ABNORMAL
CO2 SERPL-SCNC: 24 MEQ/L (ref 20–31)
COLOR UR: YELLOW
CREAT SERPL-MCNC: 1.34 MG/DL (ref 0.7–1.2)
EKG ATRIAL RATE: 60 BPM
EKG P AXIS: 68 DEGREES
EKG P-R INTERVAL: 180 MS
EKG Q-T INTERVAL: 476 MS
EKG QRS DURATION: 164 MS
EKG QTC CALCULATION (BAZETT): 524 MS
EKG R AXIS: -57 DEGREES
EKG T AXIS: 116 DEGREES
EKG VENTRICULAR RATE: 73 BPM
EOSINOPHIL # BLD: 1.2 K/UL (ref 0–0.5)
EOSINOPHIL NFR BLD: 9.1 % (ref 0.8–7)
EPI CELLS #/AREA URNS HPF: ABNORMAL /HPF
ERYTHROCYTE [DISTWIDTH] IN BLOOD BY AUTOMATED COUNT: 14.1 % (ref 11.6–14.4)
GLOBULIN SER CALC-MCNC: 4.6 G/DL (ref 2.3–3.5)
GLUCOSE SERPL-MCNC: 119 MG/DL (ref 70–99)
GLUCOSE UR STRIP-MCNC: NEGATIVE MG/DL
HCT VFR BLD AUTO: 33.4 % (ref 42–52)
HGB BLD-MCNC: 11 G/DL (ref 13.7–17.5)
HGB UR QL STRIP: ABNORMAL
HYALINE CASTS #/AREA URNS LPF: ABNORMAL /LPF (ref 0–5)
IMM GRANULOCYTES # BLD: 0.2 K/UL
IMM GRANULOCYTES NFR BLD: 1.7 %
INFLUENZA A BY PCR: NEGATIVE
INFLUENZA B BY PCR: NEGATIVE
INR PPP: 5.8
KETONES UR STRIP-MCNC: NEGATIVE MG/DL
LEUKOCYTE ESTERASE UR QL STRIP: NEGATIVE
LYMPHOCYTES # BLD: 1.4 K/UL (ref 1.3–3.6)
LYMPHOCYTES NFR BLD: 10.3 %
MAGNESIUM SERPL-MCNC: 1.7 MG/DL (ref 1.7–2.4)
MCH RBC QN AUTO: 31.2 PG (ref 25.7–32.2)
MCHC RBC AUTO-ENTMCNC: 32.9 % (ref 32.3–36.5)
MCV RBC AUTO: 94.6 FL (ref 79–92.2)
MONOCYTES # BLD: 0.6 K/UL (ref 0.3–0.8)
MONOCYTES NFR BLD: 4.4 % (ref 5.3–12.2)
NEUTROPHILS # BLD: 9.8 K/UL (ref 1.8–5.4)
NEUTS SEG NFR BLD: 73.9 % (ref 34–67.9)
NITRITE UR QL STRIP: NEGATIVE
PH UR STRIP: 5.5 [PH] (ref 5–9)
PLATELET # BLD AUTO: 257 K/UL (ref 163–337)
POTASSIUM SERPL-SCNC: 4.5 MEQ/L (ref 3.4–4.9)
PROT SERPL-MCNC: 7.4 G/DL (ref 6.3–8)
PROT UR STRIP-MCNC: 30 MG/DL
PROTHROMBIN TIME: 51.9 SEC (ref 12.3–14.9)
RBC # BLD AUTO: 3.53 M/UL (ref 4.63–6.08)
RBC #/AREA URNS HPF: ABNORMAL /HPF (ref 0–2)
RSV BY PCR: NEGATIVE
SARS-COV-2 RDRP RESP QL NAA+PROBE: NOT DETECTED
SODIUM SERPL-SCNC: 137 MEQ/L (ref 135–144)
SP GR UR STRIP: 1.01 (ref 1–1.03)
TROPONIN, HIGH SENSITIVITY: 25 NG/L (ref 0–19)
TSH SERPL-MCNC: 6.85 UIU/ML (ref 0.44–3.86)
URINE REFLEX TO CULTURE: ABNORMAL
UROBILINOGEN UR STRIP-ACNC: 1 E.U./DL
WBC # BLD AUTO: 13.2 K/UL (ref 4.2–9)
WBC #/AREA URNS HPF: ABNORMAL /HPF (ref 0–5)

## 2024-03-18 PROCEDURE — 2580000003 HC RX 258: Performed by: INTERNAL MEDICINE

## 2024-03-18 PROCEDURE — 71045 X-RAY EXAM CHEST 1 VIEW: CPT

## 2024-03-18 PROCEDURE — 84443 ASSAY THYROID STIM HORMONE: CPT

## 2024-03-18 PROCEDURE — 87634 RSV DNA/RNA AMP PROBE: CPT

## 2024-03-18 PROCEDURE — 87502 INFLUENZA DNA AMP PROBE: CPT

## 2024-03-18 PROCEDURE — 85610 PROTHROMBIN TIME: CPT

## 2024-03-18 PROCEDURE — 81001 URINALYSIS AUTO W/SCOPE: CPT

## 2024-03-18 PROCEDURE — 6370000000 HC RX 637 (ALT 250 FOR IP): Performed by: INTERNAL MEDICINE

## 2024-03-18 PROCEDURE — 85025 COMPLETE CBC W/AUTO DIFF WBC: CPT

## 2024-03-18 PROCEDURE — 36415 COLL VENOUS BLD VENIPUNCTURE: CPT

## 2024-03-18 PROCEDURE — 87635 SARS-COV-2 COVID-19 AMP PRB: CPT

## 2024-03-18 PROCEDURE — 84484 ASSAY OF TROPONIN QUANT: CPT

## 2024-03-18 PROCEDURE — 93005 ELECTROCARDIOGRAM TRACING: CPT

## 2024-03-18 PROCEDURE — 99285 EMERGENCY DEPT VISIT HI MDM: CPT

## 2024-03-18 PROCEDURE — 6360000002 HC RX W HCPCS: Performed by: INTERNAL MEDICINE

## 2024-03-18 PROCEDURE — 1210000000 HC MED SURG R&B

## 2024-03-18 PROCEDURE — 6370000000 HC RX 637 (ALT 250 FOR IP): Performed by: EMERGENCY MEDICINE

## 2024-03-18 PROCEDURE — 93010 ELECTROCARDIOGRAM REPORT: CPT | Performed by: INTERNAL MEDICINE

## 2024-03-18 PROCEDURE — 83735 ASSAY OF MAGNESIUM: CPT

## 2024-03-18 PROCEDURE — 80053 COMPREHEN METABOLIC PANEL: CPT

## 2024-03-18 PROCEDURE — 6360000002 HC RX W HCPCS: Performed by: EMERGENCY MEDICINE

## 2024-03-18 PROCEDURE — 83880 ASSAY OF NATRIURETIC PEPTIDE: CPT

## 2024-03-18 RX ORDER — LAMOTRIGINE 100 MG/1
100 TABLET ORAL NIGHTLY
Status: DISCONTINUED | OUTPATIENT
Start: 2024-03-18 | End: 2024-03-21 | Stop reason: HOSPADM

## 2024-03-18 RX ORDER — MAGNESIUM SULFATE IN WATER 40 MG/ML
2000 INJECTION, SOLUTION INTRAVENOUS PRN
Status: DISCONTINUED | OUTPATIENT
Start: 2024-03-18 | End: 2024-03-18

## 2024-03-18 RX ORDER — FUROSEMIDE 10 MG/ML
40 INJECTION INTRAMUSCULAR; INTRAVENOUS 2 TIMES DAILY
Status: DISCONTINUED | OUTPATIENT
Start: 2024-03-18 | End: 2024-03-19

## 2024-03-18 RX ORDER — CETIRIZINE HYDROCHLORIDE 10 MG/1
10 TABLET ORAL DAILY
COMMUNITY

## 2024-03-18 RX ORDER — DOCUSATE SODIUM 100 MG/1
100 CAPSULE, LIQUID FILLED ORAL DAILY
COMMUNITY

## 2024-03-18 RX ORDER — TRAZODONE HYDROCHLORIDE 50 MG/1
50 TABLET ORAL NIGHTLY
Status: DISCONTINUED | OUTPATIENT
Start: 2024-03-18 | End: 2024-03-21 | Stop reason: HOSPADM

## 2024-03-18 RX ORDER — ONDANSETRON 2 MG/ML
4 INJECTION INTRAMUSCULAR; INTRAVENOUS EVERY 6 HOURS PRN
Status: DISCONTINUED | OUTPATIENT
Start: 2024-03-18 | End: 2024-03-18

## 2024-03-18 RX ORDER — ATROPINE SULFATE 10 MG/ML
1 SOLUTION/ DROPS OPHTHALMIC DAILY
Status: DISCONTINUED | OUTPATIENT
Start: 2024-03-18 | End: 2024-03-20

## 2024-03-18 RX ORDER — ACETAMINOPHEN 650 MG/1
650 SUPPOSITORY RECTAL EVERY 6 HOURS PRN
Status: DISCONTINUED | OUTPATIENT
Start: 2024-03-18 | End: 2024-03-21 | Stop reason: HOSPADM

## 2024-03-18 RX ORDER — ACETAMINOPHEN 325 MG/1
650 TABLET ORAL EVERY 6 HOURS PRN
Status: DISCONTINUED | OUTPATIENT
Start: 2024-03-18 | End: 2024-03-21 | Stop reason: HOSPADM

## 2024-03-18 RX ORDER — MORPHINE SULFATE 15 MG/1
15 TABLET, FILM COATED, EXTENDED RELEASE ORAL EVERY 12 HOURS SCHEDULED
Status: DISCONTINUED | OUTPATIENT
Start: 2024-03-18 | End: 2024-03-21 | Stop reason: HOSPADM

## 2024-03-18 RX ORDER — WARFARIN SODIUM 2.5 MG/1
2.5 TABLET ORAL DAILY
COMMUNITY

## 2024-03-18 RX ORDER — POTASSIUM CHLORIDE 20 MEQ/1
40 TABLET, EXTENDED RELEASE ORAL PRN
Status: DISCONTINUED | OUTPATIENT
Start: 2024-03-18 | End: 2024-03-18

## 2024-03-18 RX ORDER — LACTULOSE 10 G/15ML
30 SOLUTION ORAL DAILY
Status: DISCONTINUED | OUTPATIENT
Start: 2024-03-18 | End: 2024-03-21 | Stop reason: HOSPADM

## 2024-03-18 RX ORDER — POTASSIUM CHLORIDE 7.45 MG/ML
10 INJECTION INTRAVENOUS PRN
Status: DISCONTINUED | OUTPATIENT
Start: 2024-03-18 | End: 2024-03-18

## 2024-03-18 RX ORDER — FUROSEMIDE 10 MG/ML
20 INJECTION INTRAMUSCULAR; INTRAVENOUS ONCE
Status: COMPLETED | OUTPATIENT
Start: 2024-03-18 | End: 2024-03-18

## 2024-03-18 RX ORDER — WARFARIN SODIUM 5 MG/1
5 TABLET ORAL DAILY
COMMUNITY

## 2024-03-18 RX ORDER — SODIUM CHLORIDE 9 MG/ML
INJECTION, SOLUTION INTRAVENOUS PRN
Status: DISCONTINUED | OUTPATIENT
Start: 2024-03-18 | End: 2024-03-21 | Stop reason: HOSPADM

## 2024-03-18 RX ORDER — SODIUM CHLORIDE 0.9 % (FLUSH) 0.9 %
5-40 SYRINGE (ML) INJECTION PRN
Status: DISCONTINUED | OUTPATIENT
Start: 2024-03-18 | End: 2024-03-21 | Stop reason: HOSPADM

## 2024-03-18 RX ORDER — CYCLOPENTOLATE HYDROCHLORIDE 10 MG/ML
1 SOLUTION/ DROPS OPHTHALMIC DAILY
Status: DISCONTINUED | OUTPATIENT
Start: 2024-03-18 | End: 2024-03-20

## 2024-03-18 RX ORDER — SOTALOL HYDROCHLORIDE 80 MG/1
80 TABLET ORAL 2 TIMES DAILY
Status: DISCONTINUED | OUTPATIENT
Start: 2024-03-18 | End: 2024-03-21 | Stop reason: HOSPADM

## 2024-03-18 RX ORDER — CARVEDILOL 12.5 MG/1
12.5 TABLET ORAL 2 TIMES DAILY
Status: DISCONTINUED | OUTPATIENT
Start: 2024-03-18 | End: 2024-03-21 | Stop reason: HOSPADM

## 2024-03-18 RX ORDER — TROSPIUM CHLORIDE 20 MG/1
20 TABLET, FILM COATED ORAL
Status: DISCONTINUED | OUTPATIENT
Start: 2024-03-19 | End: 2024-03-21 | Stop reason: HOSPADM

## 2024-03-18 RX ORDER — ALBUTEROL SULFATE 2.5 MG/3ML
2.5 SOLUTION RESPIRATORY (INHALATION) EVERY 4 HOURS PRN
Status: DISCONTINUED | OUTPATIENT
Start: 2024-03-18 | End: 2024-03-21 | Stop reason: HOSPADM

## 2024-03-18 RX ORDER — IPRATROPIUM BROMIDE AND ALBUTEROL SULFATE 2.5; .5 MG/3ML; MG/3ML
1 SOLUTION RESPIRATORY (INHALATION) ONCE
Status: COMPLETED | OUTPATIENT
Start: 2024-03-18 | End: 2024-03-18

## 2024-03-18 RX ORDER — ONDANSETRON 4 MG/1
4 TABLET, ORALLY DISINTEGRATING ORAL EVERY 8 HOURS PRN
Status: DISCONTINUED | OUTPATIENT
Start: 2024-03-18 | End: 2024-03-18

## 2024-03-18 RX ORDER — SODIUM CHLORIDE 0.9 % (FLUSH) 0.9 %
5-40 SYRINGE (ML) INJECTION EVERY 12 HOURS SCHEDULED
Status: DISCONTINUED | OUTPATIENT
Start: 2024-03-18 | End: 2024-03-21 | Stop reason: HOSPADM

## 2024-03-18 RX ORDER — LEVOTHYROXINE SODIUM 88 UG/1
88 TABLET ORAL DAILY
Status: DISCONTINUED | OUTPATIENT
Start: 2024-03-18 | End: 2024-03-21 | Stop reason: HOSPADM

## 2024-03-18 RX ORDER — PANTOPRAZOLE SODIUM 40 MG/1
40 TABLET, DELAYED RELEASE ORAL
Status: DISCONTINUED | OUTPATIENT
Start: 2024-03-19 | End: 2024-03-21 | Stop reason: HOSPADM

## 2024-03-18 RX ORDER — POLYETHYLENE GLYCOL 3350 17 G/17G
17 POWDER, FOR SOLUTION ORAL DAILY PRN
Status: DISCONTINUED | OUTPATIENT
Start: 2024-03-18 | End: 2024-03-21 | Stop reason: HOSPADM

## 2024-03-18 RX ORDER — ESCITALOPRAM OXALATE 10 MG/1
20 TABLET ORAL DAILY
Status: DISCONTINUED | OUTPATIENT
Start: 2024-03-18 | End: 2024-03-21 | Stop reason: HOSPADM

## 2024-03-18 RX ADMIN — CARVEDILOL 12.5 MG: 12.5 TABLET, FILM COATED ORAL at 20:31

## 2024-03-18 RX ADMIN — SOTALOL HYDROCHLORIDE 80 MG: 80 TABLET ORAL at 20:31

## 2024-03-18 RX ADMIN — FUROSEMIDE 20 MG: 10 INJECTION, SOLUTION INTRAMUSCULAR; INTRAVENOUS at 15:15

## 2024-03-18 RX ADMIN — TRAZODONE HYDROCHLORIDE 50 MG: 50 TABLET ORAL at 20:32

## 2024-03-18 RX ADMIN — FUROSEMIDE 40 MG: 10 INJECTION, SOLUTION INTRAMUSCULAR; INTRAVENOUS at 18:47

## 2024-03-18 RX ADMIN — LAMOTRIGINE 100 MG: 100 TABLET ORAL at 20:31

## 2024-03-18 RX ADMIN — SODIUM CHLORIDE, PRESERVATIVE FREE 10 ML: 5 INJECTION INTRAVENOUS at 20:29

## 2024-03-18 RX ADMIN — MORPHINE SULFATE 15 MG: 15 TABLET, FILM COATED, EXTENDED RELEASE ORAL at 20:31

## 2024-03-18 RX ADMIN — IPRATROPIUM BROMIDE AND ALBUTEROL SULFATE 1 DOSE: 2.5; .5 SOLUTION RESPIRATORY (INHALATION) at 14:31

## 2024-03-18 ASSESSMENT — ENCOUNTER SYMPTOMS
TROUBLE SWALLOWING: 0
EYE DISCHARGE: 0
ABDOMINAL PAIN: 0
DIARRHEA: 0
COUGH: 0
SHORTNESS OF BREATH: 1
FACIAL SWELLING: 0
CHOKING: 0
BACK PAIN: 0
STRIDOR: 0
SORE THROAT: 0
VOMITING: 0
EYE REDNESS: 0
SINUS PRESSURE: 0
CONSTIPATION: 0
WHEEZING: 1
EYE PAIN: 0
VOICE CHANGE: 0
CHEST TIGHTNESS: 0

## 2024-03-18 ASSESSMENT — PAIN - FUNCTIONAL ASSESSMENT: PAIN_FUNCTIONAL_ASSESSMENT: 0-10

## 2024-03-18 ASSESSMENT — PAIN DESCRIPTION - ONSET: ONSET: SUDDEN

## 2024-03-18 ASSESSMENT — LIFESTYLE VARIABLES
HOW MANY STANDARD DRINKS CONTAINING ALCOHOL DO YOU HAVE ON A TYPICAL DAY: PATIENT DOES NOT DRINK
HOW OFTEN DO YOU HAVE A DRINK CONTAINING ALCOHOL: NEVER

## 2024-03-18 ASSESSMENT — PAIN DESCRIPTION - PAIN TYPE: TYPE: ACUTE PAIN

## 2024-03-18 ASSESSMENT — PAIN DESCRIPTION - FREQUENCY: FREQUENCY: CONTINUOUS

## 2024-03-18 ASSESSMENT — PAIN DESCRIPTION - ORIENTATION: ORIENTATION: MID;RIGHT;LEFT

## 2024-03-18 ASSESSMENT — PAIN DESCRIPTION - DESCRIPTORS: DESCRIPTORS: PATIENT UNABLE TO DESCRIBE

## 2024-03-18 ASSESSMENT — PAIN SCALES - GENERAL: PAINLEVEL_OUTOF10: 7

## 2024-03-18 ASSESSMENT — PAIN DESCRIPTION - DIRECTION: RADIATING_TOWARDS: NO

## 2024-03-18 ASSESSMENT — PAIN DESCRIPTION - LOCATION: LOCATION: CHEST

## 2024-03-18 NOTE — ED PROVIDER NOTES
[facility] ED  eMERGENCY dEPARTMENT eNCOUnter      Pt Name: Jonah Alvarez  MRN: [id number]  Birthdate 1950  Date of evaluation: 3/18/2024  Provider: Jeison Celeste MD    CHIEF COMPLAINT       Chief Complaint   Patient presents with    Chest Pain     Chest pain, SOB, and low HR         HISTORY OF PRESENT ILLNESS   (Location/Symptom, Timing/Onset,Context/Setting, Quality, Duration, Modifying Factors, Severity)  Note limiting factors.   Jonah Alvarez is a 73 y.o. male who presents to the emergency department patient accompanied by his son comes to this emergency in a private vehicle because of ongoing issues with chest heaviness and short of breath on exertion for the past several weeks he gets winded when he goes to the bathroom as per patient and his time to take recover because he is oxygen machine broke at home and is not able to take oxygen he could obstructive sleep apnea on CPAP he had a history of atrial fibrillation on long-term Coumadin therapy sick sinus syndrome and placement of pacemaker about 14 years ago he has some leaky valves as per son history of hypothyroidism is ex-smoker patient denies any fever chills    HPI    NursingNotes were reviewed.    REVIEW OF SYSTEMS    (2-9 systems for level 4, 10 or more for level 5)     Review of Systems   Constitutional: Negative.  Negative for activity change and fever.   HENT:  Negative for congestion, drooling, facial swelling, mouth sores, nosebleeds, sinus pressure, sore throat, trouble swallowing and voice change.    Eyes:  Negative for pain, discharge, redness and visual disturbance.   Respiratory:  Positive for shortness of breath and wheezing. Negative for cough, choking, chest tightness and stridor.    Cardiovascular:  Positive for chest pain. Negative for palpitations and leg swelling.   Gastrointestinal:  Negative for abdominal pain, blood in stool, constipation, diarrhea and vomiting.   Endocrine: Negative for cold intolerance,  polyphagia and polyuria.   Genitourinary:  Negative for dysuria, flank pain, frequency, genital sores and urgency.   Musculoskeletal:  Negative for back pain, joint swelling, neck pain and neck stiffness.   Skin:  Negative for pallor and rash.   Neurological:  Negative for tremors, seizures, syncope, weakness, numbness and headaches.   Hematological:  Negative for adenopathy. Does not bruise/bleed easily.   Psychiatric/Behavioral:  Negative for agitation, behavioral problems, hallucinations and sleep disturbance. The patient is not hyperactive.    All other systems reviewed and are negative.  pt as noted above the remainder of the review of systems was reviewed and negative.       PAST MEDICAL HISTORY     Past Medical History:   Diagnosis Date    Allergic rhinitis     Asthma     asthmatic bronchitis    Atrial fibrillation (HCC)     CAD (coronary artery disease)     Chronic back pain     COPD (chronic obstructive pulmonary disease) (HCC)     CVA (cerebral infarction)     Depression     Edema     GERD (gastroesophageal reflux disease)     ulcer    Glucose intolerance (impaired glucose tolerance) 11/06    Hypertension     ICD (implantable cardiac defibrillator) in place 2010    OMAR (obstructive sleep apnea)     UTI (urinary tract infection)          SURGICALHISTORY       Past Surgical History:   Procedure Laterality Date    BACK SURGERY      ensed    FRACTURE SURGERY  7/06    l/shoulder    GLOSSECTOMY  12/09/14    nasal endoscopy    GLOSSECTOMY  07/31/15    W/COBLATION,NASAL ENDOSCOPY    PACEMAKER PLACEMENT  2010    SKIN BIOPSY  2/13/12    right leg-Dr. Johns         CURRENT MEDICATIONS       Previous Medications    ALBUTEROL (PROVENTIL) (2.5 MG/3ML) 0.083% NEBULIZER SOLUTION    USE ONE VIAL IN NEBULIZER EVERY 4 HOURS AS NEEDED FOR WHEEZING AND FOR SHORTNESS OF BREATH    ATROPINE 1 % OPHTHALMIC SOLUTION    INSTILL 1 DROP INTO LEFT EYE ONCE DAILY    BACITRACIN-POLYMYXIN B (POLYSPORIN) [id number] UNIT/GM OPHTHALMIC

## 2024-03-18 NOTE — ED NOTES
Patient assigned bed 226 for inpatient admission, called registration with this information. Supervisor is aware that patient is ready for admission.

## 2024-03-18 NOTE — PROGRESS NOTES
Pt a and o x3. No home o2 per pt. Pt is on 3L NC and 94%. Dyspnea with exertion. BLE edema 3+. Lives home with son. Son Jonas called to verify home meds. Home meds updated in Epic with son Jonas via phone. Pt wears glasses for light sensitivity. Skin intact. Up with 1 SBA with cane. Pt only has 1 right HA. Left HA at home. Bed alarm on and call light in reach.

## 2024-03-19 ENCOUNTER — APPOINTMENT (OUTPATIENT)
Age: 74
End: 2024-03-19
Attending: INTERNAL MEDICINE
Payer: MEDICARE

## 2024-03-19 LAB
ANION GAP SERPL CALCULATED.3IONS-SCNC: 9 MEQ/L (ref 9–15)
BASOPHILS # BLD: 0.1 K/UL (ref 0–0.1)
BASOPHILS NFR BLD: 0.7 % (ref 0.2–1.2)
BUN SERPL-MCNC: 33 MG/DL (ref 8–23)
CALCIUM SERPL-MCNC: 8.9 MG/DL (ref 8.5–9.9)
CHLORIDE SERPL-SCNC: 99 MEQ/L (ref 95–107)
CO2 SERPL-SCNC: 30 MEQ/L (ref 20–31)
CREAT SERPL-MCNC: 1.49 MG/DL (ref 0.7–1.2)
ECHO AR MAX VEL PISA: 3.1 M/S
ECHO AV AREA PEAK VELOCITY: 1.2 CM2
ECHO AV AREA PEAK VELOCITY: 1.3 CM2
ECHO AV AREA VTI: 1.4 CM2
ECHO AV AREA/BSA VTI: 0.6 CM2/M2
ECHO AV CUSP MM: 1.7 CM
ECHO AV MEAN GRADIENT: 13 MMHG
ECHO AV MEAN VELOCITY: 1.7 M/S
ECHO AV PEAK GRADIENT: 24 MMHG
ECHO AV PEAK GRADIENT: 26 MMHG
ECHO AV PEAK VELOCITY: 2.4 M/S
ECHO AV PEAK VELOCITY: 2.6 M/S
ECHO AV REGURGITANT PHT: 540.2 MILLISECOND
ECHO AV VTI: 50.9 CM
ECHO BSA: 2.46 M2
ECHO EST RA PRESSURE: 10 MMHG
ECHO LA VOL A-L A2C: 75 ML (ref 18–58)
ECHO LA VOL A-L A4C: 48 ML (ref 18–58)
ECHO LA VOL MOD A2C: 73 ML (ref 18–58)
ECHO LA VOL MOD A4C: 45 ML (ref 18–58)
ECHO LA VOLUME AREA LENGTH: 61 ML
ECHO LA VOLUME INDEX A-L A2C: 31 ML/M2 (ref 16–34)
ECHO LA VOLUME INDEX A-L A4C: 20 ML/M2 (ref 16–34)
ECHO LA VOLUME INDEX AREA LENGTH: 25 ML/M2 (ref 16–34)
ECHO LA VOLUME INDEX MOD A2C: 30 ML/M2 (ref 16–34)
ECHO LA VOLUME INDEX MOD A4C: 19 ML/M2 (ref 16–34)
ECHO LV EDV A2C: 122 ML
ECHO LV EDV A4C: 147 ML
ECHO LV EDV BP: 144 ML (ref 67–155)
ECHO LV EDV INDEX A4C: 61 ML/M2
ECHO LV EDV INDEX BP: 60 ML/M2
ECHO LV EDV NDEX A2C: 51 ML/M2
ECHO LV EJECTION FRACTION A2C: 56 %
ECHO LV EJECTION FRACTION A4C: 63 %
ECHO LV EJECTION FRACTION BIPLANE: 60 % (ref 55–100)
ECHO LV ESV A2C: 53 ML
ECHO LV ESV A4C: 55 ML
ECHO LV ESV BP: 57 ML (ref 22–58)
ECHO LV ESV INDEX A2C: 22 ML/M2
ECHO LV ESV INDEX A4C: 23 ML/M2
ECHO LV ESV INDEX BP: 24 ML/M2
ECHO LV FRACTIONAL SHORTENING: 38 % (ref 28–44)
ECHO LV INTERNAL DIMENSION DIASTOLE INDEX: 2.32 CM/M2
ECHO LV INTERNAL DIMENSION DIASTOLIC: 5.6 CM (ref 4.2–5.9)
ECHO LV INTERNAL DIMENSION SYSTOLIC INDEX: 1.45 CM/M2
ECHO LV INTERNAL DIMENSION SYSTOLIC: 3.5 CM
ECHO LV IVSD: 1.1 CM (ref 0.6–1)
ECHO LV IVSS: 1.2 CM
ECHO LV MASS 2D: 249.3 G (ref 88–224)
ECHO LV MASS INDEX 2D: 103.5 G/M2 (ref 49–115)
ECHO LV POSTERIOR WALL DIASTOLIC: 1.1 CM (ref 0.6–1)
ECHO LV POSTERIOR WALL SYSTOLIC: 1.2 CM
ECHO LV RELATIVE WALL THICKNESS RATIO: 0.39
ECHO LVOT AREA: 4.2 CM2
ECHO LVOT AV VTI INDEX: 0.35
ECHO LVOT DIAM: 2.3 CM
ECHO LVOT MEAN GRADIENT: 1 MMHG
ECHO LVOT PEAK GRADIENT: 2 MMHG
ECHO LVOT PEAK GRADIENT: 2 MMHG
ECHO LVOT PEAK VELOCITY: 0.8 M/S
ECHO LVOT PEAK VELOCITY: 0.8 M/S
ECHO LVOT STROKE VOLUME INDEX: 30.7 ML/M2
ECHO LVOT SV: 73.9 ML
ECHO LVOT VTI: 17.8 CM
ECHO MV A VELOCITY: 0.85 M/S
ECHO MV E DECELERATION TIME (DT): 247.8 MS
ECHO MV E VELOCITY: 1.38 M/S
ECHO MV E/A RATIO: 1.62
ECHO MV REGURGITANT PEAK GRADIENT: 67 MMHG
ECHO MV REGURGITANT PEAK VELOCITY: 4.1 M/S
ECHO RIGHT VENTRICULAR SYSTOLIC PRESSURE (RVSP): 73 MMHG
ECHO RV INTERNAL DIMENSION: 1.3 CM
ECHO TV REGURGITANT MAX VELOCITY: 3.98 M/S
ECHO TV REGURGITANT PEAK GRADIENT: 63 MMHG
EOSINOPHIL # BLD: 0.9 K/UL (ref 0–0.5)
EOSINOPHIL NFR BLD: 13 % (ref 0.8–7)
ERYTHROCYTE [DISTWIDTH] IN BLOOD BY AUTOMATED COUNT: 13.8 % (ref 11.6–14.4)
GLUCOSE SERPL-MCNC: 107 MG/DL (ref 70–99)
HCT VFR BLD AUTO: 28.4 % (ref 42–52)
HGB BLD-MCNC: 9.4 G/DL (ref 13.7–17.5)
IMM GRANULOCYTES # BLD: 0.1 K/UL
IMM GRANULOCYTES NFR BLD: 1.9 %
INR PPP: 5.9
LYMPHOCYTES # BLD: 1.1 K/UL (ref 1.3–3.6)
LYMPHOCYTES NFR BLD: 15.4 %
MCH RBC QN AUTO: 31.1 PG (ref 25.7–32.2)
MCHC RBC AUTO-ENTMCNC: 33.1 % (ref 32.3–36.5)
MCV RBC AUTO: 94 FL (ref 79–92.2)
MONOCYTES # BLD: 0.4 K/UL (ref 0.3–0.8)
MONOCYTES NFR BLD: 5.5 % (ref 5.3–12.2)
NEUTROPHILS # BLD: 4.4 K/UL (ref 1.8–5.4)
NEUTS SEG NFR BLD: 63.5 % (ref 34–67.9)
PLATELET # BLD AUTO: 175 K/UL (ref 163–337)
POTASSIUM SERPL-SCNC: 4.3 MEQ/L (ref 3.4–4.9)
PROTHROMBIN TIME: 52 SEC (ref 12.3–14.9)
RBC # BLD AUTO: 3.02 M/UL (ref 4.63–6.08)
SODIUM SERPL-SCNC: 138 MEQ/L (ref 135–144)
WBC # BLD AUTO: 6.9 K/UL (ref 4.2–9)

## 2024-03-19 PROCEDURE — 97162 PT EVAL MOD COMPLEX 30 MIN: CPT

## 2024-03-19 PROCEDURE — 2580000003 HC RX 258: Performed by: INTERNAL MEDICINE

## 2024-03-19 PROCEDURE — 97166 OT EVAL MOD COMPLEX 45 MIN: CPT

## 2024-03-19 PROCEDURE — 85610 PROTHROMBIN TIME: CPT

## 2024-03-19 PROCEDURE — 80048 BASIC METABOLIC PNL TOTAL CA: CPT

## 2024-03-19 PROCEDURE — 85025 COMPLETE CBC W/AUTO DIFF WBC: CPT

## 2024-03-19 PROCEDURE — 6360000002 HC RX W HCPCS: Performed by: INTERNAL MEDICINE

## 2024-03-19 PROCEDURE — 93306 TTE W/DOPPLER COMPLETE: CPT

## 2024-03-19 PROCEDURE — 97535 SELF CARE MNGMENT TRAINING: CPT

## 2024-03-19 PROCEDURE — 6370000000 HC RX 637 (ALT 250 FOR IP): Performed by: INTERNAL MEDICINE

## 2024-03-19 PROCEDURE — 36415 COLL VENOUS BLD VENIPUNCTURE: CPT

## 2024-03-19 PROCEDURE — 2700000000 HC OXYGEN THERAPY PER DAY

## 2024-03-19 PROCEDURE — 1210000000 HC MED SURG R&B

## 2024-03-19 PROCEDURE — 93306 TTE W/DOPPLER COMPLETE: CPT | Performed by: INTERNAL MEDICINE

## 2024-03-19 RX ORDER — FUROSEMIDE 10 MG/ML
20 INJECTION INTRAMUSCULAR; INTRAVENOUS 2 TIMES DAILY
Status: DISCONTINUED | OUTPATIENT
Start: 2024-03-19 | End: 2024-03-21 | Stop reason: HOSPADM

## 2024-03-19 RX ADMIN — TROSPIUM CHLORIDE 20 MG: 20 TABLET, FILM COATED ORAL at 16:40

## 2024-03-19 RX ADMIN — MORPHINE SULFATE 15 MG: 15 TABLET, FILM COATED, EXTENDED RELEASE ORAL at 20:31

## 2024-03-19 RX ADMIN — SOTALOL HYDROCHLORIDE 80 MG: 80 TABLET ORAL at 07:38

## 2024-03-19 RX ADMIN — LAMOTRIGINE 100 MG: 100 TABLET ORAL at 20:31

## 2024-03-19 RX ADMIN — ACETAMINOPHEN 650 MG: 325 TABLET ORAL at 21:03

## 2024-03-19 RX ADMIN — MORPHINE SULFATE 15 MG: 15 TABLET, FILM COATED, EXTENDED RELEASE ORAL at 07:39

## 2024-03-19 RX ADMIN — SODIUM CHLORIDE, PRESERVATIVE FREE 10 ML: 5 INJECTION INTRAVENOUS at 07:39

## 2024-03-19 RX ADMIN — ESCITALOPRAM OXALATE 20 MG: 10 TABLET ORAL at 07:38

## 2024-03-19 RX ADMIN — FUROSEMIDE 20 MG: 10 INJECTION, SOLUTION INTRAMUSCULAR; INTRAVENOUS at 16:40

## 2024-03-19 RX ADMIN — PANTOPRAZOLE SODIUM 40 MG: 40 TABLET, DELAYED RELEASE ORAL at 05:19

## 2024-03-19 RX ADMIN — SOTALOL HYDROCHLORIDE 80 MG: 80 TABLET ORAL at 20:30

## 2024-03-19 RX ADMIN — TRAZODONE HYDROCHLORIDE 50 MG: 50 TABLET ORAL at 20:29

## 2024-03-19 RX ADMIN — SODIUM CHLORIDE, PRESERVATIVE FREE 10 ML: 5 INJECTION INTRAVENOUS at 20:33

## 2024-03-19 RX ADMIN — FUROSEMIDE 40 MG: 10 INJECTION, SOLUTION INTRAMUSCULAR; INTRAVENOUS at 07:39

## 2024-03-19 RX ADMIN — CARVEDILOL 12.5 MG: 12.5 TABLET, FILM COATED ORAL at 07:38

## 2024-03-19 RX ADMIN — TROSPIUM CHLORIDE 20 MG: 20 TABLET, FILM COATED ORAL at 05:19

## 2024-03-19 RX ADMIN — LEVOTHYROXINE SODIUM 88 MCG: 0.09 TABLET ORAL at 07:38

## 2024-03-19 RX ADMIN — LACTULOSE 20 G: 20 SOLUTION ORAL at 07:36

## 2024-03-19 RX ADMIN — CARVEDILOL 12.5 MG: 12.5 TABLET, FILM COATED ORAL at 20:30

## 2024-03-19 ASSESSMENT — PAIN DESCRIPTION - PAIN TYPE
TYPE: CHRONIC PAIN
TYPE: CHRONIC PAIN

## 2024-03-19 ASSESSMENT — PAIN DESCRIPTION - DESCRIPTORS
DESCRIPTORS: ACHING;GNAWING
DESCRIPTORS: ACHING
DESCRIPTORS: ACHING;THROBBING
DESCRIPTORS: ACHING

## 2024-03-19 ASSESSMENT — PAIN DESCRIPTION - LOCATION
LOCATION: BACK;HEAD
LOCATION: BACK
LOCATION: BACK
LOCATION: HEAD

## 2024-03-19 ASSESSMENT — PAIN SCALES - GENERAL
PAINLEVEL_OUTOF10: 6
PAINLEVEL_OUTOF10: 7
PAINLEVEL_OUTOF10: 5
PAINLEVEL_OUTOF10: 7

## 2024-03-19 ASSESSMENT — PAIN DESCRIPTION - ORIENTATION
ORIENTATION: MID;LOWER
ORIENTATION: LOWER
ORIENTATION: MID;LOWER

## 2024-03-19 NOTE — PROGRESS NOTES
[facility] Initial Discharge Assessment    Met with Patient to discuss discharge plan.        PCP: Christiano Linder MD                                  Date of Last Visit: \" last Thursday\"  If no PCP, list provided? N/A    Discharge Planning    Living Arrangements: independently at home    Who do you live with? Patient reports residing with son in one level apartment attached to their home.  Patient reports that spouse reside in the home    Who helps you with your care:  self    If lives at home:     Do you have any barriers navigating in your home? no    Patient can perform ADL?  Yes    Current Services (outpatient and in home) :  None    Dialysis: No    Is transportation available to get to your appointments? Yes- patient reports that spouse or adult son aid with transportation needs     DME Equipment:  yes - cane, walker, rollator, electric wheelchair, manual wheelchair, walk in shower with built in shower bench and removable shower head, bed side commode.  Patient reports not having home O2.  However patient is currently on oxygen at [facility].  Plan is for patient to have home O2 eval prior to DC     Respiratory equipment: None- Patient is reported to be non-complaint with home c-pap     Respiratory provider:  no     Pharmacy:  yes - [facility] in [place]     Able to afford cost of medication: Yes    Does patient feel safe at home? Yes    Does patient identify any home going needs? No- Patient reports plan to return home with family assist prn    Patient agreeable to HHC?      PT/OT recommending HHC upon DC.  Reviewed recommendation with patient and freedom of choice provided.  Patient reports no desire to have HHC upon DC.  Patient reports that he has had HHC in the past and does not feel services are warranted at this time    Patient agreeable to SNF/Rehab?     N/A    Other discharge needs identified?      Other Respiratory is following patient and plan is for patient to have home O2 eval prior to DC .  Patient may

## 2024-03-19 NOTE — PROGRESS NOTES
Facility/Department: [facility] SURG UNIT  Physical Therapy Initial Assessment    Name: Jonah Alvarez  : 1950  MRN: [id number]  Date of Service: 3/19/2024    Discharge Recommendations:  Continue to assess pending progress, Home with Home health PT          Patient Diagnosis(es): The primary encounter diagnosis was SOB (shortness of breath) on exertion. Diagnoses of Congestive heart failure, unspecified HF chronicity, unspecified heart failure type (HCC), Oxygen dependent, and Coagulopathy (HCC) were also pertinent to this visit.  Past Medical History:  has a past medical history of Allergic rhinitis, Asthma, Atrial fibrillation (HCC), CAD (coronary artery disease), Chronic back pain, COPD (chronic obstructive pulmonary disease) (HCC), CVA (cerebral infarction), Depression, Edema, GERD (gastroesophageal reflux disease), Glucose intolerance (impaired glucose tolerance), Hypertension, ICD (implantable cardiac defibrillator) in place, OMAR (obstructive sleep apnea), and UTI (urinary tract infection).  Past Surgical History:  has a past surgical history that includes pacemaker placement (); fracture surgery (); back surgery; skin biopsy (12); Glossectomy (14); and Glossectomy (07/31/15).    Assessment   Body Structures, Functions, Activity Limitations Requiring Skilled Therapeutic Intervention: Decreased functional mobility ;Decreased strength;Decreased endurance;Decreased posture;Decreased balance  Assessment: 73 year old male who lives with family I PLOF was adm to [facility] with SOB and dyspnea. Pt currently on 2 L O2 exhibiting mild SOB during amblation and decreased endurance. Pt would benefit from skilled PT during hospitalization to address impairments and improve endurance strength and overall functional mobility to allow safe D/C home  Treatment Diagnosis: weakness, decreased endurance  Therapy Prognosis: Good  Requires PT Follow-Up: Yes     Plan   Physical Therapy Plan  General Plan:

## 2024-03-19 NOTE — H&P
Hospital Medicine History & Physical      PCP: Christiano Linder MD    Date of Admission: 3/18/2024    Date of Service: 3/19/24      Chief Complaint:  dyspnea/weakness      History Of Present Illness:  73 y.o. male who presented to [facility] with above complains. Patient developed worsening in his dyspnea/SOB last week, was able to go to bathroom and take 15 min rest to recovery. Had chest heaviness. Denied fever/dizziness, wasn't taking lasix. Also wasn't using his O2 x 2 last years. Not compliant with his CPAP as well. Was found to have desaturation in ER and after initial stabilization was admitted for further management     Past Medical History:          Diagnosis Date    Allergic rhinitis     Asthma     asthmatic bronchitis    Atrial fibrillation (HCC)     CAD (coronary artery disease)     Chronic back pain     COPD (chronic obstructive pulmonary disease) (HCC)     CVA (cerebral infarction)     Depression     Edema     GERD (gastroesophageal reflux disease)     ulcer    Glucose intolerance (impaired glucose tolerance) 11/06    Hypertension     ICD (implantable cardiac defibrillator) in place 2010    OMAR (obstructive sleep apnea)     UTI (urinary tract infection)        Past Surgical History:          Procedure Laterality Date    BACK SURGERY      ensed    FRACTURE SURGERY  7/06    l/shoulder    GLOSSECTOMY  12/09/14    nasal endoscopy    GLOSSECTOMY  07/31/15    W/COBLATION,NASAL ENDOSCOPY    PACEMAKER PLACEMENT  2010    SKIN BIOPSY  2/13/12    right leg-Dr. Johns       Medications Prior to Admission:      Prior to Admission medications    Medication Sig Start Date End Date Taking? Authorizing Provider   warfarin (COUMADIN) 2.5 MG tablet Take 1 tablet by mouth daily Sunday, Tuesday, Thursday Saturday   Yes ProviderBlake MD   warfarin (COUMADIN) 5 MG tablet Take 1 tablet by mouth daily Monday. Wednesday, friday   Yes ProviderlBake MD   cetirizine (ZYRTEC) 10 MG tablet Take 1 tablet by

## 2024-03-19 NOTE — PROGRESS NOTES
Facility/Department: [facility] MED SURG UNIT  Occupational Therapy Initial Assessment    Name: Jonah Alvarez  : 1950  MRN: [id number]  Date of Service: 3/19/2024    Discharge Recommendations:  Continue to assess pending progress, Home with Home health OT, Home with assist PRN          Patient Diagnosis(es): The primary encounter diagnosis was SOB (shortness of breath) on exertion. Diagnoses of Congestive heart failure, unspecified HF chronicity, unspecified heart failure type (HCC), Oxygen dependent, and Coagulopathy (HCC) were also pertinent to this visit.  Past Medical History:  has a past medical history of Allergic rhinitis, Asthma, Atrial fibrillation (HCC), CAD (coronary artery disease), Chronic back pain, COPD (chronic obstructive pulmonary disease) ([facility]), CVA (cerebral infarction), Depression, Edema, GERD (gastroesophageal reflux disease), Glucose intolerance (impaired glucose tolerance), Hypertension, ICD (implantable cardiac defibrillator) in place, OMAR (obstructive sleep apnea), and UTI (urinary tract infection).  Past Surgical History:  has a past surgical history that includes pacemaker placement (); fracture surgery (); back surgery; skin biopsy (12); Glossectomy (14); and Glossectomy (07/31/15).    Treatment Diagnosis: Decreased ADL/IADL performance d/t CHF exacerbation      Assessment   Performance deficits / Impairments: Decreased endurance;Decreased functional mobility ;Decreased ADL status;Decreased balance;Decreased strength;Decreased safe awareness;Decreased high-level IADLs  Assessment: Pt goes by Faraz. Pt is a 72 y/o M admitted to [facility] for increased SOB and chest heaviness. Pt dx with CHF exacerbation and hypoxia. Pt is not on oxygen at home, currently on 3L upon OT arrival. PLOF pt reports he lives in in-law suite with son, pt performs his own ADLs and son completes IADLs. Pt uses SPC for mobility, no falls. OT eval completed with pt appearing to be near baseline, he

## 2024-03-20 LAB
ANION GAP SERPL CALCULATED.3IONS-SCNC: 10 MEQ/L (ref 9–15)
BNP BLD-MCNC: 2399 PG/ML
BUN SERPL-MCNC: 35 MG/DL (ref 8–23)
CALCIUM SERPL-MCNC: 8.8 MG/DL (ref 8.5–9.9)
CHLORIDE SERPL-SCNC: 97 MEQ/L (ref 95–107)
CO2 SERPL-SCNC: 31 MEQ/L (ref 20–31)
CREAT SERPL-MCNC: 1.72 MG/DL (ref 0.7–1.2)
GLUCOSE BLD-MCNC: 134 MG/DL (ref 70–99)
GLUCOSE SERPL-MCNC: 131 MG/DL (ref 70–99)
INR PPP: 4.2
PERFORMED ON: ABNORMAL
POTASSIUM SERPL-SCNC: 4.2 MEQ/L (ref 3.4–4.9)
PROTHROMBIN TIME: 40.4 SEC (ref 12.3–14.9)
SODIUM SERPL-SCNC: 138 MEQ/L (ref 135–144)

## 2024-03-20 PROCEDURE — 83880 ASSAY OF NATRIURETIC PEPTIDE: CPT

## 2024-03-20 PROCEDURE — 36415 COLL VENOUS BLD VENIPUNCTURE: CPT

## 2024-03-20 PROCEDURE — 2580000003 HC RX 258: Performed by: INTERNAL MEDICINE

## 2024-03-20 PROCEDURE — 80048 BASIC METABOLIC PNL TOTAL CA: CPT

## 2024-03-20 PROCEDURE — 2700000000 HC OXYGEN THERAPY PER DAY

## 2024-03-20 PROCEDURE — 6360000002 HC RX W HCPCS: Performed by: INTERNAL MEDICINE

## 2024-03-20 PROCEDURE — 85610 PROTHROMBIN TIME: CPT

## 2024-03-20 PROCEDURE — 97116 GAIT TRAINING THERAPY: CPT

## 2024-03-20 PROCEDURE — 1210000000 HC MED SURG R&B

## 2024-03-20 PROCEDURE — 6370000000 HC RX 637 (ALT 250 FOR IP): Performed by: INTERNAL MEDICINE

## 2024-03-20 PROCEDURE — 97110 THERAPEUTIC EXERCISES: CPT

## 2024-03-20 PROCEDURE — 97530 THERAPEUTIC ACTIVITIES: CPT

## 2024-03-20 PROCEDURE — 99221 1ST HOSP IP/OBS SF/LOW 40: CPT | Performed by: INTERNAL MEDICINE

## 2024-03-20 RX ADMIN — ESCITALOPRAM OXALATE 20 MG: 10 TABLET ORAL at 08:19

## 2024-03-20 RX ADMIN — TROSPIUM CHLORIDE 20 MG: 20 TABLET, FILM COATED ORAL at 06:02

## 2024-03-20 RX ADMIN — FUROSEMIDE 20 MG: 10 INJECTION, SOLUTION INTRAMUSCULAR; INTRAVENOUS at 08:19

## 2024-03-20 RX ADMIN — MORPHINE SULFATE 15 MG: 15 TABLET, FILM COATED, EXTENDED RELEASE ORAL at 20:30

## 2024-03-20 RX ADMIN — SOTALOL HYDROCHLORIDE 80 MG: 80 TABLET ORAL at 20:31

## 2024-03-20 RX ADMIN — PANTOPRAZOLE SODIUM 40 MG: 40 TABLET, DELAYED RELEASE ORAL at 06:02

## 2024-03-20 RX ADMIN — CARVEDILOL 12.5 MG: 12.5 TABLET, FILM COATED ORAL at 20:31

## 2024-03-20 RX ADMIN — TROSPIUM CHLORIDE 20 MG: 20 TABLET, FILM COATED ORAL at 16:28

## 2024-03-20 RX ADMIN — LEVOTHYROXINE SODIUM 88 MCG: 0.09 TABLET ORAL at 08:19

## 2024-03-20 RX ADMIN — TRAZODONE HYDROCHLORIDE 50 MG: 50 TABLET ORAL at 20:31

## 2024-03-20 RX ADMIN — LACTULOSE 20 G: 20 SOLUTION ORAL at 08:20

## 2024-03-20 RX ADMIN — SODIUM CHLORIDE, PRESERVATIVE FREE 10 ML: 5 INJECTION INTRAVENOUS at 20:32

## 2024-03-20 RX ADMIN — CARVEDILOL 12.5 MG: 12.5 TABLET, FILM COATED ORAL at 08:19

## 2024-03-20 RX ADMIN — LAMOTRIGINE 100 MG: 100 TABLET ORAL at 20:30

## 2024-03-20 RX ADMIN — SODIUM CHLORIDE, PRESERVATIVE FREE 10 ML: 5 INJECTION INTRAVENOUS at 16:28

## 2024-03-20 RX ADMIN — MORPHINE SULFATE 15 MG: 15 TABLET, FILM COATED, EXTENDED RELEASE ORAL at 08:19

## 2024-03-20 RX ADMIN — SOTALOL HYDROCHLORIDE 80 MG: 80 TABLET ORAL at 08:19

## 2024-03-20 ASSESSMENT — PAIN SCALES - GENERAL
PAINLEVEL_OUTOF10: 0
PAINLEVEL_OUTOF10: 7
PAINLEVEL_OUTOF10: 7
PAINLEVEL_OUTOF10: 0

## 2024-03-20 ASSESSMENT — PAIN DESCRIPTION - ORIENTATION: ORIENTATION: LOWER

## 2024-03-20 ASSESSMENT — PAIN - FUNCTIONAL ASSESSMENT: PAIN_FUNCTIONAL_ASSESSMENT: ACTIVITIES ARE NOT PREVENTED

## 2024-03-20 ASSESSMENT — PAIN DESCRIPTION - LOCATION
LOCATION: BACK
LOCATION: BACK

## 2024-03-20 ASSESSMENT — PAIN DESCRIPTION - DESCRIPTORS
DESCRIPTORS: ACHING
DESCRIPTORS: ACHING

## 2024-03-20 NOTE — CONSULTS
Inpatient consult to Cardiology  Consult performed by: Minesh Nicoel MD  Consult ordered by: Nirav Jade MD          Patient Name: Jonah Alvarez  Admit Date: 3/18/2024  2:09 PM  MR #: [id number]  : 1950    Attending Physician: Nirav Jade MD  Reason for consult: PA HTN    History of Presenting Illness:      Jonah Alvarez is a 73 y.o. male on hospital day 2 with a history of .   History Obtained From:  patient, electronic medical record    Admitted with Dyspnea. He has OMAR, COPD and Persistent AF on Sotalol and Warfarin.     He is on 2L O2 and states he feels better than yesterday. He denies CP.   He does have mild LE edema.     He has Nuclear stress 2022 that was negative.     ECG AV Paced 73  For SSS He received Dual chamber pacer  He has chronically prolonged QTc per his EP physician-  and remains on Sotalol.     Presenting INR 5.8.  this AM 4.2.  no bleeding noted.     Echo revealed EF 50% Severe PA HTN RVSP 73 mmHg.  He has Mild AS and Moderate severe central AR.   History:      EKG:  Past Medical History:   Diagnosis Date    Allergic rhinitis     Asthma     asthmatic bronchitis    Atrial fibrillation (HCC)     CAD (coronary artery disease)     Chronic back pain     COPD (chronic obstructive pulmonary disease) (HCC)     CVA (cerebral infarction)     Depression     Edema     GERD (gastroesophageal reflux disease)     ulcer    Glucose intolerance (impaired glucose tolerance)     Hypertension     ICD (implantable cardiac defibrillator) in place     OMAR (obstructive sleep apnea)     UTI (urinary tract infection)      Past Surgical History:   Procedure Laterality Date    BACK SURGERY      ensed    FRACTURE SURGERY      l/shoulder    GLOSSECTOMY  14    nasal endoscopy    GLOSSECTOMY  07/31/15    W/COBLATION,NASAL ENDOSCOPY    PACEMAKER PLACEMENT      SKIN BIOPSY  12    right leg-Dr. Johns       Family History  Family History   Problem Relation Age of Onset     Sunday, Tuesday, Thursday Saturday  warfarin (COUMADIN) 5 MG tablet, Take 1 tablet by mouth daily Monday. Wednesday, friday  cetirizine (ZYRTEC) 10 MG tablet, Take 1 tablet by mouth daily  docusate sodium (COLACE) 100 MG capsule, Take 1 capsule by mouth daily  Oxygen Concentrator, 2L oxygen for sleep and exertion  levothyroxine (SYNTHROID) 88 MCG tablet, Take 1 tablet by mouth once daily  omeprazole (PRILOSEC) 20 MG delayed release capsule, TAKE 1 CAPSULE BY MOUTH IN THE MORNING BEFORE BREAKFAST  lamoTRIgine (LAMICTAL) 100 MG tablet, Take 1 tablet by mouth at bedtime  furosemide (LASIX) 40 MG tablet, TAKE 1 TABLET BY MOUTH ONCE DAILY FOR 3 DAYS THEN ONE TABLET DAILY AS NEEDED (3 LBS WEIGHT GAIN OVERNIGHT OR 5 LBS IN 5 DAYS)  escitalopram (LEXAPRO) 20 MG tablet, Take 1 tablet by mouth daily  traZODone (DESYREL) 50 MG tablet, Take 1 tablet by mouth nightly  solifenacin (VESICARE) 5 MG tablet, Take 1 tablet by mouth in the morning and at bedtime (Patient taking differently: Take 1 tablet by mouth daily)  lamoTRIgine (LAMICTAL) 25 MG tablet, TAKE 3 TABLETS BY MOUTH ONCE DAILY AT NIGHT (Patient not taking: Reported on 3/18/2024)  TYRVAYA 0.03 MG/ACT SOLN, USE 1 SPRAY IN EACH NOSTRIL EVERY 12 HOURS  fluticasone (FLONASE) 50 MCG/ACT nasal spray, 2 sprays by Each Nostril route daily (Patient not taking: Reported on 3/18/2024)  bacitracin-polymyxin b (POLYSPORIN) [id number] UNIT/GM ophthalmic ointment, USE ONE APPLICATION IN THE RIGHT EYE DAILY AT BEDTIME  OXERVATE 0.002 % SOLN,   cyclopentolate (CYCLOGYL) 1 % ophthalmic solution, INSTILL 1 DROP INTO LEFT EYE ONCE DAILY  tobramycin (TOBREX) 0.3 % ophthalmic solution,   fluorometholone (FML) 0.1 % ophthalmic suspension, INSTILL 1 DROP INTO EACH EYE TWICE DAILY  morphine (MS CONTIN) 30 MG extended release tablet, Take 1 tablet by mouth 3 times daily.  atropine 1 % ophthalmic solution, INSTILL 1 DROP INTO LEFT EYE ONCE DAILY  ZIRGAN 0.15 % GEL ophthalmic gel, INSTILL 1 DROP

## 2024-03-20 NOTE — PROGRESS NOTES
Cardiopulmonary to bedside to perform a home oxygen evaluation.  Pt currently on 2LNC with a Spo2 of 97% at rest.  Pt was ambulated with 2L of oxygen and rechecked.  After ambulation with oxygen Pt had a Spo2 of 94%.  Oxygen was removed and Pt rechecked 10 minutes later.  At rest on room air Pt had a Spo2 of 90%.  Pt was then ambulated again on room air and rechecked.  After ambulation on room air Pt had a Spo2 of 85%.  Pt meets requirements for oxygen with exertion.

## 2024-03-20 NOTE — PROGRESS NOTES
Warfarin Dosing - Pharmacy Consult Note  Consulting Provider: Dr. Jade    Indication:  Atrial Fibrillation  Warfarin Dose prior to admission: 5 mg MWF, 2.5 mg all other days   Concurrent anticoagulants/antiplatelets: NA  Significant Drug Interactions: No obvious interactions  Recent Labs     03/18/24  1424 03/19/24  0603 03/20/24  0632   INR 5.8* 5.9* 4.2   HGB 11.0* 9.4*  --     175  --    LABALBU 2.8*  --   --      Recent warfarin administrations        No warfarin orders with administrations found.            Orders not given:            warfarin placeholder: dosing by pharmacy                   Date   INR    Dose  3/20       4.2        Hold    Assessment/Plan  (Goal INR: 2 - 3)  INR of 4.2 is sub therapeutic for goal range  Will hold warfarin for today    Active problem list reviewed.  INR orders are placed.  Chart reviewed for pertinent labs, drug/diet interactions, and past doses.  Documentation of patient's clinical condition was reviewed.    Pharmacy Dosing:  Pharmacy will continue to follow.     Mariama Ferreira, PharmD  3/20/2024 8:58 AM

## 2024-03-20 NOTE — PROGRESS NOTES
Physical Therapy  Facility/Department: [facility] MED SURG UNIT  Daily Treatment Note  NAME: Jonah Alvarez  : 1950  MRN: [id number]    Date of Service: 3/20/2024    Discharge Recommendations:  (P) Continue to assess pending progress, Home with Home health PT        Patient Diagnosis(es): The primary encounter diagnosis was SOB (shortness of breath) on exertion. Diagnoses of Congestive heart failure, unspecified HF chronicity, unspecified heart failure type (HCC), Oxygen dependent, and Coagulopathy (HCC) were also pertinent to this visit.    Assessment   Assessment: (P) Therapy session focused on endurance and energy conservation intervention with gait trianing and Ther Ex. Pt at 100% SPO2 at start of visit on 2L. Pt ambulated 75' w/ need for seated RB due to SOB w/ reading at 94%. Seated Ther Ex to increase B LE strength with monitoring of oxygen throughout and RB's between exercises. SPO2 dropped to 88% with seated marches w/ 45 sec recovery. Pt ambulated 75' back to room and SPO2 read post second trial at 94%. Additional time required for seated RB's and monitoring of oxygen.  Activity Tolerance: (P) Patient tolerated treatment well;Patient limited by endurance     Plan    Physical Therapy Plan  General Plan: (P) 5-7 times per week  Current Treatment Recommendations: (P) Strengthening;Functional mobility training;Balance training;Transfer training;Endurance training;Gait training     Restrictions  Restrictions/Precautions  Restrictions/Precautions: Fall Risk  Required Braces or Orthoses?: No  Implants present? : Metal implants (L TKA, lumbar fusion)     Subjective    Subjective  Subjective: (P) Pt seated in room chair at arrival and c/o  7/10 LB pain where L5 and S1 are fused.  Cognition  Overall Cognitive Status: (P) WNL     Objective   Vitals  SpO2: (P) 100 %  O2 Device: (P) Nasal cannula  Bed Mobility Training  Bed Mobility Training: (P) No  Balance  Sitting: (P) Intact  Standing: (P) With support  Transfer  Training  Transfer Training: (P) Yes  Overall Level of Assistance: (P) Supervision  Interventions: (P) Safety awareness training;Verbal cues  Sit to Stand: (P) Supervision  Stand to Sit: (P) Stand-by assistance  Stand Pivot Transfers: (P) Stand-by assistance  Bed to Chair: (P) Stand-by assistance  Gait Training  Gait Training: (P) Yes  Gait  Overall Level of Assistance: (P) Stand-by assistance  Distance (ft): (P)  (75' x 2 w/ 1 turn. Seated RB between trials to test SPO2 at 92 after first trial and 94% after second.)  Assistive Device: (P) Cane, straight  Interventions: (P) Safety awareness training  Base of Support: (P) Widened  Speed/Rylee: (P) Fluctuations  Step Length: (P) Left shortened  Gait Abnormalities: (P) Path deviations     PT Exercises  A/AROM Exercises: (P) Seated AROM B LE's: marches x10 (SPO2 desat to 88% w/ 45 sec recovery), LAQ's x15, h3, hip abduction/adduction x 15, AP's x 20,pillow and glute squeeze x 15, h5. RB's between Ther Ex, SPO2 monitored, remaining between 92-94%.             Goals  Short Term Goals  Time Frame for Short Term Goals: 3 days  Short Term Goal 1: Pt transfer mod I  Short Term Goal 2: Pt amb 100 feet with cane mod I maintaining O2 sats greater than 90%  Short Term Goal 3: Pt I wtih HEP to further improve strength and endurance    Education  Patient Education  Education Given To: (P) Patient  Education Provided: (P) Home Exercise Program;Energy Conservation;Transfer Training  Education Provided Comments: (P) VC's for proper form and count of reps for Ther Ex, instruction in PLB and seated RB's for energy conservation, VC's for safety w/ transfers.  Education Method: (P) Verbal  Education Outcome: (P) Verbalized understanding;Continued education needed    AM-PAC - Mobility              Therapy Time   Individual Concurrent Group Co-treatment   Time In (P) 1100         Time Out (P) 1150         Minutes (P) 50                 Ashlyn oCrrales, PTA

## 2024-03-20 NOTE — PROGRESS NOTES
Hospitalist Progress Note      PCP: Christiano Linder MD    Date of Admission: 3/18/2024    Chief Complaint: dyspnea    Subjective: patient eating breakfast     Medications:  Reviewed    Infusion Medications    sodium chloride       Scheduled Medications    [Held by provider] furosemide  20 mg IntraVENous BID    sotalol  80 mg Oral BID    sodium chloride flush  5-40 mL IntraVENous 2 times per day    atropine  1 drop Left Eye Daily    carvedilol  12.5 mg Oral BID    cyclopentolate  1 drop Left Eye Daily    traZODone  50 mg Oral Nightly    trospium  20 mg Oral BID AC    pantoprazole  40 mg Oral QAM AC    morphine  15 mg Oral 2 times per day    levothyroxine  88 mcg Oral Daily    lamoTRIgine  100 mg Oral Nightly    lactulose  30 mL Oral Daily    escitalopram  20 mg Oral Daily     PRN Meds: albuterol, sodium chloride flush, sodium chloride, polyethylene glycol, acetaminophen **OR** acetaminophen      Intake/Output Summary (Last 24 hours) at 3/20/2024 0838  Last data filed at 3/20/2024 0632  Gross per 24 hour   Intake 2420 ml   Output 2925 ml   Net -505 ml       Exam:    BP (!) 109/53   Pulse 70   Temp 97.6 °F (36.4 °C) (Oral)   Resp 18   Ht 1.854 m (6' 0.99\")   Wt 117.9 kg (260 lb)   SpO2 99%   BMI 34.31 kg/m²     General appearance: No apparent distress, appears stated age and cooperative.  HEENT: Pupils equal, round, and reactive to light. Conjunctivae/corneas clear.  Neck: Supple, with full range of motion. No jugular venous distention. Trachea midline.  Respiratory:  Normal respiratory effort. Clear to auscultation, bilaterally without Rales/Wheezes/Rhonchi.  Cardiovascular: Regular rate and rhythm with normal S1/S2 without murmurs, rubs or gallops.  Abdomen: Soft, non-tender, non-distended with normal bowel sounds.  Musculoskeletal: trace edema bilaterally.  Full range of motion without deformity.  Skin: Skin color, texture, turgor normal.  No rashes or lesions.  Neurologic:  Neurovascularly intact without  holding coumadin today, repeat INR AM  Acute/chronic resp failure/desaturation= perform home O2 eval before DC, may needs home O2 after DC  OMAR- non compliant with CPAP- may needs to follow up with pulmonary service after DC  Obesity with BMI 34%- supportive care, PT on case  Medically stable for acute admission at [facility]          TTS: 45 minutes where I focused more than 75% of my attention on rendering care, and planning treatment course for this patient, in addition to talking to RN team, mid levels, consulting with other physicians and following up on labs and imaging.    Electronically signed by Nirav Jade MD on 3/20/2024 at 8:38 AM

## 2024-03-21 ENCOUNTER — HOSPITAL ENCOUNTER (INPATIENT)
Age: 74
LOS: 2 days | Discharge: ANOTHER HOSPITAL | DRG: 286 | End: 2024-03-23
Attending: INTERNAL MEDICINE | Admitting: INTERNAL MEDICINE
Payer: MEDICARE

## 2024-03-21 VITALS
RESPIRATION RATE: 18 BRPM | BODY MASS INDEX: 34.46 KG/M2 | HEART RATE: 71 BPM | HEIGHT: 73 IN | WEIGHT: 260 LBS | SYSTOLIC BLOOD PRESSURE: 113 MMHG | OXYGEN SATURATION: 95 % | DIASTOLIC BLOOD PRESSURE: 58 MMHG | TEMPERATURE: 97.5 F

## 2024-03-21 DIAGNOSIS — R06.02 SOB (SHORTNESS OF BREATH): ICD-10-CM

## 2024-03-21 DIAGNOSIS — I35.1 AORTIC VALVE INSUFFICIENCY, ETIOLOGY OF CARDIAC VALVE DISEASE UNSPECIFIED: Primary | ICD-10-CM

## 2024-03-21 PROBLEM — I50.9 CONGESTIVE HEART FAILURE OF UNKNOWN ETIOLOGY (HCC): Status: ACTIVE | Noted: 2024-03-21

## 2024-03-21 LAB
ANION GAP SERPL CALCULATED.3IONS-SCNC: 9 MEQ/L (ref 9–15)
BNP BLD-MCNC: 2074 PG/ML
BUN SERPL-MCNC: 35 MG/DL (ref 8–23)
CALCIUM SERPL-MCNC: 9 MG/DL (ref 8.5–9.9)
CHLORIDE SERPL-SCNC: 98 MEQ/L (ref 95–107)
CO2 SERPL-SCNC: 30 MEQ/L (ref 20–31)
CREAT SERPL-MCNC: 1.46 MG/DL (ref 0.7–1.2)
GLUCOSE SERPL-MCNC: 123 MG/DL (ref 70–99)
INR PPP: 2.9
POTASSIUM SERPL-SCNC: 4.8 MEQ/L (ref 3.4–4.9)
PROTHROMBIN TIME: 30.8 SEC (ref 12.3–14.9)
SODIUM SERPL-SCNC: 137 MEQ/L (ref 135–144)

## 2024-03-21 PROCEDURE — 97535 SELF CARE MNGMENT TRAINING: CPT

## 2024-03-21 PROCEDURE — 85610 PROTHROMBIN TIME: CPT

## 2024-03-21 PROCEDURE — 6370000000 HC RX 637 (ALT 250 FOR IP): Performed by: INTERNAL MEDICINE

## 2024-03-21 PROCEDURE — 97110 THERAPEUTIC EXERCISES: CPT

## 2024-03-21 PROCEDURE — 36415 COLL VENOUS BLD VENIPUNCTURE: CPT

## 2024-03-21 PROCEDURE — 6360000002 HC RX W HCPCS: Performed by: INTERNAL MEDICINE

## 2024-03-21 PROCEDURE — 99233 SBSQ HOSP IP/OBS HIGH 50: CPT | Performed by: INTERNAL MEDICINE

## 2024-03-21 PROCEDURE — 83880 ASSAY OF NATRIURETIC PEPTIDE: CPT

## 2024-03-21 PROCEDURE — 2060000000 HC ICU INTERMEDIATE R&B

## 2024-03-21 PROCEDURE — 2580000003 HC RX 258: Performed by: INTERNAL MEDICINE

## 2024-03-21 PROCEDURE — 80048 BASIC METABOLIC PNL TOTAL CA: CPT

## 2024-03-21 PROCEDURE — 2700000000 HC OXYGEN THERAPY PER DAY

## 2024-03-21 RX ORDER — SODIUM CHLORIDE 0.9 % (FLUSH) 0.9 %
5-40 SYRINGE (ML) INJECTION PRN
Status: CANCELLED | OUTPATIENT
Start: 2024-03-21

## 2024-03-21 RX ORDER — SODIUM CHLORIDE 0.9 % (FLUSH) 0.9 %
5-40 SYRINGE (ML) INJECTION EVERY 12 HOURS SCHEDULED
Status: DISCONTINUED | OUTPATIENT
Start: 2024-03-21 | End: 2024-03-23 | Stop reason: HOSPADM

## 2024-03-21 RX ORDER — LACTULOSE 10 G/15ML
30 SOLUTION ORAL DAILY
Status: DISCONTINUED | OUTPATIENT
Start: 2024-03-22 | End: 2024-03-23 | Stop reason: HOSPADM

## 2024-03-21 RX ORDER — ACETAMINOPHEN 650 MG/1
650 SUPPOSITORY RECTAL EVERY 6 HOURS PRN
Status: CANCELLED | OUTPATIENT
Start: 2024-03-21

## 2024-03-21 RX ORDER — TROSPIUM CHLORIDE 20 MG/1
20 TABLET, FILM COATED ORAL NIGHTLY
Status: CANCELLED | OUTPATIENT
Start: 2024-03-22

## 2024-03-21 RX ORDER — TROSPIUM CHLORIDE 20 MG/1
20 TABLET, FILM COATED ORAL NIGHTLY
Status: DISCONTINUED | OUTPATIENT
Start: 2024-03-22 | End: 2024-03-23 | Stop reason: HOSPADM

## 2024-03-21 RX ORDER — POLYETHYLENE GLYCOL 3350 17 G/17G
17 POWDER, FOR SOLUTION ORAL DAILY PRN
Status: DISCONTINUED | OUTPATIENT
Start: 2024-03-21 | End: 2024-03-23 | Stop reason: HOSPADM

## 2024-03-21 RX ORDER — ALBUTEROL SULFATE 2.5 MG/3ML
2.5 SOLUTION RESPIRATORY (INHALATION) EVERY 4 HOURS PRN
Status: CANCELLED | OUTPATIENT
Start: 2024-03-21

## 2024-03-21 RX ORDER — CARVEDILOL 12.5 MG/1
12.5 TABLET ORAL 2 TIMES DAILY
Status: DISCONTINUED | OUTPATIENT
Start: 2024-03-21 | End: 2024-03-23 | Stop reason: HOSPADM

## 2024-03-21 RX ORDER — ACETAMINOPHEN 325 MG/1
650 TABLET ORAL EVERY 6 HOURS PRN
Status: DISCONTINUED | OUTPATIENT
Start: 2024-03-21 | End: 2024-03-23 | Stop reason: HOSPADM

## 2024-03-21 RX ORDER — ACETAMINOPHEN 650 MG/1
650 SUPPOSITORY RECTAL EVERY 6 HOURS PRN
Status: DISCONTINUED | OUTPATIENT
Start: 2024-03-21 | End: 2024-03-23 | Stop reason: HOSPADM

## 2024-03-21 RX ORDER — ALBUTEROL SULFATE 2.5 MG/3ML
2.5 SOLUTION RESPIRATORY (INHALATION) EVERY 4 HOURS PRN
Status: DISCONTINUED | OUTPATIENT
Start: 2024-03-21 | End: 2024-03-23 | Stop reason: HOSPADM

## 2024-03-21 RX ORDER — ESCITALOPRAM OXALATE 20 MG/1
20 TABLET ORAL DAILY
Status: DISCONTINUED | OUTPATIENT
Start: 2024-03-22 | End: 2024-03-23 | Stop reason: HOSPADM

## 2024-03-21 RX ORDER — SODIUM CHLORIDE 0.9 % (FLUSH) 0.9 %
5-40 SYRINGE (ML) INJECTION EVERY 12 HOURS SCHEDULED
Status: CANCELLED | OUTPATIENT
Start: 2024-03-21

## 2024-03-21 RX ORDER — LAMOTRIGINE 100 MG/1
100 TABLET ORAL NIGHTLY
Status: CANCELLED | OUTPATIENT
Start: 2024-03-21

## 2024-03-21 RX ORDER — TRAZODONE HYDROCHLORIDE 50 MG/1
50 TABLET ORAL NIGHTLY
Status: DISCONTINUED | OUTPATIENT
Start: 2024-03-21 | End: 2024-03-23 | Stop reason: HOSPADM

## 2024-03-21 RX ORDER — PANTOPRAZOLE SODIUM 40 MG/1
40 TABLET, DELAYED RELEASE ORAL
Status: CANCELLED | OUTPATIENT
Start: 2024-03-22

## 2024-03-21 RX ORDER — SOTALOL HYDROCHLORIDE 80 MG/1
80 TABLET ORAL 2 TIMES DAILY
Status: CANCELLED | OUTPATIENT
Start: 2024-03-21

## 2024-03-21 RX ORDER — SODIUM CHLORIDE 9 MG/ML
INJECTION, SOLUTION INTRAVENOUS PRN
Status: CANCELLED | OUTPATIENT
Start: 2024-03-21

## 2024-03-21 RX ORDER — FUROSEMIDE 10 MG/ML
20 INJECTION INTRAMUSCULAR; INTRAVENOUS 2 TIMES DAILY
Status: CANCELLED | OUTPATIENT
Start: 2024-03-21

## 2024-03-21 RX ORDER — TRAZODONE HYDROCHLORIDE 50 MG/1
50 TABLET ORAL NIGHTLY
Status: CANCELLED | OUTPATIENT
Start: 2024-03-21

## 2024-03-21 RX ORDER — ESCITALOPRAM OXALATE 10 MG/1
20 TABLET ORAL DAILY
Status: CANCELLED | OUTPATIENT
Start: 2024-03-21

## 2024-03-21 RX ORDER — SODIUM CHLORIDE 0.9 % (FLUSH) 0.9 %
5-40 SYRINGE (ML) INJECTION PRN
Status: DISCONTINUED | OUTPATIENT
Start: 2024-03-21 | End: 2024-03-23 | Stop reason: HOSPADM

## 2024-03-21 RX ORDER — PHYTONADIONE 10 MG/ML
10 INJECTION, EMULSION INTRAMUSCULAR; INTRAVENOUS; SUBCUTANEOUS ONCE
Status: COMPLETED | OUTPATIENT
Start: 2024-03-21 | End: 2024-03-21

## 2024-03-21 RX ORDER — POLYETHYLENE GLYCOL 3350 17 G/17G
17 POWDER, FOR SOLUTION ORAL DAILY PRN
Status: CANCELLED | OUTPATIENT
Start: 2024-03-21

## 2024-03-21 RX ORDER — PANTOPRAZOLE SODIUM 40 MG/1
40 TABLET, DELAYED RELEASE ORAL
Status: DISCONTINUED | OUTPATIENT
Start: 2024-03-22 | End: 2024-03-23 | Stop reason: HOSPADM

## 2024-03-21 RX ORDER — ACETAMINOPHEN 325 MG/1
650 TABLET ORAL EVERY 6 HOURS PRN
Status: CANCELLED | OUTPATIENT
Start: 2024-03-21

## 2024-03-21 RX ORDER — FUROSEMIDE 20 MG/1
10 TABLET ORAL DAILY
Status: CANCELLED | OUTPATIENT
Start: 2024-03-21

## 2024-03-21 RX ORDER — LEVOTHYROXINE SODIUM 88 UG/1
88 TABLET ORAL DAILY
Status: CANCELLED | OUTPATIENT
Start: 2024-03-22

## 2024-03-21 RX ORDER — LACTULOSE 10 G/15ML
30 SOLUTION ORAL DAILY
Status: CANCELLED | OUTPATIENT
Start: 2024-03-21

## 2024-03-21 RX ORDER — CARVEDILOL 12.5 MG/1
12.5 TABLET ORAL 2 TIMES DAILY
Status: CANCELLED | OUTPATIENT
Start: 2024-03-21

## 2024-03-21 RX ORDER — LAMOTRIGINE 100 MG/1
100 TABLET ORAL NIGHTLY
Status: DISCONTINUED | OUTPATIENT
Start: 2024-03-21 | End: 2024-03-23 | Stop reason: HOSPADM

## 2024-03-21 RX ORDER — FUROSEMIDE 10 MG/ML
20 INJECTION INTRAMUSCULAR; INTRAVENOUS 2 TIMES DAILY
Status: DISCONTINUED | OUTPATIENT
Start: 2024-03-21 | End: 2024-03-23 | Stop reason: HOSPADM

## 2024-03-21 RX ORDER — SOTALOL HYDROCHLORIDE 80 MG/1
80 TABLET ORAL 2 TIMES DAILY
Status: DISCONTINUED | OUTPATIENT
Start: 2024-03-21 | End: 2024-03-21

## 2024-03-21 RX ORDER — FUROSEMIDE 20 MG/1
10 TABLET ORAL DAILY
Status: DISCONTINUED | OUTPATIENT
Start: 2024-03-22 | End: 2024-03-23 | Stop reason: HOSPADM

## 2024-03-21 RX ORDER — LEVOTHYROXINE SODIUM 88 UG/1
88 TABLET ORAL DAILY
Status: DISCONTINUED | OUTPATIENT
Start: 2024-03-22 | End: 2024-03-23 | Stop reason: HOSPADM

## 2024-03-21 RX ORDER — FUROSEMIDE 20 MG/1
10 TABLET ORAL DAILY
Status: DISCONTINUED | OUTPATIENT
Start: 2024-03-21 | End: 2024-03-21 | Stop reason: HOSPADM

## 2024-03-21 RX ORDER — ACETAMINOPHEN 80 MG
TABLET,CHEWABLE ORAL ONCE
Status: COMPLETED | OUTPATIENT
Start: 2024-03-21 | End: 2024-03-21

## 2024-03-21 RX ORDER — MORPHINE SULFATE 15 MG/1
15 TABLET, FILM COATED, EXTENDED RELEASE ORAL EVERY 12 HOURS SCHEDULED
Status: CANCELLED | OUTPATIENT
Start: 2024-03-21

## 2024-03-21 RX ORDER — SODIUM CHLORIDE 9 MG/ML
INJECTION, SOLUTION INTRAVENOUS PRN
Status: DISCONTINUED | OUTPATIENT
Start: 2024-03-21 | End: 2024-03-23 | Stop reason: HOSPADM

## 2024-03-21 RX ORDER — MORPHINE SULFATE 15 MG/1
15 TABLET, FILM COATED, EXTENDED RELEASE ORAL EVERY 12 HOURS SCHEDULED
Status: DISCONTINUED | OUTPATIENT
Start: 2024-03-21 | End: 2024-03-23 | Stop reason: HOSPADM

## 2024-03-21 RX ORDER — SOTALOL HYDROCHLORIDE 80 MG/1
40 TABLET ORAL 2 TIMES DAILY
Status: DISCONTINUED | OUTPATIENT
Start: 2024-03-21 | End: 2024-03-23 | Stop reason: HOSPADM

## 2024-03-21 RX ADMIN — PANTOPRAZOLE SODIUM 40 MG: 40 TABLET, DELAYED RELEASE ORAL at 05:32

## 2024-03-21 RX ADMIN — PHYTONADIONE 10 MG: 10 INJECTION, EMULSION INTRAMUSCULAR; INTRAVENOUS; SUBCUTANEOUS at 17:11

## 2024-03-21 RX ADMIN — LEVOTHYROXINE SODIUM 88 MCG: 0.09 TABLET ORAL at 05:32

## 2024-03-21 RX ADMIN — MORPHINE SULFATE 15 MG: 15 TABLET, FILM COATED, EXTENDED RELEASE ORAL at 08:14

## 2024-03-21 RX ADMIN — SODIUM CHLORIDE, PRESERVATIVE FREE 10 ML: 5 INJECTION INTRAVENOUS at 08:16

## 2024-03-21 RX ADMIN — MORPHINE SULFATE 15 MG: 15 TABLET, FILM COATED, EXTENDED RELEASE ORAL at 22:11

## 2024-03-21 RX ADMIN — SOTALOL HYDROCHLORIDE 40 MG: 80 TABLET ORAL at 22:10

## 2024-03-21 RX ADMIN — FUROSEMIDE 10 MG: 20 TABLET ORAL at 08:14

## 2024-03-21 RX ADMIN — TROSPIUM CHLORIDE 20 MG: 20 TABLET, FILM COATED ORAL at 05:32

## 2024-03-21 RX ADMIN — ACETAMINOPHEN 650 MG: 325 TABLET ORAL at 03:20

## 2024-03-21 RX ADMIN — SOTALOL HYDROCHLORIDE 80 MG: 80 TABLET ORAL at 08:14

## 2024-03-21 RX ADMIN — ESCITALOPRAM OXALATE 20 MG: 10 TABLET ORAL at 08:13

## 2024-03-21 RX ADMIN — SODIUM CHLORIDE, PRESERVATIVE FREE 10 ML: 5 INJECTION INTRAVENOUS at 22:12

## 2024-03-21 RX ADMIN — ACETAMINOPHEN 325MG 650 MG: 325 TABLET ORAL at 20:09

## 2024-03-21 RX ADMIN — CARVEDILOL 12.5 MG: 12.5 TABLET, FILM COATED ORAL at 08:14

## 2024-03-21 RX ADMIN — CARVEDILOL 12.5 MG: 12.5 TABLET, FILM COATED ORAL at 17:11

## 2024-03-21 RX ADMIN — LAMOTRIGINE 100 MG: 100 TABLET ORAL at 22:11

## 2024-03-21 RX ADMIN — Medication: at 15:18

## 2024-03-21 ASSESSMENT — PAIN SCALES - GENERAL
PAINLEVEL_OUTOF10: 4
PAINLEVEL_OUTOF10: 4
PAINLEVEL_OUTOF10: 6
PAINLEVEL_OUTOF10: 4
PAINLEVEL_OUTOF10: 5
PAINLEVEL_OUTOF10: 3
PAINLEVEL_OUTOF10: 5
PAINLEVEL_OUTOF10: 4

## 2024-03-21 ASSESSMENT — PAIN DESCRIPTION - DESCRIPTORS
DESCRIPTORS: DISCOMFORT
DESCRIPTORS: DISCOMFORT
DESCRIPTORS: ACHING
DESCRIPTORS: ACHING
DESCRIPTORS: DISCOMFORT
DESCRIPTORS_2: ACHING

## 2024-03-21 ASSESSMENT — PAIN DESCRIPTION - ORIENTATION
ORIENTATION: LOWER
ORIENTATION: LOWER;MID

## 2024-03-21 ASSESSMENT — PAIN DESCRIPTION - LOCATION
LOCATION: HEAD
LOCATION: BACK
LOCATION: BACK
LOCATION_2: HEAD
LOCATION: BACK
LOCATION: BACK;HEAD

## 2024-03-21 ASSESSMENT — PAIN DESCRIPTION - ONSET
ONSET: ON-GOING
ONSET: ON-GOING

## 2024-03-21 ASSESSMENT — PAIN DESCRIPTION - PAIN TYPE
TYPE: CHRONIC PAIN

## 2024-03-21 ASSESSMENT — PAIN - FUNCTIONAL ASSESSMENT
PAIN_FUNCTIONAL_ASSESSMENT: ACTIVITIES ARE NOT PREVENTED
PAIN_FUNCTIONAL_ASSESSMENT: PREVENTS OR INTERFERES SOME ACTIVE ACTIVITIES AND ADLS
PAIN_FUNCTIONAL_ASSESSMENT_SITE2: ACTIVITIES ARE NOT PREVENTED
PAIN_FUNCTIONAL_ASSESSMENT: ACTIVITIES ARE NOT PREVENTED
PAIN_FUNCTIONAL_ASSESSMENT: ACTIVITIES ARE NOT PREVENTED

## 2024-03-21 ASSESSMENT — PAIN DESCRIPTION - FREQUENCY
FREQUENCY: CONTINUOUS
FREQUENCY: CONTINUOUS

## 2024-03-21 ASSESSMENT — PAIN DESCRIPTION - INTENSITY: RATING_2: 6

## 2024-03-21 NOTE — PROGRESS NOTES
03/21/24 1200   RT Protocol   History Pulmonary Disease 2   Respiratory pattern 0   Breath sounds 0   Cough 0   Indications for Bronchodilator Therapy Decreased or absent breath sounds   Bronchodilator Assessment Score 2

## 2024-03-21 NOTE — PROGRESS NOTES
Pt alert and oriented. Pt denies any pain or needs at this time. Pt call light in reach. Pt up in chair. Pt up with a one assist with a walker. Will continue to monitor pt.  Electronically signed by Ashlyn Bardales RN on 3/20/2024 at 10:37 PM

## 2024-03-21 NOTE — CARE COORDINATION
Definition of CHF discussed with patient.   Symptoms of heart failure and decompensation reviewed: weight gain of >3 #, edema, difficulty breathing, cough, issues with appetite, fatigue, or difficulty with sleep.   Common causes of CHF reviewed: CAD, arrhythmias, MI, HTN, valve dz., infection,  ETOH or drug abuse, or genetic abnormalities.  Importance of daily weight and B/P monitoring discussed. Pt to use a calender or notebook to record daily weight and call physician immediately with 3 # weight gain.  Low sodium diet and fluid intake discussed. Pt taught about a fluid restriction and advised to discuss this with the cardiologist prior to limiting oral intake.  Shown how to read labels for sodium levels, recommended food list provided.  I emphasized the importance of following their physician's orders for medication administration.   Importance of flu and pneumonia vaccinations reinforced.   Common CHF medications reviewed as well as avoiding certain other meds (decongestants, NSAIDS)  Instructed to discuss activity recommendations with physician.  Sample CHF weight documentation form provided.   CHF Zones discussed. Importance of staying in \"green\" area stressed. Pt verbalized understanding to call MD ASAP when he reaches the yellow zone, and to call 911 when reaching the red zone.   Booklet and zone pamphlet provided to the pt.   Patient denies any further questions at this time.  Electronically signed by Morgan Knutson RN on 3/21/2024 at 3:11 PM

## 2024-03-21 NOTE — PROGRESS NOTES
Hospitalist Progress Note      PCP: Christiano Linder MD    Date of Admission: 3/18/2024    Chief Complaint: dyspnea    Subjective: pt in chair, looks comfortable     Medications:  Reviewed    Infusion Medications    sodium chloride       Scheduled Medications    furosemide  10 mg Oral Daily    warfarin placeholder: dosing by pharmacy   Other RX Placeholder    Cenegermin-michaelj  1 drop Right Eye 4x daily    [Held by provider] furosemide  20 mg IntraVENous BID    sotalol  80 mg Oral BID    sodium chloride flush  5-40 mL IntraVENous 2 times per day    carvedilol  12.5 mg Oral BID    traZODone  50 mg Oral Nightly    trospium  20 mg Oral BID AC    pantoprazole  40 mg Oral QAM AC    morphine  15 mg Oral 2 times per day    levothyroxine  88 mcg Oral Daily    lamoTRIgine  100 mg Oral Nightly    lactulose  30 mL Oral Daily    escitalopram  20 mg Oral Daily     PRN Meds: albuterol, sodium chloride flush, sodium chloride, polyethylene glycol, acetaminophen **OR** acetaminophen      Intake/Output Summary (Last 24 hours) at 3/21/2024 0742  Last data filed at 3/21/2024 0532  Gross per 24 hour   Intake 1330 ml   Output 1200 ml   Net 130 ml       Exam:    BP (!) 113/58   Pulse 71   Temp 97.5 °F (36.4 °C) (Oral)   Resp 18   Ht 1.854 m (6' 0.99\")   Wt 117.9 kg (260 lb)   SpO2 95%   BMI 34.31 kg/m²     General appearance: No apparent distress, appears stated age and cooperative.  HEENT: Pupils equal, round, and reactive to light. Conjunctivae/corneas clear.  Neck: Supple, with full range of motion. No jugular venous distention. Trachea midline.  Respiratory:  Normal respiratory effort. Clear to auscultation, bilaterally without Rales/Wheezes/Rhonchi.  Cardiovascular: Regular rate and rhythm with normal S1/S2 without murmurs, rubs or gallops.  Abdomen: Soft, non-tender, non-distended with normal bowel sounds.  Musculoskeletal: No clubbing, cyanosis or edema bilaterally.  Full range of motion without deformity.  Skin: Skin  holding IV lasix, repeat BMP AM. Restart low dose PO lasix today    A fib/arrhythmia- on coumadin, post pacer placement, on coumadin- dosing per pharmacy   Acute/chronic resp failure/desaturation= perform home O2 eval before DC, may needs home O2 after DC  OMAR- non compliant with CPAP- may needs to follow up with pulmonary service after DC  Obesity with BMI 34%- supportive care, PT on case  Medically stable for acute admission at [facility]     TTS: 45 minutes where I focused more than 75% of my attention on rendering care, and planning treatment course for this patient, in addition to talking to RN team, mid levels, consulting with other physicians and following up on labs and imaging.    Electronically signed by Nirav Jade MD on 3/21/2024 at 7:42 AM

## 2024-03-21 NOTE — PROGRESS NOTES
No results for input(s): \"LIPASE\", \"AMYLASE\" in the last 72 hours.  Recent Labs     03/18/24  1424 03/19/24  0603 03/20/24  0632 03/21/24  0539   PROT 7.4  --   --   --    INR 5.8* 5.9* 4.2 2.9       Echo (TTE) complete (PRN contrast/bubble/strain/3D)    Result Date: 3/19/2024    Aortic Valve: Bicuspid valve. Thickened cusp. Calcified cusp. Moderate to severe regurgitation with a centrally directed jet. Mild stenosis of the aortic valve. AV mean gradient is 13 mmHg. AV area by continuity VTI is 1.4 cm2. Recommend VIN or Cardiac MRI for further evaluation of AI   Left Ventricle: Normal left ventricular systolic function with a visually estimated EF of 50%. Left ventricle size is normal. Mildly increased wall thickness. Normal wall motion. Normal diastolic function.   Right Ventricle: Normal systolic function.   Mitral Valve: Mild regurgitation.   Tricuspid Valve: Severely elevated RVSP, consistent with severe pulmonary hypertension. The estimated RVSP is 73 mmHg.   Left Atrium: Left atrium is mildly dilated.     XR CHEST PORTABLE    Result Date: 3/18/2024  EXAMINATION: ONE XRAY VIEW OF THE CHEST 3/18/2024 3:08 pm COMPARISON: March 1, 2015 HISTORY: ORDERING SYSTEM PROVIDED HISTORY: SOB TECHNOLOGIST PROVIDED HISTORY: Reason for exam:->SOB What reading provider will be dictating this exam?->CRC FINDINGS: The heart is enlarged.  Pulmonary vessels are congested.  There are diffusely increased interstitial markings.  There is no pneumothorax.  Minimal bilateral pleural effusion suspected. Pacemaker present.     Cardiomegaly with pulmonary vascular congestion and minimal bilateral pleural effusion.       Active Hospital Problems    Diagnosis Date Noted    CHF (congestive heart failure), NYHA class I, acute on chronic, combined (HCC) [I50.43] 03/18/2024     Priority: Low         Impression/Plan:   CORMIER Likely related to AR.  LVEF is borderline. He will need further w/u to include VIN and Cath. I discussed with

## 2024-03-21 NOTE — CARE COORDINATION
Case Management Assessment  Initial Evaluation    Date/Time of Evaluation: 3/21/2024 1:39 PM  Assessment Completed by: Carolyn Kapadia RN    If patient is discharged prior to next notation, then this note serves as note for discharge by case management.    Patient Name: Jonah Alvarez                   YOB: 1950  Diagnosis: Congestive heart failure of unknown etiology (HCC) [I50.9]                   Date / Time: 3/21/2024 10:19 AM    Patient Admission Status: Inpatient   Readmission Risk (Low < 19, Mod (19-27), High > 27): Readmission Risk Score: 17.6    Current PCP: Christiano Linder MD  PCP verified by CM? Yes    Chart Reviewed: Yes      History Provided by: Patient  Patient Orientation: Alert and Oriented, Person, Place, Situation, Self    Patient Cognition: Alert    Hospitalization in the last 30 days (Readmission):  No  TX FROM [facility]  If yes, Readmission Assessment in  Navigator will be completed.    Advance Directives:      Code Status: Full Code   Patient's Primary Decision Maker is: Legal Next of Kin    Primary Decision Maker: Brittany Alvarez - Spouse - [phone]    Discharge Planning:    Patient lives with: Spouse/Significant Other Type of Home: House  Primary Care Giver: Self  Patient Support Systems include: Spouse/Significant Other, Family Members, Children, Friends/Neighbors   Current services prior to admission: Durable Medical Equipment            Current DME: Cane, Walker, Wheelchair, Shower Chair, Bedside Commode (CANE, WALKER, ELECTRIC WC, WC, SHOWER BENCH, BSC)            Type of Home Care services:  None    ADLS  Prior functional level: Independent in ADLs/IADLs  Current functional level: Independent in ADLs/IADLs    PT AM-PAC:   /24  OT AM-PAC:   /24    Family can provide assistance at DC: Yes  Would you like Case Management to discuss the discharge plan with any other family members/significant others, and if so, who? Yes (WIFE IF NEEDED)  Plans to Return to Present

## 2024-03-21 NOTE — CARE COORDINATION
[facility] called, s/w [place].  Transferring to [address] [place] [address], for cardiac cath and VIN. Dr Ascencio is the accepting provider.

## 2024-03-21 NOTE — ACP (ADVANCE CARE PLANNING)
Advance Care Planning   Healthcare Decision Maker:    Primary Decision Maker: Brittany Alvarez - [facility] - [phone]

## 2024-03-21 NOTE — PROGRESS NOTES
Spiritual Care Services     Summary of Visit:  Pt anticipating a heart cath tomorrow. Pt at ease about this, shared his healthcare journey, being inpatient at [facility] and then transferring here after being too compromised to engage in therapy he said. Pt not a person of dylan, well supported by family.     Encounter Summary  Encounter Overview/Reason : Initial Encounter  Service Provided For:: Patient  Referral/Consult From:: Rounding  Support System: Spouse, Children  Complexity of Encounter: Low  Begin Time: 1530  End Time : 1545  Total Time Calculated: 15 min        Spiritual/Emotional needs  Type: Spiritual Support                      Spiritual Assessment/Intervention/Outcomes:    Assessment: Calm, Compromised coping    Intervention: Active listening    Outcome: Expressed feelings, needs, and concerns, Engaged in conversation      Care Plan:    Plan and Referrals  Plan/Referrals: Continue to visit, (comment)      Spiritual Care Services   Electronically signed by ALANA Vásquez on 3/21/2024 at 3:28 PM.    To reach a  for emotional and spiritual support, place an EPIC consult request.   If a  is needed immediately, dial “0” and ask to page the on-call .

## 2024-03-21 NOTE — PROGRESS NOTES
Occupational Therapy  Facility/Department: [facility] MED SURG UNIT  Daily Treatment Note  NAME: Jonah Alvarez  : 1950  MRN: [id number]    Date of Service: 3/21/2024    Discharge Recommendations:  Home with Home health OT, Home with assist PRN, pt. Is d/c'ing today to [facility]     Treatment Diagnosis: Decreased ADL/IADL performance d/t CHF exacerbation     Patient Diagnosis(es): The primary encounter diagnosis was SOB (shortness of breath) on exertion. Diagnoses of Congestive heart failure, unspecified HF chronicity, unspecified heart failure type (HCC), Oxygen dependent, and Coagulopathy (HCC) were also pertinent to this visit.     Assessment      OT follow up:yes  Pt. Is going to the other facility for a heart cath. Pt. Will stay there for a few days.Pt. Was agreeable to OT. Pt. Tolerated bathing with warm wipes and changing his gown. Pt. Tolerated UB exercises with no complaints with 1 lb. T-bar. Pt. Was on 2 liters of O2. Pt.'s O2 was monitored throughout OT session. Trained and educated pt. In breathing techniques and energy conservation techniques to decrease SOB and fatigue. OT session ended due to EMTs came to pick him up. Answered pt.'s questions and concerns.     Plan   Occupational Therapy Plan  Times Per Week: 4-7  Times Per Day: Once a day  Current Treatment Recommendations: Strengthening;ROM;Balance training;Safety education & training;Self-Care / ADL;Patient/Caregiver education & training;Endurance training;Equipment evaluation, education, & procurement;Positioning     Restrictions   Restrictions/Precautions  Restrictions/Precautions: Fall Risk  Required Braces or Orthoses?: No  Implants present? : Metal implants (L TKR, h/o back surgery)    Subjective       Pt. Was sitting up in the recliner and agreeable to OT.        Objective      Vitals  Comment: 2 liters of  O2, Pt.'s O2 was 99% and heart rate was 71        ADL  Grooming: Stand by assistance;Supervision (washed face with warm wet wipes)  SUNNY

## 2024-03-21 NOTE — DISCHARGE SUMMARY
Discharge Summary    Patient:  Jonah Alvarez  YOB: 1950    MRN: [id number]   Acct: [id number]    Primary Care Physician: Christiano Linder MD    Admit date:  3/18/2024    Discharge date:   03/21/24      Discharge Diagnoses:   CHF (congestive heart failure), NYHA class I, acute on chronic, combined (HCC)  Principal Problem:    CHF (congestive heart failure), NYHA class I, acute on chronic, combined (HCC)  Resolved Problems:    * No resolved hospital problems. *      Admitted for: (HPI)above    Hospital Course: patient was admitted with severe dyspnea due to combination of CHF/acute respiratory failure- O2 applied, was diuresed with negative water balance. 2 d echo show severe pulmonary HTN and severe aortic stenosis- per cardiology recommendations he will be transferred to [facility] for card cath and further post cath management   Needs home O2 evaluation/sleep study for suspected sleep apnea     Consultants:  cardiology    Discharge Medications:       Medication List        CONTINUE taking these medications      Handicap Placard Misc  by Does not apply route Good through 8/26/2025     Oxygen Concentrator  2L oxygen for sleep and exertion            ASK your doctor about these medications      albuterol (2.5 MG/3ML) 0.083% nebulizer solution  Commonly known as: PROVENTIL  USE ONE VIAL IN NEBULIZER EVERY 4 HOURS AS NEEDED FOR WHEEZING AND FOR SHORTNESS OF BREATH     Carboxymethylcell-Glycerin PF 0.5-0.9 % Soln     carvedilol 12.5 MG tablet  Commonly known as: COREG     cetirizine 10 MG tablet  Commonly known as: ZYRTEC     docusate sodium 100 MG capsule  Commonly known as: COLACE     escitalopram 20 MG tablet  Commonly known as: Lexapro  Take 1 tablet by mouth daily     furosemide 40 MG tablet  Commonly known as: LASIX     lactulose 20 GM/30ML Soln     * lamoTRIgine 25 MG tablet  Commonly known as: LAMICTAL     * lamoTRIgine 100 MG tablet  Commonly known as: LAMICTAL     levothyroxine 88 MCG

## 2024-03-21 NOTE — H&P
Hospital Medicine  History and Physical    Patient:  Jonah Alvarez  MRN: [id number]    CHIEF COMPLAINT:  No chief complaint on file.      History Obtained From:  Patient, EMR  Primary Care Physician: Christiano Linder MD    HISTORY OF PRESENT ILLNESS:   73-year-old male presents as transfer from [facility] so cardiology can do left heart catheterization.  Patient had initially presented to [facility] in [place] after approximately 1 week history of dyspnea primarily with exertion and increasing lower extremity edema.  He was treated with IV diuresis with improvement in symptoms.  Echocardiogram revealed ejection fraction of 45 to 50%.  He was found to have moderate to severe aortic regurgitation and severely elevated RVSP (73 mmHg).    He does not have any known history of coronary artery disease.  He is a former smoker and quit in the 90s.  He is not sure if he has a diagnosis of COPD.  He was on inhalers in the past but now only uses them as needed and rarely needs them.  He is ambulatory with a cane.  He does not drive due to retinal issues.  He does have history of paroxysmal atrial fibrillation for which she takes warfarin.    Past Medical History:      Diagnosis Date    Allergic rhinitis     Asthma     asthmatic bronchitis    Atrial fibrillation (HCC)     CAD (coronary artery disease)     Chronic back pain     COPD (chronic obstructive pulmonary disease) (HCC)     CVA (cerebral infarction)     Depression     Edema     GERD (gastroesophageal reflux disease)     ulcer    Glucose intolerance (impaired glucose tolerance) 11/06    Hypertension     ICD (implantable cardiac defibrillator) in place 2010    OMAR (obstructive sleep apnea)     UTI (urinary tract infection)        Past Surgical History:      Procedure Laterality Date    BACK SURGERY      ensed    FRACTURE SURGERY  7/06    l/shoulder    GLOSSECTOMY  12/09/14    nasal endoscopy    GLOSSECTOMY  07/31/15    W/COBLATION,NASAL ENDOSCOPY    PACEMAKER PLACEMENT

## 2024-03-22 ENCOUNTER — APPOINTMENT (OUTPATIENT)
Age: 74
DRG: 286 | End: 2024-03-22
Attending: INTERNAL MEDICINE
Payer: MEDICARE

## 2024-03-22 PROBLEM — I35.1 AORTIC VALVE REGURGITATION: Status: ACTIVE | Noted: 2024-03-22

## 2024-03-22 LAB
ANION GAP SERPL CALCULATED.3IONS-SCNC: 10 MEQ/L (ref 9–15)
BUN SERPL-MCNC: 30 MG/DL (ref 8–23)
CALCIUM SERPL-MCNC: 9.1 MG/DL (ref 8.5–9.9)
CHLORIDE SERPL-SCNC: 100 MEQ/L (ref 95–107)
CO2 SERPL-SCNC: 29 MEQ/L (ref 20–31)
CREAT SERPL-MCNC: 1.28 MG/DL (ref 0.7–1.2)
ECHO AV AREA PLAN/BSA: 1.02 CM2/M2
ECHO AV AREA PLAN: 2.4 CM2
ECHO BSA: 2.42 M2
ECHO BSA: 2.45 M2
GLUCOSE SERPL-MCNC: 111 MG/DL (ref 70–99)
INR PPP: 2.2
POTASSIUM SERPL-SCNC: 4.3 MEQ/L (ref 3.4–4.9)
PROTHROMBIN TIME: 24.4 SEC (ref 12.3–14.9)
SODIUM SERPL-SCNC: 139 MEQ/L (ref 135–144)

## 2024-03-22 PROCEDURE — 6370000000 HC RX 637 (ALT 250 FOR IP): Performed by: INTERNAL MEDICINE

## 2024-03-22 PROCEDURE — 99153 MOD SED SAME PHYS/QHP EA: CPT | Performed by: INTERNAL MEDICINE

## 2024-03-22 PROCEDURE — 2709999900 HC NON-CHARGEABLE SUPPLY: Performed by: INTERNAL MEDICINE

## 2024-03-22 PROCEDURE — C1894 INTRO/SHEATH, NON-LASER: HCPCS | Performed by: INTERNAL MEDICINE

## 2024-03-22 PROCEDURE — 2700000000 HC OXYGEN THERAPY PER DAY

## 2024-03-22 PROCEDURE — 93312 ECHO TRANSESOPHAGEAL: CPT | Performed by: INTERNAL MEDICINE

## 2024-03-22 PROCEDURE — 99233 SBSQ HOSP IP/OBS HIGH 50: CPT | Performed by: INTERNAL MEDICINE

## 2024-03-22 PROCEDURE — 99152 MOD SED SAME PHYS/QHP 5/>YRS: CPT | Performed by: INTERNAL MEDICINE

## 2024-03-22 PROCEDURE — 2500000003 HC RX 250 WO HCPCS: Performed by: INTERNAL MEDICINE

## 2024-03-22 PROCEDURE — 2580000003 HC RX 258: Performed by: INTERNAL MEDICINE

## 2024-03-22 PROCEDURE — 7100000011 HC PHASE II RECOVERY - ADDTL 15 MIN

## 2024-03-22 PROCEDURE — 85610 PROTHROMBIN TIME: CPT

## 2024-03-22 PROCEDURE — 7100000011 HC PHASE II RECOVERY - ADDTL 15 MIN: Performed by: INTERNAL MEDICINE

## 2024-03-22 PROCEDURE — 4A023N7 MEASUREMENT OF CARDIAC SAMPLING AND PRESSURE, LEFT HEART, PERCUTANEOUS APPROACH: ICD-10-PCS | Performed by: INTERNAL MEDICINE

## 2024-03-22 PROCEDURE — 7100000010 HC PHASE II RECOVERY - FIRST 15 MIN: Performed by: INTERNAL MEDICINE

## 2024-03-22 PROCEDURE — 93325 DOPPLER ECHO COLOR FLOW MAPG: CPT

## 2024-03-22 PROCEDURE — C1769 GUIDE WIRE: HCPCS | Performed by: INTERNAL MEDICINE

## 2024-03-22 PROCEDURE — B2111ZZ FLUOROSCOPY OF MULTIPLE CORONARY ARTERIES USING LOW OSMOLAR CONTRAST: ICD-10-PCS | Performed by: INTERNAL MEDICINE

## 2024-03-22 PROCEDURE — 93320 DOPPLER ECHO COMPLETE: CPT | Performed by: INTERNAL MEDICINE

## 2024-03-22 PROCEDURE — 93312 ECHO TRANSESOPHAGEAL: CPT

## 2024-03-22 PROCEDURE — 93325 DOPPLER ECHO COLOR FLOW MAPG: CPT | Performed by: INTERNAL MEDICINE

## 2024-03-22 PROCEDURE — 36415 COLL VENOUS BLD VENIPUNCTURE: CPT

## 2024-03-22 PROCEDURE — 6360000002 HC RX W HCPCS: Performed by: INTERNAL MEDICINE

## 2024-03-22 PROCEDURE — 2060000000 HC ICU INTERMEDIATE R&B

## 2024-03-22 PROCEDURE — 6360000004 HC RX CONTRAST MEDICATION: Performed by: INTERNAL MEDICINE

## 2024-03-22 PROCEDURE — 93454 CORONARY ARTERY ANGIO S&I: CPT | Performed by: INTERNAL MEDICINE

## 2024-03-22 PROCEDURE — B24BZZ4 ULTRASONOGRAPHY OF HEART WITH AORTA, TRANSESOPHAGEAL: ICD-10-PCS | Performed by: INTERNAL MEDICINE

## 2024-03-22 PROCEDURE — B2151ZZ FLUOROSCOPY OF LEFT HEART USING LOW OSMOLAR CONTRAST: ICD-10-PCS | Performed by: INTERNAL MEDICINE

## 2024-03-22 PROCEDURE — 7100000010 HC PHASE II RECOVERY - FIRST 15 MIN

## 2024-03-22 PROCEDURE — 80048 BASIC METABOLIC PNL TOTAL CA: CPT

## 2024-03-22 PROCEDURE — C8925 2D TEE W OR W/O FOL W/CON,IN: HCPCS

## 2024-03-22 PROCEDURE — 93458 L HRT ARTERY/VENTRICLE ANGIO: CPT | Performed by: INTERNAL MEDICINE

## 2024-03-22 RX ORDER — FENTANYL CITRATE 0.05 MG/ML
25 INJECTION, SOLUTION INTRAMUSCULAR; INTRAVENOUS
Status: DISCONTINUED | OUTPATIENT
Start: 2024-03-22 | End: 2024-03-23 | Stop reason: HOSPADM

## 2024-03-22 RX ORDER — LABETALOL HYDROCHLORIDE 5 MG/ML
10 INJECTION, SOLUTION INTRAVENOUS EVERY 30 MIN PRN
Status: DISCONTINUED | OUTPATIENT
Start: 2024-03-22 | End: 2024-03-23 | Stop reason: HOSPADM

## 2024-03-22 RX ORDER — MIDAZOLAM HYDROCHLORIDE 2 MG/2ML
2 INJECTION, SOLUTION INTRAMUSCULAR; INTRAVENOUS
Status: DISCONTINUED | OUTPATIENT
Start: 2024-03-22 | End: 2024-03-23 | Stop reason: HOSPADM

## 2024-03-22 RX ORDER — HYDRALAZINE HYDROCHLORIDE 20 MG/ML
10 INJECTION INTRAMUSCULAR; INTRAVENOUS EVERY 10 MIN PRN
Status: DISCONTINUED | OUTPATIENT
Start: 2024-03-22 | End: 2024-03-23 | Stop reason: HOSPADM

## 2024-03-22 RX ORDER — MORPHINE SULFATE 4 MG/ML
2 INJECTION, SOLUTION INTRAMUSCULAR; INTRAVENOUS
Status: DISCONTINUED | OUTPATIENT
Start: 2024-03-22 | End: 2024-03-23 | Stop reason: HOSPADM

## 2024-03-22 RX ORDER — NITROGLYCERIN 0.4 MG/1
0.4 TABLET SUBLINGUAL EVERY 5 MIN PRN
Status: CANCELLED | OUTPATIENT
Start: 2024-03-22

## 2024-03-22 RX ORDER — LIDOCAINE HYDROCHLORIDE 10 MG/ML
INJECTION, SOLUTION INFILTRATION; PERINEURAL PRN
Status: DISCONTINUED | OUTPATIENT
Start: 2024-03-22 | End: 2024-03-22 | Stop reason: HOSPADM

## 2024-03-22 RX ORDER — FENTANYL CITRATE 50 UG/ML
INJECTION, SOLUTION INTRAMUSCULAR; INTRAVENOUS PRN
Status: DISCONTINUED | OUTPATIENT
Start: 2024-03-22 | End: 2024-03-22 | Stop reason: HOSPADM

## 2024-03-22 RX ORDER — DIPHENHYDRAMINE HYDROCHLORIDE 50 MG/ML
50 INJECTION INTRAMUSCULAR; INTRAVENOUS ONCE
Status: CANCELLED | OUTPATIENT
Start: 2024-03-22 | End: 2024-03-22

## 2024-03-22 RX ORDER — MIDAZOLAM HYDROCHLORIDE 1 MG/ML
INJECTION INTRAMUSCULAR; INTRAVENOUS PRN
Status: DISCONTINUED | OUTPATIENT
Start: 2024-03-22 | End: 2024-03-22 | Stop reason: HOSPADM

## 2024-03-22 RX ORDER — SODIUM CHLORIDE 9 MG/ML
INJECTION, SOLUTION INTRAVENOUS CONTINUOUS
Status: DISCONTINUED | OUTPATIENT
Start: 2024-03-22 | End: 2024-03-23 | Stop reason: HOSPADM

## 2024-03-22 RX ORDER — SODIUM CHLORIDE 9 MG/ML
INJECTION, SOLUTION INTRAVENOUS PRN
Status: DISCONTINUED | OUTPATIENT
Start: 2024-03-22 | End: 2024-03-23 | Stop reason: HOSPADM

## 2024-03-22 RX ORDER — ACETAMINOPHEN 325 MG/1
650 TABLET ORAL EVERY 4 HOURS PRN
Status: DISCONTINUED | OUTPATIENT
Start: 2024-03-22 | End: 2024-03-23 | Stop reason: HOSPADM

## 2024-03-22 RX ORDER — SODIUM CHLORIDE 0.9 % (FLUSH) 0.9 %
5-40 SYRINGE (ML) INJECTION PRN
Status: DISCONTINUED | OUTPATIENT
Start: 2024-03-22 | End: 2024-03-23 | Stop reason: HOSPADM

## 2024-03-22 RX ORDER — ONDANSETRON 2 MG/ML
4 INJECTION INTRAMUSCULAR; INTRAVENOUS EVERY 6 HOURS PRN
Status: CANCELLED | OUTPATIENT
Start: 2024-03-22

## 2024-03-22 RX ORDER — NITROGLYCERIN 20 MG/100ML
INJECTION INTRAVENOUS CONTINUOUS PRN
Status: COMPLETED | OUTPATIENT
Start: 2024-03-22 | End: 2024-03-22

## 2024-03-22 RX ORDER — ONDANSETRON 2 MG/ML
4 INJECTION INTRAMUSCULAR; INTRAVENOUS EVERY 6 HOURS PRN
Status: DISCONTINUED | OUTPATIENT
Start: 2024-03-22 | End: 2024-03-23 | Stop reason: HOSPADM

## 2024-03-22 RX ORDER — SODIUM CHLORIDE 0.9 % (FLUSH) 0.9 %
5-40 SYRINGE (ML) INJECTION EVERY 12 HOURS SCHEDULED
Status: DISCONTINUED | OUTPATIENT
Start: 2024-03-22 | End: 2024-03-23 | Stop reason: HOSPADM

## 2024-03-22 RX ORDER — HEPARIN SODIUM 1000 [USP'U]/ML
INJECTION, SOLUTION INTRAVENOUS; SUBCUTANEOUS PRN
Status: DISCONTINUED | OUTPATIENT
Start: 2024-03-22 | End: 2024-03-22 | Stop reason: HOSPADM

## 2024-03-22 RX ORDER — SOTALOL HYDROCHLORIDE 80 MG/1
40 TABLET ORAL 2 TIMES DAILY
Qty: 60 TABLET | Refills: 3 | COMMUNITY
Start: 2024-03-22

## 2024-03-22 RX ADMIN — MORPHINE SULFATE 15 MG: 15 TABLET, FILM COATED, EXTENDED RELEASE ORAL at 22:15

## 2024-03-22 RX ADMIN — LAMOTRIGINE 100 MG: 100 TABLET ORAL at 22:15

## 2024-03-22 RX ADMIN — MORPHINE SULFATE 15 MG: 15 TABLET, FILM COATED, EXTENDED RELEASE ORAL at 09:17

## 2024-03-22 RX ADMIN — SODIUM CHLORIDE, PRESERVATIVE FREE 10 ML: 5 INJECTION INTRAVENOUS at 09:19

## 2024-03-22 RX ADMIN — SOTALOL HYDROCHLORIDE 40 MG: 80 TABLET ORAL at 22:14

## 2024-03-22 RX ADMIN — CARVEDILOL 12.5 MG: 12.5 TABLET, FILM COATED ORAL at 09:18

## 2024-03-22 RX ADMIN — ACETAMINOPHEN 325MG 650 MG: 325 TABLET ORAL at 17:38

## 2024-03-22 RX ADMIN — PANTOPRAZOLE SODIUM 40 MG: 40 TABLET, DELAYED RELEASE ORAL at 05:26

## 2024-03-22 RX ADMIN — SOTALOL HYDROCHLORIDE 40 MG: 80 TABLET ORAL at 09:16

## 2024-03-22 RX ADMIN — LEVOTHYROXINE SODIUM 88 MCG: 0.09 TABLET ORAL at 05:26

## 2024-03-22 RX ADMIN — SODIUM CHLORIDE, PRESERVATIVE FREE 10 ML: 5 INJECTION INTRAVENOUS at 22:16

## 2024-03-22 RX ADMIN — CARVEDILOL 12.5 MG: 12.5 TABLET, FILM COATED ORAL at 17:34

## 2024-03-22 RX ADMIN — TROSPIUM CHLORIDE 20 MG: 20 TABLET, FILM COATED ORAL at 22:15

## 2024-03-22 ASSESSMENT — PAIN DESCRIPTION - FREQUENCY
FREQUENCY: CONTINUOUS
FREQUENCY: CONTINUOUS

## 2024-03-22 ASSESSMENT — PAIN DESCRIPTION - ONSET
ONSET: ON-GOING
ONSET: ON-GOING

## 2024-03-22 ASSESSMENT — PAIN - FUNCTIONAL ASSESSMENT
PAIN_FUNCTIONAL_ASSESSMENT: ACTIVITIES ARE NOT PREVENTED
PAIN_FUNCTIONAL_ASSESSMENT: PREVENTS OR INTERFERES SOME ACTIVE ACTIVITIES AND ADLS
PAIN_FUNCTIONAL_ASSESSMENT: ACTIVITIES ARE NOT PREVENTED
PAIN_FUNCTIONAL_ASSESSMENT: PREVENTS OR INTERFERES SOME ACTIVE ACTIVITIES AND ADLS
PAIN_FUNCTIONAL_ASSESSMENT: ACTIVITIES ARE NOT PREVENTED

## 2024-03-22 ASSESSMENT — ENCOUNTER SYMPTOMS
SHORTNESS OF BREATH: 1
COUGH: 0
EYES NEGATIVE: 1
STRIDOR: 0
WHEEZING: 0
BLOOD IN STOOL: 0
CHEST TIGHTNESS: 0
GASTROINTESTINAL NEGATIVE: 1

## 2024-03-22 ASSESSMENT — PAIN DESCRIPTION - ORIENTATION
ORIENTATION: MID;LOWER
ORIENTATION: LOWER;MID

## 2024-03-22 ASSESSMENT — PAIN DESCRIPTION - PAIN TYPE
TYPE: CHRONIC PAIN

## 2024-03-22 ASSESSMENT — PAIN DESCRIPTION - DESCRIPTORS
DESCRIPTORS: DISCOMFORT
DESCRIPTORS: DISCOMFORT
DESCRIPTORS: ACHING
DESCRIPTORS: DISCOMFORT
DESCRIPTORS: DISCOMFORT

## 2024-03-22 ASSESSMENT — PAIN DESCRIPTION - LOCATION
LOCATION: BACK

## 2024-03-22 ASSESSMENT — PAIN SCALES - GENERAL
PAINLEVEL_OUTOF10: 5
PAINLEVEL_OUTOF10: 4
PAINLEVEL_OUTOF10: 5
PAINLEVEL_OUTOF10: 4
PAINLEVEL_OUTOF10: 5

## 2024-03-22 NOTE — CONSULTS
Consults    Patient Name: Jonah Alvarez  Admit Date: 3/21/2024 10:19 AM  MR #: [id number]  : 1950    Attending Physician: Vincent Schaffer DO  Reason for consult: CHF    History of Presenting Illness:      Jonah Alvarez is a 73 y.o. male on hospital day 1 with a history of .   History Obtained From:  patient, electronic medical record    Pt initially admitted to [facility] with SOB/CHF. He was diuresed. He responded well.. mild Pre renal. Diuretics held since yesterday. GFR is better.  He feels better since Diuresis    He sees [facility] and Jose D.    He has no known CAD. He has AF and on Sotalol and Warfarin. Sotalol dose was lowered whiole at [place] due to prolonged QTc.      He received Vit K yesterday.  INR this AM 2.2    By TTE Moderate Severe AR EF 45-50%.  RVSP 73 mmHg    S/p PPM for SSS  History:      EKG: AV Paced 74  Past Medical History:   Diagnosis Date    Allergic rhinitis     Asthma     asthmatic bronchitis    Atrial fibrillation (HCC)     CAD (coronary artery disease)     Chronic back pain     COPD (chronic obstructive pulmonary disease) (HCC)     CVA (cerebral infarction)     Depression     Edema     GERD (gastroesophageal reflux disease)     ulcer    Glucose intolerance (impaired glucose tolerance)     Hypertension     ICD (implantable cardiac defibrillator) in place     OMAR (obstructive sleep apnea)     UTI (urinary tract infection)      Past Surgical History:   Procedure Laterality Date    BACK SURGERY      ensed    FRACTURE SURGERY      l/shoulder    GLOSSECTOMY  14    nasal endoscopy    GLOSSECTOMY  07/31/15    W/COBLATION,NASAL ENDOSCOPY    PACEMAKER PLACEMENT      SKIN BIOPSY  12    right leg-Dr. Johns       Family History  Family History   Problem Relation Age of Onset    Hypertension Mother     Stroke Mother     Coronary Art Dis Mother     High Cholesterol Mother      [] Unable to obtain due to ventilated and/ or neurologic status    Social  03/20/24  0632 03/21/24  0539 03/22/24  0509   INR 4.2 2.9 2.2     Echo (TTE) complete (PRN contrast/bubble/strain/3D)    Result Date: 3/19/2024    Aortic Valve: Bicuspid valve. Thickened cusp. Calcified cusp. Moderate to severe regurgitation with a centrally directed jet. Mild stenosis of the aortic valve. AV mean gradient is 13 mmHg. AV area by continuity VTI is 1.4 cm2. Recommend VIN or Cardiac MRI for further evaluation of AI   Left Ventricle: Normal left ventricular systolic function with a visually estimated EF of 50%. Left ventricle size is normal. Mildly increased wall thickness. Normal wall motion. Normal diastolic function.   Right Ventricle: Normal systolic function.   Mitral Valve: Mild regurgitation.   Tricuspid Valve: Severely elevated RVSP, consistent with severe pulmonary hypertension. The estimated RVSP is 73 mmHg.   Left Atrium: Left atrium is mildly dilated.     XR CHEST PORTABLE    Result Date: 3/18/2024  EXAMINATION: ONE XRAY VIEW OF THE CHEST 3/18/2024 3:08 pm COMPARISON: March 1, 2015 HISTORY: ORDERING SYSTEM PROVIDED HISTORY: SOB TECHNOLOGIST PROVIDED HISTORY: Reason for exam:->SOB What reading provider will be dictating this exam?->CRC FINDINGS: The heart is enlarged.  Pulmonary vessels are congested.  There are diffusely increased interstitial markings.  There is no pneumothorax.  Minimal bilateral pleural effusion suspected. Pacemaker present.     Cardiomegaly with pulmonary vascular congestion and minimal bilateral pleural effusion.       Active Hospital Problems    Diagnosis Date Noted    Congestive heart failure of unknown etiology (HCC) [I50.9] 03/21/2024     Priority: Low         Impression/Plan:   Acutely decompensated SHF due to AR - now compensated. Holding Diuretics today ( will get contrast from Cath and recent DAVID)  LVEF 45-50%  RBA VIN/Cath PCI discussed w pt - will proceed this AM.  Keep NPO. INR 2.2 - Dr. Drake aware and will do Radial access  Severe PA HTN  Persistent AS -

## 2024-03-22 NOTE — PROGRESS NOTES
Physician Progress Note      PATIENT:               TERE MCMAHON  CSN #:                  [id number]  :                       1950  ADMIT DATE:       3/21/2024 10:19 AM  DISCH DATE:  RESPONDING  PROVIDER #:        Minesh Nicole MD          QUERY TEXT:    Patient admitted with \"Acutely decompensated SHF due to AR - now   compensated....He has AF and on Sotalol and Warfarin\" per cardiology consult   note.  If possible, please document in progress notes and discharge summary if   you are evaluating and/or treating any of the following:    The medical record reflects the following:  Risk Factors: male age 73 persistent AF severe aortic regurgitation  severe   pulmonary hypertension   obesity  Clinical Indicators: age 73  HTN  acute on chronic systolic HF  Treatment: Warfarin held   Lasix held  RBA VIN/Cath PCI planned    Dax Jimenez RN [facility]  [phone]  Options provided:  -- Secondary hypercoagulable state in a patient with atrial fibrillation  -- Other - I will add my own diagnosis  -- Disagree - Not applicable / Not valid  -- Disagree - Clinically unable to determine / Unknown  -- Refer to Clinical Documentation Reviewer    PROVIDER RESPONSE TEXT:    This patient has secondary hypercoagulable state in a patient with atrial   fibrillation.    Query created by: Dax Jimenez on 3/22/2024 10:45 AM      Electronically signed by:  Minesh Nicole MD 3/22/2024 12:32 PM

## 2024-03-22 NOTE — PROGRESS NOTES
Arrived to pre/post from [facility] by wheelchair.  Name and date of birth verified along with allergies.  Orders verified and consents obtained.  Attached to monitor.  IV flushed and patent and right groin and right wrist prepped

## 2024-03-22 NOTE — PROGRESS NOTES
Physician Progress Note    3/22/2024   1:38 PM    Name:  Jonah Alvarez  MRN:    [id number]      Day: 1     Admit Date: 3/21/2024 10:19 AM  PCP: Christiano Linder MD    Code Status:  Full Code    Subjective:     Going for catheterization    Current Facility-Administered Medications   Medication Dose Route Frequency Provider Last Rate Last Admin    albuterol (PROVENTIL) (2.5 MG/3ML) 0.083% nebulizer solution 2.5 mg  2.5 mg Nebulization Q4H PRN Nirav Jade MD        sodium chloride flush 0.9 % injection 5-40 mL  5-40 mL IntraVENous 2 times per day Nirav Jade MD   10 mL at 03/22/24 0919    sodium chloride flush 0.9 % injection 5-40 mL  5-40 mL IntraVENous PRN Nirav Jade MD        0.9 % sodium chloride infusion   IntraVENous PRN Nirav Jade MD        polyethylene glycol (GLYCOLAX) packet 17 g  17 g Oral Daily PRN Nirav Jade MD        acetaminophen (TYLENOL) tablet 650 mg  650 mg Oral Q6H PRN Nirav Jade MD   650 mg at 03/21/24 2009    Or    acetaminophen (TYLENOL) suppository 650 mg  650 mg Rectal Q6H PRN Nirav Jade MD        carvedilol (COREG) tablet 12.5 mg  12.5 mg Oral BID Nirav Jade MD   12.5 mg at 03/22/24 0918    [Held by provider] traZODone (DESYREL) tablet 50 mg  50 mg Oral Nightly Nirav Jade MD        pantoprazole (PROTONIX) tablet 40 mg  40 mg Oral QAM AC Nirav Jade MD   40 mg at 03/22/24 0526    morphine (MS CONTIN) extended release tablet 15 mg  15 mg Oral 2 times per day Nirav Jade MD   15 mg at 03/22/24 0917    levothyroxine (SYNTHROID) tablet 88 mcg  88 mcg Oral Daily Nirav Jade MD   88 mcg at 03/22/24 0526    lamoTRIgine (LAMICTAL) tablet 100 mg  100 mg Oral Nightly Nirav Jade MD   100 mg at 03/21/24 2211    lactulose (CHRONULAC) 10 GM/15ML solution 20 g  30 mL Oral Daily Nirav Jade MD        [Held by provider] escitalopram (LEXAPRO) tablet 20 mg  20 mg Oral Daily Nirav Jade MD        [Held by provider] furosemide (LASIX) injection 20 mg  20 mg IntraVENous BID  Nirav Jade MD        Cenegermin-bkbj 0.002 % SOLN 1 drop (Patient Supplied)  1 drop Right Eye 4x daily Nirav Jade MD   1 drop at 24 1720    [Held by provider] furosemide (LASIX) tablet 10 mg  10 mg Oral Daily Nirav Jade MD        trospium (SANCTURA) tablet 20 mg  20 mg Oral Nightly Nirav Jade MD        warfarin placeholder: dosing by pharmacy   Other RX Placeholder Nirav Jade MD        sotalol (BETAPACE) tablet 40 mg  40 mg Oral BID Minesh Nicole MD   40 mg at 24 0916       Physical Examination:      Vitals:  BP (!) 97/57   Pulse 72   Temp 97.3 °F (36.3 °C) (Oral)   Resp 16   Ht 1.829 m (6' 0.01\")   Wt 115.4 kg (254 lb 6.6 oz)   SpO2 99%   BMI 34.50 kg/m²   Temp (24hrs), Av.6 °F (36.4 °C), Min:97.3 °F (36.3 °C), Max:97.9 °F (36.6 °C)      General appearance: alert, cooperative and no distress. obese  Mental Status: oriented to person, place and time and normal affect  Lungs: clear to auscultation bilaterally, normal effort  Heart: regular rate and rhythm, no murmur  Abdomen: soft, nontender, nondistended, bowel sounds present, no masses  Extremities: no edema, redness, tenderness in the calves. Cap refill <2s  Skin: no gross lesions, rashes    Data:     Labs:  No results for input(s): \"WBC\", \"HGB\", \"PLT\" in the last 72 hours.  Recent Labs     24  0539 24  0509    139   K 4.8 4.3   CL 98 100   CO2 30 29   BUN 35* 30*   CREATININE 1.46* 1.28*   GLUCOSE 123* 111*     No results for input(s): \"AST\", \"ALT\", \"ALB\", \"BILITOT\", \"ALKPHOS\" in the last 72 hours.    Assessment and Plan:        1.  Severe aortic regurgitation and severe pulmonary hypertension:  -Management per cardiology who are arranging transfer to      2.  Acute diastolic heart failure likely triggered by above  -Cardiac medications and diuresis per cardiologist     Persistent atrial fibrillation on sotalol-dose adjusted by cardiology.  Supratherapeutic INR-warfarin being held.  Okay to hold/stop

## 2024-03-22 NOTE — FLOWSHEET NOTE
Patient's family frought patient fast food(hamburger/french fries) patient educated regarding fluid retention due to excess sodium intake and the lg amount of sodium in fast food. Patient stated,\" I know they gave me all that education before\" patient continued to eat fast food. Dr. Nicole on unit , notifie. Dr. Nicole spoke with patient regarding plan for transfer to [facility] for valve replacement.

## 2024-03-22 NOTE — PROGRESS NOTES
Outer pressure dressing removed from right wrist. Remains stable, no bleeding, hematoma noted.Tegaderm and quiklot in place,May remove and shower in 24 hours.

## 2024-03-22 NOTE — FLOWSHEET NOTE
Patient to room per w/c. Patient prefers to sit in recliner. Right radial dressing is dry and intact. Pulses palpable. No bleeding, swelling, or hematoma noted.Family at bedside asking about cath results. Dr April honeycutt served.

## 2024-03-22 NOTE — BRIEF OP NOTE
Section of Cardiology  Adult Brief Cardiac Cath Procedure Note        Procedure(s):    LHC, b/l coronary angio, LV gram  VIN with bubble study    Pre-operative Diagnosis: Shortness of breath      H&P Status: Completed and reviewed.     Post-operative Diagnosis: Mild CAD, moderate to severe aortic stenosis, mild to moderate aortic stenosis    Findings:  See full report right dominant system  Left Main: Normal  LAD: Big size vessel mild disease  Circumflex: Big size vessel mild disease  RCA big dominant vessel PDA 30% proximal stenosis  No LVEDP or LV gram performed to preserve kidney function  This coronary angiogram was performed with minimal IV contrast    VIN  EF 50%  Aortic valve moderate to severe aortic stenosis with centrally directed jet towards the posterior leaflet of the mitral valve  Moderate to severe aortic stenosis  Mild to moderate mitral regurgitation  Mild TR  No left atrial appendage thrombus  No interatrial shunt      Complications:  none    Recommendations:  Transfer patient back to holding area for post diagnostic cath management  Plan to transfer patient to  for aortic valve replacement  Maximize medical therapy   Continue aspirin and high intensity statin indefinitely for secondary prevention of CAD  Continue beta-blocker   Bedrest for 2 hours  IV hydration at 75 cc/h to complete 1 L then TKO  Above findings relayed to Dr. Nicole (admitting cardiologist)    Primary Proceduralist:   Duncan Drake MD    Full procedure note to follow

## 2024-03-22 NOTE — PLAN OF CARE
Problem: Safety - Adult  Goal: Free from fall injury  Outcome: Progressing     Problem: Discharge Planning  Goal: Discharge to home or other facility with appropriate resources  Outcome: Progressing     Problem: ABCDS Injury Assessment  Goal: Absence of physical injury  Outcome: Progressing     Problem: Chronic Conditions and Co-morbidities  Goal: Patient's chronic conditions and co-morbidity symptoms are monitored and maintained or improved  Outcome: Progressing     Problem: Pain  Goal: Verbalizes/displays adequate comfort level or baseline comfort level  Outcome: Progressing     Problem: Skin/Tissue Integrity  Goal: Absence of new skin breakdown  Description: 1.  Monitor for areas of redness and/or skin breakdown  2.  Assess vascular access sites hourly  3.  Every 4-6 hours minimum:  Change oxygen saturation probe site  4.  Every 4-6 hours:  If on nasal continuous positive airway pressure, respiratory therapy assess nares and determine need for appliance change or resting period.  Outcome: Progressing

## 2024-03-22 NOTE — PROGRESS NOTES
Arrived to pre/post from the cath lab and report from Albert HAMPTON.  Quikclot to right wrist with no bleeding or hematoma.  Attached to monitor and vitals are stable.  Patient is resting comfortably.  No chest pain or shortness of breath

## 2024-03-22 NOTE — CARE COORDINATION
QUALITY ROUND COMPLETE WITH CARE TEAM. PATIENT TO HAVE VIN TODAY. D/C PLAN REMAINS HOME. AWAIT HOME O2 EVAL.

## 2024-03-22 NOTE — PROGRESS NOTES
Report called to Haley HAMPTON on [facility] [place].  Right wrist is stable and vitals are stable.  Monitor attached and transport notified

## 2024-03-23 VITALS
RESPIRATION RATE: 20 BRPM | TEMPERATURE: 98.4 F | HEIGHT: 72 IN | SYSTOLIC BLOOD PRESSURE: 131 MMHG | BODY MASS INDEX: 34.67 KG/M2 | WEIGHT: 256 LBS | OXYGEN SATURATION: 97 % | HEART RATE: 70 BPM | DIASTOLIC BLOOD PRESSURE: 54 MMHG

## 2024-03-23 LAB
INR PPP: 1.7
PROTHROMBIN TIME: 19.7 SEC (ref 12.3–14.9)

## 2024-03-23 PROCEDURE — 36415 COLL VENOUS BLD VENIPUNCTURE: CPT

## 2024-03-23 PROCEDURE — 85610 PROTHROMBIN TIME: CPT

## 2024-03-23 PROCEDURE — 2700000000 HC OXYGEN THERAPY PER DAY

## 2024-03-23 PROCEDURE — 93005 ELECTROCARDIOGRAM TRACING: CPT | Performed by: INTERNAL MEDICINE

## 2024-03-23 PROCEDURE — 6370000000 HC RX 637 (ALT 250 FOR IP): Performed by: INTERNAL MEDICINE

## 2024-03-23 RX ORDER — SOTALOL HYDROCHLORIDE 80 MG/1
TABLET ORAL 2 TIMES DAILY
Qty: 90 TABLET | Refills: 1 | Status: SHIPPED | OUTPATIENT
Start: 2024-03-23

## 2024-03-23 RX ADMIN — LEVOTHYROXINE SODIUM 88 MCG: 0.09 TABLET ORAL at 05:43

## 2024-03-23 RX ADMIN — PANTOPRAZOLE SODIUM 40 MG: 40 TABLET, DELAYED RELEASE ORAL at 05:43

## 2024-03-23 RX ADMIN — ACETAMINOPHEN 325MG 650 MG: 325 TABLET ORAL at 01:41

## 2024-03-23 ASSESSMENT — PAIN - FUNCTIONAL ASSESSMENT
PAIN_FUNCTIONAL_ASSESSMENT: ACTIVITIES ARE NOT PREVENTED
PAIN_FUNCTIONAL_ASSESSMENT: PREVENTS OR INTERFERES SOME ACTIVE ACTIVITIES AND ADLS

## 2024-03-23 ASSESSMENT — PAIN DESCRIPTION - DESCRIPTORS
DESCRIPTORS: DISCOMFORT
DESCRIPTORS: ACHING
DESCRIPTORS: DISCOMFORT
DESCRIPTORS: DULL

## 2024-03-23 ASSESSMENT — PAIN DESCRIPTION - LOCATION
LOCATION: HEAD
LOCATION: BACK
LOCATION: BACK
LOCATION: CHEST

## 2024-03-23 ASSESSMENT — PAIN DESCRIPTION - PAIN TYPE
TYPE: CHRONIC PAIN
TYPE: CHRONIC PAIN
TYPE: ACUTE PAIN

## 2024-03-23 ASSESSMENT — PAIN DESCRIPTION - ORIENTATION
ORIENTATION: LOWER;MID
ORIENTATION: LOWER;MID
ORIENTATION: LEFT

## 2024-03-23 ASSESSMENT — PAIN SCALES - GENERAL
PAINLEVEL_OUTOF10: 4
PAINLEVEL_OUTOF10: 4
PAINLEVEL_OUTOF10: 2
PAINLEVEL_OUTOF10: 0
PAINLEVEL_OUTOF10: 3

## 2024-03-23 ASSESSMENT — PAIN DESCRIPTION - ONSET: ONSET: SUDDEN

## 2024-03-23 NOTE — PROGRESS NOTES
This RN educated the patient on their transfer process to the [facility]. The patient verbalized understanding of education and had no questions regarding their transfer to [facility].

## 2024-03-23 NOTE — DISCHARGE SUMMARY
[facility] Hospital Medicine Discharge Summary    Jonah Alvarez  :  1950  MRN:  [id number]    Admit date:  3/21/2024  Discharge date:  3/23/2024    Admitting Physician:  Vincent Schaffer DO  Primary Care Physician:  Christiano Linder MD    Discharge Diagnoses:    Principal Problem:    Congestive heart failure of unknown etiology (HCC)  Active Problems:    Aortic valve regurgitation  Resolved Problems:    * No resolved hospital problems. *    No chief complaint on file.      Condition: improved  Activity: no strenuous activity   Diet: low sodium  Disposition: transfer to   Functional Status: ambulatory with assistance    Significant Findings:     Findings:  See full report right dominant system  Left Main: Normal  LAD: Big size vessel mild disease  Circumflex: Big size vessel mild disease  RCA big dominant vessel PDA 30% proximal stenosis  No LVEDP or LV gram performed to preserve kidney function  This coronary angiogram was performed with minimal IV contrast     VIN  EF 50%  Aortic valve moderate to severe aortic stenosis with centrally directed jet towards the posterior leaflet of the mitral valve  Moderate to severe aortic stenosis  Mild to moderate mitral regurgitation  Mild TR  No left atrial appendage thrombus  No interatrial shunt    Hospital Course:   73 year-old male was transferred from [facility] to [facility] for further workup regarding his severe aortic regurgitation and pulmonary hypertension.  Left heart catheterization and VIN were performed with results as shown above.  Afterwards he was transferred to  for aortic valve replacement.    His sotalol was reduced and psychiatric medications discontinued due to prolonged QTc.         Medication List        CHANGE how you take these medications      sotalol 80 MG tablet  Commonly known as: BETAPACE  What changed:   how much to take  additional instructions            CONTINUE taking these medications      albuterol  (2.5 MG/3ML) 0.083% nebulizer solution  Commonly known as: PROVENTIL  USE ONE VIAL IN NEBULIZER EVERY 4 HOURS AS NEEDED FOR WHEEZING AND FOR SHORTNESS OF BREATH     Carboxymethylcell-Glycerin PF 0.5-0.9 % Soln     carvedilol 12.5 MG tablet  Commonly known as: COREG     cetirizine 10 MG tablet  Commonly known as: ZYRTEC     docusate sodium 100 MG capsule  Commonly known as: COLACE     furosemide 40 MG tablet  Commonly known as: LASIX     Handicap Placard Misc  by Does not apply route Good through 8/26/2025     lactulose 20 GM/30ML Soln     lamoTRIgine 100 MG tablet  Commonly known as: LAMICTAL     levothyroxine 88 MCG tablet  Commonly known as: SYNTHROID  Take 1 tablet by mouth once daily     morphine 30 MG extended release tablet  Commonly known as: MS CONTIN     omeprazole 20 MG delayed release capsule  Commonly known as: PRILOSEC  TAKE 1 CAPSULE BY MOUTH IN THE MORNING BEFORE BREAKFAST     Oxervate 0.002 % Soln  Generic drug: Cenegermin-bkbj     Oxygen Concentrator  2L oxygen for sleep and exertion     solifenacin 5 MG tablet  Commonly known as: VESICARE  Take 1 tablet by mouth in the morning and at bedtime     Tyrvaya 0.03 MG/ACT Soln  Generic drug: Varenicline Tartrate     * warfarin 2.5 MG tablet  Commonly known as: COUMADIN     * warfarin 5 MG tablet  Commonly known as: COUMADIN           * This list has 2 medication(s) that are the same as other medications prescribed for you. Read the directions carefully, and ask your doctor or other care provider to review them with you.                STOP taking these medications      escitalopram 20 MG tablet  Commonly known as: Lexapro     traZODone 50 MG tablet  Commonly known as: DESYREL                  Signed:  Vincent Schaffer DO  3/23/2024, 3:11 PM

## 2024-03-23 NOTE — PROGRESS NOTES
Patient taken via Physician's ambulance to [facility]. This RN gave reports to the 2 EMS squad members. Patient's belongings packed along with the patient's refrigerated medication. Patient tolerated transfer well with no complaints. Patient left the floor via EMS @ 0620 via stretcher.

## 2024-03-23 NOTE — PROGRESS NOTES
@ 0125 the patient used their call light and this RN entered the room. The patient c/o of a headache and L. Sided chest pain - 3/10, dull, that did not radiate. This RN obtained performed an assessment, obtained VS, and an ECG. This RN also notified Dr. Nicole of the patient's status. Dr. Nicole acknowledged updates and provided no new orders other than to continue to observe the patient. At around 0135 the patient reported that the \"pain was easing up\". Will continue to observe the patient as per Dr. Nicole and protocol.

## 2024-03-25 LAB
ECHO AV AREA PLAN/BSA: 1.02 CM2/M2
ECHO AV AREA PLAN: 2.4 CM2
ECHO BSA: 2.42 M2

## 2024-03-25 PROCEDURE — 93312 ECHO TRANSESOPHAGEAL: CPT | Performed by: INTERNAL MEDICINE

## 2024-03-25 PROCEDURE — 93320 DOPPLER ECHO COMPLETE: CPT | Performed by: INTERNAL MEDICINE

## 2024-03-25 PROCEDURE — 93325 DOPPLER ECHO COLOR FLOW MAPG: CPT | Performed by: INTERNAL MEDICINE

## 2024-03-26 LAB
EKG ATRIAL RATE: 35 BPM
EKG P-R INTERVAL: 180 MS
EKG Q-T INTERVAL: 494 MS
EKG QRS DURATION: 210 MS
EKG QTC CALCULATION (BAZETT): 533 MS
EKG R AXIS: -44 DEGREES
EKG T AXIS: 114 DEGREES
EKG VENTRICULAR RATE: 70 BPM

## 2024-05-17 ENCOUNTER — HOSPITAL ENCOUNTER (EMERGENCY)
Age: 74
Discharge: HOME | End: 2024-05-17
Attending: EMERGENCY MEDICINE
Payer: MEDICARE

## 2024-05-17 VITALS
WEIGHT: 250 LBS | DIASTOLIC BLOOD PRESSURE: 61 MMHG | OXYGEN SATURATION: 93 % | BODY MASS INDEX: 33.86 KG/M2 | SYSTOLIC BLOOD PRESSURE: 119 MMHG | TEMPERATURE: 98.9 F | RESPIRATION RATE: 18 BRPM | HEART RATE: 76 BPM | HEIGHT: 72 IN

## 2024-05-17 DIAGNOSIS — K56.41 FECAL IMPACTION (HCC): ICD-10-CM

## 2024-05-17 DIAGNOSIS — K59.03 DRUG-INDUCED CONSTIPATION: Primary | ICD-10-CM

## 2024-05-17 PROCEDURE — 99283 EMERGENCY DEPT VISIT LOW MDM: CPT

## 2024-05-17 RX ORDER — POLYETHYLENE GLYCOL 3350 17 G/17G
17 POWDER, FOR SOLUTION ORAL DAILY
Qty: 238 G | Refills: 1 | Status: SHIPPED | OUTPATIENT
Start: 2024-05-17

## 2024-05-17 ASSESSMENT — ENCOUNTER SYMPTOMS
BACK PAIN: 0
DIARRHEA: 0
BLOOD IN STOOL: 0
TROUBLE SWALLOWING: 0
WHEEZING: 0
SHORTNESS OF BREATH: 0
COUGH: 0
CONSTIPATION: 1
VOMITING: 0
CHOKING: 0
FACIAL SWELLING: 0
SINUS PRESSURE: 0
ABDOMINAL PAIN: 1
EYE REDNESS: 0
CHEST TIGHTNESS: 0
EYE PAIN: 0
VOICE CHANGE: 0

## 2024-05-17 ASSESSMENT — PAIN DESCRIPTION - PAIN TYPE: TYPE: ACUTE PAIN

## 2024-05-17 ASSESSMENT — PAIN DESCRIPTION - LOCATION: LOCATION: ABDOMEN

## 2024-05-17 ASSESSMENT — PAIN DESCRIPTION - DESCRIPTORS: DESCRIPTORS: ACHING

## 2024-05-17 ASSESSMENT — PAIN - FUNCTIONAL ASSESSMENT: PAIN_FUNCTIONAL_ASSESSMENT: 0-10

## 2024-05-17 ASSESSMENT — PAIN SCALES - GENERAL: PAINLEVEL_OUTOF10: 6

## 2024-05-17 NOTE — ED PROVIDER NOTES
reactive to light.   Cardiovascular:      Rate and Rhythm: Normal rate and regular rhythm.      Heart sounds: Normal heart sounds. No murmur heard.     No friction rub. No gallop.   Pulmonary:      Effort: No respiratory distress.      Breath sounds: Normal breath sounds. No wheezing.   Abdominal:      General: Bowel sounds are normal. There is no distension or abdominal bruit. There are no signs of injury.      Palpations: Abdomen is soft. There is no shifting dullness, fluid wave, hepatomegaly, splenomegaly, mass or pulsatile mass.      Tenderness: There is no abdominal tenderness. There is no right CVA tenderness, left CVA tenderness, guarding or rebound. Negative signs include Rovsing's sign, McBurney's sign and psoas sign.      Comments: Attention good abdomen patient abdomen not distended no guarding or rebound tenderness in fact I could not elicit any tenderness on my examination patient has no hernia has good bowel sound   Genitourinary:     Testes:         Right: Mass, tenderness or swelling not present.         Left: Mass, tenderness or swelling not present.      Prostate: Not tender.      Rectum: Normal. Guaiac result negative. No mass or tenderness.      Comments: Patient with a rectal examination patient does have a good normal strength or tone no external hemorrhoid no active bleeding patient has a hard stool disimpacted in the rectum no darker  Musculoskeletal:         General: No tenderness. Normal range of motion.      Cervical back: Neck supple.   Skin:     General: Skin is warm.      Capillary Refill: Capillary refill takes less than 2 seconds.      Coloration: Skin is not cyanotic, jaundiced, mottled or pale.      Findings: No erythema or rash.   Neurological:      General: No focal deficit present.      Mental Status: He is alert and oriented to person, place, and time.      Cranial Nerves: No cranial nerve deficit.      Motor: No abnormal muscle tone.   Psychiatric:         Behavior: Behavior

## 2024-05-17 NOTE — ED NOTES
Pt on lt side and soap suds enema started. Fluids clamped x 3 times for total of 500 cc. Pt to bsc and began having BM.

## 2024-05-17 NOTE — ED TRIAGE NOTES
Pt arrived via ems from home with c/o constipation x 3 weeks w/o BM for same. Pt a/o x 3 skin pink w/d resp non labored. Pt belching at present. Pt c/o stomach ache. Denies n,v,d.fever or chills. Abd soft non tender. BS present.

## 2024-05-17 NOTE — ED NOTES
Pt had small bm with enema fluids. Pt reports he feels better and his stomach does not hurt as much.

## 2024-05-30 DIAGNOSIS — F51.02 ADJUSTMENT INSOMNIA: ICD-10-CM

## 2024-05-31 RX ORDER — TRAZODONE HYDROCHLORIDE 50 MG/1
TABLET ORAL
Qty: 15 TABLET | Refills: 0 | Status: SHIPPED | OUTPATIENT
Start: 2024-05-31

## 2024-06-17 DIAGNOSIS — I50.9 CONGESTIVE HEART FAILURE, UNSPECIFIED HF CHRONICITY, UNSPECIFIED HEART FAILURE TYPE (MULTI): ICD-10-CM

## 2024-06-17 DIAGNOSIS — F51.02 ADJUSTMENT INSOMNIA: ICD-10-CM

## 2024-06-17 RX ORDER — FUROSEMIDE 40 MG/1
40 TABLET ORAL DAILY
Qty: 90 TABLET | Refills: 3 | Status: SHIPPED | OUTPATIENT
Start: 2024-06-17 | End: 2025-06-17

## 2024-06-17 RX ORDER — TRAZODONE HYDROCHLORIDE 50 MG/1
50 TABLET ORAL NIGHTLY
Qty: 90 TABLET | Refills: 3 | Status: SHIPPED | OUTPATIENT
Start: 2024-06-17

## 2024-06-17 NOTE — TELEPHONE ENCOUNTER
Received request for prescription refills for patient.   Patient follows with Dr Bhargav Velásquez      Request is for Furosemide 40mg   Is patient currently on medication yes    Last OV 02/07/2024  Next OV 08/08/2024      Pended for signing and sent to provider

## 2024-06-17 NOTE — TELEPHONE ENCOUNTER
Comments:     Last Office Visit (last PCP visit):   3/13/2024    Next Visit Date:  No future appointments.    **If hasn't been seen in over a year OR hasn't followed up according to last diabetes/ADHD visit, make appointment for patient before sending refill to provider.    Rx requested:  Requested Prescriptions     Pending Prescriptions Disp Refills    traZODone (DESYREL) 50 MG tablet 15 tablet 0

## 2024-07-08 ENCOUNTER — TELEPHONE (OUTPATIENT)
Dept: CARDIOLOGY | Facility: CLINIC | Age: 74
End: 2024-07-08
Payer: MEDICARE

## 2024-07-08 NOTE — TELEPHONE ENCOUNTER
Called Pt left a message stating that he should follow up with his PCP for further recommendations and instruction and to call back for any other concerns. Forwarding to Charla for further review

## 2024-07-24 DIAGNOSIS — J43.9 PULMONARY EMPHYSEMA, UNSPECIFIED EMPHYSEMA TYPE (HCC): ICD-10-CM

## 2024-07-24 RX ORDER — ALBUTEROL SULFATE 2.5 MG/3ML
SOLUTION RESPIRATORY (INHALATION)
Qty: 75 EACH | Refills: 5 | Status: SHIPPED | OUTPATIENT
Start: 2024-07-24

## 2024-07-24 NOTE — TELEPHONE ENCOUNTER
Comments:     Last Office Visit (last PCP visit):   3/13/2024    Next Visit Date:  No future appointments.    **If hasn't been seen in over a year OR hasn't followed up according to last diabetes/ADHD visit, make appointment for patient before sending refill to provider.    Rx requested:  Requested Prescriptions     Pending Prescriptions Disp Refills    albuterol (PROVENTIL) (2.5 MG/3ML) 0.083% nebulizer solution 75 each 5     Sig: USE ONE VIAL IN NEBULIZER EVERY 4 HOURS AS NEEDED FOR WHEEZING AND FOR SHORTNESS OF BREATH

## 2024-08-07 ENCOUNTER — TELEPHONE (OUTPATIENT)
Dept: CARDIOLOGY | Facility: CLINIC | Age: 74
End: 2024-08-07
Payer: MEDICARE

## 2024-08-08 ENCOUNTER — APPOINTMENT (OUTPATIENT)
Dept: CARDIOLOGY | Facility: CLINIC | Age: 74
End: 2024-08-08
Payer: MEDICARE

## 2024-08-08 ENCOUNTER — APPOINTMENT (OUTPATIENT)
Dept: CARDIOLOGY | Facility: HOSPITAL | Age: 74
End: 2024-08-08
Payer: MEDICARE

## 2024-08-14 DIAGNOSIS — I50.9 CONGESTIVE HEART FAILURE, UNSPECIFIED HF CHRONICITY, UNSPECIFIED HEART FAILURE TYPE (MULTI): ICD-10-CM

## 2024-08-14 NOTE — TELEPHONE ENCOUNTER
Received request for prescription refill for patient.  Patient follows with Dr. Bhargav Velásquez MD and JACQUE Pelayo-CNP     Request is for Lasix   Is patient currently on medication- yes    Last OV- 2/7/24  Next OV- needs scheduling     Patient does not follow with general cardiology. Please route to Ioana Chavez RN after scheduling for refill.

## 2024-08-15 RX ORDER — FUROSEMIDE 40 MG/1
40 TABLET ORAL DAILY
Qty: 90 TABLET | Refills: 3 | Status: SHIPPED | OUTPATIENT
Start: 2024-08-15 | End: 2025-08-15

## 2024-08-28 ENCOUNTER — CARE COORDINATION (OUTPATIENT)
Dept: CARE COORDINATION | Age: 74
End: 2024-08-28

## 2024-08-28 NOTE — CARE COORDINATION
During [facility] out reach for patient's wife son has questions regarding his father.  ACM was given permission by son to review current needs.  Patient is in need of home health care.  However patient has not been seen by PCP since March 2024.  ACM attempted and offered to arrange these appointments son declined ACM offered care coordination son declined

## 2024-09-03 ENCOUNTER — OFFICE VISIT (OUTPATIENT)
Dept: FAMILY MEDICINE CLINIC | Age: 74
End: 2024-09-03

## 2024-09-03 VITALS
BODY MASS INDEX: 27.36 KG/M2 | DIASTOLIC BLOOD PRESSURE: 56 MMHG | HEART RATE: 73 BPM | HEIGHT: 72 IN | WEIGHT: 202 LBS | OXYGEN SATURATION: 72 % | SYSTOLIC BLOOD PRESSURE: 106 MMHG

## 2024-09-03 DIAGNOSIS — R53.1 GENERALIZED WEAKNESS: Primary | ICD-10-CM

## 2024-09-03 DIAGNOSIS — L89.329 PRESSURE INJURY OF SKIN OF LEFT BUTTOCK, UNSPECIFIED INJURY STAGE: ICD-10-CM

## 2024-09-03 DIAGNOSIS — I50.43 CHF (CONGESTIVE HEART FAILURE), NYHA CLASS I, ACUTE ON CHRONIC, COMBINED (HCC): ICD-10-CM

## 2024-09-03 DIAGNOSIS — I87.2 VENOUS STASIS DERMATITIS OF BOTH LOWER EXTREMITIES: ICD-10-CM

## 2024-09-03 RX ORDER — SPIRONOLACTONE 25 MG/1
25 TABLET ORAL DAILY
COMMUNITY
Start: 2024-08-20 | End: 2025-08-20

## 2024-09-03 RX ORDER — POTASSIUM CHLORIDE 1.5 G/1.58G
20 POWDER, FOR SOLUTION ORAL DAILY
COMMUNITY
Start: 2024-08-20 | End: 2025-08-20

## 2024-09-03 RX ORDER — TORSEMIDE 20 MG/1
40 TABLET ORAL DAILY
COMMUNITY
Start: 2024-08-19 | End: 2025-02-15

## 2024-09-03 RX ORDER — BUDESONIDE AND FORMOTEROL FUMARATE DIHYDRATE 160; 4.5 UG/1; UG/1
1 AEROSOL RESPIRATORY (INHALATION) 2 TIMES DAILY
COMMUNITY
Start: 2024-07-31

## 2024-09-03 RX ORDER — ATORVASTATIN CALCIUM 40 MG/1
40 TABLET, FILM COATED ORAL NIGHTLY
COMMUNITY
Start: 2024-08-19 | End: 2025-08-19

## 2024-09-03 RX ORDER — APIXABAN 5 MG/1
5 TABLET, FILM COATED ORAL 2 TIMES DAILY
COMMUNITY

## 2024-09-03 ASSESSMENT — ENCOUNTER SYMPTOMS
RHINORRHEA: 0
SORE THROAT: 0
DIARRHEA: 0
CONSTIPATION: 0
ABDOMINAL PAIN: 0
COUGH: 0
SHORTNESS OF BREATH: 0
WHEEZING: 0

## 2024-09-03 NOTE — PROGRESS NOTES
Advance Care Planning     Advance Care Planning (ACP) Physician/NP/PA Conversation  {Vanishing Tip This is a Qualifying Goals of Care Conversation:[id number]}  Date of Conversation: 9/3/2024  Conducted with: Patient with Decision Making Capacity  Other persons present: None    Healthcare Decision Maker:   Primary Decision Maker: Brittany Alvarez - [facility] - [phone]  {Vanishing Tip  Click here to complete HealthCare Decision Makers including selection of the Healthcare Decision Maker Relationship (ie \"Primary\"):[id number]}   {Select if Healthcare Decision Makers in ACP Activity was empty/incomplete (Optional):518050097}    Care Preferences:    Hospitalization:  \"If your health worsens and it becomes clear that your chance of recovery is unlikely, what would be your preference regarding hospitalization?\"  The patient would prefer comfort-focused treatment without hospitalization.    Ventilation:  \"If you were unable to breath on your own and your chance of recovery was unlikely, what would be your preference about the use of a ventilator (breathing machine) if it was available to you?\"  The patient would NOT desire the use of a ventilator.    Resuscitation:  \"In the event your heart stopped as a result of an underlying serious health condition, would you want attempts made to restart your heart, or would you prefer a natural death?\"  {CPR Preference:411739006}    {Additional topics discussed:730974502}    Conversation Outcomes / Follow-Up Plan:  {APC outcomes (Optional):543621985::\"ACP in process - information provided, considering goals and options\"}  Reviewed DNR/DNI and patient {APC;  DNR/DNI:240958282::\"elects Full Code (Attempt Resuscitation)\"}    Length of Voluntary ACP Conversation in minutes:  {TIME; ACP time 16 min - 1 hour:92069::\"<16 minutes (Non-Billable)\"}    Karla Mena MA  {Vanishing Tip  If the relationship to the patient does NOT follow our state's Next of Kin hierarchy, the patient MUST

## 2024-09-03 NOTE — PROGRESS NOTES
Advance Care Planning   Discussed the patient’s choices for care and treatment preferences in case of a health event that adversely affects decision-making abilities or is life-limiting. Recommended the patient document care preferences in state-specific advance directives. Also reviewed the process of designating a trusted capable adult as an Agent (or Health Care Power of ) to make health care decisions for the patient if the patient becomes unable due to incapacity. Patient was asked to complete advance directive forms, if not already done, and to provide a dated, signed and witnessed (or notarized) copy, per the forms' requirements, to the practice office.    Time spent (minutes): {TIME; ACP time 16 min - 1 hour:35537}

## 2024-09-03 NOTE — PROGRESS NOTES
Advance Care Planning   Discussed the patient’s choices for care and treatment preferences in case of a health event that adversely affects decision-making abilities or is life-limiting. Recommended the patient document care preferences in state-specific advance directives. Also reviewed the process of designating a trusted capable adult as an Agent (or Health Care Power of ) to make health care decisions for the patient if the patient becomes unable due to incapacity. Patient was asked to complete advance directive forms, if not already done, and to provide a dated, signed and witnessed (or notarized) copy, per the forms' requirements, to the practice office.    Time spent (minutes): {TIME; ACP time 16 min - 1 hour:24574}     Advance Care Planning   Discussed the patient’s choices for care and treatment preferences in case of a health event that adversely affects decision-making abilities or is life-limiting. Recommended the patient document care preferences in state-specific advance directives. Also reviewed the process of designating a trusted capable adult as an Agent (or Health Care Power of ) to make health care decisions for the patient if the patient becomes unable due to incapacity. Patient was asked to complete advance directive forms, if not already done, and to provide a dated, signed and witnessed (or notarized) copy, per the forms' requirements, to the practice office.    Time spent (minutes): {TIME; ACP time 16 min - 1 hour:72859}

## 2024-09-03 NOTE — PATIENT INSTRUCTIONS
someone you trust to make healthcare decisions for you, only if you can’t. (Healthcare Power of )    Document your wishes for care if you were seriously ill and not expected to recover or are approaching end of life. (Advance Directive or Living Will)    The same page can be found using the QR code below.

## 2024-09-03 NOTE — PROGRESS NOTES
[facility] PRIMARY CARE  [address] [place] [address] WAY  [facility] [address]  Dept: [phone]  Dept Fax: [phone]     Chief Complaint:  Chief Complaint   Patient presents with    Referral - General     Would like a referral for home healthcare and physical therapy.        Vitals:    09/03/24 1404   BP: (!) 106/56   Pulse: 73   SpO2: (!) 72%   Weight: 91.6 kg (202 lb)   Height: 1.829 m (6')       HPI:  74 y.o.male who presents for the following:      L buttock sore: sits all day; unsure how long the sore has been there; has been applying calamine lotion and a piece of plastic over the area; no pain    Hosp f/u: admitted 8/11/24 for fluid overload in the setting of dHF and med noncompliance; improved with diuresis; breathing better today; feels generalized weakness; no falls but doesn't get up; has again not been taking his meds on a regular schedule and getting increased swelling of the legs; he is still wearing the dressings on his feet and legs from his hospital visit      HOSPITAL COURSE:   Jonah Alvarez is a 73 year old male with bicuspid aortic valve stenosis s/p AVR (2024), atrial fibrillation s/p MAZE/LAAC (2024, on eliquis), sinus node dysfunction s/p dual chamber ICD (2010 and CRT-P 2024) mild CAD, OMAR, COPD/Asthma, CVA, GERD, HTN, hypothyroidism, L5-S1 fusion complicated by chronic pain, on morphine who presented to ED on 8/11 for worsening shortness of breath and edema requiring 6L. HsTNT elevated but stable. ProBNP elevated. EKG negative for STEMI. CXR consistent with pulmonary edema. Given IV lasix and transferred to [facility] on 8/12 for further management. Patient was diuresed with IV lasix and started on spironolactone and empagliflozin for management of HFpEF. Patient's oxygen requirements subsequently improved and was transitioned to PO torsemide. Patient was clinical stable and at baseline oxygen and was subsequently discharged with close follow up with Cardiology.     # Anasarca 2/2

## 2024-09-16 DIAGNOSIS — R39.15 URINARY URGENCY: ICD-10-CM

## 2024-09-16 RX ORDER — SOLIFENACIN SUCCINATE 5 MG/1
5 TABLET, FILM COATED ORAL DAILY
Qty: 90 TABLET | Refills: 3 | Status: SHIPPED | OUTPATIENT
Start: 2024-09-16

## 2024-09-18 ENCOUNTER — HOSPITAL ENCOUNTER (OUTPATIENT)
Dept: CARDIOLOGY | Facility: HOSPITAL | Age: 74
Discharge: HOME | End: 2024-09-18
Payer: MEDICARE

## 2024-09-18 ENCOUNTER — APPOINTMENT (OUTPATIENT)
Dept: CARDIOLOGY | Facility: CLINIC | Age: 74
End: 2024-09-18
Payer: MEDICARE

## 2024-09-18 VITALS
BODY MASS INDEX: 37.16 KG/M2 | SYSTOLIC BLOOD PRESSURE: 82 MMHG | DIASTOLIC BLOOD PRESSURE: 50 MMHG | HEIGHT: 72 IN | HEART RATE: 70 BPM

## 2024-09-18 DIAGNOSIS — I49.5 SSS (SICK SINUS SYNDROME) (MULTI): ICD-10-CM

## 2024-09-18 DIAGNOSIS — Z95.0 PACEMAKER: ICD-10-CM

## 2024-09-18 DIAGNOSIS — I10 BENIGN ESSENTIAL HYPERTENSION: ICD-10-CM

## 2024-09-18 DIAGNOSIS — E03.9 ACQUIRED HYPOTHYROIDISM: ICD-10-CM

## 2024-09-18 DIAGNOSIS — J42 CHRONIC BRONCHITIS, UNSPECIFIED CHRONIC BRONCHITIS TYPE (MULTI): Primary | ICD-10-CM

## 2024-09-18 DIAGNOSIS — Z86.79 HISTORY OF SICK SINUS SYNDROME: ICD-10-CM

## 2024-09-18 DIAGNOSIS — I50.9 CHRONIC CONGESTIVE HEART FAILURE, UNSPECIFIED HEART FAILURE TYPE: ICD-10-CM

## 2024-09-18 DIAGNOSIS — I48.0 PAROXYSMAL ATRIAL FIBRILLATION (MULTI): ICD-10-CM

## 2024-09-18 DIAGNOSIS — Z95.0 CARDIAC RESYNCHRONIZATION THERAPY PACEMAKER (CRT-P) IN PLACE: ICD-10-CM

## 2024-09-18 DIAGNOSIS — I49.5 SICK SINUS SYNDROME (MULTI): ICD-10-CM

## 2024-09-18 DIAGNOSIS — R94.31 PROLONGED QT INTERVAL: ICD-10-CM

## 2024-09-18 DIAGNOSIS — Z95.2 S/P AVR (AORTIC VALVE REPLACEMENT): ICD-10-CM

## 2024-09-18 DIAGNOSIS — Z95.0 PRESENCE OF CARDIAC PACEMAKER: ICD-10-CM

## 2024-09-18 PROCEDURE — 1159F MED LIST DOCD IN RCRD: CPT | Performed by: NURSE PRACTITIONER

## 2024-09-18 PROCEDURE — 1036F TOBACCO NON-USER: CPT | Performed by: NURSE PRACTITIONER

## 2024-09-18 PROCEDURE — 99215 OFFICE O/P EST HI 40 MIN: CPT | Performed by: NURSE PRACTITIONER

## 2024-09-18 PROCEDURE — 1160F RVW MEDS BY RX/DR IN RCRD: CPT | Performed by: NURSE PRACTITIONER

## 2024-09-18 PROCEDURE — 93281 PM DEVICE PROGR EVAL MULTI: CPT

## 2024-09-18 PROCEDURE — 3078F DIAST BP <80 MM HG: CPT | Performed by: NURSE PRACTITIONER

## 2024-09-18 PROCEDURE — 3074F SYST BP LT 130 MM HG: CPT | Performed by: NURSE PRACTITIONER

## 2024-09-18 RX ORDER — TORSEMIDE 20 MG/1
2 TABLET ORAL DAILY
COMMUNITY
Start: 2024-08-19 | End: 2025-02-15

## 2024-09-18 RX ORDER — POTASSIUM CHLORIDE 1.5 G/1.58G
20 POWDER, FOR SOLUTION ORAL DAILY
COMMUNITY
Start: 2024-08-20 | End: 2025-08-20

## 2024-09-18 RX ORDER — SPIRONOLACTONE 25 MG/1
1 TABLET ORAL DAILY
COMMUNITY
Start: 2024-08-20 | End: 2025-08-20

## 2024-09-18 RX ORDER — APIXABAN 5 MG/1
1 TABLET, FILM COATED ORAL 2 TIMES DAILY
COMMUNITY

## 2024-09-18 RX ORDER — ATORVASTATIN CALCIUM 40 MG/1
1 TABLET, FILM COATED ORAL NIGHTLY
COMMUNITY
Start: 2024-08-19 | End: 2025-08-19

## 2024-09-18 NOTE — PROGRESS NOTES
"CARDIOLOGY OFFICE VISIT      CHIEF COMPLAINT  Chief Complaint   Patient presents with    Follow-up     Chief complaint: \"I am still short of breath and tired.\"  HISTORY OF PRESENT ILLNESS  HPI  History: The patient is a 74-year-old  male who is followed for persistent atrial fibrillation.  He had been maintained on sotalol and carvedilol and anticoagulated with warfarin.  Patient was admitted to [facility] on March 18, 2024 with shortness of breath with exertion chest pain and congestive heart failure.  A 2D echocardiogram dated March 19, 2024 revealed an ejection fraction of 50%, severe pulmonary hypertension with a right ventricular systolic pressure 73 mmHg severe aortic stenosis and mild mitral regurgitation.  He was transferred to the [facility] and underwent an AVR with a #27 epic plus bioprosthetic valve, left-sided maze procedure, placement of an epicardial LV lead and generator change out with upgrade to a CRT-P (Medtronic Percepta) device for the treatment of congestive heart failure.  He presents to the office today complaining of shortness of breath, dizziness and lightheadedness and fatigue.  He denies chest pain, palpitations, abdominal distention, or orthopnea.  Patient is on supplemental oxygen at 2 L nasal cannula due to history of COPD.  He is accompanied by his son for today's office visit.  The patient and his son state they cannot confirm his current medication list.  The son states he believes the sotalol has been discontinued.  Patient states when he was at [facility] he did undergo evaluation by pulmonologist but does not recall the physician's name.  At discharge from the [facility] it was recommended that he follow-up with the heart failure specialist however the patient states the physician was only at main campus and therefore the patient declined the office visit.  Past Medical History  Past Medical History:   Diagnosis Date    Encounter for " preprocedural cardiovascular examination     Preop cardiovascular exam    Encounter for screening for lipoid disorders     Screening for lipoid disorders    Personal history of other diseases of the circulatory system 10/05/2021    History of cardiac murmur    Personal history of other endocrine, nutritional and metabolic disease     History of hypokalemia    Personal history of other endocrine, nutritional and metabolic disease     History of obesity    Personal history of other endocrine, nutritional and metabolic disease 08/03/2022    History of obesity    Personal history of other specified conditions     History of chest pain    Personal history of other specified conditions     History of fatigue    Personal history of other specified conditions     History of palpitations    Presence of cardiac pacemaker     History of cardiac pacemaker    Repeated falls     Multiple falls    Tinnitus, left ear 10/05/2021    Tinnitus of left ear       Social History  Social History     Tobacco Use    Smoking status: Former     Types: Cigarettes    Smokeless tobacco: Never   Substance Use Topics    Alcohol use: Not Currently    Drug use: Not Currently       Family History     Family History   Problem Relation Name Age of Onset    Heart failure Mother      Other (asbestosis) Father      Lung disease Father      Thyroid disease Sister          Allergies:  No Known Allergies     Outpatient Medications:  Current Outpatient Medications   Medication Instructions    albuterol 2.5 mg /3 mL (0.083 %) nebulizer solution inhalation, Use as directed PRN    atorvastatin (Lipitor) 40 mg tablet 1 tablet, oral, Nightly    carvedilol (COREG) 12.5 mg, oral, 2 times daily    Eliquis 5 mg tablet 1 tablet, oral, 2 times daily    empagliflozin (Jardiance) 10 mg 1 tablet, oral, Daily    escitalopram (LEXAPRO) 20 mg, oral, Daily    furosemide (LASIX) 40 mg, oral, Daily, PRN    lamoTRIgine (LaMICtal) 100 mg tablet 1 tablet, oral, Nightly     levothyroxine (SYNTHROID, LEVOXYL) 88 mcg, oral, Daily    morphine CR (MS CONTIN) 30 mg, oral, Every 8 hours, PRN    omeprazole (PRILOSEC) 60 mg, oral, Daily before breakfast    Oxervate 0.002 % ophthalmic solution     potassium chloride (Klor-Con) 20 mEq packet 20 mEq, oral, Daily    solifenacin (VESICARE) 5 mg, oral, Daily    sotalol (Betapace) 80 mg tablet oral, 2 times daily    spironolactone (Aldactone) 25 mg tablet 1 tablet, oral, Daily    torsemide (Demadex) 20 mg tablet 2 tablets, oral, Daily    traZODone (DESYREL) 50 mg, oral, Nightly, PRN          REVIEW OF SYSTEMS  Review of Systems   All other systems reviewed and are negative.        VITALS  There were no vitals filed for this visit.    PHYSICAL EXAM  Vitals and nursing note reviewed.   Constitutional:       Appearance: Normal appearance.   HENT:      Head: Normocephalic.   Neck:      Vascular: No JVD. Carotid upstrokes II/IV.  Cardiovascular:      Rate and Rhythm: Normal rate and regular rhythm.      Pulses: Normal pulses.      Heart sounds: Normal S1 S2, no S3 S4.  No murmurs or rubs.  Pulmonary:      Effort: Pulmonary effort is normal. Respirations regular and labored with conversation on supplemental oxygen at 2 L nasal cannula.     Breath sounds: Diminished with Rales in the right base.  Abdominal:      General: Bowel sounds are normal.      Palpations: Abdomen is soft.   Musculoskeletal:         General: Normal range of motion.      Cervical back: Normal range of motion.   Skin:     General: Skin is warm and dry.  Left subclavian pacemaker pocket is well-healed without redness swelling or drainage.  Skin of the legs is dry and extremely flaky.  No lower extremity edema noted.  Neurological:      General: No focal deficit present.      Mental Status: Alert and oriented to person, place, and time.      Motor: Motor function is intact.   Psychiatric:         Attention and Perception: Attention and perception normal.         Mood and Affect: Mood and  affect normal.         Speech: Speech normal.         Behavior: Behavior normal. Behavior is cooperative.         Thought Content: Thought content normal.         Cognition and Memory: Cognition and memory normal.     Labs and testing: Twelve-lead EKG reveals atrial and ventricular pacing at 70 bpm.  Pacemaker interrogation dated September 18, 2024 reveals atrial pacing 84.8% and ventricular pacing 99.5%.  3 AT/AF episodes were noted with the longest being 49.5 hours and a burden of 1.6%.  1 nonsustained VT detection on July 17, 2024 at 9:20 AM was noted lasting 1 second at a rate of 176 bpm.  No additional arrhythmic events were noted.  Good sensing and capture thresholds were noted.      ASSESSMENT AND PLAN    Clinical impressions:  1. Persistent atrial fibrillation controlled on low-dose sotalol and carvedilol and anticoagulated with warfarin.  2. Sinus node dysfunction status post dual-chamber pacemaker implant on October 12, 2010 and generator change out on June 20, 2017 for MADHAV parameters ([facility]ARGAS).  Upgrade to an AV biventricular pacemaker (Medtronic Percepta CRT-P) on March 20, 2024 for the treatment of refractory heart failure status postplacement of an epicardial LV lead.  3. History of QTC prolongation on sotalol.  4. Intolerance of Multaq secondary to cough.  5. Normal left ventricular function per the echocardiogram dated July 27, 2022.  6. Valvular heart disease consisting of mild to moderate MR, moderate TR, mild AS, moderate aortic valve cusp calcification, and moderate AR per 2D echocardiogram dated July 27, 2022.  Severe aortic regurgitation status post aortic valve replacement with a bioprosthetic #27 epic plus valve on March 28, 2024, left atrial appendage ligation, left-sided maze procedure, placement of an epicardial LV lead.  7. Lexiscan stress test dated July 26, 2022 revealing an ejection fraction of 57% with no acute ischemic changes or infarct patterns.  8. Obstructive sleep  apnea on CPAP status post septoplasty on December 13, 2013.  9. Hypertension, controlled with a blood pressure today of 82/50.  11. Hypothyroidism on replacement therapy.  12. Shortness of breath on supplemental oxygen at 2 L nasal cannula continuous therapy.    Recommendations:  1.  The patient's son was provided with the current list of medications from the epic chart.  I requested that he review all medications and verify them with the medications that are at home and contact me today at [phone] so that the chart may be updated and medications adjusted accordingly.  2.  Patient will be referred to Dr. Prescott for evaluation and treatment of congestive heart failure.  3.  Patient will be referred to Dr. Nunez for the treatment of chronic obstructive pulmonary disease.  4.  Obtain an in clinic device check in 3 months and in 6 months prior to the office visit with Dr. Velásquez.  5.  Follow-up in office with Dr. Velásquez in 6 months or sooner if needed.      Evaluation and note by Charla Jenkins, CNP  **Please excuse any errors in grammar or translation related to this dictation.  Voice recognition software was utilized to prepare this document.**

## 2024-09-20 ENCOUNTER — DOCUMENTATION (OUTPATIENT)
Dept: CARDIOLOGY | Facility: CLINIC | Age: 74
End: 2024-09-20
Payer: MEDICARE

## 2024-09-20 ENCOUNTER — TELEPHONE (OUTPATIENT)
Dept: CARDIOLOGY | Facility: CLINIC | Age: 74
End: 2024-09-20
Payer: MEDICARE

## 2024-09-20 RX ORDER — BUDESONIDE AND FORMOTEROL FUMARATE DIHYDRATE 160; 4.5 UG/1; UG/1
1 AEROSOL RESPIRATORY (INHALATION) 2 TIMES DAILY
COMMUNITY
Start: 2024-07-31

## 2024-09-20 RX ORDER — CARVEDILOL 3.12 MG/1
3.12 TABLET ORAL 2 TIMES DAILY
COMMUNITY

## 2024-09-20 RX ORDER — OMEPRAZOLE 20 MG/1
20 TABLET, DELAYED RELEASE ORAL
COMMUNITY

## 2024-09-20 RX ORDER — LACTULOSE 10 G/15ML
SOLUTION ORAL; RECTAL
COMMUNITY
Start: 2024-06-14

## 2024-09-20 NOTE — TELEPHONE ENCOUNTER
----- Message from Charla Jenkins sent at 9/20/2024  6:24 AM EDT -----  Can someone call this patient's son Ivan at [phone] and update his medication list.   They didn't have one with them and he's been hospitalized multiple times since his last ov.  Thanks!

## 2024-09-20 NOTE — PROGRESS NOTES
Patient's son left a voicemail message stating he was calling to update his father's medication list.  He can be reached at [phone].  Message forwarded to nursing to contact the patient's son and update the medication list as requested.

## 2024-09-20 NOTE — TELEPHONE ENCOUNTER
Medications updated per phone call with patient's son:    Carvedilol: 3.125mg twice daily (Decreased from 12.5mg twice daily)  Lasix: discontinued  Sotalol: discontinued      Will route to Charla ZHOU as FYI.

## 2024-09-30 NOTE — PROGRESS NOTES
While reviewing chart with plans to modify standing INR order from [facility] provider for [facility]- it was noted that patient is no longer on Warfarin therapy.   Canceling standing order and resolving Anticoagulation Episode.

## 2024-10-01 ENCOUNTER — TELEPHONE (OUTPATIENT)
Dept: FAMILY MEDICINE CLINIC | Age: 74
End: 2024-10-01

## 2024-10-01 NOTE — TELEPHONE ENCOUNTER
Per Nishi from [facility], Patient has 2 small wounds on his righ lower extremities (possibly from blisters) and they are requesting an order for wound care. Nishi is requesting to use Calcium Alginate and cover with Border Foam and to change it 3x a week.

## 2024-10-02 PROBLEM — I87.2 VENOUS STASIS DERMATITIS OF BOTH LOWER EXTREMITIES: Status: ACTIVE | Noted: 2024-09-03

## 2024-10-02 PROBLEM — H93.19 TINNITUS: Status: ACTIVE | Noted: 2024-10-02

## 2024-10-02 PROBLEM — I50.43 CHF (CONGESTIVE HEART FAILURE), NYHA CLASS I, ACUTE ON CHRONIC, COMBINED: Status: ACTIVE | Noted: 2024-03-18

## 2024-10-02 PROBLEM — R93.89 ABNORMAL CT SCAN: Status: ACTIVE | Noted: 2024-03-29

## 2024-10-02 PROBLEM — J98.11 ATELECTASIS: Status: ACTIVE | Noted: 2024-03-28

## 2024-10-02 PROBLEM — I35.1 SEVERE AORTIC REGURGITATION: Status: ACTIVE | Noted: 2024-03-22

## 2024-10-02 PROBLEM — F41.9 ANXIETY: Status: ACTIVE | Noted: 2024-03-28

## 2024-10-02 PROBLEM — R29.6 RECURRENT FALLS: Status: ACTIVE | Noted: 2024-10-02

## 2024-10-02 PROBLEM — I27.20 PULMONARY HTN (MULTI): Status: ACTIVE | Noted: 2024-03-28

## 2024-10-02 PROBLEM — L89.323: Status: ACTIVE | Noted: 2024-08-13

## 2024-10-02 PROBLEM — R01.1 HEART MURMUR: Status: ACTIVE | Noted: 2024-10-02

## 2024-10-02 PROBLEM — Z86.73 HISTORY OF CVA (CEREBROVASCULAR ACCIDENT): Status: ACTIVE | Noted: 2024-03-28

## 2024-10-02 PROBLEM — R73.9 STRESS HYPERGLYCEMIA: Status: ACTIVE | Noted: 2024-03-28

## 2024-10-02 NOTE — PROGRESS NOTES
Referred by Ms. Jenkins for No chief complaint on file.     History Of Present Illness:    Antony Goff is a 74 y.o. male bicuspid aortic valve regurgitation s/p AVR #27 Epic plus. (3/2024), atrial fibrillation s/p MAZE/LAAC (2024, on eliquis), sinus node dysfunction s/p dual chamber ICD (2010 and CRT-P 2024) mild CAD, KANCHAN, COPD/Asthma, CVA, GERD, HTN, hypothyroidism, L5-S1 fusion complicated by chronic pain, on morphine who presented to ED on 8/11 for worsening shortness of breath and edema requiring 6L. HsTNT elevated but stable. ProBNP elevated. EKG negative for STEMI. CXR consistent with pulmonary edema. Given IV lasix and transferred to [facility] on 8/12 for further management. Patient was diuresed with IV lasix and started on spironolactone and empagliflozin for management of HFpEF. Patient's oxygen requirements subsequently improved and was transitioned to PO torsemide. Patient was clinical stable and at baseline oxygen and was subsequently discharged with close follow up with Cardiology.   In follow up on 9/3/2024, non compliance was noted with diuretics. O2 sat was 72%  Coming to us as [facility] campus is far away.       Past Medical History:  He has a past medical history of Encounter for preprocedural cardiovascular examination, Encounter for screening for lipoid disorders, Personal history of other diseases of the circulatory system (10/05/2021), Personal history of other endocrine, nutritional and metabolic disease, Personal history of other endocrine, nutritional and metabolic disease, Personal history of other endocrine, nutritional and metabolic disease (08/03/2022), Personal history of other specified conditions, Personal history of other specified conditions, Personal history of other specified conditions, Presence of cardiac pacemaker, Repeated falls, and Tinnitus, left ear (10/05/2021).    Past Surgical History:  He has a past surgical history that includes Other surgical history  (10/05/2021); Other surgical history (10/05/2021); Other surgical history (10/05/2021); Other surgical history (10/05/2021); Other surgical history (10/05/2021); Other surgical history (10/05/2021); Other surgical history (10/05/2021); Other surgical history (10/05/2021); Other surgical history (01/28/2022); Other surgical history (01/28/2022); Other surgical history (01/28/2022); Other surgical history (01/28/2022); Coronary artery bypass graft; Cardiac valve replacement; and Insert / replace / remove pacemaker.      Social History:  He reports that he has quit smoking. His smoking use included cigarettes. He has never used smokeless tobacco. He reports that he does not currently use alcohol. He reports that he does not currently use drugs.    Family History:  Family History   Problem Relation Name Age of Onset    Heart failure Mother      Other (asbestosis) Father      Lung disease Father      Thyroid disease Sister          Allergies:  Patient has no known allergies.    Outpatient Medications:  Current Outpatient Medications   Medication Instructions    albuterol 2.5 mg /3 mL (0.083 %) nebulizer solution inhalation, Use as directed PRN    atorvastatin (Lipitor) 40 mg tablet 1 tablet, oral, Nightly    carvedilol (COREG) 3.125 mg, oral, 2 times daily    Eliquis 5 mg tablet 1 tablet, oral, 2 times daily    empagliflozin (Jardiance) 10 mg 1 tablet, oral, Daily    lactulose 10 gram/15 mL solution TAKE 30 ML BY MOUTH TWICE DAILY AS NEEDED    lamoTRIgine (LaMICtal) 100 mg tablet 1 tablet, oral, Nightly    levothyroxine (SYNTHROID, LEVOXYL) 88 mcg, oral, Daily    morphine CR (MS CONTIN) 30 mg, oral, Every 8 hours, PRN    omeprazole OTC (PRILOSEC OTC) 20 mg, oral, Daily before breakfast, Do not crush, chew, or split.    Oxervate 0.002 % ophthalmic solution     potassium chloride (Klor-Con) 20 mEq packet 20 mEq, oral, Daily    solifenacin (VESICARE) 5 mg, oral, Daily    spironolactone (Aldactone) 25 mg tablet 1 tablet,  "oral, Daily    Symbicort 160-4.5 mcg/actuation inhaler 1 puff, inhalation, 2 times daily    torsemide (Demadex) 20 mg tablet 2 tablets, oral, Daily    traZODone (DESYREL) 50 mg, oral, Nightly, PRN        Last Recorded Vitals:  There were no vitals filed for this visit.    Physical Exam:  Physical Exam       Last Labs:  8/19/2024  Albumin  3.9 - 4.9 g/dL 3.1 Low    Calcium, Total  8.5 - 10.2 mg/dL 9.7   Phosphorus  2.7 - 4.8 mg/dL 3.9   Glucose  74 - 99 mg/dL 113 High       BUN  9 - 24 mg/dL 30 High    Creatinine  0.73 - 1.22 mg/dL 1.38 High    Sodium  136 - 144 mmol/L 134 Low    Potassium  3.7 - 5.1 mmol/L 3.4 Low    Chloride  98 - 107 mmol/L 76 Low    Comment: Result rechecked.   CO2  22 - 30 mmol/L 50 High    Anion Gap  8 - 15 mmol/L 8   Estimated Glomerular Filtration Rate  >=60 mL/min/1.73m² 55 Low      WBC  3.70 - 11.00 k/uL 8.29   RBC  4.20 - 6.00 m/uL 3.53 Low    Hemoglobin  13.0 - 17.0 g/dL 10.5 Low    Hematocrit  39.0 - 51.0 % 33.7 Low    MCV  80.0 - 100.0 fL 95.5   MCH  26.0 - 34.0 pg 29.7   MCHC  30.5 - 36.0 g/dL 31.2   RDW-CV  11.5 - 15.0 % 14.9   Platelet Count  150 - 400 k/uL 230   MPV  9.0 - 12.7 fL 8.9 Low    Absolute nRBC  <0.01 k/uL <0.01     CBC -  No results found for: \"WBC\", \"HGB\", \"HCT\", \"MCV\", \"PLT\"    CMP -  No results found for: \"CALCIUM\", \"PHOS\", \"PROT\", \"ALBUMIN\", \"AST\", \"ALT\", \"ALKPHOS\", \"BILITOT\"    LIPID PANEL -   No results found for: \"CHOL\", \"TRIG\", \"HDL\", \"CHHDL\", \"LDLF\", \"VLDL\", \"NHDL\"    RENAL FUNCTION PANEL -   No results found for: \"GLUCOSE\", \"NA\", \"K\", \"CL\", \"CO2\", \"ANIONGAP\", \"BUN\", \"CREATININE\", \"GFRMALE\", \"CALCIUM\", \"PHOS\", \"ALBUMIN\"     Lab Results   Component Value Date    HGBA1C 5.2 03/09/2022       Last Cardiology Tests:  ECG:  ECG 12 Lead 09/18/2024    ***  Echo: 8/15/2024 ([facility])  - Exam indication: Limited echo - Re-evaluation of heart failure   - Left ventricular systolic function is normal. EF = 60 ± 5% (visual est.)   - The right ventricle is dilated. Right " ventricular systolic function is low   normal.   - The visualized aorta is dilated with a maximal dimension of 4.1 cm.   - There is moderate (2+) tricuspid valve regurgitation.   - Epic prosthetic aortic valve (size #27). The peak gradient is 18 mmHg, the mean   gradient is 9 mmHg and the dimensionless valve index is 0.79. Prior peak/mean   gradients were 21/11 mmHg.   - Estimated right ventricular systolic pressure is 59 mmHg consistent with   moderate pulmonary hypertension. Estimated right atrial pressure is 3 mmHg based   on IVC assessment. Estimated right ventricular systolic pressure is 59 mmHg   consistent with moderate pulmonary hypertension. Estimated right atrial pressure   is 3 mmHg based on IVC assessment.   - Exam was compared with the prior CC echocardiographic exam performed on   04/26/2024. There is no significant changes.     Cath: [facility] 3/22/2024  Left Main: Normal   LAD: Big size vessel mild disease   Circumflex: Big size vessel mild disease   RCA big dominant vessel PDA 30% proximal stenosis   No LVEDP or LV gram performed to preserve kidney function   This coronary angiogram was performed with minimal IV contrast     Coronary Findings Diagnostic Dominance: Right   Left Main: Size of vessel >=2.0 mm. The vessel is angiographically normal.   Left Anterior Descending: Size of vessel >=2.0 mm. There is mild disease.   Left Circumflex: Size of vessel >=2.0 mm. There is mild disease.   Right Coronary Artery: Size of vessel >=2.0 mm. There is mild disease. Right Posterior Descending Artery: There is mild disease in the proximal segment. Disease is noted to be 30%.     Stress Test:  Nuclear Stress Test 07/26/2022    ***  Cardiac Imaging: CT Scan High Res 3/14/2023  Lower lobe predominant septal thickening along with small bilateral pleural   effusions favoring interstitial pulmonary edema pattern with mild increase in   mosaicism on expiratory imaging of likely superimposed bronchomalacia and or   small  airways disease.  It would be difficult to exclude background or   intermixed early fibrotic changes at the lung bases which are largely   obscured from pleural effusions although similar reticulation could exist in   the lung periphery.     Partially visualized portions of the upper abdomen reveal nodular contours of   parenchymal disease process liver cirrhotic hepatic morphology with spleen   enlarged consistent of sequela portal hypertension. Layering density   gallbladder microlithiasis or cholelithiasis without obvious wall thickening   or partial imaging. No ascites.     {Lab/Diag/Rad Review:89805}    Assessment/Plan   {Assess/Plan SmartLinks:04487}        Baljeet Prescott MD

## 2024-10-03 ENCOUNTER — APPOINTMENT (OUTPATIENT)
Dept: CARDIOLOGY | Facility: CLINIC | Age: 74
End: 2024-10-03
Payer: MEDICARE

## 2024-10-16 DIAGNOSIS — J44.9 CHRONIC OBSTRUCTIVE PULMONARY DISEASE, UNSPECIFIED COPD TYPE (HCC): Primary | ICD-10-CM

## 2024-10-16 PROBLEM — H16.229 KERATOCONJUNCTIVITIS SICCA: Status: RESOLVED | Noted: 2021-02-02 | Resolved: 2024-10-16

## 2024-10-16 PROBLEM — H25.812 COMBINED FORMS OF AGE-RELATED CATARACT OF LEFT EYE: Status: RESOLVED | Noted: 2019-04-15 | Resolved: 2024-10-16

## 2024-10-16 PROBLEM — I50.9 CONGESTIVE HEART FAILURE OF UNKNOWN ETIOLOGY (HCC): Status: RESOLVED | Noted: 2024-03-21 | Resolved: 2024-10-16

## 2024-10-16 RX ORDER — BUDESONIDE AND FORMOTEROL FUMARATE DIHYDRATE 160; 4.5 UG/1; UG/1
1 AEROSOL RESPIRATORY (INHALATION) 2 TIMES DAILY
Qty: 10.2 G | Refills: 5 | Status: SHIPPED | OUTPATIENT
Start: 2024-10-16

## 2024-10-16 NOTE — TELEPHONE ENCOUNTER
Comments:     Last Office Visit (last PCP visit):   9/3/2024    Next Visit Date:  No future appointments.    **If hasn't been seen in over a year OR hasn't followed up according to last diabetes/ADHD visit, make appointment for patient before sending refill to provider.    Rx requested:  Requested Prescriptions     Pending Prescriptions Disp Refills    SYMBICORT 160-4.5 MCG/ACT AERO 10.2 g      Sig: Inhale 1 puff into the lungs in the morning and at bedtime

## 2024-10-17 DIAGNOSIS — J43.9 PULMONARY EMPHYSEMA, UNSPECIFIED EMPHYSEMA TYPE (HCC): ICD-10-CM

## 2024-10-17 NOTE — TELEPHONE ENCOUNTER
Son is requesting a refill of the patient's Albuterol ProAir inhaler that was prescribed to him at his last hospital stay in the [facility]..

## 2024-10-31 ENCOUNTER — TELEPHONE (OUTPATIENT)
Dept: FAMILY MEDICINE CLINIC | Age: 74
End: 2024-10-31

## 2024-10-31 NOTE — TELEPHONE ENCOUNTER
Home care nurse called stating that Jonah and son are non compliant with wound care in between visits with his nursing staff. She states that they might end up discharging him sometime next week due to non compliance. (With [facility], they are merging with a new company now)

## 2024-11-04 DIAGNOSIS — I48.0 PAROXYSMAL ATRIAL FIBRILLATION (MULTI): ICD-10-CM

## 2024-11-04 DIAGNOSIS — I10 BENIGN ESSENTIAL HYPERTENSION: ICD-10-CM

## 2024-11-05 RX ORDER — CARVEDILOL 3.12 MG/1
3.12 TABLET ORAL 2 TIMES DAILY
Qty: 180 TABLET | Refills: 3 | Status: SHIPPED | OUTPATIENT
Start: 2024-11-05

## 2024-11-05 RX ORDER — APIXABAN 5 MG/1
5 TABLET, FILM COATED ORAL 2 TIMES DAILY
Qty: 180 TABLET | Refills: 3 | Status: SHIPPED | OUTPATIENT
Start: 2024-11-05

## 2024-11-18 ENCOUNTER — APPOINTMENT (OUTPATIENT)
Dept: CT IMAGING | Age: 74
End: 2024-11-18
Payer: MEDICARE

## 2024-11-18 ENCOUNTER — APPOINTMENT (OUTPATIENT)
Dept: GENERAL RADIOLOGY | Age: 74
End: 2024-11-18
Payer: MEDICARE

## 2024-11-18 ENCOUNTER — HOSPITAL ENCOUNTER (EMERGENCY)
Age: 74
Discharge: ANOTHER HOSPITAL | End: 2024-11-19
Attending: EMERGENCY MEDICINE
Payer: MEDICARE

## 2024-11-18 DIAGNOSIS — R41.0 TRANSIENT CONFUSION: ICD-10-CM

## 2024-11-18 DIAGNOSIS — I50.41 ACUTE COMBINED SYSTOLIC AND DIASTOLIC CONGESTIVE HEART FAILURE (HCC): ICD-10-CM

## 2024-11-18 DIAGNOSIS — J44.1 COPD EXACERBATION (HCC): Primary | ICD-10-CM

## 2024-11-18 LAB
ALBUMIN SERPL-MCNC: 2.7 G/DL (ref 3.5–4.6)
ALP SERPL-CCNC: 138 U/L (ref 35–104)
ALT SERPL-CCNC: 11 U/L (ref 0–41)
AMMONIA PLAS-SCNC: 28 UMOL/L (ref 16–60)
AMPHETAMINES UR QL SCN>500 NG/ML: ABNORMAL
ANION GAP SERPL CALCULATED.3IONS-SCNC: 8 MEQ/L (ref 9–15)
AST SERPL-CCNC: 34 U/L (ref 0–40)
BACTERIA URNS QL MICRO: NEGATIVE /HPF
BARBITURATES UR QL SCN>200 NG/ML: ABNORMAL
BASOPHILS # BLD: 0 K/UL (ref 0–0.1)
BASOPHILS NFR BLD: 0.4 % (ref 0.2–1.2)
BENZODIAZ UR QL SCN: ABNORMAL
BILIRUB SERPL-MCNC: 1.2 MG/DL (ref 0.2–0.7)
BILIRUB UR QL STRIP: ABNORMAL
BNP BLD-MCNC: 8235 PG/ML
BUN SERPL-MCNC: 47 MG/DL (ref 8–23)
CALCIUM SERPL-MCNC: 9.9 MG/DL (ref 8.5–9.9)
CHLORIDE SERPL-SCNC: 83 MEQ/L (ref 95–107)
CLARITY UR: CLEAR
CO2 SERPL-SCNC: 44 MEQ/L (ref 20–31)
COCAINE UR QL SCN: ABNORMAL
COLOR UR: ABNORMAL
CREAT SERPL-MCNC: 1.44 MG/DL (ref 0.7–1.2)
DRUG SCREEN COMMENT UR-IMP: ABNORMAL
EOSINOPHIL # BLD: 0.1 K/UL (ref 0–0.5)
EOSINOPHIL NFR BLD: 1.2 % (ref 0.8–7)
EPI CELLS #/AREA URNS HPF: ABNORMAL /HPF
ERYTHROCYTE [DISTWIDTH] IN BLOOD BY AUTOMATED COUNT: 15.6 % (ref 11.6–14.4)
ETHANOL PERCENT: NORMAL G/DL
ETHANOLAMINE SERPL-MCNC: <10 MG/DL (ref 0–0.08)
GLOBULIN SER CALC-MCNC: 6.2 G/DL (ref 2.3–3.5)
GLUCOSE SERPL-MCNC: 113 MG/DL (ref 70–99)
GLUCOSE UR STRIP-MCNC: 100 MG/DL
HCT VFR BLD AUTO: 30.1 % (ref 42–52)
HGB BLD-MCNC: 9.5 G/DL (ref 13.7–17.5)
HGB UR QL STRIP: ABNORMAL
IMM GRANULOCYTES # BLD: 0 K/UL
IMM GRANULOCYTES NFR BLD: 0.4 %
INR PPP: 1.8
KETONES UR STRIP-MCNC: NEGATIVE MG/DL
LACTATE BLDV-SCNC: 1.3 MMOL/L (ref 0.5–2.2)
LEUKOCYTE ESTERASE UR QL STRIP: NEGATIVE
LYMPHOCYTES # BLD: 0.8 K/UL (ref 1.3–3.6)
LYMPHOCYTES NFR BLD: 7.4 %
MAGNESIUM SERPL-MCNC: 1.8 MG/DL (ref 1.7–2.4)
MCH RBC QN AUTO: 30.7 PG (ref 25.7–32.2)
MCHC RBC AUTO-ENTMCNC: 31.6 % (ref 32.3–36.5)
MCV RBC AUTO: 97.4 FL (ref 79–92.2)
MONOCYTES # BLD: 0.7 K/UL (ref 0.3–0.8)
MONOCYTES NFR BLD: 6.5 % (ref 5.3–12.2)
NEUTROPHILS # BLD: 8.8 K/UL (ref 1.8–5.4)
NEUTS SEG NFR BLD: 84.1 % (ref 34–67.9)
NITRITE UR QL STRIP: NEGATIVE
OPIATES UR QL SCN: POSITIVE
PCP UR QL SCN>25 NG/ML: ABNORMAL
PH UR STRIP: 5 [PH] (ref 5–9)
PLATELET # BLD AUTO: 175 K/UL (ref 163–337)
POTASSIUM SERPL-SCNC: 3.9 MEQ/L (ref 3.4–4.9)
PROT SERPL-MCNC: 8.9 G/DL (ref 6.3–8)
PROT UR STRIP-MCNC: ABNORMAL MG/DL
PROTHROMBIN TIME: 20.7 SEC (ref 12.3–14.9)
RBC # BLD AUTO: 3.09 M/UL (ref 4.63–6.08)
RBC #/AREA URNS HPF: ABNORMAL /HPF (ref 0–2)
SARS-COV-2 RDRP RESP QL NAA+PROBE: NOT DETECTED
SODIUM SERPL-SCNC: 135 MEQ/L (ref 135–144)
SP GR UR STRIP: 1.01 (ref 1–1.03)
THC UR QL SCN>50 NG/ML: ABNORMAL
TRICYCLICS UR QL SCN: ABNORMAL
TROPONIN, HIGH SENSITIVITY: 69 NG/L (ref 0–19)
TROPONIN, HIGH SENSITIVITY: 71 NG/L (ref 0–19)
TSH SERPL-MCNC: 6.69 UIU/ML (ref 0.44–3.86)
URINE REFLEX TO CULTURE: ABNORMAL
UROBILINOGEN UR STRIP-ACNC: 4 E.U./DL
WBC # BLD AUTO: 10.4 K/UL (ref 4.2–9)
WBC #/AREA URNS HPF: ABNORMAL /HPF (ref 0–5)

## 2024-11-18 PROCEDURE — 80053 COMPREHEN METABOLIC PANEL: CPT

## 2024-11-18 PROCEDURE — 82140 ASSAY OF AMMONIA: CPT

## 2024-11-18 PROCEDURE — 94664 DEMO&/EVAL PT USE INHALER: CPT

## 2024-11-18 PROCEDURE — 96375 TX/PRO/DX INJ NEW DRUG ADDON: CPT

## 2024-11-18 PROCEDURE — 81001 URINALYSIS AUTO W/SCOPE: CPT

## 2024-11-18 PROCEDURE — 94640 AIRWAY INHALATION TREATMENT: CPT

## 2024-11-18 PROCEDURE — 6370000000 HC RX 637 (ALT 250 FOR IP): Performed by: EMERGENCY MEDICINE

## 2024-11-18 PROCEDURE — 83735 ASSAY OF MAGNESIUM: CPT

## 2024-11-18 PROCEDURE — 85610 PROTHROMBIN TIME: CPT

## 2024-11-18 PROCEDURE — 96374 THER/PROPH/DIAG INJ IV PUSH: CPT

## 2024-11-18 PROCEDURE — 71045 X-RAY EXAM CHEST 1 VIEW: CPT

## 2024-11-18 PROCEDURE — 96361 HYDRATE IV INFUSION ADD-ON: CPT

## 2024-11-18 PROCEDURE — 6360000002 HC RX W HCPCS: Performed by: EMERGENCY MEDICINE

## 2024-11-18 PROCEDURE — 99285 EMERGENCY DEPT VISIT HI MDM: CPT

## 2024-11-18 PROCEDURE — 83880 ASSAY OF NATRIURETIC PEPTIDE: CPT

## 2024-11-18 PROCEDURE — 82077 ASSAY SPEC XCP UR&BREATH IA: CPT

## 2024-11-18 PROCEDURE — 93005 ELECTROCARDIOGRAM TRACING: CPT

## 2024-11-18 PROCEDURE — 83605 ASSAY OF LACTIC ACID: CPT

## 2024-11-18 PROCEDURE — 85025 COMPLETE CBC W/AUTO DIFF WBC: CPT

## 2024-11-18 PROCEDURE — 2580000003 HC RX 258: Performed by: EMERGENCY MEDICINE

## 2024-11-18 PROCEDURE — 80306 DRUG TEST PRSMV INSTRMNT: CPT

## 2024-11-18 PROCEDURE — 84443 ASSAY THYROID STIM HORMONE: CPT

## 2024-11-18 PROCEDURE — 84484 ASSAY OF TROPONIN QUANT: CPT

## 2024-11-18 PROCEDURE — 36415 COLL VENOUS BLD VENIPUNCTURE: CPT

## 2024-11-18 PROCEDURE — 87635 SARS-COV-2 COVID-19 AMP PRB: CPT

## 2024-11-18 RX ORDER — SODIUM CHLORIDE 9 MG/ML
INJECTION, SOLUTION INTRAVENOUS CONTINUOUS
Status: DISCONTINUED | OUTPATIENT
Start: 2024-11-18 | End: 2024-11-18

## 2024-11-18 RX ORDER — MORPHINE SULFATE 4 MG/ML
4 INJECTION INTRAVENOUS
Status: COMPLETED | OUTPATIENT
Start: 2024-11-18 | End: 2024-11-18

## 2024-11-18 RX ORDER — MORPHINE SULFATE 4 MG/ML
4 INJECTION INTRAVENOUS
Status: DISCONTINUED | OUTPATIENT
Start: 2024-11-18 | End: 2024-11-19 | Stop reason: HOSPADM

## 2024-11-18 RX ORDER — IPRATROPIUM BROMIDE AND ALBUTEROL SULFATE 2.5; .5 MG/3ML; MG/3ML
1 SOLUTION RESPIRATORY (INHALATION) ONCE
Status: COMPLETED | OUTPATIENT
Start: 2024-11-18 | End: 2024-11-18

## 2024-11-18 RX ORDER — METHYLPREDNISOLONE SODIUM SUCCINATE 125 MG/2ML
125 INJECTION INTRAMUSCULAR; INTRAVENOUS ONCE
Status: COMPLETED | OUTPATIENT
Start: 2024-11-18 | End: 2024-11-18

## 2024-11-18 RX ORDER — KETOROLAC TROMETHAMINE 15 MG/ML
15 INJECTION, SOLUTION INTRAMUSCULAR; INTRAVENOUS ONCE
Status: COMPLETED | OUTPATIENT
Start: 2024-11-18 | End: 2024-11-18

## 2024-11-18 RX ORDER — 0.9 % SODIUM CHLORIDE 0.9 %
500 INTRAVENOUS SOLUTION INTRAVENOUS ONCE
Status: DISCONTINUED | OUTPATIENT
Start: 2024-11-18 | End: 2024-11-18

## 2024-11-18 RX ADMIN — SODIUM CHLORIDE 500 ML: 9 INJECTION, SOLUTION INTRAVENOUS at 16:04

## 2024-11-18 RX ADMIN — METHYLPREDNISOLONE SODIUM SUCCINATE 125 MG: 125 INJECTION INTRAMUSCULAR; INTRAVENOUS at 16:48

## 2024-11-18 RX ADMIN — KETOROLAC TROMETHAMINE 15 MG: 15 INJECTION, SOLUTION INTRAMUSCULAR; INTRAVENOUS at 18:13

## 2024-11-18 RX ADMIN — IPRATROPIUM BROMIDE AND ALBUTEROL SULFATE 1 DOSE: 2.5; .5 SOLUTION RESPIRATORY (INHALATION) at 16:52

## 2024-11-18 RX ADMIN — MORPHINE SULFATE 4 MG: 4 INJECTION INTRAVENOUS at 20:06

## 2024-11-18 ASSESSMENT — ENCOUNTER SYMPTOMS
VOICE CHANGE: 0
SHORTNESS OF BREATH: 1
BACK PAIN: 0
COUGH: 0
EYE REDNESS: 0
TROUBLE SWALLOWING: 0
EYE DISCHARGE: 0
CHOKING: 0
WHEEZING: 0
STRIDOR: 0
EYE PAIN: 0
DIARRHEA: 0
SINUS PRESSURE: 0
CONSTIPATION: 0
SORE THROAT: 0
VOMITING: 0
ABDOMINAL PAIN: 0
FACIAL SWELLING: 0
CHEST TIGHTNESS: 0
BLOOD IN STOOL: 0

## 2024-11-18 ASSESSMENT — PAIN SCALES - GENERAL
PAINLEVEL_OUTOF10: 5
PAINLEVEL_OUTOF10: 7
PAINLEVEL_OUTOF10: 9
PAINLEVEL_OUTOF10: 8
PAINLEVEL_OUTOF10: 7

## 2024-11-18 ASSESSMENT — PAIN DESCRIPTION - LOCATION
LOCATION: BACK

## 2024-11-18 ASSESSMENT — PAIN - FUNCTIONAL ASSESSMENT
PAIN_FUNCTIONAL_ASSESSMENT: 0-10
PAIN_FUNCTIONAL_ASSESSMENT: 0-10

## 2024-11-18 ASSESSMENT — PAIN DESCRIPTION - ORIENTATION
ORIENTATION: LOWER
ORIENTATION: LOWER

## 2024-11-18 ASSESSMENT — PAIN DESCRIPTION - FREQUENCY
FREQUENCY: CONTINUOUS
FREQUENCY: CONTINUOUS

## 2024-11-18 ASSESSMENT — PAIN DESCRIPTION - DESCRIPTORS
DESCRIPTORS: SORE
DESCRIPTORS: SHARP
DESCRIPTORS: SHARP;SHOOTING
DESCRIPTORS: SHOOTING

## 2024-11-18 ASSESSMENT — PAIN DESCRIPTION - ONSET
ONSET: ON-GOING
ONSET: ON-GOING

## 2024-11-18 ASSESSMENT — PAIN DESCRIPTION - PAIN TYPE
TYPE: ACUTE PAIN;CHRONIC PAIN
TYPE: ACUTE PAIN;CHRONIC PAIN

## 2024-11-18 NOTE — ED PROVIDER NOTES
DISPOSITION/PLAN   DISPOSITION             PATIENT REFERRED TO:  No follow-up provider specified.    DISCHARGE MEDICATIONS:  New Prescriptions    No medications on file          (Please note that portions of this note were completed with a voice recognition program.  Efforts were made to edit the dictations but occasionally words are mis-transcribed.)    Jeison Celeste MD (electronically signed)  Attending Emergency Physician        Jeison Celeste MD  11/18/24 4083

## 2024-11-18 NOTE — ED TRIAGE NOTES
Patient brought in by squad for SOB and altered mental status. Pt alert and oriented by 1-2. Pt on home O2. Pt with a pacemaker. BG on squad was 155.

## 2024-11-19 ENCOUNTER — HOSPITAL ENCOUNTER (INPATIENT)
Facility: HOSPITAL | Age: 74
End: 2024-11-19
Attending: STUDENT IN AN ORGANIZED HEALTH CARE EDUCATION/TRAINING PROGRAM | Admitting: NURSE PRACTITIONER
Payer: MEDICARE

## 2024-11-19 VITALS
HEIGHT: 72 IN | WEIGHT: 200 LBS | RESPIRATION RATE: 20 BRPM | SYSTOLIC BLOOD PRESSURE: 142 MMHG | HEART RATE: 72 BPM | DIASTOLIC BLOOD PRESSURE: 74 MMHG | BODY MASS INDEX: 27.09 KG/M2 | OXYGEN SATURATION: 98 % | TEMPERATURE: 97.5 F

## 2024-11-19 DIAGNOSIS — I50.41 ACUTE COMBINED SYSTOLIC AND DIASTOLIC HF (HEART FAILURE): Primary | ICD-10-CM

## 2024-11-19 DIAGNOSIS — J96.01 ACUTE HYPOXIC RESPIRATORY FAILURE (MULTI): ICD-10-CM

## 2024-11-19 DIAGNOSIS — R29.6 RECURRENT FALLS: ICD-10-CM

## 2024-11-19 LAB
ALBUMIN SERPL BCP-MCNC: 2.8 G/DL (ref 3.4–5)
ALP SERPL-CCNC: 106 U/L (ref 33–136)
ALT SERPL W P-5'-P-CCNC: 14 U/L (ref 10–52)
ANION GAP SERPL CALC-SCNC: ABNORMAL MMOL/L
ANION GAP SERPL CALC-SCNC: ABNORMAL MMOL/L
APPEARANCE UR: ABNORMAL
AST SERPL W P-5'-P-CCNC: 28 U/L (ref 9–39)
BASE EXCESS BLDA CALC-SCNC: 21.8 MMOL/L (ref -2–3)
BILIRUB SERPL-MCNC: 1.3 MG/DL (ref 0–1.2)
BILIRUB UR STRIP.AUTO-MCNC: NEGATIVE MG/DL
BODY TEMPERATURE: ABNORMAL
BUN SERPL-MCNC: 55 MG/DL (ref 6–23)
BUN SERPL-MCNC: 55 MG/DL (ref 6–23)
CALCIUM SERPL-MCNC: 9.4 MG/DL (ref 8.6–10.3)
CALCIUM SERPL-MCNC: 9.4 MG/DL (ref 8.6–10.3)
CHLORIDE SERPL-SCNC: 80 MMOL/L (ref 98–107)
CHLORIDE SERPL-SCNC: 80 MMOL/L (ref 98–107)
CO2 SERPL-SCNC: >45 MMOL/L (ref 21–32)
CO2 SERPL-SCNC: >45 MMOL/L (ref 21–32)
COLOR UR: YELLOW
CREAT SERPL-MCNC: 1.43 MG/DL (ref 0.5–1.3)
CREAT SERPL-MCNC: 1.44 MG/DL (ref 0.5–1.3)
CRITICAL CALL TIME: 1312
CRITICAL CALLED BY: ABNORMAL
CRITICAL CALLED TO: ABNORMAL
CRITICAL READ BACK: ABNORMAL
EGFRCR SERPLBLD CKD-EPI 2021: 52 ML/MIN/1.73M*2
EGFRCR SERPLBLD CKD-EPI 2021: 52 ML/MIN/1.73M*2
EKG ATRIAL RATE: 375 BPM
EKG P-R INTERVAL: 174 MS
EKG Q-T INTERVAL: 478 MS
EKG QRS DURATION: 160 MS
EKG QTC CALCULATION (BAZETT): 516 MS
EKG R AXIS: -32 DEGREES
EKG T AXIS: 17 DEGREES
EKG VENTRICULAR RATE: 70 BPM
ERYTHROCYTE [DISTWIDTH] IN BLOOD BY AUTOMATED COUNT: 15.6 % (ref 11.5–14.5)
GLUCOSE SERPL-MCNC: 115 MG/DL (ref 74–99)
GLUCOSE SERPL-MCNC: 138 MG/DL (ref 74–99)
GLUCOSE UR STRIP.AUTO-MCNC: ABNORMAL MG/DL
HCO3 BLDA-SCNC: 52.6 MMOL/L (ref 22–26)
HCT VFR BLD AUTO: 28.5 % (ref 41–52)
HGB BLD-MCNC: 9 G/DL (ref 13.5–17.5)
HOLD SPECIMEN: NORMAL
HYALINE CASTS #/AREA URNS AUTO: ABNORMAL /LPF
INHALED O2 CONCENTRATION: 36 %
KETONES UR STRIP.AUTO-MCNC: NEGATIVE MG/DL
LEUKOCYTE ESTERASE UR QL STRIP.AUTO: NEGATIVE
MCH RBC QN AUTO: 30.5 PG (ref 26–34)
MCHC RBC AUTO-ENTMCNC: 31.6 G/DL (ref 32–36)
MCV RBC AUTO: 97 FL (ref 80–100)
MUCOUS THREADS #/AREA URNS AUTO: ABNORMAL /LPF
NITRITE UR QL STRIP.AUTO: NEGATIVE
NRBC BLD-RTO: 0 /100 WBCS (ref 0–0)
OXYHGB MFR BLDA: 97.1 % (ref 94–98)
PCO2 BLDA: 91 MM HG (ref 38–42)
PH BLDA: 7.37 PH (ref 7.38–7.42)
PH UR STRIP.AUTO: 5 [PH]
PLATELET # BLD AUTO: 222 X10*3/UL (ref 150–450)
PO2 BLDA: 147 MM HG (ref 85–95)
POTASSIUM SERPL-SCNC: 4.2 MMOL/L (ref 3.5–5.3)
POTASSIUM SERPL-SCNC: 4.3 MMOL/L (ref 3.5–5.3)
PROT SERPL-MCNC: 8.1 G/DL (ref 6.4–8.2)
PROT UR STRIP.AUTO-MCNC: NEGATIVE MG/DL
RBC # BLD AUTO: 2.95 X10*6/UL (ref 4.5–5.9)
RBC # UR STRIP.AUTO: ABNORMAL /UL
RBC #/AREA URNS AUTO: >20 /HPF
SAO2 % BLDA: 100 % (ref 94–100)
SODIUM SERPL-SCNC: 134 MMOL/L (ref 136–145)
SODIUM SERPL-SCNC: 135 MMOL/L (ref 136–145)
SP GR UR STRIP.AUTO: 1.01
SQUAMOUS #/AREA URNS AUTO: ABNORMAL /HPF
UROBILINOGEN UR STRIP.AUTO-MCNC: ABNORMAL MG/DL
WBC # BLD AUTO: 11.1 X10*3/UL (ref 4.4–11.3)
WBC #/AREA URNS AUTO: ABNORMAL /HPF

## 2024-11-19 PROCEDURE — 81001 URINALYSIS AUTO W/SCOPE: CPT | Performed by: FAMILY MEDICINE

## 2024-11-19 PROCEDURE — 36415 COLL VENOUS BLD VENIPUNCTURE: CPT | Performed by: FAMILY MEDICINE

## 2024-11-19 PROCEDURE — 99222 1ST HOSP IP/OBS MODERATE 55: CPT | Performed by: NURSE PRACTITIONER

## 2024-11-19 PROCEDURE — 85027 COMPLETE CBC AUTOMATED: CPT | Performed by: NURSE PRACTITIONER

## 2024-11-19 PROCEDURE — 2500000001 HC RX 250 WO HCPCS SELF ADMINISTERED DRUGS (ALT 637 FOR MEDICARE OP): Performed by: NURSE PRACTITIONER

## 2024-11-19 PROCEDURE — 80053 COMPREHEN METABOLIC PANEL: CPT | Performed by: NURSE PRACTITIONER

## 2024-11-19 PROCEDURE — 2500000001 HC RX 250 WO HCPCS SELF ADMINISTERED DRUGS (ALT 637 FOR MEDICARE OP): Performed by: FAMILY MEDICINE

## 2024-11-19 PROCEDURE — 2500000005 HC RX 250 GENERAL PHARMACY W/O HCPCS: Performed by: NURSE PRACTITIONER

## 2024-11-19 PROCEDURE — 80051 ELECTROLYTE PANEL: CPT | Performed by: NURSE PRACTITIONER

## 2024-11-19 PROCEDURE — 82810 BLOOD GASES O2 SAT ONLY: CPT | Performed by: FAMILY MEDICINE

## 2024-11-19 PROCEDURE — 2500000004 HC RX 250 GENERAL PHARMACY W/ HCPCS (ALT 636 FOR OP/ED): Performed by: NURSE PRACTITIONER

## 2024-11-19 PROCEDURE — 36415 COLL VENOUS BLD VENIPUNCTURE: CPT | Performed by: NURSE PRACTITIONER

## 2024-11-19 PROCEDURE — 2500000002 HC RX 250 W HCPCS SELF ADMINISTERED DRUGS (ALT 637 FOR MEDICARE OP, ALT 636 FOR OP/ED): Performed by: NURSE PRACTITIONER

## 2024-11-19 PROCEDURE — 1200000002 HC GENERAL ROOM WITH TELEMETRY DAILY

## 2024-11-19 PROCEDURE — 84075 ASSAY ALKALINE PHOSPHATASE: CPT | Performed by: FAMILY MEDICINE

## 2024-11-19 PROCEDURE — 2500000004 HC RX 250 GENERAL PHARMACY W/ HCPCS (ALT 636 FOR OP/ED): Performed by: FAMILY MEDICINE

## 2024-11-19 PROCEDURE — 93010 ELECTROCARDIOGRAM REPORT: CPT | Performed by: INTERNAL MEDICINE

## 2024-11-19 PROCEDURE — 99233 SBSQ HOSP IP/OBS HIGH 50: CPT | Performed by: FAMILY MEDICINE

## 2024-11-19 PROCEDURE — 82805 BLOOD GASES W/O2 SATURATION: CPT | Performed by: FAMILY MEDICINE

## 2024-11-19 PROCEDURE — 36600 WITHDRAWAL OF ARTERIAL BLOOD: CPT

## 2024-11-19 PROCEDURE — 6370000000 HC RX 637 (ALT 250 FOR IP): Performed by: EMERGENCY MEDICINE

## 2024-11-19 RX ORDER — ACETAMINOPHEN 500 MG
10 TABLET ORAL NIGHTLY PRN
Status: DISCONTINUED | OUTPATIENT
Start: 2024-11-19 | End: 2024-11-30 | Stop reason: HOSPADM

## 2024-11-19 RX ORDER — LEVOTHYROXINE SODIUM 88 UG/1
88 TABLET ORAL DAILY
Status: DISCONTINUED | OUTPATIENT
Start: 2024-11-19 | End: 2024-11-29

## 2024-11-19 RX ORDER — FLUTICASONE FUROATE AND VILANTEROL 200; 25 UG/1; UG/1
1 POWDER RESPIRATORY (INHALATION)
Status: DISCONTINUED | OUTPATIENT
Start: 2024-11-19 | End: 2024-11-22

## 2024-11-19 RX ORDER — OXYBUTYNIN CHLORIDE 5 MG/1
5 TABLET ORAL 2 TIMES DAILY
Status: DISCONTINUED | OUTPATIENT
Start: 2024-11-19 | End: 2024-11-29

## 2024-11-19 RX ORDER — PANTOPRAZOLE SODIUM 40 MG/1
40 TABLET, DELAYED RELEASE ORAL
Status: DISCONTINUED | OUTPATIENT
Start: 2024-11-19 | End: 2024-11-22

## 2024-11-19 RX ORDER — CARBOXYMETHYLCELLULOSE SODIUM 5 MG/ML
1 SOLUTION/ DROPS OPHTHALMIC EVERY 4 HOURS PRN
Status: DISCONTINUED | OUTPATIENT
Start: 2024-11-19 | End: 2024-11-19 | Stop reason: HOSPADM

## 2024-11-19 RX ORDER — FUROSEMIDE 40 MG/1
1 TABLET ORAL
COMMUNITY
Start: 2024-09-22 | End: 2024-11-30 | Stop reason: HOSPADM

## 2024-11-19 RX ORDER — LACTULOSE 10 G/15ML
30 SOLUTION ORAL 2 TIMES DAILY PRN
Status: DISCONTINUED | OUTPATIENT
Start: 2024-11-19 | End: 2024-11-25

## 2024-11-19 RX ORDER — NYSTATIN 100000 [USP'U]/G
1 POWDER TOPICAL 2 TIMES DAILY
Status: DISCONTINUED | OUTPATIENT
Start: 2024-11-19 | End: 2024-11-29

## 2024-11-19 RX ORDER — CARVEDILOL 3.12 MG/1
3.12 TABLET ORAL 2 TIMES DAILY
Status: DISCONTINUED | OUTPATIENT
Start: 2024-11-19 | End: 2024-11-29

## 2024-11-19 RX ORDER — POLYETHYLENE GLYCOL 3350 17 G/17G
17 POWDER, FOR SOLUTION ORAL DAILY PRN
Status: DISCONTINUED | OUTPATIENT
Start: 2024-11-19 | End: 2024-11-25

## 2024-11-19 RX ORDER — SPIRONOLACTONE 25 MG/1
25 TABLET ORAL DAILY
Status: DISCONTINUED | OUTPATIENT
Start: 2024-11-19 | End: 2024-11-30 | Stop reason: HOSPADM

## 2024-11-19 RX ORDER — FUROSEMIDE 10 MG/ML
20 INJECTION INTRAMUSCULAR; INTRAVENOUS ONCE
Status: COMPLETED | OUTPATIENT
Start: 2024-11-19 | End: 2024-11-19

## 2024-11-19 RX ORDER — LAMOTRIGINE 100 MG/1
100 TABLET ORAL NIGHTLY
Status: DISCONTINUED | OUTPATIENT
Start: 2024-11-19 | End: 2024-11-24

## 2024-11-19 RX ORDER — MORPHINE SULFATE 30 MG/1
30 TABLET, FILM COATED, EXTENDED RELEASE ORAL EVERY 12 HOURS SCHEDULED
Status: DISCONTINUED | OUTPATIENT
Start: 2024-11-19 | End: 2024-11-24

## 2024-11-19 RX ORDER — ACETAMINOPHEN 160 MG/5ML
650 SOLUTION ORAL EVERY 4 HOURS PRN
Status: DISCONTINUED | OUTPATIENT
Start: 2024-11-19 | End: 2024-11-30 | Stop reason: HOSPADM

## 2024-11-19 RX ORDER — TRAZODONE HYDROCHLORIDE 50 MG/1
50 TABLET ORAL NIGHTLY
Status: DISCONTINUED | OUTPATIENT
Start: 2024-11-19 | End: 2024-11-29

## 2024-11-19 RX ORDER — TORSEMIDE 20 MG/1
40 TABLET ORAL DAILY
Status: DISCONTINUED | OUTPATIENT
Start: 2024-11-19 | End: 2024-11-19

## 2024-11-19 RX ORDER — FUROSEMIDE 10 MG/ML
40 INJECTION INTRAMUSCULAR; INTRAVENOUS EVERY 12 HOURS
Status: DISCONTINUED | OUTPATIENT
Start: 2024-11-19 | End: 2024-11-29

## 2024-11-19 RX ORDER — ACETAMINOPHEN 650 MG/1
650 SUPPOSITORY RECTAL EVERY 4 HOURS PRN
Status: DISCONTINUED | OUTPATIENT
Start: 2024-11-19 | End: 2024-11-30 | Stop reason: HOSPADM

## 2024-11-19 RX ORDER — ATORVASTATIN CALCIUM 20 MG/1
40 TABLET, FILM COATED ORAL NIGHTLY
Status: DISCONTINUED | OUTPATIENT
Start: 2024-11-19 | End: 2024-11-29

## 2024-11-19 RX ORDER — POTASSIUM CHLORIDE 1.5 G/1.58G
20 POWDER, FOR SOLUTION ORAL DAILY
Status: DISCONTINUED | OUTPATIENT
Start: 2024-11-19 | End: 2024-11-29

## 2024-11-19 RX ORDER — NALOXONE HYDROCHLORIDE 1 MG/ML
0.2 INJECTION INTRAMUSCULAR; INTRAVENOUS; SUBCUTANEOUS EVERY 5 MIN PRN
Status: DISCONTINUED | OUTPATIENT
Start: 2024-11-19 | End: 2024-11-25

## 2024-11-19 RX ORDER — MORPHINE SULFATE 30 MG/1
30 TABLET, FILM COATED, EXTENDED RELEASE ORAL EVERY 12 HOURS SCHEDULED
Status: DISCONTINUED | OUTPATIENT
Start: 2024-11-19 | End: 2024-11-19

## 2024-11-19 RX ORDER — ACETAMINOPHEN 325 MG/1
650 TABLET ORAL EVERY 4 HOURS PRN
Status: DISCONTINUED | OUTPATIENT
Start: 2024-11-19 | End: 2024-11-30 | Stop reason: HOSPADM

## 2024-11-19 RX ADMIN — CARBOXYMETHYLCELLULOSE SODIUM 1 DROP: 0.5 SOLUTION/ DROPS OPHTHALMIC at 02:29

## 2024-11-19 SDOH — SOCIAL STABILITY: SOCIAL INSECURITY: WERE YOU ABLE TO COMPLETE ALL THE BEHAVIORAL HEALTH SCREENINGS?: YES

## 2024-11-19 SDOH — SOCIAL STABILITY: SOCIAL INSECURITY: HAVE YOU HAD ANY THOUGHTS OF HARMING ANYONE ELSE?: NO

## 2024-11-19 SDOH — SOCIAL STABILITY: SOCIAL INSECURITY
WITHIN THE LAST YEAR, HAVE YOU BEEN RAPED OR FORCED TO HAVE ANY KIND OF SEXUAL ACTIVITY BY YOUR PARTNER OR EX-PARTNER?: NO

## 2024-11-19 SDOH — HEALTH STABILITY: MENTAL HEALTH: HOW OFTEN DO YOU HAVE A DRINK CONTAINING ALCOHOL?: NEVER

## 2024-11-19 SDOH — SOCIAL STABILITY: SOCIAL INSECURITY: WITHIN THE LAST YEAR, HAVE YOU BEEN AFRAID OF YOUR PARTNER OR EX-PARTNER?: NO

## 2024-11-19 SDOH — ECONOMIC STABILITY: FOOD INSECURITY: WITHIN THE PAST 12 MONTHS, THE FOOD YOU BOUGHT JUST DIDN'T LAST AND YOU DIDN'T HAVE MONEY TO GET MORE.: NEVER TRUE

## 2024-11-19 SDOH — HEALTH STABILITY: MENTAL HEALTH: HOW OFTEN DO YOU HAVE SIX OR MORE DRINKS ON ONE OCCASION?: NEVER

## 2024-11-19 SDOH — ECONOMIC STABILITY: FOOD INSECURITY: WITHIN THE PAST 12 MONTHS, YOU WORRIED THAT YOUR FOOD WOULD RUN OUT BEFORE YOU GOT THE MONEY TO BUY MORE.: NEVER TRUE

## 2024-11-19 SDOH — SOCIAL STABILITY: SOCIAL INSECURITY
WITHIN THE LAST YEAR, HAVE YOU BEEN KICKED, HIT, SLAPPED, OR OTHERWISE PHYSICALLY HURT BY YOUR PARTNER OR EX-PARTNER?: NO

## 2024-11-19 SDOH — ECONOMIC STABILITY: INCOME INSECURITY: IN THE PAST 12 MONTHS HAS THE ELECTRIC, GAS, OIL, OR WATER COMPANY THREATENED TO SHUT OFF SERVICES IN YOUR HOME?: NO

## 2024-11-19 SDOH — SOCIAL STABILITY: SOCIAL INSECURITY: WITHIN THE LAST YEAR, HAVE YOU BEEN HUMILIATED OR EMOTIONALLY ABUSED IN OTHER WAYS BY YOUR PARTNER OR EX-PARTNER?: NO

## 2024-11-19 SDOH — SOCIAL STABILITY: SOCIAL INSECURITY: ABUSE: ADULT

## 2024-11-19 SDOH — HEALTH STABILITY: MENTAL HEALTH: HOW MANY DRINKS CONTAINING ALCOHOL DO YOU HAVE ON A TYPICAL DAY WHEN YOU ARE DRINKING?: PATIENT DOES NOT DRINK

## 2024-11-19 SDOH — SOCIAL STABILITY: SOCIAL INSECURITY: DO YOU FEEL ANYONE HAS EXPLOITED OR TAKEN ADVANTAGE OF YOU FINANCIALLY OR OF YOUR PERSONAL PROPERTY?: NO

## 2024-11-19 SDOH — SOCIAL STABILITY: SOCIAL INSECURITY: DOES ANYONE TRY TO KEEP YOU FROM HAVING/CONTACTING OTHER FRIENDS OR DOING THINGS OUTSIDE YOUR HOME?: NO

## 2024-11-19 SDOH — SOCIAL STABILITY: SOCIAL INSECURITY: ARE YOU OR HAVE YOU BEEN THREATENED OR ABUSED PHYSICALLY, EMOTIONALLY, OR SEXUALLY BY ANYONE?: NO

## 2024-11-19 SDOH — SOCIAL STABILITY: SOCIAL INSECURITY: HAS ANYONE EVER THREATENED TO HURT YOUR FAMILY OR YOUR PETS?: NO

## 2024-11-19 SDOH — SOCIAL STABILITY: SOCIAL INSECURITY: ARE THERE ANY APPARENT SIGNS OF INJURIES/BEHAVIORS THAT COULD BE RELATED TO ABUSE/NEGLECT?: NO

## 2024-11-19 SDOH — SOCIAL STABILITY: SOCIAL INSECURITY: DO YOU FEEL UNSAFE GOING BACK TO THE PLACE WHERE YOU ARE LIVING?: NO

## 2024-11-19 SDOH — SOCIAL STABILITY: SOCIAL INSECURITY: HAVE YOU HAD THOUGHTS OF HARMING ANYONE ELSE?: NO

## 2024-11-19 ASSESSMENT — ENCOUNTER SYMPTOMS
COUGH: 1
NAUSEA: 0
CHILLS: 0
ABDOMINAL PAIN: 0
VOMITING: 0
CONSTIPATION: 0
FREQUENCY: 0
PALPITATIONS: 0
DYSURIA: 0
DIARRHEA: 0
SHORTNESS OF BREATH: 0
FEVER: 0
HEMATURIA: 0
FLANK PAIN: 0

## 2024-11-19 ASSESSMENT — PATIENT HEALTH QUESTIONNAIRE - PHQ9
2. FEELING DOWN, DEPRESSED OR HOPELESS: NOT AT ALL
SUM OF ALL RESPONSES TO PHQ9 QUESTIONS 1 & 2: 0
1. LITTLE INTEREST OR PLEASURE IN DOING THINGS: NOT AT ALL

## 2024-11-19 ASSESSMENT — COGNITIVE AND FUNCTIONAL STATUS - GENERAL
DRESSING REGULAR LOWER BODY CLOTHING: A LITTLE
DRESSING REGULAR UPPER BODY CLOTHING: A LITTLE
HELP NEEDED FOR BATHING: A LITTLE
DRESSING REGULAR UPPER BODY CLOTHING: A LITTLE
PATIENT BASELINE BEDBOUND: NO
WALKING IN HOSPITAL ROOM: A LOT
DRESSING REGULAR LOWER BODY CLOTHING: A LITTLE
MOBILITY SCORE: 18
WALKING IN HOSPITAL ROOM: A LOT
HELP NEEDED FOR BATHING: A LITTLE
MOVING TO AND FROM BED TO CHAIR: A LITTLE
DAILY ACTIVITIY SCORE: 20
STANDING UP FROM CHAIR USING ARMS: A LITTLE
MOBILITY SCORE: 18
TOILETING: A LITTLE
STANDING UP FROM CHAIR USING ARMS: A LITTLE
CLIMB 3 TO 5 STEPS WITH RAILING: A LOT
DAILY ACTIVITIY SCORE: 20
MOVING TO AND FROM BED TO CHAIR: A LITTLE
TOILETING: A LITTLE
CLIMB 3 TO 5 STEPS WITH RAILING: A LOT

## 2024-11-19 ASSESSMENT — PAIN SCALES - GENERAL
PAINLEVEL_OUTOF10: 3
PAINLEVEL_OUTOF10: 3
PAINLEVEL_OUTOF10: 7
PAINLEVEL_OUTOF10: 9
PAINLEVEL_OUTOF10: 3
PAINLEVEL_OUTOF10: 0 - NO PAIN

## 2024-11-19 ASSESSMENT — ACTIVITIES OF DAILY LIVING (ADL)
ASSISTIVE_DEVICE: HEARING AID - RIGHT;WALKER
HEARING - RIGHT EAR: HEARING AID
PATIENT'S MEMORY ADEQUATE TO SAFELY COMPLETE DAILY ACTIVITIES?: UNABLE TO ASSESS
JUDGMENT_ADEQUATE_SAFELY_COMPLETE_DAILY_ACTIVITIES: YES
FEEDING YOURSELF: INDEPENDENT
GROOMING: NEEDS ASSISTANCE
DRESSING YOURSELF: NEEDS ASSISTANCE
BATHING: NEEDS ASSISTANCE
WALKS IN HOME: NEEDS ASSISTANCE
TOILETING: NEEDS ASSISTANCE
HEARING - LEFT EAR: DIFFICULTY WITH NOISE
ADEQUATE_TO_COMPLETE_ADL: YES
LACK_OF_TRANSPORTATION: NO

## 2024-11-19 ASSESSMENT — LIFESTYLE VARIABLES
SKIP TO QUESTIONS 9-10: 1
AUDIT-C TOTAL SCORE: 0

## 2024-11-19 ASSESSMENT — PAIN DESCRIPTION - LOCATION
LOCATION: BACK

## 2024-11-19 ASSESSMENT — COLUMBIA-SUICIDE SEVERITY RATING SCALE - C-SSRS
2. HAVE YOU ACTUALLY HAD ANY THOUGHTS OF KILLING YOURSELF?: NO
6. HAVE YOU EVER DONE ANYTHING, STARTED TO DO ANYTHING, OR PREPARED TO DO ANYTHING TO END YOUR LIFE?: NO
1. IN THE PAST MONTH, HAVE YOU WISHED YOU WERE DEAD OR WISHED YOU COULD GO TO SLEEP AND NOT WAKE UP?: NO

## 2024-11-19 ASSESSMENT — PAIN - FUNCTIONAL ASSESSMENT: PAIN_FUNCTIONAL_ASSESSMENT: 0-10

## 2024-11-19 NOTE — PROGRESS NOTES
11/19/24 0833   Discharge Planning   Living Arrangements Children   Support Systems Children   Assistance Needed TBD   Type of Residence Private residence   Who is requesting discharge planning? Provider   Expected Discharge Disposition Home   Does the patient need discharge transport arranged? Yes   RoundTrip coordination needed? Yes   Patient Choice   Provider Choice list and CMS website (https://medicare.gov/care-compare#search) for post-acute Quality and Resource Measure Data were provided and reviewed with: Other (Comment)   Patient / Family choosing to utilize agency / facility established prior to hospitalization No     Patient admitted to  from [facility] per family request. Patient admitted for shortness of breath increased lethargy. Patient on 3 liters oxygen via n/c which is baseline. Patient also on narcotic pain medication for chronic back pain. Per PCP, will decrease amount of pain medication for now, cardiology consult on for CHF, diuretics on board. PT/OT to assess, will speak with family regarding care at home vs. SNF depending on outcome of therapy evaluation. CT team will continue to follow.

## 2024-11-19 NOTE — PROGRESS NOTES
Antony Goff is a 74 y.o. male on day 0 of admission presenting with Acute combined systolic and diastolic HF (heart failure).      Subjective   He is confused, Reports he cannot lay down       Objective     Last Recorded Vitals  /58 (BP Location: Left arm, Patient Position: Sitting)   Pulse 72   Temp 36 °C (96.8 °F) (Temporal)   Resp 20   Wt 86.6 kg (190 lb 14.7 oz)   SpO2 91%   Intake/Output last 3 Shifts:    Intake/Output Summary (Last 24 hours) at 11/19/2024 1246  Last data filed at 11/19/2024 1043  Gross per 24 hour   Intake --   Output 100 ml   Net -100 ml       Admission Weight  Weight: 86.6 kg (190 lb 14.7 oz) (11/19/24 0352)    Daily Weight  11/19/24 : 86.6 kg (190 lb 14.7 oz)    Image Results  ECG 12 Lead  EKG performed today shows atrial ventricular paced rhythm at rate of 73   bpm QRS duration 204 ms QT corrected 546 ms.  Rhythm strip shows the same   pattern.      Physical Exam  Alert, confused  Lungs bilateral crackles  Heart regular rhythm  Abdomen soft nontender  EXTR bilateral leg edema  CNS no focal deficit    Relevant Results  Results for orders placed or performed during the hospital encounter of 11/19/24 (from the past 24 hours)   CBC   Result Value Ref Range    WBC 11.1 4.4 - 11.3 x10*3/uL    nRBC 0.0 0.0 - 0.0 /100 WBCs    RBC 2.95 (L) 4.50 - 5.90 x10*6/uL    Hemoglobin 9.0 (L) 13.5 - 17.5 g/dL    Hematocrit 28.5 (L) 41.0 - 52.0 %    MCV 97 80 - 100 fL    MCH 30.5 26.0 - 34.0 pg    MCHC 31.6 (L) 32.0 - 36.0 g/dL    RDW 15.6 (H) 11.5 - 14.5 %    Platelets 222 150 - 450 x10*3/uL   Basic metabolic panel   Result Value Ref Range    Glucose 115 (H) 74 - 99 mg/dL    Sodium 134 (L) 136 - 145 mmol/L    Potassium 4.2 3.5 - 5.3 mmol/L    Chloride 80 (L) 98 - 107 mmol/L    Bicarbonate >45 (HH) 21 - 32 mmol/L    Anion Gap      Urea Nitrogen 55 (H) 6 - 23 mg/dL    Creatinine 1.43 (H) 0.50 - 1.30 mg/dL    eGFR 52 (L) >60 mL/min/1.73m*2    Calcium 9.4 8.6 - 10.3 mg/dL   Comprehensive  metabolic panel   Result Value Ref Range    Glucose 138 (H) 74 - 99 mg/dL    Sodium 135 (L) 136 - 145 mmol/L    Potassium 4.3 3.5 - 5.3 mmol/L    Chloride 80 (L) 98 - 107 mmol/L    Bicarbonate >45 (HH) 21 - 32 mmol/L    Anion Gap      Urea Nitrogen 55 (H) 6 - 23 mg/dL    Creatinine 1.44 (H) 0.50 - 1.30 mg/dL    eGFR 52 (L) >60 mL/min/1.73m*2    Calcium 9.4 8.6 - 10.3 mg/dL    Albumin 2.8 (L) 3.4 - 5.0 g/dL    Alkaline Phosphatase 106 33 - 136 U/L    Total Protein 8.1 6.4 - 8.2 g/dL    AST 28 9 - 39 U/L    Bilirubin, Total 1.3 (H) 0.0 - 1.2 mg/dL    ALT 14 10 - 52 U/L   Urinalysis with Reflex Culture and Microscopic   Result Value Ref Range    Color, Urine Yellow Light-Yellow, Yellow, Dark-Yellow    Appearance, Urine Turbid (N) Clear    Specific Gravity, Urine 1.010 1.005 - 1.035    pH, Urine 5.0 5.0, 5.5, 6.0, 6.5, 7.0, 7.5, 8.0    Protein, Urine NEGATIVE NEGATIVE, 10 (TRACE), 20 (TRACE) mg/dL    Glucose, Urine 30 (TRACE) (A) Normal mg/dL    Blood, Urine 0.5 (2+) (A) NEGATIVE    Ketones, Urine NEGATIVE NEGATIVE mg/dL    Bilirubin, Urine NEGATIVE NEGATIVE    Urobilinogen, Urine 2 (1+) (A) Normal mg/dL    Nitrite, Urine NEGATIVE NEGATIVE    Leukocyte Esterase, Urine NEGATIVE NEGATIVE   Urinalysis Microscopic   Result Value Ref Range    WBC, Urine 1-5 1-5, NONE /HPF    RBC, Urine >20 (A) NONE, 1-2, 3-5 /HPF    Squamous Epithelial Cells, Urine 1-9 (SPARSE) Reference range not established. /HPF    Mucus, Urine FEW Reference range not established. /LPF    Hyaline Casts, Urine 4+ (A) NONE /LPF            Assessment/Plan   Acute systolic heart failure, EF 45 to 50% in 3/24  Acute on chronic hypoxic respiratory failure, baseline 3 L  Metabolic encephalopathy  Chronic back pain    Plan:  IV Lasix, supplemental oxygen  Opioid dose was reduced   Consult cardiology  ABGs  Urine is cloudy, will check urinalysis  DVT prophylaxis      Migel Gage MD

## 2024-11-19 NOTE — H&P
History Of Present Illness  Antony Goff is a 74 y.o. male with a past history of COPD on 3L, aortic valve stenosis s/p AVR, afib on Eliquis, remote CVA, GERD, hypothyroidism, and chronic back pain who presented to [facility] from [facility] for evaluation by cardiology. He initially reported to [facility] ED after family reported he was more groggy/sleepy than usual, and has been more short of breath. He was diagnosed with a COPD exacerbation and acute HF. Labs from [facility] were significant for troponin of 69 with a delta of 71, TSH of 6.690, BNP of 8,235, sCr of 1.44, and VBG consistent with respiratory acidosis. A CXR showed pulmonary vascular congestion. He was given duonebs, solu-medrol, Toradol, morphine, and 500 mL NS. Family requested the patient be transferred here. He arrived to [facility] without incident. He is quite hard of hearing and appears to be somewhat confused on exam.     Past Medical History  Past Medical History:   Diagnosis Date    Encounter for preprocedural cardiovascular examination     Preop cardiovascular exam    Encounter for screening for lipoid disorders     Screening for lipoid disorders    Personal history of other diseases of the circulatory system 10/05/2021    History of cardiac murmur    Personal history of other endocrine, nutritional and metabolic disease     History of hypokalemia    Personal history of other endocrine, nutritional and metabolic disease     History of obesity    Personal history of other endocrine, nutritional and metabolic disease 08/03/2022    History of obesity    Personal history of other specified conditions     History of chest pain    Personal history of other specified conditions     History of fatigue    Personal history of other specified conditions     History of palpitations    Presence of cardiac pacemaker     History of cardiac pacemaker    Repeated falls     Multiple falls    Tinnitus, left ear 10/05/2021    Tinnitus of left ear       Surgical History  Past  Surgical History:   Procedure Laterality Date    CARDIAC VALVE REPLACEMENT      CORONARY ARTERY BYPASS GRAFT      INSERT / REPLACE / REMOVE PACEMAKER      OTHER SURGICAL HISTORY  10/05/2021    Shoulder surgery    OTHER SURGICAL HISTORY  10/05/2021    Knee replacement    OTHER SURGICAL HISTORY  10/05/2021    Cataract surgery    OTHER SURGICAL HISTORY  10/05/2021    Breast surgery    OTHER SURGICAL HISTORY  10/05/2021    Pacemaker insertion    OTHER SURGICAL HISTORY  10/05/2021    Appendectomy    OTHER SURGICAL HISTORY  10/05/2021    Sinus surgery    OTHER SURGICAL HISTORY  10/05/2021    Uvulectomy    OTHER SURGICAL HISTORY  01/28/2022    Colonoscopy    OTHER SURGICAL HISTORY  01/28/2022    Kidney surgery    OTHER SURGICAL HISTORY  01/28/2022    Throat surgery    OTHER SURGICAL HISTORY  01/28/2022    Nose surgery        Social History  He reports that he has quit smoking. His smoking use included cigarettes. He has never used smokeless tobacco. He reports that he does not currently use alcohol. He reports that he does not currently use drugs.    Family History  Family History   Problem Relation Name Age of Onset    Heart failure Mother      Other (asbestosis) Father      Lung disease Father      Thyroid disease Sister          Allergies  Patient has no known allergies.    Review of Systems   Constitutional:  Negative for chills and fever.   Respiratory:  Positive for cough. Negative for shortness of breath.    Cardiovascular:  Positive for leg swelling. Negative for chest pain and palpitations.   Gastrointestinal:  Negative for abdominal pain, constipation, diarrhea, nausea and vomiting.   Genitourinary:  Negative for dysuria, flank pain, frequency, hematuria and urgency.   All other systems reviewed and are negative.       Physical Exam  Vitals reviewed.   HENT:      Head: Normocephalic and atraumatic.   Cardiovascular:      Rate and Rhythm: Normal rate and regular rhythm.      Heart sounds: Normal heart sounds.    Pulmonary:      Effort: Pulmonary effort is normal.      Breath sounds: Normal air entry. Rhonchi present.   Abdominal:      General: Bowel sounds are normal.      Palpations: Abdomen is soft.      Tenderness: There is no abdominal tenderness.   Musculoskeletal:         General: No deformity.      Right lower leg: Edema present.      Left lower leg: Edema present.   Skin:     General: Skin is warm and dry.   Neurological:      General: No focal deficit present.      Mental Status: He is alert. He is disoriented.   Psychiatric:         Mood and Affect: Mood normal.         Behavior: Behavior normal.          Last Recorded Vitals  Blood pressure 145/72, pulse 75, temperature 35.4 °C (95.7 °F), temperature source Temporal, resp. rate 20, SpO2 98%.    Relevant Results         Assessment/Plan   Assessment & Plan  Acute combined systolic and diastolic HF (heart failure)      #COPD with c/f acute exacerbation  #Respiratory acidosis  #Acute HF  #Afib  -EF 45-50% 3/2024  -On 3L which is baseline  -BNP elevated, CXR with pulmonary vascular congestion  -Will diurese with Lasix 40mg IV BID  -Strict I&O  -Consult cardiology  -Telemetry  -Continue home medications    #Altered mental status (improved)  -Suspect 2/2 hypercapnea  -No focal deficit  -Confused on exam  -Monitor    #Chronic back pain  -On 30mg MS ER TID  -Could be contributing to AMS, respiratory acidosis  -Will change to 30mg ER BID  -Narcan PRN           JACQUE Moore-CNP

## 2024-11-19 NOTE — PROGRESS NOTES
Respiratory Therapy Note  ABG results- Critical sent to doctor  4L     Latest Reference Range & Units 11/19/24 13:05   POCT pH, Arterial 7.38 - 7.42 pH 7.37 (L)   POCT pCO2, Arterial 38 - 42 mm Hg 91 (HH)   POCT pO2, Arterial 85 - 95 mm Hg 147 (H)   POCT SO2, Arterial 94 - 100 % 100   POCT Oxy Hemoglobin, Arterial 94.0 - 98.0 % 97.1   POCT Base Excess, Arterial -2.0 - 3.0 mmol/L 21.8 (H)   POCT HCO3 Calculated, Arterial 22.0 - 26.0 mmol/L 52.6 (H)   FiO2 % 36   Patient Temperature  COMMENT ONLY   (HH): Data is critically high  (L): Data is abnormally low  (H): Data is abnormally high

## 2024-11-20 LAB
ANION GAP SERPL CALC-SCNC: ABNORMAL MMOL/L
BNP SERPL-MCNC: 337 PG/ML (ref 0–99)
BUN SERPL-MCNC: 58 MG/DL (ref 6–23)
CALCIUM SERPL-MCNC: 9.2 MG/DL (ref 8.6–10.3)
CARDIAC TROPONIN I PNL SERPL HS: 38 NG/L (ref 0–20)
CHLORIDE SERPL-SCNC: 81 MMOL/L (ref 98–107)
CO2 SERPL-SCNC: >45 MMOL/L (ref 21–32)
CREAT SERPL-MCNC: 1.35 MG/DL (ref 0.5–1.3)
EGFRCR SERPLBLD CKD-EPI 2021: 56 ML/MIN/1.73M*2
GLUCOSE SERPL-MCNC: 111 MG/DL (ref 74–99)
HOLD SPECIMEN: NORMAL
MAGNESIUM SERPL-MCNC: 1.78 MG/DL (ref 1.6–2.4)
POTASSIUM SERPL-SCNC: 4.2 MMOL/L (ref 3.5–5.3)
SODIUM SERPL-SCNC: 133 MMOL/L (ref 136–145)

## 2024-11-20 PROCEDURE — 1200000002 HC GENERAL ROOM WITH TELEMETRY DAILY

## 2024-11-20 PROCEDURE — 83880 ASSAY OF NATRIURETIC PEPTIDE: CPT | Performed by: NURSE PRACTITIONER

## 2024-11-20 PROCEDURE — 84484 ASSAY OF TROPONIN QUANT: CPT | Performed by: NURSE PRACTITIONER

## 2024-11-20 PROCEDURE — 97165 OT EVAL LOW COMPLEX 30 MIN: CPT | Mod: GO

## 2024-11-20 PROCEDURE — 93010 ELECTROCARDIOGRAM REPORT: CPT | Performed by: INTERNAL MEDICINE

## 2024-11-20 PROCEDURE — 80048 BASIC METABOLIC PNL TOTAL CA: CPT | Performed by: NURSE PRACTITIONER

## 2024-11-20 PROCEDURE — 36415 COLL VENOUS BLD VENIPUNCTURE: CPT | Performed by: NURSE PRACTITIONER

## 2024-11-20 PROCEDURE — 71046 X-RAY EXAM CHEST 2 VIEWS: CPT | Performed by: RADIOLOGY

## 2024-11-20 PROCEDURE — 99223 1ST HOSP IP/OBS HIGH 75: CPT | Performed by: INTERNAL MEDICINE

## 2024-11-20 PROCEDURE — 2500000002 HC RX 250 W HCPCS SELF ADMINISTERED DRUGS (ALT 637 FOR MEDICARE OP, ALT 636 FOR OP/ED): Performed by: NURSE PRACTITIONER

## 2024-11-20 PROCEDURE — 83735 ASSAY OF MAGNESIUM: CPT | Performed by: NURSE PRACTITIONER

## 2024-11-20 PROCEDURE — 2500000001 HC RX 250 WO HCPCS SELF ADMINISTERED DRUGS (ALT 637 FOR MEDICARE OP): Performed by: NURSE PRACTITIONER

## 2024-11-20 PROCEDURE — 2500000005 HC RX 250 GENERAL PHARMACY W/O HCPCS: Performed by: NURSE PRACTITIONER

## 2024-11-20 PROCEDURE — 2500000001 HC RX 250 WO HCPCS SELF ADMINISTERED DRUGS (ALT 637 FOR MEDICARE OP): Performed by: FAMILY MEDICINE

## 2024-11-20 PROCEDURE — 99233 SBSQ HOSP IP/OBS HIGH 50: CPT | Performed by: FAMILY MEDICINE

## 2024-11-20 PROCEDURE — 97161 PT EVAL LOW COMPLEX 20 MIN: CPT | Mod: GP | Performed by: PHYSICAL THERAPIST

## 2024-11-20 PROCEDURE — 2500000004 HC RX 250 GENERAL PHARMACY W/ HCPCS (ALT 636 FOR OP/ED): Performed by: NURSE PRACTITIONER

## 2024-11-20 RX ORDER — ALBUTEROL SULFATE 90 UG/1
2 INHALANT RESPIRATORY (INHALATION) EVERY 6 HOURS PRN
COMMUNITY
Start: 2024-10-18 | End: 2024-11-30 | Stop reason: HOSPADM

## 2024-11-20 ASSESSMENT — COGNITIVE AND FUNCTIONAL STATUS - GENERAL
WALKING IN HOSPITAL ROOM: A LOT
MOBILITY SCORE: 18
MOVING TO AND FROM BED TO CHAIR: A LITTLE
MOBILITY SCORE: 18
STANDING UP FROM CHAIR USING ARMS: A LITTLE
CLIMB 3 TO 5 STEPS WITH RAILING: A LOT
HELP NEEDED FOR BATHING: A LOT
DRESSING REGULAR UPPER BODY CLOTHING: A LITTLE
DAILY ACTIVITIY SCORE: 20
MOVING TO AND FROM BED TO CHAIR: A LITTLE
TOILETING: A LITTLE
MOVING TO AND FROM BED TO CHAIR: A LITTLE
PERSONAL GROOMING: A LITTLE
WALKING IN HOSPITAL ROOM: A LOT
MOBILITY SCORE: 18
HELP NEEDED FOR BATHING: A LITTLE
MOVING FROM LYING ON BACK TO SITTING ON SIDE OF FLAT BED WITH BEDRAILS: A LITTLE
DRESSING REGULAR LOWER BODY CLOTHING: A LITTLE
HELP NEEDED FOR BATHING: A LITTLE
TOILETING: A LOT
DRESSING REGULAR LOWER BODY CLOTHING: A LOT
TURNING FROM BACK TO SIDE WHILE IN FLAT BAD: A LITTLE
DAILY ACTIVITIY SCORE: 16
STANDING UP FROM CHAIR USING ARMS: A LITTLE
WALKING IN HOSPITAL ROOM: A LOT
CLIMB 3 TO 5 STEPS WITH RAILING: A LOT
STANDING UP FROM CHAIR USING ARMS: A LITTLE
TOILETING: A LITTLE
MOVING TO AND FROM BED TO CHAIR: A LITTLE
STANDING UP FROM CHAIR USING ARMS: A LITTLE
DRESSING REGULAR UPPER BODY CLOTHING: A LITTLE
DRESSING REGULAR UPPER BODY CLOTHING: A LITTLE
TOILETING: A LITTLE
DAILY ACTIVITIY SCORE: 20
DRESSING REGULAR LOWER BODY CLOTHING: A LITTLE
HELP NEEDED FOR BATHING: A LITTLE
DAILY ACTIVITIY SCORE: 20
CLIMB 3 TO 5 STEPS WITH RAILING: A LOT
WALKING IN HOSPITAL ROOM: A LITTLE
CLIMB 3 TO 5 STEPS WITH RAILING: TOTAL
DRESSING REGULAR UPPER BODY CLOTHING: A LITTLE
MOBILITY SCORE: 16
DRESSING REGULAR LOWER BODY CLOTHING: A LITTLE

## 2024-11-20 ASSESSMENT — PAIN - FUNCTIONAL ASSESSMENT
PAIN_FUNCTIONAL_ASSESSMENT: 0-10

## 2024-11-20 ASSESSMENT — PAIN SCALES - GENERAL
PAINLEVEL_OUTOF10: 0 - NO PAIN
PAINLEVEL_OUTOF10: 0 - NO PAIN
PAINLEVEL_OUTOF10: 4
PAINLEVEL_OUTOF10: 0 - NO PAIN

## 2024-11-20 ASSESSMENT — ACTIVITIES OF DAILY LIVING (ADL): BATHING_ASSISTANCE: MODERATE

## 2024-11-20 ASSESSMENT — PAIN DESCRIPTION - LOCATION: LOCATION: BACK

## 2024-11-20 NOTE — PROGRESS NOTES
Antony Goff is a 74 y.o. male on day 1 of admission presenting with Acute combined systolic and diastolic HF (heart failure).      Subjective   He is confused, shortness of breath has improved       Objective     Last Recorded Vitals  /60   Pulse 74   Temp 36 °C (96.8 °F)   Resp 16   Wt 86.6 kg (190 lb 14.7 oz)   SpO2 94%   Intake/Output last 3 Shifts:  No intake or output data in the 24 hours ending 11/20/24 1335      Admission Weight  Weight: 86.6 kg (190 lb 14.7 oz) (11/19/24 0352)    Daily Weight  11/19/24 : 86.6 kg (190 lb 14.7 oz)    Image Results  ECG 12 Lead  EKG performed today shows atrial ventricular paced rhythm at rate of 73   bpm QRS duration 204 ms QT corrected 546 ms.  Rhythm strip shows the same   pattern.      Physical Exam  Alert, confused, morbidly obese  Lungs bilateral crackles  Heart regular rhythm  Abdomen soft nontender  EXTR bilateral leg edema  CNS no focal deficit    Relevant Results  No results found for this or any previous visit (from the past 24 hours).           Assessment/Plan   Acute systolic heart failure, EF 45 to 50% in 3/24  Acute on chronic hypoxic respiratory failure, baseline 3 L  Metabolic encephalopathy  Chronic back pain    Plan:  IV Lasix, supplemental oxygen  Opioid dose was reduced   Consult cardiology  ABGs showed compensated metabolic panel; namely respiratory acidosis with metabolic alkalosis  DVT prophylaxis      Migel Gage MD

## 2024-11-20 NOTE — CONSULTS
Cardiology Consult Note      Date:   11/20/2024  Patient name:  Antony Goff  Date of admission:  11/19/2024  3:46 AM  MRN:   [id number]  YOB: 1950  Time of Consult:  2:59 PM    Consulting Cardiologist: Dr. Armani Godinez, APRN, CNP  Primary Cardiologist:   CHEMO, Dr Velásquez     Referring Provider: Dr Gage      Admission Diagnosis:     Acute combined systolic and diastolic HF (heart failure)      History of Present Illness:      Antony Goff is a 74 y.o.  male patient who is being at the request of Dr. Gage for inpatient consultation of CHF. He was admitted on 11/19/2024.  Previous [facility] and [facility] records have been reviewed in detail.    Patient with an extensive history of aortic valve stenosis status post AVR, persistent A-fib, hypertension, dyslipidemia, GERD, hypothyroidism, CVA  Called and spoke at length to Ivan Booker who is his son that he lives with he states that when he got up Monday morning was very groggy was not acting like himself very confused and disoriented brought into the emergency department at [facility].  At that time they had done a chest x-ray found that he had showed some pulmonary vascular congestion and that he had respiratory acidosis he normally sees [facility] who did his aortic valve replacement in March but the family states that he does not want to go to [facility] anymore because family cannot drive down to the [place] and they wanted him to be seen by  here in [place] cardiologist.  The patient's awake slightly groggy he is alert to person but he does get confused about place and time.  His lungs he does have a very diminished with scattered Rales more congested on the left side.  He did go on to tell me the last couple days he has had a lot of pulmonary congestion, shortness of breath and inability to lie flat.  Denies chest pain, fever, orthopnea, claudication    EKG ordered  Telemetry is paced  ABG pCO2 91  pO2 147  Base excess  21.8  Troponin 71    Cardiac history  8/24 echo  CONCLUSIONS:   - Exam indication: Limited echo - Re-evaluation of heart failure   - Left ventricular systolic function is normal. EF = 60 ± 5% (visual est.)   - The right ventricle is dilated. Right ventricular systolic function is low   normal.   - The visualized aorta is dilated with a maximal dimension of 4.1 cm.   - There is moderate (2+) tricuspid valve regurgitation.   - Epic prosthetic aortic valve (size #27). The peak gradient is 18 mmHg, the mean   gradient is 9 mmHg and the dimensionless valve index is 0.79. Prior peak/mean   gradients were 21/11 mmHg.   - Estimated right ventricular systolic pressure is 59 mmHg consistent with   moderate pulmonary hypertension. Estimated right atrial pressure is 3 mmHg based   on IVC assessment. Estimated right ventricular systolic pressure is 59 mmHg   consistent with moderate pulmonary hypertension. Estimated right atrial pressure   is 3 mmHg based on IVC assessment.   - Exam was compared with the prior  echocardiographic exam performed on   04/26/2024. There is no significant changes.     3/28/24  Operations during Hospitalization:   3/28/2024: AVR (#27 Epic plus), LAAC, left side MAZE Procedure, Epicardial lead LV pacing lead tunneled to left chest and generator exchanged for CRT-P       Allergies:     No Known Allergies      Past Medical History:     Past Medical History:   Diagnosis Date    Encounter for preprocedural cardiovascular examination     Preop cardiovascular exam    Encounter for screening for lipoid disorders     Screening for lipoid disorders    Personal history of other diseases of the circulatory system 10/05/2021    History of cardiac murmur    Personal history of other endocrine, nutritional and metabolic disease     History of hypokalemia    Personal history of other endocrine, nutritional and metabolic disease     History of obesity    Personal history of other endocrine, nutritional and metabolic  disease 08/03/2022    History of obesity    Personal history of other specified conditions     History of chest pain    Personal history of other specified conditions     History of fatigue    Personal history of other specified conditions     History of palpitations    Presence of cardiac pacemaker     History of cardiac pacemaker    Repeated falls     Multiple falls    Tinnitus, left ear 10/05/2021    Tinnitus of left ear       Past Surgical History:     Past Surgical History:   Procedure Laterality Date    CARDIAC VALVE REPLACEMENT      CORONARY ARTERY BYPASS GRAFT      INSERT / REPLACE / REMOVE PACEMAKER      OTHER SURGICAL HISTORY  10/05/2021    Shoulder surgery    OTHER SURGICAL HISTORY  10/05/2021    Knee replacement    OTHER SURGICAL HISTORY  10/05/2021    Cataract surgery    OTHER SURGICAL HISTORY  10/05/2021    Breast surgery    OTHER SURGICAL HISTORY  10/05/2021    Pacemaker insertion    OTHER SURGICAL HISTORY  10/05/2021    Appendectomy    OTHER SURGICAL HISTORY  10/05/2021    Sinus surgery    OTHER SURGICAL HISTORY  10/05/2021    Uvulectomy    OTHER SURGICAL HISTORY  01/28/2022    Colonoscopy    OTHER SURGICAL HISTORY  01/28/2022    Kidney surgery    OTHER SURGICAL HISTORY  01/28/2022    Throat surgery    OTHER SURGICAL HISTORY  01/28/2022    Nose surgery       Family History:     Family History   Problem Relation Name Age of Onset    Heart failure Mother      Other (asbestosis) Father      Lung disease Father      Thyroid disease Sister         Social History:     Social History     Tobacco Use    Smoking status: Former     Types: Cigarettes    Smokeless tobacco: Never   Substance Use Topics    Alcohol use: Not Currently    Drug use: Not Currently       CURRENT INPATIENT MEDICATIONS    apixaban, 5 mg, oral, BID  atorvastatin, 40 mg, oral, Nightly  carvedilol, 3.125 mg, oral, BID  empagliflozin, 10 mg, oral, Daily  fluticasone furoate-vilanteroL, 1 puff, inhalation, Daily  furosemide, 40 mg,  intravenous, q12h  lamoTRIgine, 100 mg, oral, Nightly  levothyroxine, 88 mcg, oral, Daily  morphine CR, 30 mg, oral, q12h KENDAL  nystatin, 1 Application, Topical, BID  oxybutynin, 5 mg, oral, BID  pantoprazole, 40 mg, oral, Daily before breakfast  potassium chloride, 20 mEq, oral, Daily  spironolactone, 25 mg, oral, Daily  traZODone, 50 mg, oral, Nightly         Current Outpatient Medications   Medication Instructions    albuterol 2.5 mg /3 mL (0.083 %) nebulizer solution inhalation, Use as directed PRN    albuterol 90 mcg/actuation inhaler 2 puffs, Every 6 hours PRN    atorvastatin (Lipitor) 40 mg tablet 1 tablet, oral, Nightly    carvedilol (COREG) 3.125 mg, oral, 2 times daily    Eliquis 5 mg, oral, 2 times daily    empagliflozin (Jardiance) 10 mg 1 tablet, oral, Daily    furosemide (Lasix) 40 mg tablet 1 tablet, Daily (0630)    lactulose 10 gram/15 mL solution TAKE 30 ML BY MOUTH TWICE DAILY AS NEEDED    lamoTRIgine (LaMICtal) 100 mg tablet 1 tablet, oral, Nightly    levothyroxine (SYNTHROID, LEVOXYL) 88 mcg, oral, Daily    morphine CR (MS CONTIN) 30 mg, oral, Every 8 hours, PRN    omeprazole OTC (PRILOSEC OTC) 20 mg, oral, Daily before breakfast, Do not crush, chew, or split.    Oxervate 0.002 % ophthalmic solution     potassium chloride (Klor-Con) 20 mEq packet 20 mEq, Daily    solifenacin (VESICARE) 5 mg, oral, Daily    spironolactone (Aldactone) 25 mg tablet 1 tablet, oral, Daily    Symbicort 160-4.5 mcg/actuation inhaler 1 puff, 2 times daily    traZODone (DESYREL) 50 mg, Nightly        Review of Systems:      12 point review of systems was obtained in detail and is negative other than that detailed above.    Vital Signs:     Vitals:    11/19/24 2332 11/20/24 0409 11/20/24 0803 11/20/24 1448   BP: 110/58 107/57 133/60 129/61   BP Location: Left arm      Patient Position: Sitting      Pulse: 71 74     Resp: 16 16     Temp: 36.1 °C (97 °F) 36.2 °C (97.2 °F) 36 °C (96.8 °F) 35.8 °C (96.4 °F)   TempSrc: Temporal       SpO2: 93% 94%  92%   Weight:       Height:         No intake or output data in the 24 hours ending 11/20/24 1459    Wt Readings from Last 4 Encounters:   11/19/24 86.6 kg (190 lb 14.7 oz)   02/07/24 124 kg (274 lb)   02/01/23 124 kg (274 lb)   08/03/22 130 kg (287 lb)       Physical Examination:     GENERAL APPEARANCE: Awake slightly lethargic ill appearance  CHEST: Symmetric and non-tender.  INTEGUMENT: Skin warm and dry, without gross excoriationis or lesions.  HEENT: No gross abnormalities of conjunctiva, teeth, gums, oral mucosa  NECK: Supple, no JVD, no bruit. Thyroid not palpable. Carotid upstrokes normal.  NEURO/PSHCY: Alert following commands  LUNGS: Very diminished scattered Rales left side  HEART: S1, S2 regular with 2 out of 6 systolic murmur  ABDOMEN: Soft, nontender, no palpable hepatosplenomegaly, no mases, no bruits. Abdominal aorta not noted to be enlarged.  EXTREMITIES: Warm with good color, no clubbing or cyanois.  1+ edema  PERIPHERAL VASCULAR: Pulses present and equally palpable; 1+ throughout. No femoral bruits.      Lab:     CBC:   Results from last 7 days   Lab Units 11/19/24  0522   WBC AUTO x10*3/uL 11.1   RBC AUTO x10*6/uL 2.95*   HEMOGLOBIN g/dL 9.0*   HEMATOCRIT % 28.5*   MCV fL 97   MCH pg 30.5   MCHC g/dL 31.6*   RDW % 15.6*   PLATELETS AUTO x10*3/uL 222     CMP:    Results from last 7 days   Lab Units 11/19/24  0919 11/19/24  0522   SODIUM mmol/L 135* 134*   POTASSIUM mmol/L 4.3 4.2   CHLORIDE mmol/L 80* 80*   CO2 mmol/L >45* >45*   BUN mg/dL 55* 55*   CREATININE mg/dL 1.44* 1.43*   GLUCOSE mg/dL 138* 115*   PROTEIN TOTAL g/dL 8.1  --    CALCIUM mg/dL 9.4 9.4   BILIRUBIN TOTAL mg/dL 1.3*  --    ALK PHOS U/L 106  --    AST U/L 28  --    ALT U/L 14  --      BMP:    Results from last 7 days   Lab Units 11/19/24  0919 11/19/24  0522   SODIUM mmol/L 135* 134*   POTASSIUM mmol/L 4.3 4.2   CHLORIDE mmol/L 80* 80*   CO2 mmol/L >45* >45*   BUN mg/dL 55* 55*   CREATININE mg/dL 1.44* 1.43*    CALCIUM mg/dL 9.4 9.4   GLUCOSE mg/dL 138* 115*     Magnesium:    Troponin:      BNP:     Lipid Panel:         Diagnostic Studies:   @No results found for this or any previous visit.    No results found.    No echocardiogram results found for the past 14 days    Radiology:     XR chest 2 views    (Results Pending)   Cardiac Device Check - Inpatient    (Results Pending)       Problem List:     Patient Active Problem List   Diagnosis    Benign essential hypertension    Dizziness    Paroxysmal atrial fibrillation (Multi)    Pacemaker    Prolonged QT interval    Renal failure    Sleep apnea    Syncope    Shortness of breath    Blepharoconjunctivitis of left eye    Central corneal ulcer of left eye    Combined forms of age-related cataract of left eye    Congestive heart failure    Dry eye syndrome of both eyes    History of surgical procedure    HSV (herpes simplex virus) dendritic keratitis    Hypogonadism male    Hypothyroidism    Keratoconjunctivitis sicca    Hyperopia of both eyes with astigmatism and presbyopia    Meibomian gland dysfunction (MGD) of upper and lower lids of both eyes    Vitreous floaters of both eyes    Morbid obesity due to excess calories (Multi)    Neurotrophic keratitis    Neurotrophic keratoconjunctivitis, left eye    Nocturnal polyuria    Other cirrhosis of liver    Panic attack    Persistent epithelial defect of cornea    Pseudophakia of both eyes    Chronic obstructive pulmonary disease (Multi)    Pulmonary emphysema (Multi)    Punctate keratitis of both eyes    Punctate keratitis of left eye    Recurrent corneal erosion, left    Recurrent erosion of both corneas    Ring corneal ulcer of right eye    Sick sinus syndrome (Multi)    Status post total left knee replacement    Urinary urgency    Arthritis    Vasculitis (CMS-HCC)    Dizziness and giddiness    Presence of cardiac pacemaker    Chronic kidney disease    KANCHAN (obstructive sleep apnea)    Syncope and collapse    S/P AVR (aortic  valve replacement)    Abnormal CT scan    Anxiety    Atelectasis    CHF (congestive heart failure), NYHA class I, acute on chronic, combined    Heart murmur    History of CVA (cerebrovascular accident)    Pulmonary HTN (Multi)    Pressure injury of left buttock, stage 3 (Multi)    Severe aortic regurgitation    Recurrent falls    Venous stasis dermatitis of both lower extremities    Tinnitus    Stress hyperglycemia    Acute combined systolic and diastolic HF (heart failure)       Assessment:   Acute on chronic combined systolic and diastolic heart failure NYHA class III  History of aortic valve replacement  Persistent A-fib  History of CVA  EF 55% 8/24  Hypertension  Dyslipidemia  History of PPM      Plan:   Tele monitoring  Serial  enzymes  2d echo 8/24 EF normal   Daily  EKG's  Eliquis 5 mg p.o. twice daily  Lipitor 40 mg daily  Coreg 3.125 mg p.o. twice daily  Jardiance 10 mg daily  Lasix 40 mg IV push every 12  Aldactone 25 mg daily  Add low-dose Diovan once feasible  Strict intake and output  Daily weights  Chest x-ray now  Spoke at length with Ivan Goff in regards to plan of care  Further recommendations per Dr Jefe Godinez CNP  [facility]      Of note, this documentation is completed using the Dragon Dictation system (voice recognition software). There may be spelling and/or grammatical errors that were not corrected prior to final submission.      Electronically signed by JACQUE Olivarez-CNP, on 11/20/2024 at 2:59 PM  Patient seen and examined in conjunction with JACQUE Chase and agree with the evaluation as noted above.    I have personally interviewed and examined the patient.   I have personally and independently reviewed labs and diagnostic testing.  I have personally verified the elements of the history and physical listed above and changes, if any, are noted.  74-year-old gentleman with a history of severe aortic  stenosis status post AVR with a bioprosthetic tissue valve at [facility] in March 2024, hypertension, dyslipidemia, CVA with some residual confusion, GERD and other comorbidities as listed above.  He presented to [facility] ER with complaints of increased shortness of breath confusion and weakness and was subsequently transferred here for further management.  Is a poor historian because of his mental state, but he did deny any ongoing chest pain.  He had a recent follow-up echocardiogram which shows a well-seated prosthetic valve in the aortic position with normal peak and mean gradients.  LV function was normal.  He denies any orthopnea paroxysmal nocturnal dyspnea.    Agree with exam as noted above.  Cardiac exam reveals distant S1 and S2 with no murmurs appreciated.  There is reduced breath sound in the lung bases bilaterally which is worse on the left compared to the right.  There is 1+ bilateral ankle edema.    ASSESSMENT AND PLAN:  1.  Increased shortness of breath: This is likely multifactorial, with possible underlying diastolic heart failure and deconditioning secondary to his CVA.  We will treat with gentle diuresis and rule out possible large pleural effusion based on his clinical exam.  He may require elective thoracocentesis if further chest imaging reveals a large pleural effusion.  2.  Persistent atrial fibrillation: On current medications and will continue with anticoagulation for now which can be held if he requires thoracocentesis.  3.  S/p CVA with confusion: This appears to be patient's baseline, will defer to primary team for continued management.  AKA

## 2024-11-20 NOTE — PROGRESS NOTES
Occupational Therapy    Evaluation    Patient Name: Antony Goff  MRN: [id number]  Department: [facility]  Room: [facility]/1014-A  Today's Date: 11/20/2024  Time Calculation  Start Time: 0954  Stop Time: 1009  Time Calculation (min): 15 min        Assessment:  OT Assessment: Impaired balance, endurance, and decreased indep with ADLs and functional transfers/mobiliy.  Prognosis: Fair  End of Session Communication: Bedside nurse  End of Session Patient Position: Up in chair, Alarm on (donut placed underneath pt. in chair per pt. request)  OT Assessment Results: Decreased ADL status, Decreased safe judgment during ADL, Decreased endurance, Decreased functional mobility, Decreased trunk control for functional activities  Prognosis: Fair  Plan:  Treatment Interventions: ADL retraining, Functional transfer training, Endurance training  OT Frequency: 2 times per week  OT Discharge Recommendations: Moderate intensity level of continued care  OT - OK to Discharge: Yes (when medically stable/cleared)    Subjective   Current Problem:  1. Acute combined systolic and diastolic HF (heart failure)          General:  General  Reason for Referral: ADL impairment  Referred By: Merari 11/19,  OT/PT  Past Medical History Relevant to Rehab: CVA, COPD, Hypothyroidism, GERD, narcotic use for back pain, AVR, aortic valve stenosis  Family/Caregiver Present: No  Co-Treatment: PT  Co-Treatment Reason: to maximize pt. safety  Prior to Session Communication: Bedside nurse  Patient Position Received: Alarm on, Up in chair (Pt. urinating on blue pad on the floor)  General Comment: Pt. is 73 y/o male to ED transfer from [facility] due to c/o more groggy/sleepy than usual and SOB. dx: COPD exacerbation, acute heart failure. Family request transfer to [facility]. CXR: cardiomegaly with pulmonary vascular congestion and edema. Small bilateral pleural effusions. Bicarb >45. (+) opiates. Troponin 71 11/18  Precautions:  Medical Precautions: Fall  precautions    Pain:  Pain Assessment  Pain Assessment: 0-10  0-10 (Numeric) Pain Score: 0 - No pain    Objective   Cognition:  Overall Cognitive Status: Within Functional Limits  Orientation Level: Disoriented to time    Home Living:  Home Living Comments: Pt. lives with son in two story house. Ramp to enter. Sleeps in recliner on main floor and uses BSC for toileting on main floor.  Prior Function:  Prior Function Comments: Assist with ADLs, pt. sponge bathes. Son does IADLs. 1 fall. Does not drive. Uses WW at baseline.    ADL:  Eating Assistance: Independent  Grooming Assistance: Minimal  Bathing Assistance: Moderate  UE Dressing Assistance: Minimal  LE Dressing Assistance: Maximal  Toileting Assistance with Device: Moderate  Activity Tolerance:  Endurance: Decreased tolerance for upright activites  Bed Mobility/Transfers: Bed Mobility  Bed Mobility: No    Transfers  Transfer: Yes  Transfer 1  Technique 1: Sit to stand  Transfer Device 1: Walker  Transfer Level of Assistance 1: Minimum assistance  Trials/Comments 1: Assist to balance and steady from recliner chair; VCs for hand placement    Functional Mobility:  Functional Mobility  Functional Mobility Performed:  (~5 feet fwd, bwd with WW. Min A for steadying, safety, and balance.)    Standing Balance:  Dynamic Standing Balance  Dynamic Standing-Comments: Fair -     Strength:  Strength Comments: BUEs 4/5 within pt. functional range.    Hand Function:  Gross Grasp: Functional  Extremities: RUE   RUE :  (Limited shoulder flexion to ~100 deg. WFL elbow/wrist/hand AROM) and LUE   LUE:  (Limited shoulder flexion to ~100 deg. WFL elbow/wrist/hand AROM)    Outcome Measures:[facility] Daily Activity  Putting on and taking off regular lower body clothing: A lot  Bathing (including washing, rinsing, drying): A lot  Putting on and taking off regular upper body clothing: A little  Toileting, which includes using toilet, bedpan or urinal: A lot  Taking care of personal grooming  such as brushing teeth: A little  Eating Meals: None  Daily Activity - Total Score: 16    Education Documentation  Body Mechanics, taught by Georgie Escoto OT at 11/20/2024 11:42 AM.  Learner: Patient  Readiness: Acceptance  Method: Explanation  Response: Verbalizes Understanding, Needs Reinforcement    ADL Training, taught by Georgie Escoto OT at 11/20/2024 11:42 AM.  Learner: Patient  Readiness: Acceptance  Method: Explanation  Response: Verbalizes Understanding, Needs Reinforcement    IP EDUCATION:  Education  Individual(s) Educated: Patient  Education Provided: Fall precautons, Risk and benefits of OT discussed with patient or other, POC discussed and agreed upon    Goals:  Encounter Problems       Encounter Problems (Active)       OT Goals       Min A for LB dressing  (Progressing)       Start:  11/20/24    Expected End:  12/04/24            Fair dyn standing balance during ADLs and functional activities  (Progressing)       Start:  11/20/24    Expected End:  12/04/24            Min A for full body sponge bathing with AE as needed  (Progressing)       Start:  11/20/24    Expected End:  12/04/24            Min A for toileting tasks with use BSC (Progressing)       Start:  11/20/24    Expected End:  12/04/24

## 2024-11-20 NOTE — PROGRESS NOTES
Physical Therapy    Physical Therapy Evaluation    Patient Name: Antony Goff  MRN: [id number]  Today's Date: 11/20/2024   Time Calculation  Start Time: 0955  Stop Time: 1009  Time Calculation (min): 14 min  1014/1014-A    Assessment/Plan   PT Assessment  PT Assessment Results: Impaired balance, Decreased mobility, Decreased endurance  Rehab Prognosis: Good  Barriers to Discharge: Requires A for transfers and amb  Evaluation/Treatment Tolerance: Patient limited by fatigue  Medical Staff Made Aware: Yes  Strengths: Support of Caregivers, Living arrangement secure  Barriers to Participation: Comorbidities  End of Session Communication: Bedside nurse  Assessment Comment: Pt. appears weak and deconditoned. Recommend continued therapy in acute care followed by therapy at a moderate intensity level following D/C.  End of Session Patient Position: Up in chair, Alarm on (Call light within reach)  IP OR SWING BED PT PLAN  Inpatient or Swing Bed: Inpatient  PT Plan  Treatment/Interventions: Bed mobility, Transfer training, Gait training, Balance training, Strengthening, Endurance training, Therapeutic exercise  PT Plan: Ongoing PT  PT Frequency: 3 times per week  PT Discharge Recommendations: Moderate intensity level of continued care  PT Recommended Transfer Status: Assist x1, Assistive device  Physical Therapy eval completed per MD requisition. P.T. recommendations as outlined above. Recommend D/C from acute care when medically appropriate as deemed by medical staff.    Subjective       General Visit Information:  General  Reason for Referral: impaired mobility  Referred By: Dr. Gage (PT/OT 11/19)  Past Medical History Relevant to Rehab: includes: CVA, COPD, Hypothyroidism, GERD, narcotic use for back pain, AVR, aortic valve stenosis  Family/Caregiver Present: No  Co-Treatment: OT  Co-Treatment Reason: Pt. seen with OT to maximize safety and function  Prior to Session Communication: Bedside nurse  Patient Position  Received: Alarm on, Up in chair (Pt. urinating on blue pad on the floor when therapist arrived.)  Preferred Learning Style: auditory, verbal  General Comment: Pt. is a 75yo who presented to [facility] ED on 11/18/2024 with  c/o more groggy/sleepy than usual, altered mental status and SOB. Pt was transferred to [facility] for cardiology eval per family.   CXR (11/18) at [facility] (+) cardiomegaly with pulmonary vascular congestion and edema. Small bilateral pleural effusions.   Bicarb (11/19)  >45   Hgb (11/19) 9.0   Urine tox (11/18) (+) opiates.    Troponin (11/18) = 71   Dx: COPD exacerbation, acute heart failure.    Home Living:  Home Living  Home Living Comments: Pt. lives with his son in a 2 level house. Ramp to enter. Sleeps in recliner on main floor and uses BSC for toileting on main floor    Prior Level of Function:  Prior Function Per Pt/Caregiver Report  Prior Function Comments: Pt. stated he amb with FWW PTA. Pt. stated he had A for dressing and sponge bathing and son completed IADLs PTA. Pt. reports 1 fall in las 3 months.    Precautions:  Precautions  Medical Precautions:  (I&O; Activity order: No restrictions)  Precautions Comment: Per EMR: High fall risk         Objective     Pain:  Pain Assessment  Pain Assessment: 0-10  0-10 (Numeric) Pain Score: 0 - No pain    Cognition:  Cognition  Orientation Level: Disoriented to time    General Assessments:  General Observation  General Observation: Tele   Activity Tolerance  Endurance: Decreased tolerance for upright activites                 Dynamic Sitting Balance  Dynamic Sitting-Comments: Good static and dynamic sitting balance  Dynamic Standing Balance  Dynamic Standing-Comments: Fair+ static and dynamic standing balance    Functional Assessments:     Bed Mobility  Bed Mobility: No  Transfers  Transfer: Yes  Transfer 1  Technique 1: Sit to stand  Transfer Device 1:  (FWW)  Transfer Level of Assistance 1: Minimum assistance  Trials/Comments 1: A for lifting, transferring  weight over feet, steadying. VC's for hand placement  Transfers 2  Technique 2: Stand to sit  Transfer Device 2:  (FWW)  Transfer Level of Assistance 2: Minimum assistance  Trials/Comments 2: A to control descent. VC's for hand placement.  Ambulation/Gait Training  Ambulation/Gait Training Performed: Yes  Ambulation/Gait Training 1  Surface 1: Level tile  Device 1: Rolling walker  Assistance 1: Minimum assistance  Quality of Gait 1: Narrow base of support (slow, step-to gait pattern with shortened step lengths, flexed posture)  Comments/Distance (ft) 1: 8' forwards and then back;  A for balance, safety  Stairs  Stairs: No       Extremity/Trunk Assessments:        RLE   RLE : Within Functional Limits  LLE   LLE : Within Functional Limits    Outcome Measures:     [facility] Basic Mobility  Turning from your back to your side while in a flat bed without using bedrails: A little  Moving from lying on your back to sitting on the side of a flat bed without using bedrails: A little  Moving to and from bed to chair (including a wheelchair): A little  Standing up from a chair using your arms (e.g. wheelchair or bedside chair): A little  To walk in hospital room: A little  Climbing 3-5 steps with railing: Total  Basic Mobility - Total Score: 16                                                             Goals:  Encounter Problems       Encounter Problems (Active)       PT Problem       Pt. will transfer sit/stand with FWW with CGA (Progressing)       Start:  11/20/24    Expected End:  12/04/24            Pt.will ambulate 50' with FWW with CGA (Progressing)       Start:  11/20/24    Expected End:  12/04/24            Pt. will perform 2 x 15 B LE AROM exercises  (Not Progressing)       Start:  11/20/24    Expected End:  12/04/24                 Education Documentation  Mobility Training, taught by Greg Boudreaux, PT at 11/20/2024 12:08 PM.  Learner: Patient  Readiness: Acceptance  Method: Explanation  Response: Verbalizes  Understanding, Needs Reinforcement  Comment: Role of PT, transfers, amb, safety, PT POC

## 2024-11-21 ENCOUNTER — DOCUMENTATION (OUTPATIENT)
Dept: HOME HEALTH SERVICES | Facility: HOME HEALTH | Age: 74
End: 2024-11-21
Payer: MEDICARE

## 2024-11-21 LAB
ANION GAP SERPL CALC-SCNC: ABNORMAL MMOL/L
BUN SERPL-MCNC: 52 MG/DL (ref 6–23)
CALCIUM SERPL-MCNC: 9.1 MG/DL (ref 8.6–10.3)
CHLORIDE SERPL-SCNC: 81 MMOL/L (ref 98–107)
CO2 SERPL-SCNC: >45 MMOL/L (ref 21–32)
CREAT SERPL-MCNC: 1.17 MG/DL (ref 0.5–1.3)
EGFRCR SERPLBLD CKD-EPI 2021: 66 ML/MIN/1.73M*2
GLUCOSE SERPL-MCNC: 120 MG/DL (ref 74–99)
MAGNESIUM SERPL-MCNC: 1.77 MG/DL (ref 1.6–2.4)
POTASSIUM SERPL-SCNC: 4 MMOL/L (ref 3.5–5.3)
SODIUM SERPL-SCNC: 135 MMOL/L (ref 136–145)

## 2024-11-21 PROCEDURE — 99233 SBSQ HOSP IP/OBS HIGH 50: CPT | Performed by: NURSE PRACTITIONER

## 2024-11-21 PROCEDURE — 36415 COLL VENOUS BLD VENIPUNCTURE: CPT | Performed by: NURSE PRACTITIONER

## 2024-11-21 PROCEDURE — 71250 CT THORAX DX C-: CPT | Performed by: RADIOLOGY

## 2024-11-21 PROCEDURE — 2500000001 HC RX 250 WO HCPCS SELF ADMINISTERED DRUGS (ALT 637 FOR MEDICARE OP): Performed by: NURSE PRACTITIONER

## 2024-11-21 PROCEDURE — 83735 ASSAY OF MAGNESIUM: CPT | Performed by: NURSE PRACTITIONER

## 2024-11-21 PROCEDURE — 97530 THERAPEUTIC ACTIVITIES: CPT | Mod: GP,CQ

## 2024-11-21 PROCEDURE — 2500000002 HC RX 250 W HCPCS SELF ADMINISTERED DRUGS (ALT 637 FOR MEDICARE OP, ALT 636 FOR OP/ED): Performed by: FAMILY MEDICINE

## 2024-11-21 PROCEDURE — 80048 BASIC METABOLIC PNL TOTAL CA: CPT | Performed by: NURSE PRACTITIONER

## 2024-11-21 PROCEDURE — 2500000005 HC RX 250 GENERAL PHARMACY W/O HCPCS: Performed by: NURSE PRACTITIONER

## 2024-11-21 PROCEDURE — 99233 SBSQ HOSP IP/OBS HIGH 50: CPT | Performed by: FAMILY MEDICINE

## 2024-11-21 PROCEDURE — 93010 ELECTROCARDIOGRAM REPORT: CPT | Performed by: INTERNAL MEDICINE

## 2024-11-21 PROCEDURE — 2500000004 HC RX 250 GENERAL PHARMACY W/ HCPCS (ALT 636 FOR OP/ED): Performed by: NURSE PRACTITIONER

## 2024-11-21 PROCEDURE — 2500000004 HC RX 250 GENERAL PHARMACY W/ HCPCS (ALT 636 FOR OP/ED): Performed by: FAMILY MEDICINE

## 2024-11-21 PROCEDURE — 94640 AIRWAY INHALATION TREATMENT: CPT

## 2024-11-21 PROCEDURE — 2500000004 HC RX 250 GENERAL PHARMACY W/ HCPCS (ALT 636 FOR OP/ED)

## 2024-11-21 PROCEDURE — 1200000002 HC GENERAL ROOM WITH TELEMETRY DAILY

## 2024-11-21 PROCEDURE — 70450 CT HEAD/BRAIN W/O DYE: CPT | Performed by: RADIOLOGY

## 2024-11-21 PROCEDURE — 2500000002 HC RX 250 W HCPCS SELF ADMINISTERED DRUGS (ALT 637 FOR MEDICARE OP, ALT 636 FOR OP/ED): Performed by: NURSE PRACTITIONER

## 2024-11-21 PROCEDURE — 97530 THERAPEUTIC ACTIVITIES: CPT | Mod: GO

## 2024-11-21 RX ORDER — IPRATROPIUM BROMIDE AND ALBUTEROL SULFATE 2.5; .5 MG/3ML; MG/3ML
3 SOLUTION RESPIRATORY (INHALATION)
Status: DISCONTINUED | OUTPATIENT
Start: 2024-11-21 | End: 2024-11-22

## 2024-11-21 RX ORDER — VALSARTAN 40 MG/1
40 TABLET ORAL DAILY
Status: DISCONTINUED | OUTPATIENT
Start: 2024-11-21 | End: 2024-11-29

## 2024-11-21 ASSESSMENT — COGNITIVE AND FUNCTIONAL STATUS - GENERAL
MOVING TO AND FROM BED TO CHAIR: A LITTLE
DRESSING REGULAR UPPER BODY CLOTHING: A LITTLE
DAILY ACTIVITIY SCORE: 20
MOVING TO AND FROM BED TO CHAIR: A LITTLE
DRESSING REGULAR LOWER BODY CLOTHING: A LITTLE
STANDING UP FROM CHAIR USING ARMS: A LITTLE
HELP NEEDED FOR BATHING: A LOT
MOVING FROM LYING ON BACK TO SITTING ON SIDE OF FLAT BED WITH BEDRAILS: A LITTLE
WALKING IN HOSPITAL ROOM: A LOT
TOILETING: A LOT
WALKING IN HOSPITAL ROOM: A LITTLE
DAILY ACTIVITIY SCORE: 16
DRESSING REGULAR LOWER BODY CLOTHING: A LOT
PERSONAL GROOMING: A LITTLE
MOBILITY SCORE: 18
STANDING UP FROM CHAIR USING ARMS: A LITTLE
DRESSING REGULAR UPPER BODY CLOTHING: A LITTLE
CLIMB 3 TO 5 STEPS WITH RAILING: TOTAL
HELP NEEDED FOR BATHING: A LITTLE
TURNING FROM BACK TO SIDE WHILE IN FLAT BAD: A LITTLE
CLIMB 3 TO 5 STEPS WITH RAILING: A LOT
MOBILITY SCORE: 16
TOILETING: A LITTLE

## 2024-11-21 ASSESSMENT — PAIN SCALES - GENERAL
PAINLEVEL_OUTOF10: 6
PAINLEVEL_OUTOF10: 6

## 2024-11-21 ASSESSMENT — PAIN - FUNCTIONAL ASSESSMENT
PAIN_FUNCTIONAL_ASSESSMENT: 0-10
PAIN_FUNCTIONAL_ASSESSMENT: 0-10

## 2024-11-21 NOTE — DISCHARGE INSTRUCTIONS
HEART FAILURE EDUCATION:  1. Weigh yourself daily and record on your weight log.  2. If you gain more than 2 or 3 pounds overnight, call your cardiologist.  3. Follow a low sodium diet. No more than 2000 mg in one day, or more than 650 mg per meal.  4. Limit total fluids to no more than 8 cups (or 2 liters) per day - this includes all fluids (water, coffee, juice, milk, tea, etc.)  5. Monitor your blood pressure daily and record on your weight log.  6. Call to schedule your follow-up appointments when you get home if they were not already scheduled for you.  7. Keep your follow-up appointments! Bring your weight log with you so the doctors can see your weight trend and blood pressure readings.  8. Be sure to  any new prescriptions and take them as directed. If unsure of the medications, be sure to call your cardiologist.  9. Stay as active as you can tolerate.   10. If you notice subtle change of symptoms (slight increase in swelling, slight shortness of breath, a new intolerance to laying flat, a new cough), be sure to call your cardiologist.  11. If you have any questions or concerns or you have not heard back from the cardiologist, feel free to call Magda Lema heart failure navigator at [phone].

## 2024-11-21 NOTE — PROGRESS NOTES
Occupational Therapy    OT Treatment    Patient Name: Antony Goff  MRN: [id number]  Department: [facility]  Room: 1014/1014-A  Today's Date: 11/21/2024  Time Calculation  Start Time: 1355  Stop Time: 1403  Time Calculation (min): 8 min        Assessment:  End of Session Communication: Bedside nurse (secure chat to notify RN that mepalex pad was removed on sacrum as it was rolled and bunched.)  End of Session Patient Position: Up in chair, Alarm on  OT Assessment Results: Decreased ADL status, Decreased safe judgment during ADL, Decreased endurance, Decreased functional mobility, Decreased trunk control for functional activities  Barriers to Participation:  (Pt. demonstrating self-limiting behaviors. refused ADL activity, simulation of ADLS. Pt. encouraged to sponge bathe, use BSC, and/or participate in functional transfers)  Plan:  Treatment Interventions: ADL retraining, Functional transfer training, Endurance training, Patient/family training, Compensatory technique education  OT Frequency: 2 times per week  OT Discharge Recommendations: Moderate intensity level of continued care  OT - OK to Discharge: Yes (when medically stable/cleared)  Treatment Interventions: ADL retraining, Functional transfer training, Endurance training, Patient/family training, Compensatory technique education    Subjective   Previous Visit Info:  OT Last Visit  OT Received On: 11/21/24  General:  General  Reason for Referral: ADL impairment  Referred By: Merari  Past Medical History Relevant to Rehab: includes: CVA, COPD, Hypothyroidism, GERD, narcotic use for back pain, AVR, aortic valve stenosis  Family/Caregiver Present: No  Patient Position Received: Up in chair, Alarm on (donut pillow underneath pt.)  General Comment: Pt. forward flexed in chair; alarm on. Pt. required maximal encouragement and education for participation in OT session. Pt. agreeable to stand and readjust positioning in chair due to continued complaint of sore  buttocks.  Precautions:  Medical Precautions: Fall precautions  Precautions Comment: Has 02 and external catheter    Pain:  Pain Assessment  Pain Assessment: 0-10  0-10 (Numeric) Pain Score:  (Did not rate pain but states buttocks hurts.)  Pain Interventions: Repositioned (Readjusted donut padding; trial reclined position in chair to avoid continuous pressure in same area. Pt. refused reclined position.)  Response to Interventions: No change in pain    Objective    Cognition:  Cognition  Overall Cognitive Status: Within Functional Limits  Orientation Level: Disoriented to time  Insight: Mild  Processing Speed: Delayed    Functional Standing Tolerance:  Functional Standing Tolerance Comments: pt. able to tolerate standing x10 seconds; fatigues and impulsively sitting back into chair.  Bed Mobility/Transfers: Bed Mobility  Bed Mobility: No    Transfers  Transfer: Yes  Transfer 1  Technique 1: Sit to stand  Transfer Device 1: Walker  Trials/Comments 1: CGA from recliner chair. VCs for safe hand positioning - good demo carryover. Pt. Agreeable to stand to attempt repositioning. Despite education and trial for increased time to relieve pressure off buttocks, pt. Sitting back down.   Outcome Measures:[facility] Daily Activity  Putting on and taking off regular lower body clothing: A lot  Bathing (including washing, rinsing, drying): A lot  Putting on and taking off regular upper body clothing: A little  Toileting, which includes using toilet, bedpan or urinal: A lot  Taking care of personal grooming such as brushing teeth: A little  Eating Meals: None  Daily Activity - Total Score: 16      Education Documentation  ADL Training, taught by Georgie Escoto OT at 11/21/2024  2:23 PM.  Learner: Patient  Readiness: Acceptance  Method: Explanation  Response: Needs Reinforcement    Body Mechanics, taught by Georgie Escoto OT at 11/21/2024  2:23 PM.  Learner: Patient  Readiness: Acceptance  Method:  Explanation  Response: Needs Reinforcement  Comment: Educated on discomfort and techniques to avoid pressure areas.    IP EDUCATION:  Education  Individual(s) Educated: Patient  Education Provided: Symptom management, Joint protection and energy conservation, Fall precautons, Risk and benefits of OT discussed with patient or other, POC discussed and agreed upon, Other  Education Comment: Educated pt. to lay in bed and turn on side to avoid pressure on buttocks and to relieve pain. educated on differing positions to avoid pressure areas and potential for sores. Educated on benefit of working with therapy and continuining to simulate here at hospital what pt. does at home, however pt. continued to refuse ADL participation.    Goals:  Encounter Problems       Encounter Problems (Active)       OT Goals       Min A for LB dressing  (Progressing)       Start:  11/20/24    Expected End:  12/04/24            Fair dyn standing balance during ADLs and functional activities  (Progressing)       Start:  11/20/24    Expected End:  12/04/24            Min A for full body sponge bathing with AE as needed  (Progressing)       Start:  11/20/24    Expected End:  12/04/24            Min A for toileting tasks with use BSC (Progressing)       Start:  11/20/24    Expected End:  12/04/24

## 2024-11-21 NOTE — PROGRESS NOTES
Physical Therapy    Physical Therapy Treatment    Patient Name: Antony Goff  MRN: [id number]  Today's Date: 11/21/2024  Time Calculation  Start Time: 0858  Stop Time: 0921  Time Calculation (min): 23 min     1014/1014-A    Assessment/Plan   End of Session Communication: Bedside nurse    Assessment Comment: Pt. appears weak and deconditioned. Recommend continued therapy in acute care followed by therapy at a moderate intensity level following D/C.    End of Session Patient Position:  (Patient in recliner chiar, sitting on donut for pressure relief.  Call light/tray in reach. Chair alarm on. RN in room for med [employer].)    PT Plan  Inpatient/Swing Bed or Outpatient: Inpatient  Treatment/Interventions: Bed mobility, Transfer training, Gait training, Balance training, Strengthening, Endurance training, Therapeutic exercise  PT Plan: Ongoing PT  PT Frequency: 3 times per week  PT Discharge Recommendations: Moderate intensity level of continued care    PT Recommended Transfer Status: Assist x1, Assistive device    General Visit Information:   PT  Visit  PT Received On: 11/21/24    General  Prior to Session Communication:  (Cleared by RN for PT)  Patient Position Received:  (Patient presents sitting in chair, watching TV. Agreeable to PT)    General Comment:  (Dx: COPD exacerbation; acute heart failure)    General Observations:   General Observation:  (tele; 3L 02; alarm)      Precautions:  Precautions  Medical Precautions: Fall precautions  Precautions Comment: Per EMR: High fall risk    Vital Signs:  Vital Signs  SpO2:  (86% after amb on 3L 02)      Pain:  Pain Assessment  Pain Assessment: 0-10  0-10 (Numeric) Pain Score: 6  Pain Type: Chronic pain  Pain Location:  (back and buttocks)  Pain Interventions:  (mobilization; nursing medicating patient for pain)  Response to Interventions:  (Patient reported his back felt worse after ambulation, but he continued to rate it a 6/10 which is the same as he rated it before  "amb.)    Cognition:  Cognition  Overall Cognitive Status: Impaired (slow processing; minimal verbalization. Instructions needed to be repeated several times for follow through)  Insight: Mild  Processing Speed: Delayed      Balance:   Static Standing Balance  Static Standing-Level of Assistance:  (Fair+ static stand with ww)    Dynamic Standing Balance  Dynamic Standing-Balance:  (Fair/Fair+ dynamic stand with ww)      Activity Tolerance:  Activity Tolerance  Endurance:  (Activity tolerance limited by SOB)      Therapeutic Exercise  Therapeutic Exercise Performed:  (seated LE ther-ex: AP, LAQ, seated marching, hip abd x12)     Balance/Neuromuscular Re-Education  Balance/Neuromuscular Re-Education Activity Performed:  (Pateint performed standing reaching ~1\" outside TRE with single UE on ww.)     Ambulation/Gait Training  Ambulation/Gait Training Performed:  (Patient amb 40' with ww and min x1.  Fwd flexed trunk, head down.  Patient reports he's unable to stand any taller secondary to back pain. External rotation bilat feet, NBOS. Cues needed to remain inside walkers TRE. Assist to manage 02 tubing.)    Transfers  Transfer:  (sit-->stand: CGAx1  Two stands performed from chair level. Demos good hand placement. Adopts fwd flexed posture. stand-->sit: min x1. Started rushing towards chair and \"parked\" ww off to the side when approaching. Manually guided ww in front of him with instructions to remain inside TRE and to bring ww all the way back to seat with him.           Outcome Measures:     [facility] Basic Mobility  Turning from your back to your side while in a flat bed without using bedrails: A little  Moving from lying on your back to sitting on the side of a flat bed without using bedrails: A little  Moving to and from bed to chair (including a wheelchair): A little  Standing up from a chair using your arms (e.g. wheelchair or bedside chair): A little  To walk in hospital room: A little  Climbing 3-5 steps with " railing: Total  Basic Mobility - Total Score: 16       Education Documentation  Mobility Training, taught by Sowmya Welch PTA at 11/21/2024  9:29 AM.  Learner: Patient  Readiness: Acceptance  Method: Demonstration, Explanation  Response: Needs Reinforcement    Education Comments  Individual(s) Educated: Patient  Education Provided:  (Safety reinforced throughout treatment. Patient educated in transfers, use of ww, gait, balance and LE ther-ex.)    Encounter Problems       Encounter Problems (Active)       PT Problem       Pt. will transfer sit/stand with FWW with CGA (Progressing)       Start:  11/20/24    Expected End:  12/04/24            Pt.will ambulate 50' with FWW with CGA (Progressing)       Start:  11/20/24    Expected End:  12/04/24            Pt. will perform 2 x 15 B LE AROM exercises  (Progressing)       Start:  11/20/24    Expected End:  12/04/24

## 2024-11-21 NOTE — PROGRESS NOTES
Spoke with patient's son Ivan this morning, advised him of patient's therapy evaluations and recommendations. Patient prefers home, Ivan is aware of this, knows patient does not like being in the hospital. Plan will be home, patient recently had [facility], if possible would like home care services again. Made referral to [facility], will wait for their response. Patient prefer not to go to a SNF, son would prefer patient return home also. AM PAC PT 16. CT team will continue to follow.

## 2024-11-21 NOTE — PROGRESS NOTES
Antony Goff is a 74 y.o. male on day 2 of admission presenting with Acute combined systolic and diastolic HF (heart failure).      Subjective   He is confused, shortness of breath has improved     Objective     Last Recorded Vitals  /58 (BP Location: Left arm, Patient Position: Sitting)   Pulse 71   Temp 35.8 °C (96.4 °F) (Temporal)   Resp 16   Wt 86.6 kg (190 lb 14.7 oz)   SpO2 100%   Intake/Output last 3 Shifts:  No intake or output data in the 24 hours ending 11/21/24 1149      Admission Weight  Weight: 86.6 kg (190 lb 14.7 oz) (11/19/24 0352)    Daily Weight  11/19/24 : 86.6 kg (190 lb 14.7 oz)    Image Results  ECG 12 Lead  AV dual-paced rhythm  Abnormal ECG  When compared with ECG of 20-NOV-2024 15:30, (unconfirmed)  No significant change was found      Physical Exam  Alert, confused, morbidly obese  Lungs bilateral crackles  Heart regular rhythm  Abdomen soft nontender  EXTR bilateral leg edema  CNS no focal deficit    Relevant Results  Results for orders placed or performed during the hospital encounter of 11/19/24 (from the past 24 hours)   Basic Metabolic Panel   Result Value Ref Range    Glucose 111 (H) 74 - 99 mg/dL    Sodium 133 (L) 136 - 145 mmol/L    Potassium 4.2 3.5 - 5.3 mmol/L    Chloride 81 (L) 98 - 107 mmol/L    Bicarbonate >45 (HH) 21 - 32 mmol/L    Anion Gap      Urea Nitrogen 58 (H) 6 - 23 mg/dL    Creatinine 1.35 (H) 0.50 - 1.30 mg/dL    eGFR 56 (L) >60 mL/min/1.73m*2    Calcium 9.2 8.6 - 10.3 mg/dL   B-Type Natriuretic Peptide   Result Value Ref Range     (H) 0 - 99 pg/mL   Magnesium   Result Value Ref Range    Magnesium 1.78 1.60 - 2.40 mg/dL   Troponin I, High Sensitivity   Result Value Ref Range    Troponin I, High Sensitivity 38 (H) 0 - 20 ng/L   Light Blue Top   Result Value Ref Range    Extra Tube Hold for add-ons.    Lavender Top   Result Value Ref Range    Extra Tube Hold for add-ons.    SST TOP   Result Value Ref Range    Extra Tube Hold for add-ons.    ECG  12 Lead   Result Value Ref Range    Ventricular Rate 71 BPM    Atrial Rate 71 BPM    WI Interval 164 ms    QRS Duration 156 ms    QT Interval 460 ms    QTC Calculation(Bazett) 499 ms    R Axis -8 degrees    T Axis 41 degrees    QRS Count 12 beats    Q Onset 179 ms    P Onset 97 ms    P Offset 149 ms    T Offset 409 ms    QTC Fredericia 486 ms   ECG 12 Lead   Result Value Ref Range    Ventricular Rate 72 BPM    Atrial Rate 72 BPM    WI Interval 176 ms    QRS Duration 164 ms    QT Interval 464 ms    QTC Calculation(Bazett) 508 ms    R Axis -87 degrees    T Axis 72 degrees    QRS Count 12 beats    Q Onset 183 ms    P Onset 101 ms    P Offset 155 ms    T Offset 415 ms    QTC Fredericia 493 ms   Basic Metabolic Panel   Result Value Ref Range    Glucose 120 (H) 74 - 99 mg/dL    Sodium 135 (L) 136 - 145 mmol/L    Potassium 4.0 3.5 - 5.3 mmol/L    Chloride 81 (L) 98 - 107 mmol/L    Bicarbonate >45 (HH) 21 - 32 mmol/L    Anion Gap      Urea Nitrogen 52 (H) 6 - 23 mg/dL    Creatinine 1.17 0.50 - 1.30 mg/dL    eGFR 66 >60 mL/min/1.73m*2    Calcium 9.1 8.6 - 10.3 mg/dL   Magnesium   Result Value Ref Range    Magnesium 1.77 1.60 - 2.40 mg/dL              Assessment/Plan   Acute systolic and diastolic heart failure, EF 45 to 50% in 3/24  Acute on chronic hypoxic respiratory failure, baseline 3 L  Metabolic encephalopathy  Chronic back pain  Deconditioning, history of CVA  Persistent atrial fibrillation    Plan:  IV Lasix, supplemental oxygen, Monitor electrolytes  Opioid dose was reduced   Will obtain noncontrast CT of the chest  ABGs showed compensated metabolic panel; namely respiratory acidosis with metabolic alkalosis  DVT prophylaxis  With add DuoNeb treatments  PPM interrogation no AT/AF no ventricular rhythm  CBC BMP magnesium tomorrow       Migel Gage MD

## 2024-11-21 NOTE — CARE PLAN
"The patient's goals for the shift include  \" to get better and go back home \"    The clinical goals for the shift include To maintin safety throughout shift    "

## 2024-11-21 NOTE — HH CARE COORDINATION
This referral has been made a Non Admit with  Home Care due to Patient is Active with Another Home Care Agency. If you have further questions, feel free to reach out to our office at [phone]. Thank you, [facility] Intake.

## 2024-11-21 NOTE — PROGRESS NOTES
[facility] Heart Progress Note           Rounding CLAUDIA/Cardiologist:  Vinicio Godinez, APRN-CNP,     Primary Cardiologist: Dr. Armani Mayberry    Date:  11/21/2024  Patient:  Antony Goff  YOB: 1950  MRN:  [id number]   Admit Date:  11/19/2024      SUBJECTIVE:    11/21/24  Patient is awake and alert sitting up in chair answering all my questions this morning.  He states that his shortness of breath is very much improved.  Well-informed of plan of care.  He states he wants to go home in the next day or 2.  EKG dual paced  Telemetry is paced  Daily weights ordered    Cardiac history  8/24 echo  CONCLUSIONS:   - Exam indication: Limited echo - Re-evaluation of heart failure   - Left ventricular systolic function is normal. EF = 60 ± 5% (visual est.)   - The right ventricle is dilated. Right ventricular systolic function is low   normal.   - The visualized aorta is dilated with a maximal dimension of 4.1 cm.   - There is moderate (2+) tricuspid valve regurgitation.   - Epic prosthetic aortic valve (size #27). The peak gradient is 18 mmHg, the mean   gradient is 9 mmHg and the dimensionless valve index is 0.79. Prior peak/mean   gradients were 21/11 mmHg.   - Estimated right ventricular systolic pressure is 59 mmHg consistent with   moderate pulmonary hypertension. Estimated right atrial pressure is 3 mmHg based   on IVC assessment. Estimated right ventricular systolic pressure is 59 mmHg   consistent with moderate pulmonary hypertension. Estimated right atrial pressure   is 3 mmHg based on IVC assessment.   - Exam was compared with the prior  echocardiographic exam performed on   04/26/2024. There is no significant changes.      3/28/24  Operations during Hospitalization:   3/28/2024: AVR (#27 Epic plus), LAAC, left side MAZE Procedure, Epicardial lead LV pacing lead tunneled to left chest and generator exchanged for CRT-P      VITALS:     Vitals:    11/20/24 1448 11/20/24 1958 11/21/24 0016  11/21/24 0730   BP: 129/61 126/59 107/58 128/58   BP Location:  Right arm Right arm Left arm   Patient Position:  Sitting Lying Sitting   Pulse:  70 73 71   Resp:  15 16 16   Temp: 35.8 °C (96.4 °F) 36.5 °C (97.7 °F) 36 °C (96.8 °F) 35.8 °C (96.4 °F)   TempSrc:  Temporal Temporal Temporal   SpO2: 92% 95% 100% 100%   Weight:       Height:         No intake or output data in the 24 hours ending 11/21/24 0935    Wt Readings from Last 4 Encounters:   11/19/24 86.6 kg (190 lb 14.7 oz)   02/07/24 124 kg (274 lb)   02/01/23 124 kg (274 lb)   08/03/22 130 kg (287 lb)       CURRENT HOSPITAL MEDICATIONS:   apixaban, 5 mg, oral, BID  atorvastatin, 40 mg, oral, Nightly  carvedilol, 3.125 mg, oral, BID  empagliflozin, 10 mg, oral, Daily  fluticasone furoate-vilanteroL, 1 puff, inhalation, Daily  furosemide, 40 mg, intravenous, q12h  lamoTRIgine, 100 mg, oral, Nightly  levothyroxine, 88 mcg, oral, Daily  morphine CR, 30 mg, oral, q12h KENDAL  nystatin, 1 Application, Topical, BID  oxybutynin, 5 mg, oral, BID  pantoprazole, 40 mg, oral, Daily before breakfast  potassium chloride, 20 mEq, oral, Daily  spironolactone, 25 mg, oral, Daily  traZODone, 50 mg, oral, Nightly         Current Outpatient Medications   Medication Instructions    albuterol 2.5 mg /3 mL (0.083 %) nebulizer solution inhalation, Use as directed PRN    albuterol 90 mcg/actuation inhaler 2 puffs, Every 6 hours PRN    atorvastatin (Lipitor) 40 mg tablet 1 tablet, oral, Nightly    carvedilol (COREG) 3.125 mg, oral, 2 times daily    Eliquis 5 mg, oral, 2 times daily    empagliflozin (Jardiance) 10 mg 1 tablet, oral, Daily    furosemide (Lasix) 40 mg tablet 1 tablet, Daily (0630)    lactulose 10 gram/15 mL solution TAKE 30 ML BY MOUTH TWICE DAILY AS NEEDED    lamoTRIgine (LaMICtal) 100 mg tablet 1 tablet, oral, Nightly    levothyroxine (SYNTHROID, LEVOXYL) 88 mcg, oral, Daily    morphine CR (MS CONTIN) 30 mg, oral, Every 8 hours, PRN    omeprazole OTC (PRILOSEC OTC) 20  mg, oral, Daily before breakfast, Do not crush, chew, or split.    Oxervate 0.002 % ophthalmic solution     potassium chloride (Klor-Con) 20 mEq packet 20 mEq, Daily    solifenacin (VESICARE) 5 mg, oral, Daily    spironolactone (Aldactone) 25 mg tablet 1 tablet, oral, Daily    Symbicort 160-4.5 mcg/actuation inhaler 1 puff, 2 times daily    traZODone (DESYREL) 50 mg, Nightly        PHYSICAL EXAMINATION:   GENERAL APPEARANCE: awake and alert  CHEST: Symmetric and non-tender.  INTEGUMENT: Skin warm and dry, without gross excoriationis or lesions.  HEENT: No gross abnormalities of conjunctiva, teeth, gums, oral mucosa  NECK: Supple, no JVD, no bruit. Thyroid not palpable. Carotid upstrokes normal.  NEURO/PSHCY: awake  and  alert  LUNGS: Very diminished scattered Rales left side  HEART: S1, S2 regular with 2 out of 6 systolic murmur  ABDOMEN: Soft, nontender, no palpable hepatosplenomegaly, no mases, no bruits. Abdominal aorta not noted to be enlarged.  EXTREMITIES: Warm with good color, no clubbing or cyanois.  1+ edema  PERIPHERAL VASCULAR: Pulses present and equally palpable; 1+ throughout. No femoral bruits      LAB DATA:     CBC:   Results from last 7 days   Lab Units 11/19/24  0522   WBC AUTO x10*3/uL 11.1   RBC AUTO x10*6/uL 2.95*   HEMOGLOBIN g/dL 9.0*   HEMATOCRIT % 28.5*   MCV fL 97   MCH pg 30.5   MCHC g/dL 31.6*   RDW % 15.6*   PLATELETS AUTO x10*3/uL 222     CMP:    Results from last 7 days   Lab Units 11/20/24  1522 11/19/24  0919 11/19/24  0522   SODIUM mmol/L 133* 135* 134*   POTASSIUM mmol/L 4.2 4.3 4.2   CHLORIDE mmol/L 81* 80* 80*   CO2 mmol/L >45* >45* >45*   BUN mg/dL 58* 55* 55*   CREATININE mg/dL 1.35* 1.44* 1.43*   GLUCOSE mg/dL 111* 138* 115*   PROTEIN TOTAL g/dL  --  8.1  --    CALCIUM mg/dL 9.2 9.4 9.4   BILIRUBIN TOTAL mg/dL  --  1.3*  --    ALK PHOS U/L  --  106  --    AST U/L  --  28  --    ALT U/L  --  14  --      BMP:    Results from last 7 days   Lab Units 11/20/24  1522 11/19/24  0919  11/19/24  0522   SODIUM mmol/L 133* 135* 134*   POTASSIUM mmol/L 4.2 4.3 4.2   CHLORIDE mmol/L 81* 80* 80*   CO2 mmol/L >45* >45* >45*   BUN mg/dL 58* 55* 55*   CREATININE mg/dL 1.35* 1.44* 1.43*   CALCIUM mg/dL 9.2 9.4 9.4   GLUCOSE mg/dL 111* 138* 115*     Magnesium:  Results from last 7 days   Lab Units 11/20/24  1522   MAGNESIUM mg/dL 1.78     Troponin:    Results from last 7 days   Lab Units 11/20/24  1522   TROPHS ng/L 38*     BNP:   Results from last 7 days   Lab Units 11/20/24  1522   BNP pg/mL 337*     Lipid Panel:         DIAGNOSTIC TESTING:   @No results found for this or any previous visit.    ECG 12 Lead    Result Date: 11/20/2024  AV dual-paced rhythm Abnormal ECG When compared with ECG of 20-NOV-2024 15:27, (unconfirmed) Premature ventricular complexes are no longer Present Vent. rate has decreased BY  13 BPM    XR chest 2 views    Result Date: 11/20/2024  Interpreted By:  Schoenberger, Joseph, STUDY: XR CHEST 2 VIEWS;  11/20/2024 3:07 pm   INDICATION: Signs/Symptoms:sob.     COMPARISON: 03/09/2015   ACCESSION NUMBER(S): [id number]   ORDERING CLINICIAN: WANDA HUANG   FINDINGS: Left subclavian transvenous pacemaker unchanged from prior.       CARDIOMEDIASTINAL SILHOUETTE: Cardiac silhouette remains mildly enlarged. There is persistent or recurrent venous congestion. There are bilateral small pleural effusions with likely some interstitial edema.   LUNGS: There is a rather large opacity in the lower left hemithorax. On the AP radiograph, its contours somewhat rounded in a mass is a consideration though this appearance is not the noted on the lateral radiograph. However, the left hemidiaphragm is also elevated possibly exaggerating this appearance. At minimum, follow-up radiographs are recommended as a the left basal mass is a consideration.   ABDOMEN: No remarkable upper abdominal findings.   BONES: No acute osseous changes.       1.  Moderate congestive failure with interstitial edema. Small  bilateral pleural effusions 2. Left basal opacity which on the AP radiograph appears to have somewhat rounded contour superiorly. Underlying mass is a consideration. At a minimum, radiographic follow-up is recommended.       MACRO: None   Signed by: Joseph Schoenberger 11/20/2024 3:13 PM Dictation workstation:   [id number]       Cardiac Device Check - Inpatient         XR chest 2 views   Final Result   1.  Moderate congestive failure with interstitial edema. Small   bilateral pleural effusions   2. Left basal opacity which on the AP radiograph appears to have   somewhat rounded contour superiorly. Underlying mass is a   consideration. At a minimum, radiographic follow-up is recommended.                  MACRO:   None        Signed by: Joseph Schoenberger 11/20/2024 3:13 PM   Dictation workstation:   [id number]          No echocardiogram results found for the past 14 days    RADIOLOGY:     Cardiac Device Check - Inpatient         XR chest 2 views   Final Result   1.  Moderate congestive failure with interstitial edema. Small   bilateral pleural effusions   2. Left basal opacity which on the AP radiograph appears to have   somewhat rounded contour superiorly. Underlying mass is a   consideration. At a minimum, radiographic follow-up is recommended.                  MACRO:   None        Signed by: Joseph Schoenberger 11/20/2024 3:13 PM   Dictation workstation:   [id number]          PROBLEM LIST     Patient Active Problem List   Diagnosis    Benign essential hypertension    Dizziness    Paroxysmal atrial fibrillation (Multi)    Pacemaker    Prolonged QT interval    Renal failure    Sleep apnea    Syncope    Shortness of breath    Blepharoconjunctivitis of left eye    Central corneal ulcer of left eye    Combined forms of age-related cataract of left eye    Congestive heart failure    Dry eye syndrome of both eyes    History of surgical procedure    HSV (herpes simplex virus) dendritic keratitis    Hypogonadism  male    Hypothyroidism    Keratoconjunctivitis sicca    Hyperopia of both eyes with astigmatism and presbyopia    Meibomian gland dysfunction (MGD) of upper and lower lids of both eyes    Vitreous floaters of both eyes    Morbid obesity due to excess calories (Multi)    Neurotrophic keratitis    Neurotrophic keratoconjunctivitis, left eye    Nocturnal polyuria    Other cirrhosis of liver    Panic attack    Persistent epithelial defect of cornea    Pseudophakia of both eyes    Chronic obstructive pulmonary disease (Multi)    Pulmonary emphysema (Multi)    Punctate keratitis of both eyes    Punctate keratitis of left eye    Recurrent corneal erosion, left    Recurrent erosion of both corneas    Ring corneal ulcer of right eye    Sick sinus syndrome (Multi)    Status post total left knee replacement    Urinary urgency    Arthritis    Vasculitis (CMS-HCC)    Dizziness and giddiness    Presence of cardiac pacemaker    Chronic kidney disease    KANCHAN (obstructive sleep apnea)    Syncope and collapse    S/P AVR (aortic valve replacement)    Abnormal CT scan    Anxiety    Atelectasis    CHF (congestive heart failure), NYHA class I, acute on chronic, combined    Heart murmur    History of CVA (cerebrovascular accident)    Pulmonary HTN (Multi)    Pressure injury of left buttock, stage 3 (Multi)    Severe aortic regurgitation    Recurrent falls    Venous stasis dermatitis of both lower extremities    Tinnitus    Stress hyperglycemia    Acute combined systolic and diastolic HF (heart failure)       ASSESSMENT:   Acute on chronic combined systolic and diastolic heart failure NYHA class III  History of aortic valve replacement  PAF pacemaker interrogation showed no episodes of atrial fibs or atrial tachycardia  History of CVA  EF 55% 8/24  Hypertension  Dyslipidemia  History of PPM        PLAN:   I have personally interviewed and examined the patient.   I have personally and independently reviewed labs and diagnostic testing.  I  have personally verified the elements of the history and physical listed above and changes, if any, are noted.  74-year-old gentleman with a history of severe aortic stenosis status post AVR with a bioprosthetic tissue valve at [facility] in March 2024, hypertension, dyslipidemia, CVA with some residual confusion, GERD and other comorbidities as listed above.  He presented to [facility] ER with complaints of increased shortness of breath confusion and weakness and was subsequently transferred here for further management.  Is a poor historian because of his mental state, but he did deny any ongoing chest pain.  He had a recent follow-up echocardiogram which shows a well-seated prosthetic valve in the aortic position with normal peak and mean gradients.  LV function was normal.  He denies any orthopnea paroxysmal nocturnal dyspnea.     Agree with exam as noted above.  Cardiac exam reveals distant S1 and S2 with no murmurs appreciated.  There is reduced breath sound in the lung bases bilaterally which is worse on the left compared to the right.  There is 1+ bilateral ankle edema.     ASSESSMENT AND PLAN:  1.  Increased shortness of breath: This is likely multifactorial, with possible underlying diastolic heart failure and deconditioning secondary to his CVA.  We will treat with gentle diuresis and rule out possible large pleural effusion based on his clinical exam.  He may require elective thoracocentesis if further chest imaging reveals a large pleural effusion.  2.  Persistent atrial fibrillation: On current medications and will continue with anticoagulation for now which can be held if he requires thoracocentesis.  3.  S/p CVA with confusion: This appears to be patient's baseline, will defer to primary team for continued management.  AKA    11/21/24  Tele monitoring  PPM interrogation no AT/AF no ventricular rhythm  Eliquis 5 mg p.o. twice daily  Lipitor 40 mg daily  Coreg 3.125 mg p.o. twice daily  Jardiance 10 mg daily  Lasix  40 mg IV every 12  Aldactone 25 mg daily  Add Diovan 40 mg daily  Check magnesium level keep greater than 2.0  Strict intake and output  Daily weights  Recommend respiratory treatment    35-minute visit    Calixto Godinez CNP  [facility]      Of note, this documentation is completed using the Dragon Dictation system (voice recognition software). There may be spelling and/or grammatical errors that were not corrected prior to final submission.    Please do not hesitate to call with questions.  Electronically signed by JACQUE Olivarez-CNP, on 11/21/2024 at 9:35 AM

## 2024-11-21 NOTE — CONSULTS
Heart Failure Nurse Navigator    The role of the HF nurse navigator is to (1) characterize risk profiles of patients with heart failure transitioning from soaxpzof-ae-paicdwgzt after hospitalization, (2) recommend interventions to improve care and reduce risks of worse post-hospitalization outcomes. Potential recommendations may touch base on patient/family education, additional consults that may bring value, and appointment scheduling.    Assessment    I met with Antony Goff at the bedside, and my impressions include: Called and spoke with patient's son Ivan. Son provided with heart failure education. Patient lives at home with his son. According to son he has recently missed doses of medications because of how groggy and sleepy he has been. He normally sees a cardiologist at [facility] but his family wishes to switch to someone at [facility] because they cannot drive him down to [place] anymore. He does see Dr. Velásquez also. According to his son they do have issues getting him to follow up appointments because they do not have a vehicle small enough. He states they have an F-350 which he cannot get into so they can only get him to follow up appointments if they find a small car. Stressed the importance of taking all medications as prescribed and keeping and going to all follow up appointments. No questions or concerns at this time. Explained to son that I would put a CHF information packet at patient's bedside as well as my contact information should any questions or concerns arise.      Patients Cardiologist(s): Dr. Velásquez and [facility]    Patients Primary Care Provider: Dr. Mcdaniels    1. Medical Domain  What is the patient's most recent LVEF? 45-50%  HFrEF (LVEF <= 40%) Quadruple therapy recommended  HFmrEF (LVEF 41-49%) Quadruple therapy recommended  HFpEF (LVEF >= 50%) Minimum recommendations: SGLT2i and MRA  Is the patient on GDMT for their condition?   ARNI/ACEI/ARB: Yes  Valsartan 40 mg daily   BB: Yes Carvedilol  "3.125 mg BID  MRA: Yes spironolactone 25 mg daily  SGLT2i: Yes empagliflozin 10 mg daily  Is the patient prescribed a diuretic? Yes  Furosemide 40 mg BID  Does this patient have an implanted device (ie cardioMEMS, ICD, CRT-D)?  Device type: PPM  Could this patient have advanced heart failure (Stage D heart failure)?: No   If yes, the potential markers of advanced heart failure include: N/A    REFERENCE: Potential markers of advanced HF   Inotrope (dobutamine or milrinone) used during this admission?   LVEF<=25%?   >=2 hospitalizations for ADHF in the last year?   Severe symptoms of HF (fatigue, dyspnea, confusion, edema) despite medical therapy?   Downtitration of GDMT as compared to home medications?   Discontinuation of GDMT because of hypotension or renal intolerance?   Recurrent arrhythmias (AF, VT with ICD shocks)?   Cardiac cachexia (i.e., unintentional weight loss due to HF)?   High-risk biomarker profile (e.g., hyponatremia [Na<135], very elevated BNP, worsening kidney function)   Escalating doses of diuretics or persistent edema despite escalation     2. Mind and Emotion  Does this patient have possible cognitive impairment?: Yes  Ask the patient to memorize these 3 words: banana, sunrise, chair  Ask the patient to draw a clock with hands pointing at \"20 minutes after 8\"  Ask the patient to recall the 3 words  Score: Add number of words recalled + clock drawing (0 points for any errors, 2 points if correct)  Interpretation: A score of 0-2 suggests cognitive impairment is present, a score of 3-5 suggests cognitive impairment is absent  Does this patient have major depression?: No   Over the last 2 weeks: Little interest or pleasure in doing things? (Not at all +0, Several days +1, More than half the days +2, Nearly every day +3)  Over the last 2 weeks: Feeling down, depressed or hopeless? (Not at all +0, Several days +1, More than half the days +2, Nearly every day +3)  Score: Add points  Interpretation: A " score of 3 or more suggests that major depression is likely.     3. Physical Function  Could this patient be frail?: Yes   Defined by presence of all of these: slowness, weakness, shrinking, inactivity, exhaustion  Is this patient at risk for falling?: Yes   Defined by having experienced a fall in the last 12 months.    4. Social Determinants of Health  Transportation deficits?: Yes   Lack of insurance?: No   Poor financial resources?: No   Living conditions (homelessness, unstable home)?: No   Poor family/social support?: No   Poor personal care?: No   Food insecurity?: No           Recommendations  Recommend evaluation by , geriatrician, or psychiatrist (Patient may have cognitive impairment and/or depression).  Recommend evaluation by physical therapist, nutritionist, and/or Palliative Medicine consultant (Patient may be frail and/or at risk of falling).       Heart Failure Education   - Living With Heart Failure packet provided.  - HF signs and symptoms, heart failure zones and when to call cardiologist.   - Controlling Heart Failure at Home: medication adherence, following up with cardiologist at least once yearly, staying healthy and active, limiting sodium and fluid intake as directed by cardiologist.  - Daily Weight Education

## 2024-11-22 LAB
ABO GROUP (TYPE) IN BLOOD: NORMAL
ALBUMIN SERPL BCP-MCNC: 2.5 G/DL (ref 3.4–5)
ALP SERPL-CCNC: 94 U/L (ref 33–136)
ALT SERPL W P-5'-P-CCNC: 13 U/L (ref 10–52)
ANION GAP BLDA CALCULATED.4IONS-SCNC: -4 MMO/L (ref 10–25)
ANION GAP BLDA CALCULATED.4IONS-SCNC: -6 MMO/L (ref 10–25)
ANION GAP BLDA CALCULATED.4IONS-SCNC: -6 MMO/L (ref 10–25)
ANION GAP SERPL CALC-SCNC: ABNORMAL MMOL/L
ANION GAP SERPL CALC-SCNC: ABNORMAL MMOL/L
ANTIBODY SCREEN: NORMAL
APTT PPP: 38 SECONDS (ref 27–38)
AST SERPL W P-5'-P-CCNC: 26 U/L (ref 9–39)
BASE EXCESS BLDA CALC-SCNC: 24.4 MMOL/L (ref -2–3)
BASE EXCESS BLDA CALC-SCNC: 25.9 MMOL/L (ref -2–3)
BASE EXCESS BLDA CALC-SCNC: 26.8 MMOL/L (ref -2–3)
BASE EXCESS BLDMV CALC-SCNC: 20.6 MMOL/L (ref -2–3)
BASOPHILS # BLD AUTO: 0.03 X10*3/UL (ref 0–0.1)
BASOPHILS NFR BLD AUTO: 0.4 %
BILIRUB SERPL-MCNC: 1.4 MG/DL (ref 0–1.2)
BODY TEMPERATURE: ABNORMAL
BUN SERPL-MCNC: 39 MG/DL (ref 6–23)
BUN SERPL-MCNC: 45 MG/DL (ref 6–23)
CA-I BLDA-SCNC: 1.15 MMOL/L (ref 1.1–1.33)
CA-I BLDA-SCNC: 1.18 MMOL/L (ref 1.1–1.33)
CA-I BLDA-SCNC: 1.22 MMOL/L (ref 1.1–1.33)
CALCIUM SERPL-MCNC: 8.8 MG/DL (ref 8.6–10.3)
CALCIUM SERPL-MCNC: 9.3 MG/DL (ref 8.6–10.3)
CHLORIDE BLDA-SCNC: 89 MMOL/L (ref 98–107)
CHLORIDE BLDA-SCNC: 90 MMOL/L (ref 98–107)
CHLORIDE BLDA-SCNC: 91 MMOL/L (ref 98–107)
CHLORIDE SERPL-SCNC: 81 MMOL/L (ref 98–107)
CHLORIDE SERPL-SCNC: 85 MMOL/L (ref 98–107)
CO2 SERPL-SCNC: >45 MMOL/L (ref 21–32)
CO2 SERPL-SCNC: >45 MMOL/L (ref 21–32)
CREAT SERPL-MCNC: 1.09 MG/DL (ref 0.5–1.3)
CREAT SERPL-MCNC: 1.2 MG/DL (ref 0.5–1.3)
CRITICAL CALL TIME: 1535
CRITICAL CALL TIME: 1548
CRITICAL CALLED BY: ABNORMAL
CRITICAL CALLED BY: ABNORMAL
CRITICAL CALLED TO: ABNORMAL
CRITICAL CALLED TO: ABNORMAL
CRITICAL READ BACK: ABNORMAL
CRITICAL READ BACK: ABNORMAL
EGFRCR SERPLBLD CKD-EPI 2021: 64 ML/MIN/1.73M*2
EGFRCR SERPLBLD CKD-EPI 2021: 72 ML/MIN/1.73M*2
EOSINOPHIL # BLD AUTO: 0.08 X10*3/UL (ref 0–0.4)
EOSINOPHIL NFR BLD AUTO: 1 %
ERYTHROCYTE [DISTWIDTH] IN BLOOD BY AUTOMATED COUNT: 15.9 % (ref 11.5–14.5)
ERYTHROCYTE [DISTWIDTH] IN BLOOD BY AUTOMATED COUNT: 16 % (ref 11.5–14.5)
GLUCOSE BLD MANUAL STRIP-MCNC: 166 MG/DL (ref 74–99)
GLUCOSE BLDA-MCNC: 114 MG/DL (ref 74–99)
GLUCOSE BLDA-MCNC: 126 MG/DL (ref 74–99)
GLUCOSE BLDA-MCNC: 195 MG/DL (ref 74–99)
GLUCOSE SERPL-MCNC: 104 MG/DL (ref 74–99)
GLUCOSE SERPL-MCNC: 118 MG/DL (ref 74–99)
HCO3 BLDA-SCNC: 51.4 MMOL/L (ref 22–26)
HCO3 BLDA-SCNC: 51.9 MMOL/L (ref 22–26)
HCO3 BLDA-SCNC: 52.2 MMOL/L (ref 22–26)
HCO3 BLDMV-SCNC: 50.2 MMOL/L (ref 22–26)
HCT VFR BLD AUTO: 24.7 % (ref 41–52)
HCT VFR BLD AUTO: 26.5 % (ref 41–52)
HCT VFR BLD EST: 24 % (ref 41–52)
HCT VFR BLD EST: 24 % (ref 41–52)
HCT VFR BLD EST: 26 % (ref 41–52)
HGB BLD-MCNC: 7.9 G/DL (ref 13.5–17.5)
HGB BLD-MCNC: 8.4 G/DL (ref 13.5–17.5)
HGB BLDA-MCNC: 8.1 G/DL (ref 13.5–17.5)
HGB BLDA-MCNC: 8.1 G/DL (ref 13.5–17.5)
HGB BLDA-MCNC: 8.5 G/DL (ref 13.5–17.5)
HOLD SPECIMEN: NORMAL
IMM GRANULOCYTES # BLD AUTO: 0.05 X10*3/UL (ref 0–0.5)
IMM GRANULOCYTES NFR BLD AUTO: 0.6 % (ref 0–0.9)
INHALED O2 CONCENTRATION: 100 %
INHALED O2 CONCENTRATION: 60 %
INHALED O2 CONCENTRATION: 80 %
INHALED O2 CONCENTRATION: 80 %
INR PPP: 2.2 (ref 0.9–1.1)
LACTATE BLDA-SCNC: 0.9 MMOL/L (ref 0.4–2)
LACTATE BLDA-SCNC: 0.9 MMOL/L (ref 0.4–2)
LACTATE BLDA-SCNC: 1.8 MMOL/L (ref 0.4–2)
LYMPHOCYTES # BLD AUTO: 1.01 X10*3/UL (ref 0.8–3)
LYMPHOCYTES NFR BLD AUTO: 13 %
MAGNESIUM SERPL-MCNC: 1.69 MG/DL (ref 1.6–2.4)
MCH RBC QN AUTO: 30.4 PG (ref 26–34)
MCH RBC QN AUTO: 30.9 PG (ref 26–34)
MCHC RBC AUTO-ENTMCNC: 31.7 G/DL (ref 32–36)
MCHC RBC AUTO-ENTMCNC: 32 G/DL (ref 32–36)
MCV RBC AUTO: 95 FL (ref 80–100)
MCV RBC AUTO: 97 FL (ref 80–100)
MONOCYTES # BLD AUTO: 0.51 X10*3/UL (ref 0.05–0.8)
MONOCYTES NFR BLD AUTO: 6.5 %
NEUTROPHILS # BLD AUTO: 6.11 X10*3/UL (ref 1.6–5.5)
NEUTROPHILS NFR BLD AUTO: 78.5 %
NRBC BLD-RTO: 0 /100 WBCS (ref 0–0)
NRBC BLD-RTO: 0 /100 WBCS (ref 0–0)
OXYHGB MFR BLDA: 96.2 % (ref 94–98)
OXYHGB MFR BLDA: 97.5 % (ref 94–98)
OXYHGB MFR BLDA: 98.4 % (ref 94–98)
OXYHGB MFR BLDMV: 75.9 % (ref 45–75)
PCO2 BLDA: 58 MM HG (ref 38–42)
PCO2 BLDA: 67 MM HG (ref 38–42)
PCO2 BLDA: 74 MM HG (ref 38–42)
PCO2 BLDMV: 81 MM HG (ref 41–51)
PH BLDA: 7.45 PH (ref 7.38–7.42)
PH BLDA: 7.5 PH (ref 7.38–7.42)
PH BLDA: 7.56 PH (ref 7.38–7.42)
PH BLDMV: 7.4 PH (ref 7.33–7.43)
PLATELET # BLD AUTO: 155 X10*3/UL (ref 150–450)
PLATELET # BLD AUTO: 162 X10*3/UL (ref 150–450)
PO2 BLDA: 193 MM HG (ref 85–95)
PO2 BLDA: 196 MM HG (ref 85–95)
PO2 BLDA: 307 MM HG (ref 85–95)
PO2 BLDMV: 46 MM HG (ref 35–45)
POTASSIUM BLDA-SCNC: 3.8 MMOL/L (ref 3.5–5.3)
POTASSIUM BLDA-SCNC: 3.9 MMOL/L (ref 3.5–5.3)
POTASSIUM BLDA-SCNC: 4.1 MMOL/L (ref 3.5–5.3)
POTASSIUM SERPL-SCNC: 3.8 MMOL/L (ref 3.5–5.3)
POTASSIUM SERPL-SCNC: 4.2 MMOL/L (ref 3.5–5.3)
PROT SERPL-MCNC: 7 G/DL (ref 6.4–8.2)
PROTHROMBIN TIME: 24.6 SECONDS (ref 9.8–12.8)
RBC # BLD AUTO: 2.6 X10*6/UL (ref 4.5–5.9)
RBC # BLD AUTO: 2.72 X10*6/UL (ref 4.5–5.9)
RH FACTOR (ANTIGEN D): NORMAL
SAO2 % BLDA: 100 % (ref 94–100)
SAO2 % BLDMV: 78 % (ref 45–75)
SODIUM BLDA-SCNC: 131 MMOL/L (ref 136–145)
SODIUM BLDA-SCNC: 132 MMOL/L (ref 136–145)
SODIUM BLDA-SCNC: 135 MMOL/L (ref 136–145)
SODIUM SERPL-SCNC: 136 MMOL/L (ref 136–145)
SODIUM SERPL-SCNC: 136 MMOL/L (ref 136–145)
WBC # BLD AUTO: 7.8 X10*3/UL (ref 4.4–11.3)
WBC # BLD AUTO: 8.4 X10*3/UL (ref 4.4–11.3)

## 2024-11-22 PROCEDURE — 2500000004 HC RX 250 GENERAL PHARMACY W/ HCPCS (ALT 636 FOR OP/ED)

## 2024-11-22 PROCEDURE — 2500000001 HC RX 250 WO HCPCS SELF ADMINISTERED DRUGS (ALT 637 FOR MEDICARE OP): Performed by: NURSE PRACTITIONER

## 2024-11-22 PROCEDURE — 37799 UNLISTED PX VASCULAR SURGERY: CPT

## 2024-11-22 PROCEDURE — 36620 INSERTION CATHETER ARTERY: CPT

## 2024-11-22 PROCEDURE — 93010 ELECTROCARDIOGRAM REPORT: CPT | Performed by: INTERNAL MEDICINE

## 2024-11-22 PROCEDURE — 2500000001 HC RX 250 WO HCPCS SELF ADMINISTERED DRUGS (ALT 637 FOR MEDICARE OP)

## 2024-11-22 PROCEDURE — 85027 COMPLETE CBC AUTOMATED: CPT | Performed by: FAMILY MEDICINE

## 2024-11-22 PROCEDURE — 87040 BLOOD CULTURE FOR BACTERIA: CPT | Mod: ELYLAB

## 2024-11-22 PROCEDURE — 87070 CULTURE OTHR SPECIMN AEROBIC: CPT | Mod: ELYLAB

## 2024-11-22 PROCEDURE — 36415 COLL VENOUS BLD VENIPUNCTURE: CPT

## 2024-11-22 PROCEDURE — 87640 STAPH A DNA AMP PROBE: CPT | Performed by: STUDENT IN AN ORGANIZED HEALTH CARE EDUCATION/TRAINING PROGRAM

## 2024-11-22 PROCEDURE — 82805 BLOOD GASES W/O2 SATURATION: CPT | Performed by: STUDENT IN AN ORGANIZED HEALTH CARE EDUCATION/TRAINING PROGRAM

## 2024-11-22 PROCEDURE — 84132 ASSAY OF SERUM POTASSIUM: CPT | Performed by: NURSE PRACTITIONER

## 2024-11-22 PROCEDURE — 2500000004 HC RX 250 GENERAL PHARMACY W/ HCPCS (ALT 636 FOR OP/ED): Performed by: STUDENT IN AN ORGANIZED HEALTH CARE EDUCATION/TRAINING PROGRAM

## 2024-11-22 PROCEDURE — 2020000001 HC ICU ROOM DAILY

## 2024-11-22 PROCEDURE — 0BH17EZ INSERTION OF ENDOTRACHEAL AIRWAY INTO TRACHEA, VIA NATURAL OR ARTIFICIAL OPENING: ICD-10-PCS

## 2024-11-22 PROCEDURE — 31500 INSERT EMERGENCY AIRWAY: CPT | Performed by: STUDENT IN AN ORGANIZED HEALTH CARE EDUCATION/TRAINING PROGRAM

## 2024-11-22 PROCEDURE — 2500000004 HC RX 250 GENERAL PHARMACY W/ HCPCS (ALT 636 FOR OP/ED): Mod: JZ

## 2024-11-22 PROCEDURE — 80202 ASSAY OF VANCOMYCIN: CPT

## 2024-11-22 PROCEDURE — 2500000005 HC RX 250 GENERAL PHARMACY W/O HCPCS

## 2024-11-22 PROCEDURE — 2500000002 HC RX 250 W HCPCS SELF ADMINISTERED DRUGS (ALT 637 FOR MEDICARE OP, ALT 636 FOR OP/ED)

## 2024-11-22 PROCEDURE — 94002 VENT MGMT INPAT INIT DAY: CPT

## 2024-11-22 PROCEDURE — 85610 PROTHROMBIN TIME: CPT

## 2024-11-22 PROCEDURE — 84132 ASSAY OF SERUM POTASSIUM: CPT

## 2024-11-22 PROCEDURE — 2500000002 HC RX 250 W HCPCS SELF ADMINISTERED DRUGS (ALT 637 FOR MEDICARE OP, ALT 636 FOR OP/ED): Performed by: NURSE PRACTITIONER

## 2024-11-22 PROCEDURE — 2500000005 HC RX 250 GENERAL PHARMACY W/O HCPCS: Performed by: STUDENT IN AN ORGANIZED HEALTH CARE EDUCATION/TRAINING PROGRAM

## 2024-11-22 PROCEDURE — 71045 X-RAY EXAM CHEST 1 VIEW: CPT | Performed by: RADIOLOGY

## 2024-11-22 PROCEDURE — 86900 BLOOD TYPING SEROLOGIC ABO: CPT

## 2024-11-22 PROCEDURE — 86850 RBC ANTIBODY SCREEN: CPT

## 2024-11-22 PROCEDURE — 82810 BLOOD GASES O2 SAT ONLY: CPT | Performed by: STUDENT IN AN ORGANIZED HEALTH CARE EDUCATION/TRAINING PROGRAM

## 2024-11-22 PROCEDURE — 99233 SBSQ HOSP IP/OBS HIGH 50: CPT | Performed by: INTERNAL MEDICINE

## 2024-11-22 PROCEDURE — 36556 INSERT NON-TUNNEL CV CATH: CPT

## 2024-11-22 PROCEDURE — 85025 COMPLETE CBC W/AUTO DIFF WBC: CPT

## 2024-11-22 PROCEDURE — 80053 COMPREHEN METABOLIC PANEL: CPT | Performed by: FAMILY MEDICINE

## 2024-11-22 PROCEDURE — 99291 CRITICAL CARE FIRST HOUR: CPT

## 2024-11-22 PROCEDURE — 82947 ASSAY GLUCOSE BLOOD QUANT: CPT

## 2024-11-22 PROCEDURE — 80048 BASIC METABOLIC PNL TOTAL CA: CPT | Performed by: FAMILY MEDICINE

## 2024-11-22 PROCEDURE — 5A1955Z RESPIRATORY VENTILATION, GREATER THAN 96 CONSECUTIVE HOURS: ICD-10-PCS

## 2024-11-22 PROCEDURE — 37799 UNLISTED PX VASCULAR SURGERY: CPT | Performed by: STUDENT IN AN ORGANIZED HEALTH CARE EDUCATION/TRAINING PROGRAM

## 2024-11-22 PROCEDURE — 94640 AIRWAY INHALATION TREATMENT: CPT

## 2024-11-22 PROCEDURE — 87075 CULTR BACTERIA EXCEPT BLOOD: CPT | Mod: ELYLAB

## 2024-11-22 PROCEDURE — 84145 PROCALCITONIN (PCT): CPT | Mod: ELYLAB

## 2024-11-22 PROCEDURE — 2500000004 HC RX 250 GENERAL PHARMACY W/ HCPCS (ALT 636 FOR OP/ED): Performed by: NURSE PRACTITIONER

## 2024-11-22 PROCEDURE — 87641 MR-STAPH DNA AMP PROBE: CPT | Performed by: STUDENT IN AN ORGANIZED HEALTH CARE EDUCATION/TRAINING PROGRAM

## 2024-11-22 PROCEDURE — 3E043XZ INTRODUCTION OF VASOPRESSOR INTO CENTRAL VEIN, PERCUTANEOUS APPROACH: ICD-10-PCS

## 2024-11-22 PROCEDURE — 36415 COLL VENOUS BLD VENIPUNCTURE: CPT | Performed by: FAMILY MEDICINE

## 2024-11-22 PROCEDURE — 2500000002 HC RX 250 W HCPCS SELF ADMINISTERED DRUGS (ALT 637 FOR MEDICARE OP, ALT 636 FOR OP/ED): Performed by: FAMILY MEDICINE

## 2024-11-22 PROCEDURE — 85730 THROMBOPLASTIN TIME PARTIAL: CPT

## 2024-11-22 PROCEDURE — 83735 ASSAY OF MAGNESIUM: CPT | Performed by: FAMILY MEDICINE

## 2024-11-22 RX ORDER — ROCURONIUM BROMIDE 10 MG/ML
INJECTION, SOLUTION INTRAVENOUS
Status: COMPLETED
Start: 2024-11-22 | End: 2024-11-22

## 2024-11-22 RX ORDER — MIDAZOLAM HYDROCHLORIDE 1 MG/ML
2 INJECTION, SOLUTION INTRAMUSCULAR; INTRAVENOUS ONCE
Status: COMPLETED | OUTPATIENT
Start: 2024-11-22 | End: 2024-11-22

## 2024-11-22 RX ORDER — PANTOPRAZOLE SODIUM 40 MG/10ML
40 INJECTION, POWDER, LYOPHILIZED, FOR SOLUTION INTRAVENOUS
Status: DISCONTINUED | OUTPATIENT
Start: 2024-11-23 | End: 2024-11-29

## 2024-11-22 RX ORDER — ROCURONIUM BROMIDE 10 MG/ML
INJECTION, SOLUTION INTRAVENOUS CODE/TRAUMA/SEDATION MEDICATION
Status: COMPLETED | OUTPATIENT
Start: 2024-11-22 | End: 2024-11-22

## 2024-11-22 RX ORDER — IPRATROPIUM BROMIDE AND ALBUTEROL SULFATE 2.5; .5 MG/3ML; MG/3ML
3 SOLUTION RESPIRATORY (INHALATION)
Status: DISCONTINUED | OUTPATIENT
Start: 2024-11-22 | End: 2024-11-30 | Stop reason: HOSPADM

## 2024-11-22 RX ORDER — PROPOFOL 10 MG/ML
0-50 INJECTION, EMULSION INTRAVENOUS CONTINUOUS
Status: DISCONTINUED | OUTPATIENT
Start: 2024-11-22 | End: 2024-11-23

## 2024-11-22 RX ORDER — LEVOTHYROXINE SODIUM 20 UG/ML
37.5 INJECTION, SOLUTION INTRAVENOUS
Status: DISCONTINUED | OUTPATIENT
Start: 2024-11-23 | End: 2024-11-24

## 2024-11-22 RX ORDER — MIDAZOLAM HYDROCHLORIDE 1 MG/ML
0-20 INJECTION, SOLUTION INTRAVENOUS CONTINUOUS
Status: DISCONTINUED | OUTPATIENT
Start: 2024-11-22 | End: 2024-11-22

## 2024-11-22 RX ORDER — DEXMEDETOMIDINE HYDROCHLORIDE 4 UG/ML
0-1.5 INJECTION, SOLUTION INTRAVENOUS CONTINUOUS
Status: DISCONTINUED | OUTPATIENT
Start: 2024-11-22 | End: 2024-11-22

## 2024-11-22 RX ORDER — PROPOFOL 10 MG/ML
0-20 INJECTION, EMULSION INTRAVENOUS CONTINUOUS
Status: DISCONTINUED | OUTPATIENT
Start: 2024-11-22 | End: 2024-11-22

## 2024-11-22 RX ORDER — PANTOPRAZOLE SODIUM 40 MG/1
40 TABLET, DELAYED RELEASE ORAL
Status: DISCONTINUED | OUTPATIENT
Start: 2024-11-23 | End: 2024-11-29

## 2024-11-22 RX ORDER — NOREPINEPHRINE BITARTRATE/D5W 8 MG/250ML
PLASTIC BAG, INJECTION (ML) INTRAVENOUS
Status: COMPLETED
Start: 2024-11-22 | End: 2024-11-22

## 2024-11-22 RX ORDER — NOREPINEPHRINE BITARTRATE/D5W 8 MG/250ML
0-.5 PLASTIC BAG, INJECTION (ML) INTRAVENOUS CONTINUOUS
Status: DISCONTINUED | OUTPATIENT
Start: 2024-11-22 | End: 2024-11-23

## 2024-11-22 RX ORDER — ROCURONIUM BROMIDE 10 MG/ML
INJECTION, SOLUTION INTRAVENOUS
Status: DISPENSED
Start: 2024-11-22 | End: 2024-11-23

## 2024-11-22 RX ORDER — MIDAZOLAM HYDROCHLORIDE 1 MG/ML
INJECTION, SOLUTION INTRAMUSCULAR; INTRAVENOUS
Status: COMPLETED
Start: 2024-11-22 | End: 2024-11-22

## 2024-11-22 RX ORDER — OXYMETAZOLINE HCL 0.05 %
2 SPRAY, NON-AEROSOL (ML) NASAL EVERY 6 HOURS
Status: DISCONTINUED | OUTPATIENT
Start: 2024-11-22 | End: 2024-11-24

## 2024-11-22 RX ORDER — ROCURONIUM BROMIDE 10 MG/ML
50 INJECTION, SOLUTION INTRAVENOUS ONCE
Status: COMPLETED | OUTPATIENT
Start: 2024-11-22 | End: 2024-11-22

## 2024-11-22 RX ORDER — ETOMIDATE 2 MG/ML
INJECTION INTRAVENOUS CODE/TRAUMA/SEDATION MEDICATION
Status: COMPLETED | OUTPATIENT
Start: 2024-11-22 | End: 2024-11-22

## 2024-11-22 RX ORDER — FENTANYL CITRATE 50 UG/ML
50 INJECTION, SOLUTION INTRAMUSCULAR; INTRAVENOUS
Status: DISCONTINUED | OUTPATIENT
Start: 2024-11-22 | End: 2024-11-28

## 2024-11-22 RX ORDER — EPINEPHRINE IN 0.9 % SOD CHLOR 4MG/250ML
0-1 PLASTIC BAG, INJECTION (ML) INTRAVENOUS CONTINUOUS
Status: DISCONTINUED | OUTPATIENT
Start: 2024-11-22 | End: 2024-11-22

## 2024-11-22 RX ORDER — DEXMEDETOMIDINE HYDROCHLORIDE 4 UG/ML
0-1.5 INJECTION, SOLUTION INTRAVENOUS CONTINUOUS
Status: DISCONTINUED | OUTPATIENT
Start: 2024-11-22 | End: 2024-11-27

## 2024-11-22 RX ORDER — MIDAZOLAM HYDROCHLORIDE 1 MG/ML
0-20 INJECTION, SOLUTION INTRAVENOUS CONTINUOUS
Status: DISCONTINUED | OUTPATIENT
Start: 2024-11-22 | End: 2024-11-23

## 2024-11-22 RX ORDER — MORPHINE SULFATE 2 MG/ML
2 INJECTION, SOLUTION INTRAMUSCULAR; INTRAVENOUS EVERY 4 HOURS PRN
Status: DISCONTINUED | OUTPATIENT
Start: 2024-11-22 | End: 2024-11-25

## 2024-11-22 RX ORDER — IPRATROPIUM BROMIDE AND ALBUTEROL SULFATE 2.5; .5 MG/3ML; MG/3ML
3 SOLUTION RESPIRATORY (INHALATION) EVERY 2 HOUR PRN
Status: DISCONTINUED | OUTPATIENT
Start: 2024-11-22 | End: 2024-11-29

## 2024-11-22 RX ORDER — VANCOMYCIN HYDROCHLORIDE 1 G/20ML
INJECTION, POWDER, LYOPHILIZED, FOR SOLUTION INTRAVENOUS DAILY PRN
Status: DISCONTINUED | OUTPATIENT
Start: 2024-11-22 | End: 2024-11-24 | Stop reason: ALTCHOICE

## 2024-11-22 ASSESSMENT — PAIN SCALES - GENERAL: PAINLEVEL_OUTOF10: 5 - MODERATE PAIN

## 2024-11-22 NOTE — PROCEDURES
Intubation    Date/Time: 11/22/2024 12:31 PM    Performed by: Brett Bush MD  Authorized by: Brett Bush MD    Consent:     Consent obtained:  Emergent situation  Pre-procedure details:     Indications: respiratory distress and respiratory failure      Patient status:  Altered mental status    Look externally: facial hair      Mouth opening - incisor distance:  3 or more finger widths    Obstruction: none      Pharmacologic strategy: RSI      Induction agents:  Etomidate    Paralytics:  Rocuronium  Procedure details:     Preoxygenation:  Bag valve mask    CPR in progress: no      Number of attempts:  1  Successful intubation attempt details:     Intubation method:  Oral    Intubation technique: video assisted      Laryngoscope blade:  Hypercurved    Bougie used: no      Grade view: I      Tube size (mm):  7.5    Tube type:  Cuffed    Tube visualized through cords: yes    Placement assessment:     ETT at teeth/gumline (cm):  24    Tube secured with:  ETT cardona    Breath sounds:  Equal    Placement verification: colorimetric ETCO2    Post-procedure details:     Procedure completion:  Tolerated

## 2024-11-22 NOTE — CONSULTS
Otolaryngology Consult Note  Patient: Antony Goff  Unit/Bed: 01/01-A  YOB: 1950  MRN: [id number]   Admitting Diagnosis: Acute combined systolic and diastolic HF (heart failure) [I50.41]  Date:  11/19/2024  Attending: Brett Bush MD   Consults      Complaint:  No chief complaint on file.       History of Present Illness:  74-year-old male who has been admitted in the hospital with multiple medical problems and today he had an episode of severe epistaxis and probably aspirated and required intubation.  I am asked to see him for further evaluation and management.  And has multiple medical problems and is on Eliquis.  Allergies:  No Known Allergies     PMHx:  Past Medical History:   Diagnosis Date    Encounter for preprocedural cardiovascular examination     Preop cardiovascular exam    Encounter for screening for lipoid disorders     Screening for lipoid disorders    Personal history of other diseases of the circulatory system 10/05/2021    History of cardiac murmur    Personal history of other endocrine, nutritional and metabolic disease     History of hypokalemia    Personal history of other endocrine, nutritional and metabolic disease     History of obesity    Personal history of other endocrine, nutritional and metabolic disease 08/03/2022    History of obesity    Personal history of other specified conditions     History of chest pain    Personal history of other specified conditions     History of fatigue    Personal history of other specified conditions     History of palpitations    Presence of cardiac pacemaker     History of cardiac pacemaker    Repeated falls     Multiple falls    Tinnitus, left ear 10/05/2021    Tinnitus of left ear       PSHx:  Past Surgical History:   Procedure Laterality Date    CARDIAC VALVE REPLACEMENT      CORONARY ARTERY BYPASS GRAFT      INSERT / REPLACE / REMOVE PACEMAKER      OTHER SURGICAL HISTORY  10/05/2021    Shoulder surgery    OTHER SURGICAL  HISTORY  10/05/2021    Knee replacement    OTHER SURGICAL HISTORY  10/05/2021    Cataract surgery    OTHER SURGICAL HISTORY  10/05/2021    Breast surgery    OTHER SURGICAL HISTORY  10/05/2021    Pacemaker insertion    OTHER SURGICAL HISTORY  10/05/2021    Appendectomy    OTHER SURGICAL HISTORY  10/05/2021    Sinus surgery    OTHER SURGICAL HISTORY  10/05/2021    Uvulectomy    OTHER SURGICAL HISTORY  01/28/2022    Colonoscopy    OTHER SURGICAL HISTORY  01/28/2022    Kidney surgery    OTHER SURGICAL HISTORY  01/28/2022    Throat surgery    OTHER SURGICAL HISTORY  01/28/2022    Nose surgery       Social Hx:  Social History     Socioeconomic History    Marital status:    Tobacco Use    Smoking status: Former     Types: Cigarettes    Smokeless tobacco: Never   Substance and Sexual Activity    Alcohol use: Not Currently    Drug use: Not Currently    Sexual activity: Defer     Social Drivers of Health     Financial Resource Strain: Low Risk  (11/19/2024)    Overall Financial Resource Strain (CARDIA)     Difficulty of Paying Living Expenses: Not hard at all   Food Insecurity: No Food Insecurity (11/19/2024)    Hunger Vital Sign     Worried About Running Out of Food in the Last Year: Never true     Ran Out of Food in the Last Year: Never true   Transportation Needs: No Transportation Needs (11/19/2024)    PRAPARE - Transportation     Lack of Transportation (Medical): No     Lack of Transportation (Non-Medical): No   Recent Concern: Transportation Needs - High Risk (8/27/2024)    Received from [facility] SDOH Screening     Has lack of transportation kept you from any of the following?: Obtaining items needed for daily living? such as food, clothing, supplies     Has lack of transportation kept you from any of the following?: Work   Physical Activity: Inactive (1/12/2024)    Received from [facility]    Exercise Vital Sign     Days of Exercise per  Week: 0 days     Minutes of Exercise per Session: 0 min   Intimate Partner Violence: Not At Risk (11/19/2024)    Humiliation, Afraid, Rape, and Kick questionnaire     Fear of Current or Ex-Partner: No     Emotionally Abused: No     Physically Abused: No     Sexually Abused: No   Housing Stability: Low Risk  (11/19/2024)    Housing Stability Vital Sign     Unable to Pay for Housing in the Last Year: No     Number of Times Moved in the Last Year: 0     Homeless in the Last Year: No       Family Hx:  Family History   Problem Relation Name Age of Onset    Heart failure Mother      Other (asbestosis) Father      Lung disease Father      Thyroid disease Sister         Review of Systems:   Review of Systems   All other systems reviewed and are negative.      Vitals:    11/22/24 1030 11/22/24 1045 11/22/24 1100 11/22/24 1115   BP: (!) 72/40 (!) 115/93 (!) 92/43 (!) 72/39   Pulse: 78 (!) 112 68 68   Resp: 20 (!) 40 (!) 29 19   Temp:       TempSrc:       SpO2: 100% 96% 96% (!) 86%   Weight:       Height:           Intake/Output Summary (Last 24 hours) at 11/22/2024 1201  Last data filed at 11/21/2024 2339  Gross per 24 hour   Intake 240 ml   Output 1900 ml   Net -1660 ml      Wt Readings from Last 4 Encounters:   11/22/24 79.4 kg (175 lb 0.7 oz)   02/07/24 124 kg (274 lb)   02/01/23 124 kg (274 lb)   08/03/22 130 kg (287 lb)      Physical Examination:       Physical Exam  Constitutional:       Comments: Patient is intubated and somnolent.  Moans to painful stimuli.   HENT:      Head: Normocephalic and atraumatic.      Nose:      Comments: Large blood clots were noted bilaterally more in the right side.  This were cleaned out.  Nasal endoscopic examination performed with control of epistaxis on the left side.     Mouth/Throat:      Mouth: Mucous membranes are moist.      Comments: Post UPPP  Eyes:      Extraocular Movements: Extraocular movements intact.      Pupils: Pupils are equal, round, and reactive to light.    Musculoskeletal:      Cervical back: Normal range of motion and neck supple.         LAB RESULTS  CBC:   Results from last 7 days   Lab Units 11/22/24 0435 11/19/24  0522   WBC AUTO x10*3/uL 8.4 11.1   RBC AUTO x10*6/uL 2.60* 2.95*   HEMOGLOBIN g/dL 7.9* 9.0*   HEMATOCRIT % 24.7* 28.5*   MCV fL 95 97   MCH pg 30.4 30.5   MCHC g/dL 32.0 31.6*   RDW % 15.9* 15.6*   PLATELETS AUTO x10*3/uL 155 222     CMP:    Results from last 7 days   Lab Units 11/22/24 0435 11/21/24  1015 11/20/24  1522 11/19/24  0919   SODIUM mmol/L 136 135* 133* 135*   POTASSIUM mmol/L 4.2 4.0 4.2 4.3   CHLORIDE mmol/L 81* 81* 81* 80*   CO2 mmol/L >45* >45* >45* >45*   BUN mg/dL 45* 52* 58* 55*   CREATININE mg/dL 1.20 1.17 1.35* 1.44*   GLUCOSE mg/dL 104* 120* 111* 138*   PROTEIN TOTAL g/dL  --   --   --  8.1   CALCIUM mg/dL 9.3 9.1 9.2 9.4   BILIRUBIN TOTAL mg/dL  --   --   --  1.3*   ALK PHOS U/L  --   --   --  106   AST U/L  --   --   --  28   ALT U/L  --   --   --  14       BMP:    Results from last 7 days   Lab Units 11/22/24 0435 11/21/24  1015 11/20/24  1522   SODIUM mmol/L 136 135* 133*   POTASSIUM mmol/L 4.2 4.0 4.2   CHLORIDE mmol/L 81* 81* 81*   CO2 mmol/L >45* >45* >45*   BUN mg/dL 45* 52* 58*   CREATININE mg/dL 1.20 1.17 1.35*   CALCIUM mg/dL 9.3 9.1 9.2   GLUCOSE mg/dL 104* 120* 111*     Magnesium:  Results from last 7 days   Lab Units 11/22/24 0435 11/21/24  1015 11/20/24  1522   MAGNESIUM mg/dL 1.69 1.77 1.78     Troponin:    Results from last 7 days   Lab Units 11/20/24  1522   TROPHS ng/L 38*     BNP:   Results from last 7 days   Lab Units 11/20/24  1522   BNP pg/mL 337*     Lipid Panel:          Relevant Results           ECG 12 Lead    Result Date: 11/20/2024  AV dual-paced rhythm Abnormal ECG When compared with ECG of 20-NOV-2024 15:27, (unconfirmed) Premature ventricular complexes are no longer Present Vent. rate has decreased BY  13 BPM    XR chest 2 views    Result Date: 11/20/2024  Interpreted By:  Schoenberger,  Angel, STUDY: XR CHEST 2 VIEWS;  11/20/2024 3:07 pm   INDICATION: Signs/Symptoms:sob.     COMPARISON: 03/09/2015   ACCESSION NUMBER(S): [id number]   ORDERING CLINICIAN: WANDA HUANG   FINDINGS: Left subclavian transvenous pacemaker unchanged from prior.       CARDIOMEDIASTINAL SILHOUETTE: Cardiac silhouette remains mildly enlarged. There is persistent or recurrent venous congestion. There are bilateral small pleural effusions with likely some interstitial edema.   LUNGS: There is a rather large opacity in the lower left hemithorax. On the AP radiograph, its contours somewhat rounded in a mass is a consideration though this appearance is not the noted on the lateral radiograph. However, the left hemidiaphragm is also elevated possibly exaggerating this appearance. At minimum, follow-up radiographs are recommended as a the left basal mass is a consideration.   ABDOMEN: No remarkable upper abdominal findings.   BONES: No acute osseous changes.       1.  Moderate congestive failure with interstitial edema. Small bilateral pleural effusions 2. Left basal opacity which on the AP radiograph appears to have somewhat rounded contour superiorly. Underlying mass is a consideration. At a minimum, radiographic follow-up is recommended.       MACRO: None   Signed by: Joseph Schoenberger 11/20/2024 3:13 PM Dictation workstation:   [id number]       No results found for this or any previous visit from the past 1095 days.                 Current Medications:  [Held by provider] apixaban, 5 mg, oral, BID  atorvastatin, 40 mg, oral, Nightly  [Held by provider] carvedilol, 3.125 mg, oral, BID  empagliflozin, 10 mg, oral, Daily  fluticasone furoate-vilanteroL, 1 puff, inhalation, Daily  [Held by provider] furosemide, 40 mg, intravenous, q12h  ipratropium-albuteroL, 3 mL, nebulization, q6h  lamoTRIgine, 100 mg, oral, Nightly  levothyroxine, 88 mcg, oral, Daily  [Held by provider] morphine CR, 30 mg, oral, q12h KENDAL  nystatin, 1  Application, Topical, BID  oxybutynin, 5 mg, oral, BID  oxygen, , inhalation, Continuous - Inhalation  oxymetazoline, 2 spray, Each Nostril, q6h  pantoprazole, 40 mg, oral, Daily before breakfast  [START ON 11/23/2024] pantoprazole, 40 mg, oral, Daily before breakfast   Or  [START ON 11/23/2024] pantoprazole, 40 mg, intravenous, Daily before breakfast  piperacillin-tazobactam, 3.375 g, intravenous, q8h  potassium chloride, 20 mEq, oral, Daily  spironolactone, 25 mg, oral, Daily  [Held by provider] traZODone, 50 mg, oral, Nightly  [Held by provider] valsartan, 40 mg, oral, Daily       dexmedeTOMIDine, 0-1.5 mcg/kg/hr  norepinephrine, 0-0.5 mcg/kg/min, Last Rate: 0.01 mcg/kg/min (11/22/24 1020)       Current Outpatient Medications   Medication Instructions    albuterol 2.5 mg /3 mL (0.083 %) nebulizer solution inhalation, Use as directed PRN    albuterol 90 mcg/actuation inhaler 2 puffs, Every 6 hours PRN    atorvastatin (Lipitor) 40 mg tablet 1 tablet, oral, Nightly    carvedilol (COREG) 3.125 mg, oral, 2 times daily    Eliquis 5 mg, oral, 2 times daily    empagliflozin (Jardiance) 10 mg 1 tablet, oral, Daily    furosemide (Lasix) 40 mg tablet 1 tablet, Daily (0630)    lactulose 10 gram/15 mL solution TAKE 30 ML BY MOUTH TWICE DAILY AS NEEDED    lamoTRIgine (LaMICtal) 100 mg tablet 1 tablet, oral, Nightly    levothyroxine (SYNTHROID, LEVOXYL) 88 mcg, oral, Daily    morphine CR (MS CONTIN) 30 mg, oral, Every 8 hours, PRN    omeprazole OTC (PRILOSEC OTC) 20 mg, oral, Daily before breakfast, Do not crush, chew, or split.    Oxervate 0.002 % ophthalmic solution     potassium chloride (Klor-Con) 20 mEq packet 20 mEq, Daily    solifenacin (VESICARE) 5 mg, oral, Daily    spironolactone (Aldactone) 25 mg tablet 1 tablet, oral, Daily    Symbicort 160-4.5 mcg/actuation inhaler 1 puff, 2 times daily    traZODone (DESYREL) 50 mg, Nightly     Scheduled medications  [Held by provider] apixaban, 5 mg, oral, BID  atorvastatin, 40  mg, oral, Nightly  [Held by provider] carvedilol, 3.125 mg, oral, BID  empagliflozin, 10 mg, oral, Daily  fluticasone furoate-vilanteroL, 1 puff, inhalation, Daily  [Held by provider] furosemide, 40 mg, intravenous, q12h  ipratropium-albuteroL, 3 mL, nebulization, q6h  lamoTRIgine, 100 mg, oral, Nightly  levothyroxine, 88 mcg, oral, Daily  [Held by provider] morphine CR, 30 mg, oral, q12h KENDAL  nystatin, 1 Application, Topical, BID  oxybutynin, 5 mg, oral, BID  oxygen, , inhalation, Continuous - Inhalation  oxymetazoline, 2 spray, Each Nostril, q6h  pantoprazole, 40 mg, oral, Daily before breakfast  [START ON 11/23/2024] pantoprazole, 40 mg, oral, Daily before breakfast   Or  [START ON 11/23/2024] pantoprazole, 40 mg, intravenous, Daily before breakfast  piperacillin-tazobactam, 3.375 g, intravenous, q8h  potassium chloride, 20 mEq, oral, Daily  spironolactone, 25 mg, oral, Daily  [Held by provider] traZODone, 50 mg, oral, Nightly  [Held by provider] valsartan, 40 mg, oral, Daily      Continuous medications  dexmedeTOMIDine, 0-1.5 mcg/kg/hr  norepinephrine, 0-0.5 mcg/kg/min, Last Rate: 0.01 mcg/kg/min (11/22/24 1020)      PRN medications  PRN medications: acetaminophen **OR** acetaminophen **OR** acetaminophen, lactulose, lubricating eye drops, melatonin, morphine, naloxone, polyethylene glycol     ECG 12 Lead    Result Date: 11/20/2024  AV dual-paced rhythm Abnormal ECG When compared with ECG of 20-NOV-2024 15:27, (unconfirmed) Premature ventricular complexes are no longer Present Vent. rate has decreased BY  13 BPM    XR chest 2 views    Result Date: 11/20/2024  Interpreted By:  Schoenberger, Joseph, STUDY: XR CHEST 2 VIEWS;  11/20/2024 3:07 pm   INDICATION: Signs/Symptoms:sob.     COMPARISON: 03/09/2015   ACCESSION NUMBER(S): [id number]   ORDERING CLINICIAN: WANDA HUANG   FINDINGS: Left subclavian transvenous pacemaker unchanged from prior.       CARDIOMEDIASTINAL SILHOUETTE: Cardiac silhouette remains  mildly enlarged. There is persistent or recurrent venous congestion. There are bilateral small pleural effusions with likely some interstitial edema.   LUNGS: There is a rather large opacity in the lower left hemithorax. On the AP radiograph, its contours somewhat rounded in a mass is a consideration though this appearance is not the noted on the lateral radiograph. However, the left hemidiaphragm is also elevated possibly exaggerating this appearance. At minimum, follow-up radiographs are recommended as a the left basal mass is a consideration.   ABDOMEN: No remarkable upper abdominal findings.   BONES: No acute osseous changes.       1.  Moderate congestive failure with interstitial edema. Small bilateral pleural effusions 2. Left basal opacity which on the AP radiograph appears to have somewhat rounded contour superiorly. Underlying mass is a consideration. At a minimum, radiographic follow-up is recommended.       MACRO: None   Signed by: Joseph Schoenberger 11/20/2024 3:13 PM Dictation workstation:   [id number]         Assessment:    Patient Active Problem List   Diagnosis    Benign essential hypertension    Dizziness    Paroxysmal atrial fibrillation (Multi)    Pacemaker    Prolonged QT interval    Renal failure    Sleep apnea    Syncope    Shortness of breath    Blepharoconjunctivitis of left eye    Central corneal ulcer of left eye    Combined forms of age-related cataract of left eye    Congestive heart failure    Dry eye syndrome of both eyes    History of surgical procedure    HSV (herpes simplex virus) dendritic keratitis    Hypogonadism male    Hypothyroidism    Keratoconjunctivitis sicca    Hyperopia of both eyes with astigmatism and presbyopia    Meibomian gland dysfunction (MGD) of upper and lower lids of both eyes    Vitreous floaters of both eyes    Morbid obesity due to excess calories (Multi)    Neurotrophic keratitis    Neurotrophic keratoconjunctivitis, left eye    Nocturnal polyuria    Other  cirrhosis of liver    Panic attack    Persistent epithelial defect of cornea    Pseudophakia of both eyes    Chronic obstructive pulmonary disease (Multi)    Pulmonary emphysema (Multi)    Punctate keratitis of both eyes    Punctate keratitis of left eye    Recurrent corneal erosion, left    Recurrent erosion of both corneas    Ring corneal ulcer of right eye    Sick sinus syndrome (Multi)    Status post total left knee replacement    Urinary urgency    Arthritis    Vasculitis (CMS-HCC)    Dizziness and giddiness    Presence of cardiac pacemaker    Chronic kidney disease    KANCHAN (obstructive sleep apnea)    Syncope and collapse    S/P AVR (aortic valve replacement)    Abnormal CT scan    Anxiety    Atelectasis    CHF (congestive heart failure), NYHA class I, acute on chronic, combined    Heart murmur    History of CVA (cerebrovascular accident)    Pulmonary HTN (Multi)    Pressure injury of left buttock, stage 3 (Multi)    Severe aortic regurgitation    Recurrent falls    Venous stasis dermatitis of both lower extremities    Tinnitus    Stress hyperglycemia    Acute combined systolic and diastolic HF (heart failure)   Epistaxis  Anemia  Sleep apnea        Plan:  I have performed bedside nasal endoscopy and control epistaxis with insertion of rapid Rhino packing in the left nares.  I will follow him while in the hospital.  Will keep the packing at least for 24 to 48 hours.    I spent a total of 70 minutes preparing to see the patient, face-to-face patient care, bleeding clinical documentation, obtaining and/reviewing separately obtained history, performing physical examination and procedure.  Medical decision making of moderate complexity.  I discussed with the nursing staff and other caregivers including nurse practitioners.  Care coordination provided.  Sakina corona.            This note was made using a voice recognition system and may contain unintended errors in translation in grammar.  Electronically signed by  Miguel Amado MD on 11/22/2024 at 12:01 PM

## 2024-11-22 NOTE — PROCEDURES
Central Line    Date/Time: 11/22/2024 3:06 PM    Performed by: AZUL Holland  Authorized by: AZUL Holland    Consent:     Consent obtained:  Written    Consent given by:  Spouse    Risks, benefits, and alternatives were discussed: yes      Risks discussed:  Arterial puncture, incorrect placement, bleeding, infection and pneumothorax    Alternatives discussed:  No treatment  Universal protocol:     Procedure explained and questions answered to patient or proxy's satisfaction: yes      Relevant documents present and verified: yes      Test results available: yes      Imaging studies available: yes      Required blood products, implants, devices, and special equipment available: yes      Immediately prior to procedure, a time out was called: yes      Patient identity confirmed:  Arm band and hospital-assigned identification number  Pre-procedure details:     Indication(s): central venous access and insufficient peripheral access      Hand hygiene: Hand hygiene performed prior to insertion      Sterile barrier technique: All elements of maximal sterile technique followed      Skin preparation:  Chlorhexidine    Skin preparation agent: Skin preparation agent completely dried prior to procedure    Sedation:     Sedation type:  Deep  Anesthesia:     Anesthesia method:  Local infiltration    Local anesthetic:  Lidocaine 1% w/o epi  Procedure details:     Location:  L internal jugular    Patient position:  Reverse Trendelenburg    Procedural supplies:  Triple lumen    Catheter size:  8 Fr    Catheter length:  20    Ultrasound guidance: yes      Ultrasound guidance timing: real time      Sterile ultrasound techniques: Sterile gel and sterile probe covers were used      Number of attempts:  1    Successful placement: yes    Post-procedure details:     Post-procedure:  Dressing applied and line sutured    Assessment:  Blood return through all ports, no pneumothorax on x-ray and placement verified by  x-ray    Procedure completion:  Tolerated

## 2024-11-22 NOTE — PROGRESS NOTES
[facility] Progress Note           Rounding CLAUDIA/Cardiologist:  Gerhard Oro, JACQUE-WINNIE, Dr. Mayberry  Primary Cardiologist: Dr. Armani Mayberry    Date:  11/22/2024  Patient:  Antony Goff  YOB: 1950  MRN:  [id number]   Admit Date:  11/19/2024      SUBJECTIVE:    11/22/24  Eval at bedside this morning.  Denies any complaints.  Attempting to eat breakfast.  Remains on O2.  Telemetry shows dual paced rhythm.  Fluid balance -1660    11/21/24  Patient is awake and alert sitting up in chair answering all my questions this morning.  He states that his shortness of breath is very much improved.  Well-informed of plan of care.  He states he wants to go home in the next day or 2.  EKG dual paced  Telemetry is paced  Daily weights ordered    Cardiac history  8/24 echo  CONCLUSIONS:   - Exam indication: Limited echo - Re-evaluation of heart failure   - Left ventricular systolic function is normal. EF = 60 ± 5% (visual est.)   - The right ventricle is dilated. Right ventricular systolic function is low   normal.   - The visualized aorta is dilated with a maximal dimension of 4.1 cm.   - There is moderate (2+) tricuspid valve regurgitation.   - Epic prosthetic aortic valve (size #27). The peak gradient is 18 mmHg, the mean   gradient is 9 mmHg and the dimensionless valve index is 0.79. Prior peak/mean   gradients were 21/11 mmHg.   - Estimated right ventricular systolic pressure is 59 mmHg consistent with   moderate pulmonary hypertension. Estimated right atrial pressure is 3 mmHg based   on IVC assessment. Estimated right ventricular systolic pressure is 59 mmHg   consistent with moderate pulmonary hypertension. Estimated right atrial pressure   is 3 mmHg based on IVC assessment.   - Exam was compared with the prior  echocardiographic exam performed on   04/26/2024. There is no significant changes.      3/28/24  Operations during Hospitalization:   3/28/2024: AVR (#27 Epic plus), LAAC, left side  MAZE Procedure, Epicardial lead LV pacing lead tunneled to left chest and generator exchanged for CRT-P      VITALS:     Vitals:    11/22/24 1018 11/22/24 1020 11/22/24 1022 11/22/24 1024   BP:  (!) 65/40 (!) 74/44 (!) 76/44   Pulse:  98  110   Resp:  17  17   Temp:       TempSrc:       SpO2:  100%  100%   Weight: 79.4 kg (175 lb 0.7 oz)      Height:           Intake/Output Summary (Last 24 hours) at 11/22/2024 1054  Last data filed at 11/21/2024 2339  Gross per 24 hour   Intake 240 ml   Output 1900 ml   Net -1660 ml       Wt Readings from Last 4 Encounters:   11/22/24 79.4 kg (175 lb 0.7 oz)   02/07/24 124 kg (274 lb)   02/01/23 124 kg (274 lb)   08/03/22 130 kg (287 lb)       CURRENT HOSPITAL MEDICATIONS:   apixaban, 5 mg, oral, BID  atorvastatin, 40 mg, oral, Nightly  carvedilol, 3.125 mg, oral, BID  empagliflozin, 10 mg, oral, Daily  fluticasone furoate-vilanteroL, 1 puff, inhalation, Daily  furosemide, 40 mg, intravenous, q12h  ipratropium-albuteroL, 3 mL, nebulization, q6h  lamoTRIgine, 100 mg, oral, Nightly  levothyroxine, 88 mcg, oral, Daily  morphine CR, 30 mg, oral, q12h KENDAL  nystatin, 1 Application, Topical, BID  oxybutynin, 5 mg, oral, BID  oxygen, , inhalation, Continuous - Inhalation  oxymetazoline, 2 spray, Each Nostril, q6h  pantoprazole, 40 mg, oral, Daily before breakfast  [START ON 11/23/2024] pantoprazole, 40 mg, oral, Daily before breakfast   Or  [START ON 11/23/2024] pantoprazole, 40 mg, intravenous, Daily before breakfast  piperacillin-tazobactam, 3.375 g, intravenous, q8h  potassium chloride, 20 mEq, oral, Daily  spironolactone, 25 mg, oral, Daily  traZODone, 50 mg, oral, Nightly  valsartan, 40 mg, oral, Daily      norepinephrine, 0-0.5 mcg/kg/min, Last Rate: 0.01 mcg/kg/min (11/22/24 1020)  propofol, 0-20 mcg/kg/min, Last Rate: 5 mcg/kg/min (11/22/24 1043)      Current Outpatient Medications   Medication Instructions    albuterol 2.5 mg /3 mL (0.083 %) nebulizer solution inhalation, Use as  directed PRN    albuterol 90 mcg/actuation inhaler 2 puffs, Every 6 hours PRN    atorvastatin (Lipitor) 40 mg tablet 1 tablet, oral, Nightly    carvedilol (COREG) 3.125 mg, oral, 2 times daily    Eliquis 5 mg, oral, 2 times daily    empagliflozin (Jardiance) 10 mg 1 tablet, oral, Daily    furosemide (Lasix) 40 mg tablet 1 tablet, Daily (0630)    lactulose 10 gram/15 mL solution TAKE 30 ML BY MOUTH TWICE DAILY AS NEEDED    lamoTRIgine (LaMICtal) 100 mg tablet 1 tablet, oral, Nightly    levothyroxine (SYNTHROID, LEVOXYL) 88 mcg, oral, Daily    morphine CR (MS CONTIN) 30 mg, oral, Every 8 hours, PRN    omeprazole OTC (PRILOSEC OTC) 20 mg, oral, Daily before breakfast, Do not crush, chew, or split.    Oxervate 0.002 % ophthalmic solution     potassium chloride (Klor-Con) 20 mEq packet 20 mEq, Daily    solifenacin (VESICARE) 5 mg, oral, Daily    spironolactone (Aldactone) 25 mg tablet 1 tablet, oral, Daily    Symbicort 160-4.5 mcg/actuation inhaler 1 puff, 2 times daily    traZODone (DESYREL) 50 mg, Nightly        PHYSICAL EXAMINATION:   GENERAL APPEARANCE: awake and alert  CHEST: Symmetric and non-tender.  INTEGUMENT: Skin warm and dry, without gross excoriationis or lesions.  HEENT: No gross abnormalities of conjunctiva, teeth, gums, oral mucosa  NECK: Supple, no JVD, no bruit. Thyroid not palpable. Carotid upstrokes normal.  NEURO/PSHCY: awake  and  alert  LUNGS: Very diminished scattered Rales left side  HEART: S1, S2 regular with 2 out of 6 systolic murmur  ABDOMEN: Soft, nontender, no palpable hepatosplenomegaly, no mases, no bruits. Abdominal aorta not noted to be enlarged.  EXTREMITIES: Warm with good color, no clubbing or cyanois.  1+ edema  PERIPHERAL VASCULAR: Pulses present and equally palpable; 1+ throughout. No femoral bruits      LAB DATA:     CBC:   Results from last 7 days   Lab Units 11/22/24  0435 11/19/24  0522   WBC AUTO x10*3/uL 8.4 11.1   RBC AUTO x10*6/uL 2.60* 2.95*   HEMOGLOBIN g/dL 7.9* 9.0*    HEMATOCRIT % 24.7* 28.5*   MCV fL 95 97   MCH pg 30.4 30.5   MCHC g/dL 32.0 31.6*   RDW % 15.9* 15.6*   PLATELETS AUTO x10*3/uL 155 222     CMP:    Results from last 7 days   Lab Units 11/22/24  0435 11/21/24  1015 11/20/24  1522 11/19/24  0919   SODIUM mmol/L 136 135* 133* 135*   POTASSIUM mmol/L 4.2 4.0 4.2 4.3   CHLORIDE mmol/L 81* 81* 81* 80*   CO2 mmol/L >45* >45* >45* >45*   BUN mg/dL 45* 52* 58* 55*   CREATININE mg/dL 1.20 1.17 1.35* 1.44*   GLUCOSE mg/dL 104* 120* 111* 138*   PROTEIN TOTAL g/dL  --   --   --  8.1   CALCIUM mg/dL 9.3 9.1 9.2 9.4   BILIRUBIN TOTAL mg/dL  --   --   --  1.3*   ALK PHOS U/L  --   --   --  106   AST U/L  --   --   --  28   ALT U/L  --   --   --  14     BMP:    Results from last 7 days   Lab Units 11/22/24  0435 11/21/24  1015 11/20/24  1522   SODIUM mmol/L 136 135* 133*   POTASSIUM mmol/L 4.2 4.0 4.2   CHLORIDE mmol/L 81* 81* 81*   CO2 mmol/L >45* >45* >45*   BUN mg/dL 45* 52* 58*   CREATININE mg/dL 1.20 1.17 1.35*   CALCIUM mg/dL 9.3 9.1 9.2   GLUCOSE mg/dL 104* 120* 111*     Magnesium:  Results from last 7 days   Lab Units 11/22/24  0435 11/21/24  1015 11/20/24  1522   MAGNESIUM mg/dL 1.69 1.77 1.78     Troponin:    Results from last 7 days   Lab Units 11/20/24  1522   TROPHS ng/L 38*     BNP:   Results from last 7 days   Lab Units 11/20/24  1522   BNP pg/mL 337*     Lipid Panel:         DIAGNOSTIC TESTING:   @No results found for this or any previous visit.    ECG 12 Lead    Result Date: 11/20/2024  AV dual-paced rhythm Abnormal ECG When compared with ECG of 20-NOV-2024 15:27, (unconfirmed) Premature ventricular complexes are no longer Present Vent. rate has decreased BY  13 BPM    XR chest 2 views    Result Date: 11/20/2024  Interpreted By:  Schoenberger, Joseph, STUDY: XR CHEST 2 VIEWS;  11/20/2024 3:07 pm   INDICATION: Signs/Symptoms:sob.     COMPARISON: 03/09/2015   ACCESSION NUMBER(S): [id number]   ORDERING CLINICIAN: WANDA HUANG   FINDINGS: Left subclavian  transvenous pacemaker unchanged from prior.       CARDIOMEDIASTINAL SILHOUETTE: Cardiac silhouette remains mildly enlarged. There is persistent or recurrent venous congestion. There are bilateral small pleural effusions with likely some interstitial edema.   LUNGS: There is a rather large opacity in the lower left hemithorax. On the AP radiograph, its contours somewhat rounded in a mass is a consideration though this appearance is not the noted on the lateral radiograph. However, the left hemidiaphragm is also elevated possibly exaggerating this appearance. At minimum, follow-up radiographs are recommended as a the left basal mass is a consideration.   ABDOMEN: No remarkable upper abdominal findings.   BONES: No acute osseous changes.       1.  Moderate congestive failure with interstitial edema. Small bilateral pleural effusions 2. Left basal opacity which on the AP radiograph appears to have somewhat rounded contour superiorly. Underlying mass is a consideration. At a minimum, radiographic follow-up is recommended.       MACRO: None   Signed by: Joseph Schoenberger 11/20/2024 3:13 PM Dictation workstation:   [id number]       CT head wo IV contrast   Final Result   No acute intracranial abnormality or mass effect.        This study was interpreted at [facility].        MACRO:   None        Signed by: Dontae Lujan 11/22/2024 10:15 AM   Dictation workstation:   [id number]      CT chest wo IV contrast   Final Result   Rather than a nodule or mass, I suspect a postobstructive pneumonia   in the left lower lobe. Mucous plugging or aspirate in the left lower   lobe bronchus and majority of its segmental branches with   consolidation and atelectasis towards the left lung base in the lower   lobe and a more nodular distal airways pneumonia in the more superior   left lower lobe that remains fairly well inflated        In the lateral segment middle lobe, another suspected pneumonia  along   with some atelectasis, but no upstream large airway abnormality to   account for it        Diffuse airspace ground-glass change in both hemithoraces is probably   a separate process, possibly interstitial and alveolar edema or a   diffuse atypical pneumonia        Mediastinal adenopathy of uncertain etiology        Small bilateral pleural effusions probably too small for   thoracocentesis on both sides. Right side may be partially loculated   as it tracks into the major fissure        Included upper abdomen shows a markedly cirrhotic liver which has   progressed in nodularity and shrinkage since CT chest 9 March 2015.   Gallstones also present        MACRO:   None        Signed by: Ricky Squires 11/21/2024 2:12 PM   Dictation workstation:   [id number]      Cardiac Device Check - Inpatient         XR chest 2 views   Final Result   1.  Moderate congestive failure with interstitial edema. Small   bilateral pleural effusions   2. Left basal opacity which on the AP radiograph appears to have   somewhat rounded contour superiorly. Underlying mass is a   consideration. At a minimum, radiographic follow-up is recommended.                  MACRO:   None        Signed by: Joseph Schoenberger 11/20/2024 3:13 PM   Dictation workstation:   [id number]          No echocardiogram results found for the past 14 days    RADIOLOGY:     CT head wo IV contrast   Final Result   No acute intracranial abnormality or mass effect.        This study was interpreted at [facility].        MACRO:   None        Signed by: Dontae Lujan 11/22/2024 10:15 AM   Dictation workstation:   [id number]      CT chest wo IV contrast   Final Result   Rather than a nodule or mass, I suspect a postobstructive pneumonia   in the left lower lobe. Mucous plugging or aspirate in the left lower   lobe bronchus and majority of its segmental branches with   consolidation and atelectasis towards the left lung base in the  lower   lobe and a more nodular distal airways pneumonia in the more superior   left lower lobe that remains fairly well inflated        In the lateral segment middle lobe, another suspected pneumonia along   with some atelectasis, but no upstream large airway abnormality to   account for it        Diffuse airspace ground-glass change in both hemithoraces is probably   a separate process, possibly interstitial and alveolar edema or a   diffuse atypical pneumonia        Mediastinal adenopathy of uncertain etiology        Small bilateral pleural effusions probably too small for   thoracocentesis on both sides. Right side may be partially loculated   as it tracks into the major fissure        Included upper abdomen shows a markedly cirrhotic liver which has   progressed in nodularity and shrinkage since CT chest 9 March 2015.   Gallstones also present        MACRO:   None        Signed by: Ricky Squires 11/21/2024 2:12 PM   Dictation workstation:   [id number]      Cardiac Device Check - Inpatient         XR chest 2 views   Final Result   1.  Moderate congestive failure with interstitial edema. Small   bilateral pleural effusions   2. Left basal opacity which on the AP radiograph appears to have   somewhat rounded contour superiorly. Underlying mass is a   consideration. At a minimum, radiographic follow-up is recommended.                  MACRO:   None        Signed by: Joseph Schoenberger 11/20/2024 3:13 PM   Dictation workstation:   [id number]          PROBLEM LIST     Patient Active Problem List   Diagnosis    Benign essential hypertension    Dizziness    Paroxysmal atrial fibrillation (Multi)    Pacemaker    Prolonged QT interval    Renal failure    Sleep apnea    Syncope    Shortness of breath    Blepharoconjunctivitis of left eye    Central corneal ulcer of left eye    Combined forms of age-related cataract of left eye    Congestive heart failure    Dry eye syndrome of both eyes    History of surgical procedure     HSV (herpes simplex virus) dendritic keratitis    Hypogonadism male    Hypothyroidism    Keratoconjunctivitis sicca    Hyperopia of both eyes with astigmatism and presbyopia    Meibomian gland dysfunction (MGD) of upper and lower lids of both eyes    Vitreous floaters of both eyes    Morbid obesity due to excess calories (Multi)    Neurotrophic keratitis    Neurotrophic keratoconjunctivitis, left eye    Nocturnal polyuria    Other cirrhosis of liver    Panic attack    Persistent epithelial defect of cornea    Pseudophakia of both eyes    Chronic obstructive pulmonary disease (Multi)    Pulmonary emphysema (Multi)    Punctate keratitis of both eyes    Punctate keratitis of left eye    Recurrent corneal erosion, left    Recurrent erosion of both corneas    Ring corneal ulcer of right eye    Sick sinus syndrome (Multi)    Status post total left knee replacement    Urinary urgency    Arthritis    Vasculitis (CMS-HCC)    Dizziness and giddiness    Presence of cardiac pacemaker    Chronic kidney disease    KANCHAN (obstructive sleep apnea)    Syncope and collapse    S/P AVR (aortic valve replacement)    Abnormal CT scan    Anxiety    Atelectasis    CHF (congestive heart failure), NYHA class I, acute on chronic, combined    Heart murmur    History of CVA (cerebrovascular accident)    Pulmonary HTN (Multi)    Pressure injury of left buttock, stage 3 (Multi)    Severe aortic regurgitation    Recurrent falls    Venous stasis dermatitis of both lower extremities    Tinnitus    Stress hyperglycemia    Acute combined systolic and diastolic HF (heart failure)       ASSESSMENT:   Acute on chronic combined systolic and diastolic heart failure NYHA class III  History of aortic valve replacement  PAF pacemaker interrogation showed no episodes of atrial fibs or atrial tachycardia  History of CVA  EF 55% 8/24  Hypertension  Dyslipidemia  History of PPM        PLAN:   I have personally interviewed and examined the patient.   I have  personally and independently reviewed labs and diagnostic testing.  I have personally verified the elements of the history and physical listed above and changes, if any, are noted.  74-year-old gentleman with a history of severe aortic stenosis status post AVR with a bioprosthetic tissue valve at [facility] in March 2024, hypertension, dyslipidemia, CVA with some residual confusion, GERD and other comorbidities as listed above.  He presented to [facility] ER with complaints of increased shortness of breath confusion and weakness and was subsequently transferred here for further management.  Is a poor historian because of his mental state, but he did deny any ongoing chest pain.  He had a recent follow-up echocardiogram which shows a well-seated prosthetic valve in the aortic position with normal peak and mean gradients.  LV function was normal.  He denies any orthopnea paroxysmal nocturnal dyspnea.     Agree with exam as noted above.  Cardiac exam reveals distant S1 and S2 with no murmurs appreciated.  There is reduced breath sound in the lung bases bilaterally which is worse on the left compared to the right.  There is 1+ bilateral ankle edema.     ASSESSMENT AND PLAN:  1.  Increased shortness of breath: This is likely multifactorial, with possible underlying diastolic heart failure and deconditioning secondary to his CVA.  We will treat with gentle diuresis and rule out possible large pleural effusion based on his clinical exam.  He may require elective thoracocentesis if further chest imaging reveals a large pleural effusion.  2.  Persistent atrial fibrillation: On current medications and will continue with anticoagulation for now which can be held if he requires thoracocentesis.  3.  S/p CVA with confusion: This appears to be patient's baseline, will defer to primary team for continued management.  AKA    11/21/24  Tele monitoring  PPM interrogation no AT/AF no ventricular rhythm  Eliquis 5 mg p.o. twice daily  Lipitor 40  mg daily  Coreg 3.125 mg p.o. twice daily  Jardiance 10 mg daily  Lasix 40 mg IV every 12  Aldactone 25 mg daily  Add Diovan 40 mg daily  Check magnesium level keep greater than 2.0  Strict intake and output  Daily weights  Recommend respiratory treatment    35-minute visit    Calixto Godinez CNP  [facility]    11/22/24  Continue telemetry monitoring, monitor electrolytes, keep potassium greater than 4 magnesium greater than 2  Interrogation of PPM showed no AT or AF episodes.  No ventricular rhythms identified.  Continue Eliquis.  Continue current medication regimen including GDMT for heart failure management as BP allows  Strict I's and O's and daily weights  Continue gentle diuresis  Apparently after my visit today patient had rapid response called on him for an episode of unresponsiveness and epistaxis.  Patient was intubated by ICU physician.  Transferred to ICU under their care  Will continue to follow along with you      Gerhard Oro [facility]  Adult Gerontology Acute Care Nurse Practitioner  [facility]  [phone]        Of note, this documentation is completed using the Dragon Dictation system (voice recognition software). There may be spelling and/or grammatical errors that were not corrected prior to final submission.    Please do not hesitate to call with questions.  Electronically signed by JACQUE Garrido-CNP, on 11/22/2024 at 10:54 AM  Patient seen and examined in conjunction with JACQUE De La Garza/CNP and agree with the evaluation as noted above.  Recent events noted, with patient being transferred to the ICU following an episode of hypotension and severe hypoxia requiring intubation.  This was after significant bouts of epistaxis requiring nasal packs.  His vitals are stable is intubated and sedated at this time.  We will continue with current supportive  treatment, but at this time there are no active cardiac issues.  Is recent prosthetic AVR appears to be functioning normally based on his recent echocardiogram as noted above.  AKA

## 2024-11-22 NOTE — NURSING NOTE
0945: RN was in patient room administering medications when patient developed epistaxis. At this time, patient was alert and oriented. Message sent to Dr. Gage.     0955: RN unable to control epistaxis. Patient became minimally responsive, rapid response called.

## 2024-11-22 NOTE — CONSULTS
Vancomycin Dosing by Pharmacy- INITIAL    Antony Goff is a 74 y.o. year old male who Pharmacy has been consulted for vancomycin dosing for pneumonia. Based on the patient's indication and renal status this patient will be dosed based on a goal AUC of 400-600.     Renal function is currently stable.    Visit Vitals  BP (!) 47/34   Pulse 78   Temp 36 °C (96.8 °F)   Resp (!) 39        Lab Results   Component Value Date    CREATININE 1.20 2024    CREATININE 1.17 2024    CREATININE 1.35 (H) 2024    CREATININE 1.44 (H) 2024        Patient weight is as follows:   Vitals:    24 1018   Weight: 79.4 kg (175 lb 0.7 oz)       Cultures:  No results found for the encounter in last 14 days.        I/O last 3 completed shifts:  In: 240 (2.8 mL/kg) [P.O.:240]  Out: 1900 (21.9 mL/kg) [Urine:1900 (0.6 mL/kg/hr)]  Weight: 86.6 kg   I/O during current shift:  No intake/output data recorded.    Temp (24hrs), Av.1 °C (96.9 °F), Min:36 °C (96.8 °F), Max:36.2 °C (97.2 °F)         Assessment/Plan     Patient will be given a loading dose of 2000 mg.  Will initiate vancomycin maintenance, a loading dose of 2000 mg followed by a dose of 1500 mg 24 hours later.    This dosing regimen is predicted by STEGOSYSTEMSRx to result in the following pharmacokinetic parameters:  Regimen: 1500 mg IV every 24 hours.  Start time: 15:57 on 2024  Exposure target: AUC24 (range)400-600 mg/L.hr   SAA78-07: 393 mg/L.hr  AUC24,ss: 533 mg/L.hr  Probability of AUC24 > 400: 81 %  Ctrough,ss: 15.7 mg/L  Probability of Ctrough,ss > 20: 28 %    Follow-up level will be ordered on  at 2200 unless clinically indicated sooner.  Will continue to monitor renal function daily while on vancomycin and order serum creatinine at least every 48 hours if not already ordered.  Follow for continued vancomycin needs, clinical response, and signs/symptoms of toxicity.       Sri Davenport, PharmD

## 2024-11-22 NOTE — SIGNIFICANT EVENT
Rapid response called on patient for unresponsiveness and epistaxis.  On our exam, epistaxis had seem to have resolved.      Upon our arrival, patient was minimally responsive to sternal rub and significantly hypoxic and hypotensive.  SBP of 88, saturating 34% on room air.    Patient was immediately bagged with improvement in his saturations and transferred to the bed.  No improvement in his overall mentation.  On oral exam, patient did not have any obstruction and nothing was suctioned out by respiratory therapy.    Spoke with ICU who presented at bedside and patient was intubated by the intensivist.      Patient being transferred to the medical intensive care unit at this time.  Primary attending notified of transfer and events.  Patient's family was notified by nursing staff.

## 2024-11-22 NOTE — PROGRESS NOTES
Respiratory Therapy Note, ran ABG PC02 Critical, results given to DR. Bush, pco2 74, abg done pt.on vent settings have been a/c rr18, vt 375, 5 peep, fio2 80,

## 2024-11-22 NOTE — PROGRESS NOTES
Pt was Rapid response this morning for Epistaxis and unresponsiveness.   Pt required intubation and transfer to intensive care at that time.    Care transitions team will continue to follow for dc needs. Currently needs are  TBD

## 2024-11-22 NOTE — PROCEDURES
Arterial Puncture/Cannulation    Date/Time: 11/22/2024 12:00 PM    Performed by: AZUL Holland  Authorized by: AZUL Holland  Consent: Written consent obtained.  Consent given by: spouse  Procedure consent: procedure consent matches procedure scheduled  Relevant documents: relevant documents present and verified  Test results: test results available and properly labeled  Patient identity confirmed: hospital-assigned identification number and arm band  Preparation: Patient was prepped and draped in the usual sterile fashion.  Local anesthesia used: yes  Anesthesia: local infiltration    Anesthesia:  Local anesthesia used: yes  Local Anesthetic: lidocaine 1% without epinephrine  Anesthetic total: 0.5 mL    Sedation:  Patient sedated: yes  Sedation type: moderate (conscious) sedation  Sedatives: propofol  Vitals: Vital signs were monitored during sedation.    Patient tolerance: patient tolerated the procedure well with no immediate complications

## 2024-11-22 NOTE — PROGRESS NOTES
[facility] Critical Care Medicine       Date:  11/22/2024  Patient:  Antony Goff  YOB: 1950  MRN:  [id number]   Admit Date:  11/19/2024  ========================================================================================================    No chief complaint on file.    History of Present Illness:  Antony Goff is a 74 y.o. year old male patient with Past Medical History of COPD, chronic hypoxic respiratory failure on 3L NC baseline O2, aortic valve stenosis s/p AVR 3/2024 at [facility], pAfib on Eliquis, remote CVA, GERD, hypothyroidism, chronic back pain on chronic opioids who presented to [facility] ER for evaluation altered mental status and increased SOB, he was determined to have COPDE and CHF exacerbation, CXR significant for pulmonary vascular congestion. He was transferred to [facility] per family request and admitted to [facility]. He was treated with IV lasix, cardiology consulted. 11/22 RR was called for uncontrollable epistaxis and patient found unresponsive, he was intubated and transferred to ICU.     Interval ICU Events:  11/22: Admitted to MICU-1 s/p intubation. Peak pressure 45-50 on ventilator, Dr Bush performed bedside bronch, clearance of small bloody secretions, ABG/CXR obtained unchanged from previous no PTX, ventilator equipment subsequently exchanged. Sedation with versed infusion. Dr Amado at bedside, insertion of nasal trumpet and clearance of large clot. Vasopressor requirement increasing, norepineprhine/vaso.    Medical History:  Past Medical History:   Diagnosis Date    Encounter for preprocedural cardiovascular examination     Preop cardiovascular exam    Encounter for screening for lipoid disorders     Screening for lipoid disorders    Personal history of other diseases of the circulatory system 10/05/2021    History of cardiac murmur    Personal history of other endocrine, nutritional and metabolic disease     History of hypokalemia    Personal history of other endocrine,  nutritional and metabolic disease     History of obesity    Personal history of other endocrine, nutritional and metabolic disease 08/03/2022    History of obesity    Personal history of other specified conditions     History of chest pain    Personal history of other specified conditions     History of fatigue    Personal history of other specified conditions     History of palpitations    Presence of cardiac pacemaker     History of cardiac pacemaker    Repeated falls     Multiple falls    Tinnitus, left ear 10/05/2021    Tinnitus of left ear     Past Surgical History:   Procedure Laterality Date    CARDIAC VALVE REPLACEMENT      CORONARY ARTERY BYPASS GRAFT      INSERT / REPLACE / REMOVE PACEMAKER      OTHER SURGICAL HISTORY  10/05/2021    Shoulder surgery    OTHER SURGICAL HISTORY  10/05/2021    Knee replacement    OTHER SURGICAL HISTORY  10/05/2021    Cataract surgery    OTHER SURGICAL HISTORY  10/05/2021    Breast surgery    OTHER SURGICAL HISTORY  10/05/2021    Pacemaker insertion    OTHER SURGICAL HISTORY  10/05/2021    Appendectomy    OTHER SURGICAL HISTORY  10/05/2021    Sinus surgery    OTHER SURGICAL HISTORY  10/05/2021    Uvulectomy    OTHER SURGICAL HISTORY  01/28/2022    Colonoscopy    OTHER SURGICAL HISTORY  01/28/2022    Kidney surgery    OTHER SURGICAL HISTORY  01/28/2022    Throat surgery    OTHER SURGICAL HISTORY  01/28/2022    Nose surgery     Medications Prior to Admission   Medication Sig Dispense Refill Last Dose/Taking    albuterol 90 mcg/actuation inhaler Inhale 2 puffs every 6 hours if needed.   Past Month    furosemide (Lasix) 40 mg tablet Take 1 tablet (40 mg) by mouth early in the morning..   11/18/2024    lactulose 10 gram/15 mL solution TAKE 30 ML BY MOUTH TWICE DAILY AS NEEDED   Past Week    potassium chloride (Klor-Con) 20 mEq packet Take 20 mEq by mouth once daily.   Past Week    Symbicort 160-4.5 mcg/actuation inhaler Inhale 1 puff 2 times a day.   Past Week    traZODone (Desyrel)  50 mg tablet Take 1 tablet (50 mg) by mouth once daily at bedtime. PRN   Past Week    albuterol 2.5 mg /3 mL (0.083 %) nebulizer solution Inhale. Use as directed PRN       atorvastatin (Lipitor) 40 mg tablet Take 1 tablet (40 mg) by mouth once daily at bedtime.   11/18/2024    carvedilol (Coreg) 3.125 mg tablet Take 1 tablet (3.125 mg) by mouth 2 times a day. 180 tablet 3 11/18/2024    Eliquis 5 mg tablet Take 1 tablet (5 mg) by mouth 2 times a day. 180 tablet 3 11/18/2024    empagliflozin (Jardiance) 10 mg Take 1 tablet (10 mg) by mouth once daily.   11/18/2024    lamoTRIgine (LaMICtal) 100 mg tablet Take 1 tablet (100 mg) by mouth once daily at bedtime.   11/18/2024    levothyroxine (Synthroid, Levoxyl) 88 mcg tablet Take 1 tablet (88 mcg) by mouth once daily.   11/18/2024    morphine CR (MS Contin) 30 mg 12 hr tablet Take 1 tablet (30 mg) by mouth every 8 hours. PRN   11/18/2024    omeprazole OTC (PriLOSEC OTC) 20 mg EC tablet Take 1 tablet (20 mg) by mouth once daily in the morning. Take before meals. Do not crush, chew, or split.   11/18/2024    Oxervate 0.002 % ophthalmic solution    11/18/2024    solifenacin (VESIcare) 5 mg tablet Take 1 tablet (5 mg) by mouth once daily.   11/18/2024    spironolactone (Aldactone) 25 mg tablet Take 1 tablet (25 mg) by mouth once daily.   11/18/2024     Patient has no known allergies.  Social History     Tobacco Use    Smoking status: Former     Types: Cigarettes    Smokeless tobacco: Never   Substance Use Topics    Alcohol use: Not Currently    Drug use: Not Currently     Family History   Problem Relation Name Age of Onset    Heart failure Mother      Other (asbestosis) Father      Lung disease Father      Thyroid disease Sister         Review of Systems:  14 point review of systems was completed and negative except for those specially mention in my HPI    Physical Exam:    Heart Rate:  []   Temp:  [36 °C (96.8 °F)-36.2 °C (97.2 °F)]   Resp:  [16-40]   BP:  ()/(34-93)   Weight:  [79.4 kg (175 lb 0.7 oz)]   SpO2:  [63 %-100 %]     Physical Exam  Vitals and nursing note reviewed.   Constitutional:       General: He is in acute distress.      Appearance: He is toxic-appearing.   HENT:      Mouth/Throat:      Pharynx: Oropharynx is clear.   Eyes:      Extraocular Movements: Extraocular movements intact.      Pupils: Pupils are equal, round, and reactive to light.   Cardiovascular:      Rate and Rhythm: Regular rhythm. Tachycardia present.      Pulses: Normal pulses.      Heart sounds: Normal heart sounds.      Comments: AV paced  Pulmonary:      Effort: Respiratory distress present.      Breath sounds: Rhonchi present.   Abdominal:      General: There is no distension.      Palpations: Abdomen is soft.      Tenderness: There is no abdominal tenderness.   Musculoskeletal:         General: Swelling present.      Left lower leg: Edema present.   Skin:     General: Skin is warm.      Capillary Refill: Capillary refill takes less than 2 seconds.      Findings: Bruising present.   Neurological:      General: No focal deficit present.         Objective:    I have reviewed all medications, laboratory results, and imaging pertinent for today's encounter    Assessment/Plan:    I am currently managing this critically ill patient for the following problems:    Neuro/Psych/Pain Ctrl/Sedation:  Chronic pain  CVA  CTH negative  Sedation: Versed, Precedex  PRN Morphine  SAT when stable    Respiratory/ENT:  COPD  Chronic hypoxic respiratory failure  C/F Aspiration  Intubated, vent adjustment for high peak pressures  S/P Bronch 11/22  Trend ABG  Duoneb Q4    Cardiovascular:  Circulatory shock hypovolemic vs hemorrhagic  AVR 3/2024, PPM  Diastolic heart failure  Afib on Eliquis  Hold AC in the setting of acute bleed  Maintain Vasopressors: Norepi/Vaso  Initiate SDS  Maintain MAP >65  CXR: right sided pleural effusion  Cardiology following    GI:  GERD  NPO  OG to LIWS    Renal/Volume  Status (Intra & Extravascular):  Monitor UOP  S/P IVF during resuscitation  Daily RFP, Mg    Endocrine  Hypothyroidism  Continue synthroid  POCT Q6 while NPO    Infectious Disease:  C/F Sepsis  Pneumonia, ARDS  Lactate normal  UA negative  BC pending  MRSA pending  Sputum pending  Continue broad spectrum Vanc/Zosyn    Heme/Onc:  Epistaxis  ENT consulted  Nasal trumpet inserted 11/22, maintain until extubated  Afrin  Hgb stable  T&S complete    MSK:  Nystatin to groin  PT/OT when able to participate    Ethics/Code Status:  DNRCCA  Wife and son updated at bedside    :  DVT Prophylaxis: None  GI Prophylaxis: PPI  Bowel Regimen: None  Diet: NPO  CVC: [facility] TLC 11/22  Danni: Right radial 11/22  Mandel: External  Restraints: BSW  Dispo: ICU    Critical Care Time:  65 minutes spent in preparing to see patient (I.e. review of medical records), evaluation of diagnostics (I.e. labs, imaging, etc.), documentation, discussing plan of care with patient/ family/ caregiver, and/ or coordination of care with multidisciplinary team. Time does not include completion of procedure time.      Magda Sun, APRN-CNP

## 2024-11-23 PROBLEM — R04.0 EPISTAXIS: Status: ACTIVE | Noted: 2024-01-01

## 2024-11-23 LAB
ALBUMIN SERPL BCP-MCNC: 2.5 G/DL (ref 3.4–5)
ALP SERPL-CCNC: 89 U/L (ref 33–136)
ALT SERPL W P-5'-P-CCNC: 12 U/L (ref 10–52)
ANION GAP BLDA CALCULATED.4IONS-SCNC: -4 MMO/L (ref 10–25)
ANION GAP BLDA CALCULATED.4IONS-SCNC: -6 MMO/L (ref 10–25)
ANION GAP SERPL CALC-SCNC: 9 MMOL/L (ref 10–20)
AST SERPL W P-5'-P-CCNC: 24 U/L (ref 9–39)
BASE EXCESS BLDA CALC-SCNC: 25.1 MMOL/L (ref -2–3)
BASE EXCESS BLDA CALC-SCNC: 25.5 MMOL/L (ref -2–3)
BILIRUB SERPL-MCNC: 1.2 MG/DL (ref 0–1.2)
BODY TEMPERATURE: ABNORMAL
BODY TEMPERATURE: ABNORMAL
BUN SERPL-MCNC: 33 MG/DL (ref 6–23)
CA-I BLDA-SCNC: 1.18 MMOL/L (ref 1.1–1.33)
CA-I BLDA-SCNC: 1.19 MMOL/L (ref 1.1–1.33)
CALCIUM SERPL-MCNC: 8.7 MG/DL (ref 8.6–10.3)
CHLORIDE BLDA-SCNC: 90 MMOL/L (ref 98–107)
CHLORIDE BLDA-SCNC: 90 MMOL/L (ref 98–107)
CHLORIDE SERPL-SCNC: 84 MMOL/L (ref 98–107)
CO2 SERPL-SCNC: 45 MMOL/L (ref 21–32)
CREAT SERPL-MCNC: 1.08 MG/DL (ref 0.5–1.3)
CRITICAL CALL TIME: 541
CRITICAL CALLED BY: ABNORMAL
CRITICAL CALLED TO: ABNORMAL
CRITICAL READ BACK: ABNORMAL
EGFRCR SERPLBLD CKD-EPI 2021: 73 ML/MIN/1.73M*2
ERYTHROCYTE [DISTWIDTH] IN BLOOD BY AUTOMATED COUNT: 16 % (ref 11.5–14.5)
GLUCOSE BLDA-MCNC: 160 MG/DL (ref 74–99)
GLUCOSE BLDA-MCNC: 164 MG/DL (ref 74–99)
GLUCOSE SERPL-MCNC: 156 MG/DL (ref 74–99)
HCO3 BLDA-SCNC: 52.2 MMOL/L (ref 22–26)
HCO3 BLDA-SCNC: 52.3 MMOL/L (ref 22–26)
HCT VFR BLD AUTO: 23.7 % (ref 41–52)
HCT VFR BLD EST: 24 % (ref 41–52)
HCT VFR BLD EST: 30 % (ref 41–52)
HGB BLD-MCNC: 7.7 G/DL (ref 13.5–17.5)
HGB BLDA-MCNC: 8.1 G/DL (ref 13.5–17.5)
HGB BLDA-MCNC: 9.9 G/DL (ref 13.5–17.5)
INHALED O2 CONCENTRATION: 40 %
INHALED O2 CONCENTRATION: 40 %
LACTATE BLDA-SCNC: 0.7 MMOL/L (ref 0.4–2)
LACTATE BLDA-SCNC: 0.9 MMOL/L (ref 0.4–2)
MAGNESIUM SERPL-MCNC: 1.62 MG/DL (ref 1.6–2.4)
MCH RBC QN AUTO: 30.8 PG (ref 26–34)
MCHC RBC AUTO-ENTMCNC: 32.5 G/DL (ref 32–36)
MCV RBC AUTO: 95 FL (ref 80–100)
MRSA DNA SPEC QL NAA+PROBE: NOT DETECTED
NRBC BLD-RTO: 0 /100 WBCS (ref 0–0)
OXYHGB MFR BLDA: 97 % (ref 94–98)
OXYHGB MFR BLDA: 97.1 % (ref 94–98)
PCO2 BLDA: 67 MM HG (ref 38–42)
PCO2 BLDA: 77 MM HG (ref 38–42)
PH BLDA: 7.44 PH (ref 7.38–7.42)
PH BLDA: 7.5 PH (ref 7.38–7.42)
PLATELET # BLD AUTO: 141 X10*3/UL (ref 150–450)
PO2 BLDA: 113 MM HG (ref 85–95)
PO2 BLDA: 136 MM HG (ref 85–95)
POTASSIUM BLDA-SCNC: 4 MMOL/L (ref 3.5–5.3)
POTASSIUM BLDA-SCNC: 4 MMOL/L (ref 3.5–5.3)
POTASSIUM SERPL-SCNC: 3.8 MMOL/L (ref 3.5–5.3)
PROCALCITONIN SERPL-MCNC: 0.64 NG/ML
PROT SERPL-MCNC: 7 G/DL (ref 6.4–8.2)
RBC # BLD AUTO: 2.5 X10*6/UL (ref 4.5–5.9)
SAO2 % BLDA: 100 % (ref 94–100)
SAO2 % BLDA: 98 % (ref 94–100)
SODIUM BLDA-SCNC: 132 MMOL/L (ref 136–145)
SODIUM BLDA-SCNC: 134 MMOL/L (ref 136–145)
SODIUM SERPL-SCNC: 134 MMOL/L (ref 136–145)
VANCOMYCIN SERPL-MCNC: 19.7 UG/ML (ref 5–20)
VANCOMYCIN SERPL-MCNC: 26.6 UG/ML (ref 5–20)
WBC # BLD AUTO: 13.6 X10*3/UL (ref 4.4–11.3)

## 2024-11-23 PROCEDURE — 2500000005 HC RX 250 GENERAL PHARMACY W/O HCPCS: Performed by: STUDENT IN AN ORGANIZED HEALTH CARE EDUCATION/TRAINING PROGRAM

## 2024-11-23 PROCEDURE — 94003 VENT MGMT INPAT SUBQ DAY: CPT

## 2024-11-23 PROCEDURE — 71045 X-RAY EXAM CHEST 1 VIEW: CPT | Performed by: RADIOLOGY

## 2024-11-23 PROCEDURE — 2500000005 HC RX 250 GENERAL PHARMACY W/O HCPCS: Performed by: NURSE PRACTITIONER

## 2024-11-23 PROCEDURE — 2020000001 HC ICU ROOM DAILY

## 2024-11-23 PROCEDURE — 84132 ASSAY OF SERUM POTASSIUM: CPT | Performed by: NURSE PRACTITIONER

## 2024-11-23 PROCEDURE — 80202 ASSAY OF VANCOMYCIN: CPT

## 2024-11-23 PROCEDURE — 83735 ASSAY OF MAGNESIUM: CPT | Performed by: NURSE PRACTITIONER

## 2024-11-23 PROCEDURE — 99291 CRITICAL CARE FIRST HOUR: CPT | Performed by: STUDENT IN AN ORGANIZED HEALTH CARE EDUCATION/TRAINING PROGRAM

## 2024-11-23 PROCEDURE — 99232 SBSQ HOSP IP/OBS MODERATE 35: CPT | Performed by: NURSE PRACTITIONER

## 2024-11-23 PROCEDURE — 2500000004 HC RX 250 GENERAL PHARMACY W/ HCPCS (ALT 636 FOR OP/ED)

## 2024-11-23 PROCEDURE — 93010 ELECTROCARDIOGRAM REPORT: CPT | Performed by: INTERNAL MEDICINE

## 2024-11-23 PROCEDURE — 2500000004 HC RX 250 GENERAL PHARMACY W/ HCPCS (ALT 636 FOR OP/ED): Performed by: STUDENT IN AN ORGANIZED HEALTH CARE EDUCATION/TRAINING PROGRAM

## 2024-11-23 PROCEDURE — 2500000002 HC RX 250 W HCPCS SELF ADMINISTERED DRUGS (ALT 637 FOR MEDICARE OP, ALT 636 FOR OP/ED)

## 2024-11-23 PROCEDURE — 2500000001 HC RX 250 WO HCPCS SELF ADMINISTERED DRUGS (ALT 637 FOR MEDICARE OP)

## 2024-11-23 PROCEDURE — 94640 AIRWAY INHALATION TREATMENT: CPT

## 2024-11-23 PROCEDURE — 37799 UNLISTED PX VASCULAR SURGERY: CPT | Performed by: NURSE PRACTITIONER

## 2024-11-23 PROCEDURE — 2500000004 HC RX 250 GENERAL PHARMACY W/ HCPCS (ALT 636 FOR OP/ED): Performed by: NURSE PRACTITIONER

## 2024-11-23 PROCEDURE — 85027 COMPLETE CBC AUTOMATED: CPT | Performed by: NURSE PRACTITIONER

## 2024-11-23 RX ORDER — MAGNESIUM SULFATE HEPTAHYDRATE 40 MG/ML
2 INJECTION, SOLUTION INTRAVENOUS ONCE
Status: COMPLETED | OUTPATIENT
Start: 2024-11-23 | End: 2024-11-23

## 2024-11-23 RX ORDER — POTASSIUM CHLORIDE 14.9 MG/ML
20 INJECTION INTRAVENOUS ONCE
Status: COMPLETED | OUTPATIENT
Start: 2024-11-23 | End: 2024-11-23

## 2024-11-23 RX ORDER — PROPOFOL 10 MG/ML
0-50 INJECTION, EMULSION INTRAVENOUS CONTINUOUS
Status: DISCONTINUED | OUTPATIENT
Start: 2024-11-23 | End: 2024-11-25

## 2024-11-23 RX ORDER — NOREPINEPHRINE BITARTRATE/D5W 8 MG/250ML
0-.5 PLASTIC BAG, INJECTION (ML) INTRAVENOUS CONTINUOUS
Status: DISCONTINUED | OUTPATIENT
Start: 2024-11-23 | End: 2024-11-24

## 2024-11-23 RX ORDER — FENTANYL CITRATE-0.9 % NACL/PF 10 MCG/ML
0-200 PLASTIC BAG, INJECTION (ML) INTRAVENOUS CONTINUOUS
Status: DISCONTINUED | OUTPATIENT
Start: 2024-11-23 | End: 2024-11-25

## 2024-11-23 ASSESSMENT — PAIN - FUNCTIONAL ASSESSMENT: PAIN_FUNCTIONAL_ASSESSMENT: CPOT (CRITICAL CARE PAIN OBSERVATION TOOL)

## 2024-11-23 ASSESSMENT — PAIN SCALES - GENERAL: PAINLEVEL_OUTOF10: 0 - NO PAIN

## 2024-11-23 NOTE — CARE PLAN
The patient's goals for the shift include      The clinical goals for the shift include Patient will remain safe throughout safe. Patient will maintain map greater than 60    Over the shift, the patient did not make progress toward the following goals.  Recommendations to address these barriers include continuing to montior vital signs and adjust drips as ordered.

## 2024-11-23 NOTE — PROGRESS NOTES
[facility] Heart Progress Note           Rounding CLAUDIA/Cardiologist:  Vinicio Godinez, APRN-WINNIE, Dr. Mayberyr  Primary Cardiologist: Dr. Armani Mayberry    Date:  11/23/2024  Patient:  Antony Goff  YOB: 1950  MRN:  [id number]   Admit Date:  11/19/2024      SUBJECTIVE:    11/23/24  Debated and sedated in the ICU  Apparently yesterday patient developed severe epistaxis intubated taken to the ICU currently on pressors  Telemetry is paced  Slowly wean off pressors    11/22/24  Eval at bedside this morning.  Denies any complaints.  Attempting to eat breakfast.  Remains on O2.  Telemetry shows dual paced rhythm.  Fluid balance -1660    11/21/24  Patient is awake and alert sitting up in chair answering all my questions this morning.  He states that his shortness of breath is very much improved.  Well-informed of plan of care.  He states he wants to go home in the next day or 2.  EKG dual paced  Telemetry is paced  Daily weights ordered    Cardiac history  8/24 echo  CONCLUSIONS:   - Exam indication: Limited echo - Re-evaluation of heart failure   - Left ventricular systolic function is normal. EF = 60 ± 5% (visual est.)   - The right ventricle is dilated. Right ventricular systolic function is low   normal.   - The visualized aorta is dilated with a maximal dimension of 4.1 cm.   - There is moderate (2+) tricuspid valve regurgitation.   - Epic prosthetic aortic valve (size #27). The peak gradient is 18 mmHg, the mean   gradient is 9 mmHg and the dimensionless valve index is 0.79. Prior peak/mean   gradients were 21/11 mmHg.   - Estimated right ventricular systolic pressure is 59 mmHg consistent with   moderate pulmonary hypertension. Estimated right atrial pressure is 3 mmHg based   on IVC assessment. Estimated right ventricular systolic pressure is 59 mmHg   consistent with moderate pulmonary hypertension. Estimated right atrial pressure   is 3 mmHg based on IVC assessment.   - Exam was compared  with the prior CC echocardiographic exam performed on   04/26/2024. There is no significant changes.      3/28/24  Operations during Hospitalization:   3/28/2024: AVR (#27 Epic plus), LAAC, left side MAZE Procedure, Epicardial lead LV pacing lead tunneled to left chest and generator exchanged for CRT-P      VITALS:     Vitals:    11/23/24 1000 11/23/24 1017 11/23/24 1018 11/23/24 1117   BP:       Pulse: 96 96 96 96   Resp: 16 16 15 18   Temp:       TempSrc:       SpO2: 97% 97% 97% 97%   Weight:       Height:           Intake/Output Summary (Last 24 hours) at 11/23/2024 1225  Last data filed at 11/23/2024 1154  Gross per 24 hour   Intake 1313.48 ml   Output 650 ml   Net 663.48 ml       Wt Readings from Last 4 Encounters:   11/23/24 83.9 kg (184 lb 15.5 oz)   02/07/24 124 kg (274 lb)   02/01/23 124 kg (274 lb)   08/03/22 130 kg (287 lb)       CURRENT HOSPITAL MEDICATIONS:   [Held by provider] apixaban, 5 mg, oral, BID  [Held by provider] atorvastatin, 40 mg, oral, Nightly  [Held by provider] carvedilol, 3.125 mg, oral, BID  [Held by provider] empagliflozin, 10 mg, oral, Daily  [Held by provider] furosemide, 40 mg, intravenous, q12h  hydrocortisone sodium succinate, 50 mg, intravenous, q8h KENDAL  ipratropium-albuteroL, 3 mL, nebulization, q4h  [Held by provider] lamoTRIgine, 100 mg, oral, Nightly  levothyroxine, 38 mcg, intravenous, q24h KENDAL  [Held by provider] levothyroxine, 88 mcg, oral, Daily  [Held by provider] morphine CR, 30 mg, oral, q12h KENDAL  nystatin, 1 Application, Topical, BID  oxybutynin, 5 mg, oral, BID  oxygen, , inhalation, Continuous - Inhalation  oxymetazoline, 2 spray, Each Nostril, q6h  pantoprazole, 40 mg, oral, Daily before breakfast   Or  pantoprazole, 40 mg, intravenous, Daily before breakfast  piperacillin-tazobactam, 3.375 g, intravenous, q8h  potassium chloride, 20 mEq, oral, Daily  [Held by provider] spironolactone, 25 mg, oral, Daily  [Held by provider] traZODone, 50 mg, oral, Nightly  [Held  by provider] valsartan, 40 mg, oral, Daily  vancomycin, 1,250 mg, intravenous, q24h      dexmedeTOMIDine, 0-1.5 mcg/kg/hr, Last Rate: 0.84 mcg/kg/hr (11/23/24 1016)  fentaNYL, 0-200 mcg/hr, Last Rate: 25 mcg/hr (11/23/24 0939)  norepinephrine, 0-0.5 mcg/kg/min, Last Rate: 0.09 mcg/kg/min (11/23/24 0917)  propofol, 0-50 mcg/kg/min, Last Rate: 25 mcg/kg/min (11/23/24 0613)      Current Outpatient Medications   Medication Instructions    albuterol 2.5 mg /3 mL (0.083 %) nebulizer solution inhalation, Use as directed PRN    albuterol 90 mcg/actuation inhaler 2 puffs, Every 6 hours PRN    atorvastatin (Lipitor) 40 mg tablet 1 tablet, oral, Nightly    carvedilol (COREG) 3.125 mg, oral, 2 times daily    Eliquis 5 mg, oral, 2 times daily    empagliflozin (Jardiance) 10 mg 1 tablet, oral, Daily    furosemide (Lasix) 40 mg tablet 1 tablet, Daily (0630)    lactulose 10 gram/15 mL solution TAKE 30 ML BY MOUTH TWICE DAILY AS NEEDED    lamoTRIgine (LaMICtal) 100 mg tablet 1 tablet, oral, Nightly    levothyroxine (SYNTHROID, LEVOXYL) 88 mcg, oral, Daily    morphine CR (MS CONTIN) 30 mg, oral, Every 8 hours, PRN    omeprazole OTC (PRILOSEC OTC) 20 mg, oral, Daily before breakfast, Do not crush, chew, or split.    Oxervate 0.002 % ophthalmic solution     potassium chloride (Klor-Con) 20 mEq packet 20 mEq, Daily    solifenacin (VESICARE) 5 mg, oral, Daily    spironolactone (Aldactone) 25 mg tablet 1 tablet, oral, Daily    Symbicort 160-4.5 mcg/actuation inhaler 1 puff, 2 times daily    traZODone (DESYREL) 50 mg, Nightly        PHYSICAL EXAMINATION:   GENERAL APPEARANCE: Intubated and sedated  CHEST: Symmetric and non-tender.  INTEGUMENT: Skin warm and dry, without gross excoriationis or lesions.  HEENT: No gross abnormalities of conjunctiva, teeth, gums, oral mucosa  NECK: Supple, no JVD, no bruit. Thyroid not palpable. Carotid upstrokes normal.  NEURO/PSHCY: Intubated and sedated  LUNGS: Very diminished scattered Rales left  side  HEART: S1, S2 regular with 2 out of 6 systolic murmur  ABDOMEN: Soft, nontender, no palpable hepatosplenomegaly, no mases, no bruits. Abdominal aorta not noted to be enlarged.  EXTREMITIES: Warm with good color, no clubbing or cyanois.  1+ edema  PERIPHERAL VASCULAR: Pulses present and equally palpable; 1+ throughout. No femoral bruits      LAB DATA:     CBC:   Results from last 7 days   Lab Units 11/23/24  0402 11/22/24  1020 11/22/24  0435   WBC AUTO x10*3/uL 13.6* 7.8 8.4   RBC AUTO x10*6/uL 2.50* 2.72* 2.60*   HEMOGLOBIN g/dL 7.7* 8.4* 7.9*   HEMATOCRIT % 23.7* 26.5* 24.7*   MCV fL 95 97 95   MCH pg 30.8 30.9 30.4   MCHC g/dL 32.5 31.7* 32.0   RDW % 16.0* 16.0* 15.9*   PLATELETS AUTO x10*3/uL 141* 162 155     CMP:    Results from last 7 days   Lab Units 11/23/24  0402 11/22/24  1553 11/22/24  0435 11/20/24  1522 11/19/24  0919   SODIUM mmol/L 134* 136 136   < > 135*   POTASSIUM mmol/L 3.8 3.8 4.2   < > 4.3   CHLORIDE mmol/L 84* 85* 81*   < > 80*   CO2 mmol/L 45* >45* >45*   < > >45*   BUN mg/dL 33* 39* 45*   < > 55*   CREATININE mg/dL 1.08 1.09 1.20   < > 1.44*   GLUCOSE mg/dL 156* 118* 104*   < > 138*   PROTEIN TOTAL g/dL 7.0 7.0  --   --  8.1   CALCIUM mg/dL 8.7 8.8 9.3   < > 9.4   BILIRUBIN TOTAL mg/dL 1.2 1.4*  --   --  1.3*   ALK PHOS U/L 89 94  --   --  106   AST U/L 24 26  --   --  28   ALT U/L 12 13  --   --  14    < > = values in this interval not displayed.     BMP:    Results from last 7 days   Lab Units 11/23/24  0402 11/22/24  1553 11/22/24  0435   SODIUM mmol/L 134* 136 136   POTASSIUM mmol/L 3.8 3.8 4.2   CHLORIDE mmol/L 84* 85* 81*   CO2 mmol/L 45* >45* >45*   BUN mg/dL 33* 39* 45*   CREATININE mg/dL 1.08 1.09 1.20   CALCIUM mg/dL 8.7 8.8 9.3   GLUCOSE mg/dL 156* 118* 104*     Magnesium:  Results from last 7 days   Lab Units 11/23/24  0402 11/22/24  0435 11/21/24  1015   MAGNESIUM mg/dL 1.62 1.69 1.77     Troponin:    Results from last 7 days   Lab Units 11/20/24  1522   TROPHS ng/L 38*      BNP:   Results from last 7 days   Lab Units 11/20/24  1522   BNP pg/mL 337*     Lipid Panel:         DIAGNOSTIC TESTING:   @No results found for this or any previous visit.    ECG 12 Lead    Result Date: 11/20/2024  AV dual-paced rhythm Abnormal ECG When compared with ECG of 20-NOV-2024 15:27, (unconfirmed) Premature ventricular complexes are no longer Present Vent. rate has decreased BY  13 BPM    XR chest 2 views    Result Date: 11/20/2024  Interpreted By:  Schoenberger, Joseph, STUDY: XR CHEST 2 VIEWS;  11/20/2024 3:07 pm   INDICATION: Signs/Symptoms:sob.     COMPARISON: 03/09/2015   ACCESSION NUMBER(S): [id number]   ORDERING CLINICIAN: WANDA HUANG   FINDINGS: Left subclavian transvenous pacemaker unchanged from prior.       CARDIOMEDIASTINAL SILHOUETTE: Cardiac silhouette remains mildly enlarged. There is persistent or recurrent venous congestion. There are bilateral small pleural effusions with likely some interstitial edema.   LUNGS: There is a rather large opacity in the lower left hemithorax. On the AP radiograph, its contours somewhat rounded in a mass is a consideration though this appearance is not the noted on the lateral radiograph. However, the left hemidiaphragm is also elevated possibly exaggerating this appearance. At minimum, follow-up radiographs are recommended as a the left basal mass is a consideration.   ABDOMEN: No remarkable upper abdominal findings.   BONES: No acute osseous changes.       1.  Moderate congestive failure with interstitial edema. Small bilateral pleural effusions 2. Left basal opacity which on the AP radiograph appears to have somewhat rounded contour superiorly. Underlying mass is a consideration. At a minimum, radiographic follow-up is recommended.       MACRO: None   Signed by: Joseph Schoenberger 11/20/2024 3:13 PM Dictation workstation:   [id number]       XR chest 1 view   Final Result   1.  Improving findings compared to prior exam as discussed above                   MACRO:   None        Signed by: Joseph Schoenberger 11/23/2024 12:11 PM   Dictation workstation:   [id number]      XR chest 1 view   Final Result   1.  Satisfactory appearance post left jugular venous catheter   placement.   2. Progression of pleural fluids in basal airspace opacities possibly   progressing pneumonia edema.                  MACRO:   None        Signed by: Joseph Schoenberger 11/22/2024 3:02 PM   Dictation workstation:   [id number]      XR chest 1 view   Final Result   1.  Some improvement in left basal opacity.   2. Satisfactory appearance post intubation.                  MACRO:   None        Signed by: Joseph Schoenberger 11/22/2024 1:20 PM   Dictation workstation:   [id number]      CT head wo IV contrast   Final Result   No acute intracranial abnormality or mass effect.        This study was interpreted at [facility].        MACRO:   None        Signed by: Dontae Lujan 11/22/2024 10:15 AM   Dictation workstation:   [id number]      CT chest wo IV contrast   Final Result   Rather than a nodule or mass, I suspect a postobstructive pneumonia   in the left lower lobe. Mucous plugging or aspirate in the left lower   lobe bronchus and majority of its segmental branches with   consolidation and atelectasis towards the left lung base in the lower   lobe and a more nodular distal airways pneumonia in the more superior   left lower lobe that remains fairly well inflated        In the lateral segment middle lobe, another suspected pneumonia along   with some atelectasis, but no upstream large airway abnormality to   account for it        Diffuse airspace ground-glass change in both hemithoraces is probably   a separate process, possibly interstitial and alveolar edema or a   diffuse atypical pneumonia        Mediastinal adenopathy of uncertain etiology        Small bilateral pleural effusions probably too small for   thoracocentesis on both sides.  Right side may be partially loculated   as it tracks into the major fissure        Included upper abdomen shows a markedly cirrhotic liver which has   progressed in nodularity and shrinkage since CT chest 9 March 2015.   Gallstones also present        MACRO:   None        Signed by: Ricky Squires 11/21/2024 2:12 PM   Dictation workstation:   [id number]      Cardiac Device Check - Inpatient         XR chest 2 views   Final Result   1.  Moderate congestive failure with interstitial edema. Small   bilateral pleural effusions   2. Left basal opacity which on the AP radiograph appears to have   somewhat rounded contour superiorly. Underlying mass is a   consideration. At a minimum, radiographic follow-up is recommended.                  MACRO:   None        Signed by: Joseph Schoenberger 11/20/2024 3:13 PM   Dictation workstation:   [id number]          No echocardiogram results found for the past 14 days    RADIOLOGY:     XR chest 1 view   Final Result   1.  Improving findings compared to prior exam as discussed above                  MACRO:   None        Signed by: Joseph Schoenberger 11/23/2024 12:11 PM   Dictation workstation:   [id number]      XR chest 1 view   Final Result   1.  Satisfactory appearance post left jugular venous catheter   placement.   2. Progression of pleural fluids in basal airspace opacities possibly   progressing pneumonia edema.                  MACRO:   None        Signed by: Joseph Schoenberger 11/22/2024 3:02 PM   Dictation workstation:   [id number]      XR chest 1 view   Final Result   1.  Some improvement in left basal opacity.   2. Satisfactory appearance post intubation.                  MACRO:   None        Signed by: Joseph Schoenberger 11/22/2024 1:20 PM   Dictation workstation:   [id number]      CT head wo IV contrast   Final Result   No acute intracranial abnormality or mass effect.        This study was interpreted at [facility].         MACRO:   None        Signed by: Dontae Lujan 11/22/2024 10:15 AM   Dictation workstation:   [id number]      CT chest wo IV contrast   Final Result   Rather than a nodule or mass, I suspect a postobstructive pneumonia   in the left lower lobe. Mucous plugging or aspirate in the left lower   lobe bronchus and majority of its segmental branches with   consolidation and atelectasis towards the left lung base in the lower   lobe and a more nodular distal airways pneumonia in the more superior   left lower lobe that remains fairly well inflated        In the lateral segment middle lobe, another suspected pneumonia along   with some atelectasis, but no upstream large airway abnormality to   account for it        Diffuse airspace ground-glass change in both hemithoraces is probably   a separate process, possibly interstitial and alveolar edema or a   diffuse atypical pneumonia        Mediastinal adenopathy of uncertain etiology        Small bilateral pleural effusions probably too small for   thoracocentesis on both sides. Right side may be partially loculated   as it tracks into the major fissure        Included upper abdomen shows a markedly cirrhotic liver which has   progressed in nodularity and shrinkage since CT chest 9 March 2015.   Gallstones also present        MACRO:   None        Signed by: Ricky Squires 11/21/2024 2:12 PM   Dictation workstation:   [id number]      Cardiac Device Check - Inpatient         XR chest 2 views   Final Result   1.  Moderate congestive failure with interstitial edema. Small   bilateral pleural effusions   2. Left basal opacity which on the AP radiograph appears to have   somewhat rounded contour superiorly. Underlying mass is a   consideration. At a minimum, radiographic follow-up is recommended.                  MACRO:   None        Signed by: Joseph Schoenberger 11/20/2024 3:13 PM   Dictation workstation:   [id number]          PROBLEM LIST     Patient Active Problem List    Diagnosis    Benign essential hypertension    Dizziness    Paroxysmal atrial fibrillation (Multi)    Pacemaker    Prolonged QT interval    Renal failure    Sleep apnea    Syncope    Shortness of breath    Blepharoconjunctivitis of left eye    Central corneal ulcer of left eye    Combined forms of age-related cataract of left eye    Congestive heart failure    Dry eye syndrome of both eyes    History of surgical procedure    HSV (herpes simplex virus) dendritic keratitis    Hypogonadism male    Hypothyroidism    Keratoconjunctivitis sicca    Hyperopia of both eyes with astigmatism and presbyopia    Meibomian gland dysfunction (MGD) of upper and lower lids of both eyes    Vitreous floaters of both eyes    Morbid obesity due to excess calories (Multi)    Neurotrophic keratitis    Neurotrophic keratoconjunctivitis, left eye    Nocturnal polyuria    Other cirrhosis of liver    Panic attack    Persistent epithelial defect of cornea    Pseudophakia of both eyes    Chronic obstructive pulmonary disease (Multi)    Pulmonary emphysema (Multi)    Punctate keratitis of both eyes    Punctate keratitis of left eye    Recurrent corneal erosion, left    Recurrent erosion of both corneas    Ring corneal ulcer of right eye    Sick sinus syndrome (Multi)    Status post total left knee replacement    Urinary urgency    Arthritis    Vasculitis (CMS-HCC)    Dizziness and giddiness    Presence of cardiac pacemaker    Chronic kidney disease    KANCHAN (obstructive sleep apnea)    Syncope and collapse    S/P AVR (aortic valve replacement)    Abnormal CT scan    Anxiety    Atelectasis    CHF (congestive heart failure), NYHA class I, acute on chronic, combined    Heart murmur    History of CVA (cerebrovascular accident)    Pulmonary HTN (Multi)    Pressure injury of left buttock, stage 3 (Multi)    Severe aortic regurgitation    Recurrent falls    Venous stasis dermatitis of both lower extremities    Tinnitus    Stress hyperglycemia    Acute  combined systolic and diastolic HF (heart failure)    Epistaxis       ASSESSMENT:   Acute on chronic combined systolic and diastolic heart failure NYHA class III  History of aortic valve replacement  PAF pacemaker interrogation showed no episodes of atrial fibs or atrial tachycardia  History of CVA  EF 55% 8/24  Hypertension  Dyslipidemia  History of PPM        PLAN:   I have personally interviewed and examined the patient.   I have personally and independently reviewed labs and diagnostic testing.  I have personally verified the elements of the history and physical listed above and changes, if any, are noted.  74-year-old gentleman with a history of severe aortic stenosis status post AVR with a bioprosthetic tissue valve at [facility] in March 2024, hypertension, dyslipidemia, CVA with some residual confusion, GERD and other comorbidities as listed above.  He presented to [facility] ER with complaints of increased shortness of breath confusion and weakness and was subsequently transferred here for further management.  Is a poor historian because of his mental state, but he did deny any ongoing chest pain.  He had a recent follow-up echocardiogram which shows a well-seated prosthetic valve in the aortic position with normal peak and mean gradients.  LV function was normal.  He denies any orthopnea paroxysmal nocturnal dyspnea.     Agree with exam as noted above.  Cardiac exam reveals distant S1 and S2 with no murmurs appreciated.  There is reduced breath sound in the lung bases bilaterally which is worse on the left compared to the right.  There is 1+ bilateral ankle edema.     ASSESSMENT AND PLAN:  1.  Increased shortness of breath: This is likely multifactorial, with possible underlying diastolic heart failure and deconditioning secondary to his CVA.  We will treat with gentle diuresis and rule out possible large pleural effusion based on his clinical exam.  He may require elective thoracocentesis if further chest imaging  reveals a large pleural effusion.  2.  Persistent atrial fibrillation: On current medications and will continue with anticoagulation for now which can be held if he requires thoracocentesis.  3.  S/p CVA with confusion: This appears to be patient's baseline, will defer to primary team for continued management.  AKA    11/21/24  Tele monitoring  PPM interrogation no AT/AF no ventricular rhythm  Eliquis 5 mg p.o. twice daily  Lipitor 40 mg daily  Coreg 3.125 mg p.o. twice daily  Jardiance 10 mg daily  Lasix 40 mg IV every 12  Aldactone 25 mg daily  Add Diovan 40 mg daily  Check magnesium level keep greater than 2.0  Strict intake and output  Daily weights  Recommend respiratory treatment    35-minute visit    Calixto Godinez [facility]    11/22/24  Continue telemetry monitoring, monitor electrolytes, keep potassium greater than 4 magnesium greater than 2  Interrogation of PPM showed no AT or AF episodes.  No ventricular rhythms identified.  Continue Eliquis.  Continue current medication regimen including GDMT for heart failure management as BP allows  Strict I's and O's and daily weights  Continue gentle diuresis  Apparently after my visit today patient had rapid response called on him for an episode of unresponsiveness and epistaxis.  Patient was intubated by ICU physician.  Transferred to ICU under their care  Will continue to follow along with you      Gerhard Oro [facility]  Adult Gerontology Acute Care Nurse Practitioner  [facility] [place]   [facility]  [phone]        Of note, this documentation is completed using the Dragon Dictation system (voice recognition software). There may be spelling and/or grammatical errors that were not corrected prior to final submission.    Please do not hesitate to call with questions.  Electronically signed by AZUL Olivarez, on 11/23/2024 at 12:25  PM  Patient seen and examined in conjunction with JACQUE De La Garza/CNP and agree with the evaluation as noted above.  Recent events noted, with patient being transferred to the ICU following an episode of hypotension and severe hypoxia requiring intubation.  This was after significant bouts of epistaxis requiring nasal packs.  His vitals are stable is intubated and sedated at this time.  We will continue with current supportive treatment, but at this time there are no active cardiac issues.  Is recent prosthetic AVR appears to be functioning normally based on his recent echocardiogram as noted above.  AKA      11/23/24  ICU monitoring  Wean off pressors  Intensivist input thank you very much  Eliquis on hold  Cardiology will continue to follow along

## 2024-11-23 NOTE — PROGRESS NOTES
Vancomycin Dosing by Pharmacy- FOLLOW UP    Antony Goff is a 74 y.o. year old male who Pharmacy has been consulted for vancomycin dosing for pneumonia. Based on the patient's indication and renal status this patient is being dosed based on a goal AUC of 400-600.     Renal function is currently stable.    Current vancomycin dose: 1500 mg given every 24 hours    Estimated vancomycin AUC on current dose: 602 mg/L.hr     Visit Vitals  BP (!) 92/43   Pulse 68   Temp 36.2 °C (97.2 °F) (Temporal)   Resp 16        Lab Results   Component Value Date    CREATININE 1.08 2024    CREATININE 1.09 2024    CREATININE 1.20 2024    CREATININE 1.17 2024        Patient weight is as follows:   Vitals:    24 0600   Weight: 83.9 kg (184 lb 15.5 oz)       Cultures:  No results found for the encounter in last 14 days.       I/O last 3 completed shifts:  In: 240 (3 mL/kg) [P.O.:240]  Out: 1900 (23.9 mL/kg) [Urine:1900 (0.7 mL/kg/hr)]  Weight: 79.4 kg   I/O during current shift:  I/O this shift:  In: 1032.7 [I.V.:832.7; IV Piggyback:200]  Out: -     Temp (24hrs), Av.1 °C (97 °F), Min:36 °C (96.8 °F), Max:36.2 °C (97.2 °F)      Assessment/Plan    Above goal AUC. Orders placed for new vancomcyin regimen of 1250 every 24 hours to begin at 1600.    This dosing regimen is predicted by [employer] to result in the following pharmacokinetic parameters:  Regimen: 1250 mg IV every 24 hours.  Start time: 16:25 on 2024  Exposure target: AUC24 (range)400-600 mg/L.hr   SWC44-11: 535 mg/L.hr  AUC24,ss: 510 mg/L.hr  Probability of AUC24 > 400: 88 %  Ctrough,ss: 12.7 mg/L  Probability of Ctrough,ss > 20: 9 %    The next level will be obtained on 24 at 0500. May be obtained sooner if clinically indicated.   Will continue to monitor renal function daily while on vancomycin and order serum creatinine at least every 48 hours if not already ordered.  Follow for continued vancomycin needs, clinical response, and  signs/symptoms of toxicity.       ESMER DUBON, PharmD

## 2024-11-23 NOTE — PROGRESS NOTES
Occupational Therapy                 Therapy Communication Note    Patient Name: Antony Goff  MRN: [id number]  Department: [facility]  Room: 01/01-A  Today's Date: 11/23/2024     Discipline: Occupational Therapy    Missed Visit Reason: Patient placed on medical hold (Patient on medical hold after rapid response and now intubated. Will re-eval when medically stable.)       Beatriz

## 2024-11-23 NOTE — NURSING NOTE
"Son Ivan called and vocalized concern about receiving a call from his uncle that the doctors' wanted to have them come up and sign papers to take his dad off of life support .Reassured son that doctor did not verbalize that to uncle. Dr Bush did state in the next couple of days trying to wean ventilator and see how his dad does. Did discuss with son about options if patient failed wean. Ivan verbalized understanding and did request that no one but him and his mom receive any type of information from the doctor's. This nurse set up a HIPAA word of \"DOPPY\". Placed in chart and notified Dr Bush of conversation and password so he was made aware. Son verbalized his thanks.   "

## 2024-11-23 NOTE — PROGRESS NOTES
[facility] Critical Care Medicine       Date:  11/23/2024  Patient:  Antony Goff  YOB: 1950  MRN:  [id number]   Admit Date:  11/19/2024  ========================================================================================================    No chief complaint on file.    History of Present Illness:  Antony Goff is a 74 y.o. year old male patient with Past Medical History of COPD, chronic hypoxic respiratory failure on 3L NC baseline O2, aortic valve stenosis s/p AVR 3/2024 at [facility], pAfib on Eliquis, remote CVA, GERD, hypothyroidism, chronic back pain on chronic opioids who presented to [facility] ER for evaluation altered mental status and increased SOB, he was determined to have COPDE and CHF exacerbation, CXR significant for pulmonary vascular congestion. He was transferred to [facility] per family request and admitted to [facility]. He was treated with IV lasix, cardiology consulted. 11/22  was called for uncontrollable epistaxis and patient found unresponsive, he was intubated and transferred to ICU.     Interval ICU Events:  11/22: Admitted to MICU-1 s/p intubation. Peak pressure 45-50 on ventilator, Dr Bush performed bedside bronch, clearance of small bloody secretions, ABG/CXR obtained unchanged from previous no PTX, ventilator equipment subsequently exchanged. Sedation with versed infusion. Dr Amado at bedside, insertion of nasal trumpet and clearance of large clot. Vasopressor requirement increasing, norepineprhine/vaso.    Medical History:  Past Medical History:   Diagnosis Date    Encounter for preprocedural cardiovascular examination     Preop cardiovascular exam    Encounter for screening for lipoid disorders     Screening for lipoid disorders    Personal history of other diseases of the circulatory system 10/05/2021    History of cardiac murmur    Personal history of other endocrine, nutritional and metabolic disease     History of hypokalemia    Personal history of other endocrine,  nutritional and metabolic disease     History of obesity    Personal history of other endocrine, nutritional and metabolic disease 08/03/2022    History of obesity    Personal history of other specified conditions     History of chest pain    Personal history of other specified conditions     History of fatigue    Personal history of other specified conditions     History of palpitations    Presence of cardiac pacemaker     History of cardiac pacemaker    Repeated falls     Multiple falls    Tinnitus, left ear 10/05/2021    Tinnitus of left ear     Past Surgical History:   Procedure Laterality Date    CARDIAC VALVE REPLACEMENT      CORONARY ARTERY BYPASS GRAFT      INSERT / REPLACE / REMOVE PACEMAKER      OTHER SURGICAL HISTORY  10/05/2021    Shoulder surgery    OTHER SURGICAL HISTORY  10/05/2021    Knee replacement    OTHER SURGICAL HISTORY  10/05/2021    Cataract surgery    OTHER SURGICAL HISTORY  10/05/2021    Breast surgery    OTHER SURGICAL HISTORY  10/05/2021    Pacemaker insertion    OTHER SURGICAL HISTORY  10/05/2021    Appendectomy    OTHER SURGICAL HISTORY  10/05/2021    Sinus surgery    OTHER SURGICAL HISTORY  10/05/2021    Uvulectomy    OTHER SURGICAL HISTORY  01/28/2022    Colonoscopy    OTHER SURGICAL HISTORY  01/28/2022    Kidney surgery    OTHER SURGICAL HISTORY  01/28/2022    Throat surgery    OTHER SURGICAL HISTORY  01/28/2022    Nose surgery     Medications Prior to Admission   Medication Sig Dispense Refill Last Dose/Taking    albuterol 90 mcg/actuation inhaler Inhale 2 puffs every 6 hours if needed.   Past Month    furosemide (Lasix) 40 mg tablet Take 1 tablet (40 mg) by mouth early in the morning..   11/18/2024    lactulose 10 gram/15 mL solution TAKE 30 ML BY MOUTH TWICE DAILY AS NEEDED   Past Week    potassium chloride (Klor-Con) 20 mEq packet Take 20 mEq by mouth once daily.   Past Week    Symbicort 160-4.5 mcg/actuation inhaler Inhale 1 puff 2 times a day.   Past Week    traZODone (Desyrel)  50 mg tablet Take 1 tablet (50 mg) by mouth once daily at bedtime. PRN   Past Week    albuterol 2.5 mg /3 mL (0.083 %) nebulizer solution Inhale. Use as directed PRN       atorvastatin (Lipitor) 40 mg tablet Take 1 tablet (40 mg) by mouth once daily at bedtime.   11/18/2024    carvedilol (Coreg) 3.125 mg tablet Take 1 tablet (3.125 mg) by mouth 2 times a day. 180 tablet 3 11/18/2024    Eliquis 5 mg tablet Take 1 tablet (5 mg) by mouth 2 times a day. 180 tablet 3 11/18/2024    empagliflozin (Jardiance) 10 mg Take 1 tablet (10 mg) by mouth once daily.   11/18/2024    lamoTRIgine (LaMICtal) 100 mg tablet Take 1 tablet (100 mg) by mouth once daily at bedtime.   11/18/2024    levothyroxine (Synthroid, Levoxyl) 88 mcg tablet Take 1 tablet (88 mcg) by mouth once daily.   11/18/2024    morphine CR (MS Contin) 30 mg 12 hr tablet Take 1 tablet (30 mg) by mouth every 8 hours. PRN   11/18/2024    omeprazole OTC (PriLOSEC OTC) 20 mg EC tablet Take 1 tablet (20 mg) by mouth once daily in the morning. Take before meals. Do not crush, chew, or split.   11/18/2024    Oxervate 0.002 % ophthalmic solution    11/18/2024    solifenacin (VESIcare) 5 mg tablet Take 1 tablet (5 mg) by mouth once daily.   11/18/2024    spironolactone (Aldactone) 25 mg tablet Take 1 tablet (25 mg) by mouth once daily.   11/18/2024     Patient has no known allergies.  Social History     Tobacco Use    Smoking status: Former     Types: Cigarettes    Smokeless tobacco: Never   Substance Use Topics    Alcohol use: Not Currently    Drug use: Not Currently     Family History   Problem Relation Name Age of Onset    Heart failure Mother      Other (asbestosis) Father      Lung disease Father      Thyroid disease Sister         Review of Systems:  14 point review of systems was completed and negative except for those specially mention in my HPI    Physical Exam:    Heart Rate:  []   Temp:  [35.6 °C (96 °F)-36.2 °C (97.2 °F)]   Resp:  [0-39]   BP:  ()/(34-58)   Weight:  [83.9 kg (184 lb 15.5 oz)]   SpO2:  [93 %-100 %]     Physical Exam  Vitals and nursing note reviewed.   Constitutional:       General: He is in acute distress.      Appearance: He is toxic-appearing.   HENT:      Mouth/Throat:      Pharynx: Oropharynx is clear.   Eyes:      Extraocular Movements: Extraocular movements intact.      Pupils: Pupils are equal, round, and reactive to light.   Cardiovascular:      Rate and Rhythm: Regular rhythm. Tachycardia present.      Pulses: Normal pulses.      Heart sounds: Normal heart sounds.      Comments: AV paced  Pulmonary:      Effort: Respiratory distress present.      Breath sounds: Rhonchi present.   Abdominal:      General: There is no distension.      Palpations: Abdomen is soft.      Tenderness: There is no abdominal tenderness.   Musculoskeletal:         General: Swelling present.      Left lower leg: Edema present.   Skin:     General: Skin is warm.      Capillary Refill: Capillary refill takes less than 2 seconds.      Findings: Bruising present.   Neurological:      General: No focal deficit present.         Objective:    I have reviewed all medications, laboratory results, and imaging pertinent for today's encounter    Assessment/Plan:    I am currently managing this critically ill patient for the following problems:    Neuro/Psych/Pain Ctrl/Sedation:  Chronic pain  CVA  CTH negative  Sedation: Stop versed, continue propofol, Precedex  Morphine gtt  SAT when stable  - Continue at least until nasal packing removed - pending 11/24    Respiratory/ENT:  COPD  Chronic hypoxic respiratory failure  C/F Aspiration  Intubated, vent adjustment for high peak pressures  S/P Bronch 11/22  Trend ABG  Duoneb Q4    Cardiovascular:  Circulatory shock - Septic  AVR 3/2024, PPM  Diastolic heart failure  Afib on Eliquis  Hold AC in the setting of acute bleed  Maintain Vasopressors: Norepi/Vaso  Initiate SDS  Maintain MAP >65  CXR: right sided pleural  effusion  Cardiology following    GI:  GERD  NPO  OG to LIWS    Renal/Volume Status (Intra & Extravascular):  Monitor UOP  S/P IVF during resuscitation  Daily RFP, Mg    Endocrine  Hypothyroidism  Continue synthroid  POCT Q6 while NPO    Infectious Disease:  C/F Sepsis  Pneumonia, ARDS  Lactate normal  UA negative  BC pending  MRSA pending  Sputum pending  Continue broad spectrum Vanc/Zosyn    Heme/Onc:  Epistaxis  ENT consulted  Nasal rocket inserted 11/22, maintain until extubated  Afrin  Hgb stable  T&S complete    MSK:  Nystatin to groin  PT/OT when able to participate    Ethics/Code Status:  DNRCCA  Wife and son updated at bedside    :  DVT Prophylaxis: None  GI Prophylaxis: PPI  Bowel Regimen: None  Diet: NPO  CVC: LIJ TLC 11/22  Danni: Right radial 11/22  Mandel: External  Restraints: BSW  Dispo: ICU    Critical Care Time:  65 minutes spent in preparing to see patient (I.e. review of medical records), evaluation of diagnostics (I.e. labs, imaging, etc.), documentation, discussing plan of care with patient/ family/ caregiver, and/ or coordination of care with multidisciplinary team. Time does not include completion of procedure time.      Brett Bush MD

## 2024-11-23 NOTE — PROGRESS NOTES
Otolaryngology Progress Note  Patient: Antony Goff  Unit/Bed: 01/01-A  YOB: 1950  MRN: [id number]  Acct: [id number]   Admitting Diagnosis: Acute combined systolic and diastolic HF (heart failure) [I50.41]  Date:  11/19/2024  Hospital Day: 4  Attending: Brett Bush MD     Complaint:  No chief complaint on file.       SUBJECTIVE    Patient is intubated and sedated.  Nursing staff tells me that there has not been any active bleeding.        VITALS   Visit Vitals  BP (!) 92/43   Pulse 68   Temp 35.6 °C (96 °F) (Temporal)   Resp 16        I/O:    Intake/Output Summary (Last 24 hours) at 11/23/2024 1109  Last data filed at 11/23/2024 1000  Gross per 24 hour   Intake 1163.48 ml   Output 650 ml   Net 513.48 ml        Allergies:  No Known Allergies     PHYSICAL EXAM   Physical Exam  Constitutional:       Appearance: He is ill-appearing.      Comments: Patient is intubated and sedated.   HENT:      Nose:      Comments: Frank is in place in the left naris.        Review of Systems  Review of Systems   All other systems reviewed and are negative.      LABS   CBC:   Results from last 7 days   Lab Units 11/23/24  0402 11/22/24  1020 11/22/24  0435   WBC AUTO x10*3/uL 13.6* 7.8 8.4   RBC AUTO x10*6/uL 2.50* 2.72* 2.60*   HEMOGLOBIN g/dL 7.7* 8.4* 7.9*   HEMATOCRIT % 23.7* 26.5* 24.7*   MCV fL 95 97 95   MCH pg 30.8 30.9 30.4   MCHC g/dL 32.5 31.7* 32.0   RDW % 16.0* 16.0* 15.9*   PLATELETS AUTO x10*3/uL 141* 162 155     CMP:    Results from last 7 days   Lab Units 11/23/24  0402 11/22/24  1553 11/22/24  0435 11/20/24  1522 11/19/24  0919   SODIUM mmol/L 134* 136 136   < > 135*   POTASSIUM mmol/L 3.8 3.8 4.2   < > 4.3   CHLORIDE mmol/L 84* 85* 81*   < > 80*   CO2 mmol/L 45* >45* >45*   < > >45*   BUN mg/dL 33* 39* 45*   < > 55*   CREATININE mg/dL 1.08 1.09 1.20   < > 1.44*   GLUCOSE mg/dL 156* 118* 104*   < > 138*   PROTEIN TOTAL g/dL 7.0 7.0  --   --  8.1   CALCIUM mg/dL 8.7 8.8 9.3   < > 9.4    BILIRUBIN TOTAL mg/dL 1.2 1.4*  --   --  1.3*   ALK PHOS U/L 89 94  --   --  106   AST U/L 24 26  --   --  28   ALT U/L 12 13  --   --  14    < > = values in this interval not displayed.     BMP:    Results from last 7 days   Lab Units 11/23/24  0402 11/22/24  1553 11/22/24  0435   SODIUM mmol/L 134* 136 136   POTASSIUM mmol/L 3.8 3.8 4.2   CHLORIDE mmol/L 84* 85* 81*   CO2 mmol/L 45* >45* >45*   BUN mg/dL 33* 39* 45*   CREATININE mg/dL 1.08 1.09 1.20   CALCIUM mg/dL 8.7 8.8 9.3   GLUCOSE mg/dL 156* 118* 104*     Magnesium:  Results from last 7 days   Lab Units 11/23/24  0402 11/22/24  0435 11/21/24  1015   MAGNESIUM mg/dL 1.62 1.69 1.77     Troponin:    Results from last 7 days   Lab Units 11/20/24  1522   TROPHS ng/L 38*     BNP:   Results from last 7 days   Lab Units 11/20/24  1522   BNP pg/mL 337*     Lipid Panel:           Current Facility-Administered Medications:     acetaminophen (Tylenol) tablet 650 mg, 650 mg, oral, q4h PRN, 650 mg at 11/19/24 2346 **OR** acetaminophen (Tylenol) oral liquid 650 mg, 650 mg, oral, q4h PRN **OR** acetaminophen (Tylenol) suppository 650 mg, 650 mg, rectal, q4h PRN, JACQUE Holland-CNP    [Held by provider] apixaban (Eliquis) tablet 5 mg, 5 mg, oral, BID, JACQUE Holland-CNP, 5 mg at 11/22/24 0857    [Held by provider] atorvastatin (Lipitor) tablet 40 mg, 40 mg, oral, Nightly, JACQUE Holland-CNP, 40 mg at 11/21/24 2033    [Held by provider] carvedilol (Coreg) tablet 3.125 mg, 3.125 mg, oral, BID, JACQUE Holland-CNP, 3.125 mg at 11/22/24 0859    dexmedeTOMIDine (Precedex) 400 mcg in 100 mL (4 mcg/mL) sodium chloride 0.9% infusion, 0-1.5 mcg/kg/hr, intravenous, Continuous, AZUL Holland, Last Rate: 16.67 mL/hr at 11/23/24 1016, 0.84 mcg/kg/hr at 11/23/24 1016    [Held by provider] empagliflozin (Jardiance) tablet 10 mg, 10 mg, oral, Daily, AZUL Holland, 10 mg at 11/22/24 0859    fentanyl (Sublimaze) 1000 mcg in sodium chloride 0.9%  100 mL (10 mcg/mL) infusion (premix), 0-200 mcg/hr, intravenous, Continuous, Brett Bush MD, Last Rate: 2.5 mL/hr at 11/23/24 0939, 25 mcg/hr at 11/23/24 0939    fentaNYL PF (Sublimaze) injection 50 mcg, 50 mcg, intravenous, q1h PRN, JACQUE Astudillo-CNP, 50 mcg at 11/22/24 2334    [Held by provider] furosemide (Lasix) injection 40 mg, 40 mg, intravenous, q12h, AZUL Holland, 40 mg at 11/22/24 0412    hydrocortisone sodium succinate (PF) (Solu-CORTEF) injection 50 mg, 50 mg, intravenous, q8h KENDAL, AZUL Holland, 50 mg at 11/23/24 0559    ipratropium-albuteroL (Duo-Neb) 0.5-2.5 mg/3 mL nebulizer solution 3 mL, 3 mL, nebulization, q4h, AZUL Holland, 3 mL at 11/23/24 0716    ipratropium-albuteroL (Duo-Neb) 0.5-2.5 mg/3 mL nebulizer solution 3 mL, 3 mL, nebulization, q2h PRN, AZUL Holland    lactulose 20 gram/30 mL oral solution 30 g, 30 g, oral, BID PRN, AZUL Holland    [Held by provider] lamoTRIgine (LaMICtal) tablet 100 mg, 100 mg, oral, Nightly, AZUL Holland, 100 mg at 11/21/24 2033    levothyroxine (Synthroid) injection 38 mcg, 38 mcg, intravenous, q24h KENDAL, AZUL Holland, 38 mcg at 11/23/24 0848    [Held by provider] levothyroxine (Synthroid, Levoxyl) tablet 88 mcg, 88 mcg, oral, Daily, AZUL Holland, 88 mcg at 11/22/24 0534    lubricating eye drops ophthalmic solution 1 drop, 1 drop, Both Eyes, TID PRN, AZUL Holland, 1 drop at 11/21/24 1425    [Held by provider] melatonin tablet 10 mg, 10 mg, oral, Nightly PRN, AZUL Holland    [Held by provider] morphine CR (MS Contin) 12 hr tablet 30 mg, 30 mg, oral, q12h KENDAL, AZUL Holland, 30 mg at 11/22/24 0856    morphine injection 2 mg, 2 mg, intravenous, q4h PRN, JACQUE Holland-CNP, 2 mg at 11/22/24 1213    naloxone (Narcan) injection 0.2 mg, 0.2 mg, intravenous, q5 min PRN, AZUL Holland    norepinephrine  (Levophed) 8 mg in dextrose 5% 250 mL (0.032 mg/mL) infusion (premix), 0-0.5 mcg/kg/min, intravenous, Continuous, AZUL Astudillo, Last Rate: 14.61 mL/hr at 11/23/24 0917, 0.09 mcg/kg/min at 11/23/24 0917    nystatin (Mycostatin) 100,000 unit/gram powder 1 Application, 1 Application, Topical, BID, AZUL Holland, 1 Application at 11/23/24 0906    oxybutynin (Ditropan) tablet 5 mg, 5 mg, oral, BID, AZUL Holland, 5 mg at 11/23/24 0904    oxygen (O2) therapy, , inhalation, Continuous - Inhalation, Brett Bush MD, 40 percent at 11/23/24 0719    oxymetazoline (Afrin) 0.05 % nasal spray 2 spray, 2 spray, Each Nostril, q6h, AZUL Holland, 2 spray at 11/22/24 1125    pantoprazole (ProtoNix) EC tablet 40 mg, 40 mg, oral, Daily before breakfast **OR** pantoprazole (ProtoNix) injection 40 mg, 40 mg, intravenous, Daily before breakfast, AZUL Holland, 40 mg at 11/23/24 0847    piperacillin-tazobactam (Zosyn) 3.375 g in dextrose (iso) IV 50 mL, 3.375 g, intravenous, q8h, AZUL Holland, Last Rate: 0 mL/hr at 11/23/24 0703, 3.375 g at 11/23/24 1014    polyethylene glycol (Glycolax, Miralax) packet 17 g, 17 g, oral, Daily PRN, AZUL Holland    potassium chloride (Klor-Con) packet 20 mEq, 20 mEq, oral, Daily, AZUL Holland, 20 mEq at 11/23/24 0848    propofol (Diprivan) infusion, 0-50 mcg/kg/min, intravenous, Continuous, AZUL Astudillo, Last Rate: 11.91 mL/hr at 11/23/24 0613, 25 mcg/kg/min at 11/23/24 0613    [Held by provider] spironolactone (Aldactone) tablet 25 mg, 25 mg, oral, Daily, AZUL Holland, 25 mg at 11/22/24 0858    [Held by provider] traZODone (Desyrel) tablet 50 mg, 50 mg, oral, Nightly, AZUL Holland, 50 mg at 11/21/24 2033    [Held by provider] valsartan (Diovan) tablet 40 mg, 40 mg, oral, Daily, AZUL Holland, 40 mg at 11/22/24 0856    vancomycin (Vancocin) 1,250 mg in  dextrose 5%  mL, 1,250 mg, intravenous, q24h, Greg Sotelo PharmD    vancomycin (Vancocin) pharmacy to dose - pharmacy monitoring, , miscellaneous, Daily PRN, Brett Bush MD    ECG 12 Lead    Result Date: 11/20/2024  AV dual-paced rhythm Abnormal ECG When compared with ECG of 20-NOV-2024 15:27, (unconfirmed) Premature ventricular complexes are no longer Present Vent. rate has decreased BY  13 BPM    XR chest 2 views    Result Date: 11/20/2024  Interpreted By:  Schoenberger, Joseph, STUDY: XR CHEST 2 VIEWS;  11/20/2024 3:07 pm   INDICATION: Signs/Symptoms:sob.     COMPARISON: 03/09/2015   ACCESSION NUMBER(S): [id number]   ORDERING CLINICIAN: WANDA HUANG   FINDINGS: Left subclavian transvenous pacemaker unchanged from prior.       CARDIOMEDIASTINAL SILHOUETTE: Cardiac silhouette remains mildly enlarged. There is persistent or recurrent venous congestion. There are bilateral small pleural effusions with likely some interstitial edema.   LUNGS: There is a rather large opacity in the lower left hemithorax. On the AP radiograph, its contours somewhat rounded in a mass is a consideration though this appearance is not the noted on the lateral radiograph. However, the left hemidiaphragm is also elevated possibly exaggerating this appearance. At minimum, follow-up radiographs are recommended as a the left basal mass is a consideration.   ABDOMEN: No remarkable upper abdominal findings.   BONES: No acute osseous changes.       1.  Moderate congestive failure with interstitial edema. Small bilateral pleural effusions 2. Left basal opacity which on the AP radiograph appears to have somewhat rounded contour superiorly. Underlying mass is a consideration. At a minimum, radiographic follow-up is recommended.       MACRO: None   Signed by: Joseph Schoenberger 11/20/2024 3:13 PM Dictation workstation:   [id number]       No results found for this or any previous visit from the past 1095 days.                    Assessment     Patient Active Problem List   Diagnosis    Benign essential hypertension    Dizziness    Paroxysmal atrial fibrillation (Multi)    Pacemaker    Prolonged QT interval    Renal failure    Sleep apnea    Syncope    Shortness of breath    Blepharoconjunctivitis of left eye    Central corneal ulcer of left eye    Combined forms of age-related cataract of left eye    Congestive heart failure    Dry eye syndrome of both eyes    History of surgical procedure    HSV (herpes simplex virus) dendritic keratitis    Hypogonadism male    Hypothyroidism    Keratoconjunctivitis sicca    Hyperopia of both eyes with astigmatism and presbyopia    Meibomian gland dysfunction (MGD) of upper and lower lids of both eyes    Vitreous floaters of both eyes    Morbid obesity due to excess calories (Multi)    Neurotrophic keratitis    Neurotrophic keratoconjunctivitis, left eye    Nocturnal polyuria    Other cirrhosis of liver    Panic attack    Persistent epithelial defect of cornea    Pseudophakia of both eyes    Chronic obstructive pulmonary disease (Multi)    Pulmonary emphysema (Multi)    Punctate keratitis of both eyes    Punctate keratitis of left eye    Recurrent corneal erosion, left    Recurrent erosion of both corneas    Ring corneal ulcer of right eye    Sick sinus syndrome (Multi)    Status post total left knee replacement    Urinary urgency    Arthritis    Vasculitis (CMS-HCC)    Dizziness and giddiness    Presence of cardiac pacemaker    Chronic kidney disease    KANCHAN (obstructive sleep apnea)    Syncope and collapse    S/P AVR (aortic valve replacement)    Abnormal CT scan    Anxiety    Atelectasis    CHF (congestive heart failure), NYHA class I, acute on chronic, combined    Heart murmur    History of CVA (cerebrovascular accident)    Pulmonary HTN (Multi)    Pressure injury of left buttock, stage 3 (Multi)    Severe aortic regurgitation    Recurrent falls    Venous stasis dermatitis of both lower  extremities    Tinnitus    Stress hyperglycemia    Acute combined systolic and diastolic HF (heart failure)   Epistaxis  Anemia      Plan:  I will keep the packing today packing will be removed tomorrow and if needed nasal endoscopy and control epistaxis at bedside.  If his hemoglobin drops below 7 he should get blood transfusion.        This note was made using a voice recognition system and may contain unintended errors in translation in grammar.  Electronically signed by Miguel Amado MD on 11/23/2024 at 11:09 AM

## 2024-11-24 VITALS
TEMPERATURE: 96.8 F | BODY MASS INDEX: 27.36 KG/M2 | OXYGEN SATURATION: 95 % | HEIGHT: 69 IN | HEART RATE: 72 BPM | SYSTOLIC BLOOD PRESSURE: 97 MMHG | WEIGHT: 184.75 LBS | DIASTOLIC BLOOD PRESSURE: 47 MMHG | RESPIRATION RATE: 16 BRPM

## 2024-11-24 PROBLEM — D64.9 ANEMIA: Status: ACTIVE | Noted: 2024-01-01

## 2024-11-24 LAB
ALBUMIN SERPL BCP-MCNC: 2.3 G/DL (ref 3.4–5)
ALP SERPL-CCNC: 86 U/L (ref 33–136)
ALT SERPL W P-5'-P-CCNC: 12 U/L (ref 10–52)
ANION GAP SERPL CALC-SCNC: ABNORMAL MMOL/L
AST SERPL W P-5'-P-CCNC: 20 U/L (ref 9–39)
ATRIAL RATE: 357 BPM
ATRIAL RATE: 68 BPM
ATRIAL RATE: 71 BPM
ATRIAL RATE: 72 BPM
BACTERIA BLD CULT: NORMAL
BACTERIA BLD CULT: NORMAL
BACTERIA SPEC RESP CULT: NORMAL
BILIRUB SERPL-MCNC: 0.9 MG/DL (ref 0–1.2)
BUN SERPL-MCNC: 28 MG/DL (ref 6–23)
CALCIUM SERPL-MCNC: 8.9 MG/DL (ref 8.6–10.3)
CHLORIDE SERPL-SCNC: 88 MMOL/L (ref 98–107)
CO2 SERPL-SCNC: >45 MMOL/L (ref 21–32)
CREAT SERPL-MCNC: 0.97 MG/DL (ref 0.5–1.3)
EGFRCR SERPLBLD CKD-EPI 2021: 83 ML/MIN/1.73M*2
ERYTHROCYTE [DISTWIDTH] IN BLOOD BY AUTOMATED COUNT: 16.6 % (ref 11.5–14.5)
GLUCOSE SERPL-MCNC: 133 MG/DL (ref 74–99)
GRAM STN SPEC: NORMAL
GRAM STN SPEC: NORMAL
HCT VFR BLD AUTO: 24 % (ref 41–52)
HGB BLD-MCNC: 7.5 G/DL (ref 13.5–17.5)
MAGNESIUM SERPL-MCNC: 2.44 MG/DL (ref 1.6–2.4)
MCH RBC QN AUTO: 30.6 PG (ref 26–34)
MCHC RBC AUTO-ENTMCNC: 31.3 G/DL (ref 32–36)
MCV RBC AUTO: 98 FL (ref 80–100)
NRBC BLD-RTO: 0 /100 WBCS (ref 0–0)
P OFFSET: 149 MS
P OFFSET: 155 MS
P ONSET: 101 MS
P ONSET: 97 MS
PLATELET # BLD AUTO: 113 X10*3/UL (ref 150–450)
POTASSIUM SERPL-SCNC: 3.6 MMOL/L (ref 3.5–5.3)
PR INTERVAL: 164 MS
PR INTERVAL: 176 MS
PR INTERVAL: 178 MS
PROT SERPL-MCNC: 7 G/DL (ref 6.4–8.2)
Q ONSET: 179 MS
Q ONSET: 180 MS
Q ONSET: 181 MS
Q ONSET: 183 MS
QRS COUNT: 12 BEATS
QRS DURATION: 156 MS
QRS DURATION: 160 MS
QRS DURATION: 162 MS
QRS DURATION: 164 MS
QT INTERVAL: 460 MS
QT INTERVAL: 460 MS
QT INTERVAL: 464 MS
QT INTERVAL: 484 MS
QTC CALCULATION(BAZETT): 499 MS
QTC CALCULATION(BAZETT): 506 MS
QTC CALCULATION(BAZETT): 508 MS
QTC CALCULATION(BAZETT): 522 MS
QTC FREDERICIA: 486 MS
QTC FREDERICIA: 491 MS
QTC FREDERICIA: 493 MS
QTC FREDERICIA: 509 MS
R AXIS: -67 DEGREES
R AXIS: -8 DEGREES
R AXIS: -87 DEGREES
R AXIS: 70 DEGREES
RBC # BLD AUTO: 2.45 X10*6/UL (ref 4.5–5.9)
SODIUM SERPL-SCNC: 139 MMOL/L (ref 136–145)
T AXIS: 14 DEGREES
T AXIS: 16 DEGREES
T AXIS: 41 DEGREES
T AXIS: 72 DEGREES
T OFFSET: 409 MS
T OFFSET: 411 MS
T OFFSET: 415 MS
T OFFSET: 422 MS
VANCOMYCIN SERPL-MCNC: 23.6 UG/ML (ref 5–20)
VENTRICULAR RATE: 70 BPM
VENTRICULAR RATE: 71 BPM
VENTRICULAR RATE: 72 BPM
VENTRICULAR RATE: 73 BPM
WBC # BLD AUTO: 9.2 X10*3/UL (ref 4.4–11.3)

## 2024-11-24 PROCEDURE — 2500000004 HC RX 250 GENERAL PHARMACY W/ HCPCS (ALT 636 FOR OP/ED): Performed by: STUDENT IN AN ORGANIZED HEALTH CARE EDUCATION/TRAINING PROGRAM

## 2024-11-24 PROCEDURE — 83735 ASSAY OF MAGNESIUM: CPT | Performed by: NURSE PRACTITIONER

## 2024-11-24 PROCEDURE — 2500000004 HC RX 250 GENERAL PHARMACY W/ HCPCS (ALT 636 FOR OP/ED)

## 2024-11-24 PROCEDURE — 2500000001 HC RX 250 WO HCPCS SELF ADMINISTERED DRUGS (ALT 637 FOR MEDICARE OP)

## 2024-11-24 PROCEDURE — 99232 SBSQ HOSP IP/OBS MODERATE 35: CPT | Performed by: NURSE PRACTITIONER

## 2024-11-24 PROCEDURE — 2500000002 HC RX 250 W HCPCS SELF ADMINISTERED DRUGS (ALT 637 FOR MEDICARE OP, ALT 636 FOR OP/ED)

## 2024-11-24 PROCEDURE — 80202 ASSAY OF VANCOMYCIN: CPT

## 2024-11-24 PROCEDURE — 2500000005 HC RX 250 GENERAL PHARMACY W/O HCPCS: Performed by: STUDENT IN AN ORGANIZED HEALTH CARE EDUCATION/TRAINING PROGRAM

## 2024-11-24 PROCEDURE — 99291 CRITICAL CARE FIRST HOUR: CPT | Performed by: STUDENT IN AN ORGANIZED HEALTH CARE EDUCATION/TRAINING PROGRAM

## 2024-11-24 PROCEDURE — 2020000001 HC ICU ROOM DAILY

## 2024-11-24 PROCEDURE — 80053 COMPREHEN METABOLIC PANEL: CPT | Performed by: NURSE PRACTITIONER

## 2024-11-24 PROCEDURE — 37799 UNLISTED PX VASCULAR SURGERY: CPT | Performed by: NURSE PRACTITIONER

## 2024-11-24 PROCEDURE — 2500000005 HC RX 250 GENERAL PHARMACY W/O HCPCS: Performed by: NURSE PRACTITIONER

## 2024-11-24 PROCEDURE — 94003 VENT MGMT INPAT SUBQ DAY: CPT

## 2024-11-24 PROCEDURE — 2500000004 HC RX 250 GENERAL PHARMACY W/ HCPCS (ALT 636 FOR OP/ED): Performed by: NURSE PRACTITIONER

## 2024-11-24 PROCEDURE — 85027 COMPLETE CBC AUTOMATED: CPT | Performed by: NURSE PRACTITIONER

## 2024-11-24 PROCEDURE — 94640 AIRWAY INHALATION TREATMENT: CPT

## 2024-11-24 RX ORDER — MORPHINE SULFATE 30 MG/1
30 TABLET, FILM COATED, EXTENDED RELEASE ORAL EVERY 12 HOURS SCHEDULED
Status: DISCONTINUED | OUTPATIENT
Start: 2024-11-24 | End: 2024-11-29

## 2024-11-24 RX ORDER — LAMOTRIGINE 100 MG/1
100 TABLET ORAL NIGHTLY
Status: DISCONTINUED | OUTPATIENT
Start: 2024-11-24 | End: 2024-11-29

## 2024-11-24 RX ORDER — NOREPINEPHRINE BITARTRATE/D5W 8 MG/250ML
0-.5 PLASTIC BAG, INJECTION (ML) INTRAVENOUS CONTINUOUS
Status: DISCONTINUED | OUTPATIENT
Start: 2024-11-24 | End: 2024-11-28

## 2024-11-24 ASSESSMENT — PAIN SCALES - GENERAL: PAINLEVEL_OUTOF10: 0 - NO PAIN

## 2024-11-24 ASSESSMENT — PAIN - FUNCTIONAL ASSESSMENT: PAIN_FUNCTIONAL_ASSESSMENT: CPOT (CRITICAL CARE PAIN OBSERVATION TOOL)

## 2024-11-24 NOTE — PROGRESS NOTES
Remains intubated, FiO2 40%. Pressors now down to low dose. ENT removed nasal packing today. Possible SAT/SBT today vs tomorrow.

## 2024-11-24 NOTE — PROGRESS NOTES
Critical access hospital Heart Progress Note           Rounding CLAUDIA/Cardiologist:  Vinicio Godinez, APRN-CNP, Dr. Mayberry  Primary Cardiologist: Dr. Armani Mayberry    Date:  11/24/2024  Patient:  Antony Goff  YOB: 1950  MRN:  [id number]   Admit Date:  11/19/2024      SUBJECTIVE:    11/24/24  Intubated and sedated in the ICU  Slowly wean off pressors  Telemetry is paced  Cardiology continue to follow along    11/23/24  Intubated and sedated in the ICU  Apparently yesterday patient developed severe epistaxis intubated taken to the ICU currently on pressors  Telemetry is paced  Slowly wean off pressors    11/22/24  Eval at bedside this morning.  Denies any complaints.  Attempting to eat breakfast.  Remains on O2.  Telemetry shows dual paced rhythm.  Fluid balance -1660    11/21/24  Patient is awake and alert sitting up in chair answering all my questions this morning.  He states that his shortness of breath is very much improved.  Well-informed of plan of care.  He states he wants to go home in the next day or 2.  EKG dual paced  Telemetry is paced  Daily weights ordered    Cardiac history  8/24 echo  CONCLUSIONS:   - Exam indication: Limited echo - Re-evaluation of heart failure   - Left ventricular systolic function is normal. EF = 60 ± 5% (visual est.)   - The right ventricle is dilated. Right ventricular systolic function is low   normal.   - The visualized aorta is dilated with a maximal dimension of 4.1 cm.   - There is moderate (2+) tricuspid valve regurgitation.   - Epic prosthetic aortic valve (size #27). The peak gradient is 18 mmHg, the mean   gradient is 9 mmHg and the dimensionless valve index is 0.79. Prior peak/mean   gradients were 21/11 mmHg.   - Estimated right ventricular systolic pressure is 59 mmHg consistent with   moderate pulmonary hypertension. Estimated right atrial pressure is 3 mmHg based   on IVC assessment. Estimated right ventricular systolic pressure is 59 mmHg   consistent  with moderate pulmonary hypertension. Estimated right atrial pressure   is 3 mmHg based on IVC assessment.   - Exam was compared with the prior  echocardiographic exam performed on   04/26/2024. There is no significant changes.      3/28/24  Operations during Hospitalization:   3/28/2024: AVR (#27 Epic plus), LAAC, left side MAZE Procedure, Epicardial lead LV pacing lead tunneled to left chest and generator exchanged for CRT-P      VITALS:     Vitals:    11/24/24 0743 11/24/24 0745 11/24/24 1000 11/24/24 1109   BP:       Pulse:    68   Resp:  16  16   Temp:   35.4 °C (95.7 °F)    TempSrc:   Temporal    SpO2: 99%   97%   Weight:  83.8 kg (184 lb 11.9 oz)     Height:           Intake/Output Summary (Last 24 hours) at 11/24/2024 1114  Last data filed at 11/24/2024 0600  Gross per 24 hour   Intake 1724.94 ml   Output 1825 ml   Net -100.06 ml       Wt Readings from Last 4 Encounters:   11/24/24 83.8 kg (184 lb 11.9 oz)   02/07/24 124 kg (274 lb)   02/01/23 124 kg (274 lb)   08/03/22 130 kg (287 lb)       CURRENT HOSPITAL MEDICATIONS:   [Held by provider] apixaban, 5 mg, oral, BID  [Held by provider] atorvastatin, 40 mg, oral, Nightly  [Held by provider] carvedilol, 3.125 mg, oral, BID  [Held by provider] empagliflozin, 10 mg, oral, Daily  [Held by provider] furosemide, 40 mg, intravenous, q12h  ipratropium-albuteroL, 3 mL, nebulization, q4h  [Held by provider] lamoTRIgine, 100 mg, oral, Nightly  levothyroxine, 38 mcg, intravenous, q24h KENDAL  [Held by provider] levothyroxine, 88 mcg, oral, Daily  [Held by provider] morphine CR, 30 mg, oral, q12h KENDAL  nystatin, 1 Application, Topical, BID  oxybutynin, 5 mg, oral, BID  oxygen, , inhalation, Continuous - Inhalation  pantoprazole, 40 mg, oral, Daily before breakfast   Or  pantoprazole, 40 mg, intravenous, Daily before breakfast  piperacillin-tazobactam, 3.375 g, intravenous, q8h  potassium chloride, 20 mEq, oral, Daily  [Held by provider] spironolactone, 25 mg, oral,  Daily  [Held by provider] traZODone, 50 mg, oral, Nightly  [Held by provider] valsartan, 40 mg, oral, Daily      dexmedeTOMIDine, 0-1.5 mcg/kg/hr, Last Rate: 0.61 mcg/kg/hr (11/24/24 1021)  fentaNYL, 0-200 mcg/hr, Last Rate: 50 mcg/hr (11/24/24 0834)  norepinephrine, 0-0.5 mcg/kg/min, Last Rate: 0.01 mcg/kg/min (11/24/24 1000)  propofol, 0-50 mcg/kg/min, Last Rate: 30 mcg/kg/min (11/24/24 1047)      Current Outpatient Medications   Medication Instructions    albuterol 2.5 mg /3 mL (0.083 %) nebulizer solution inhalation, Use as directed PRN    albuterol 90 mcg/actuation inhaler 2 puffs, Every 6 hours PRN    atorvastatin (Lipitor) 40 mg tablet 1 tablet, oral, Nightly    carvedilol (COREG) 3.125 mg, oral, 2 times daily    Eliquis 5 mg, oral, 2 times daily    empagliflozin (Jardiance) 10 mg 1 tablet, oral, Daily    furosemide (Lasix) 40 mg tablet 1 tablet, Daily (0630)    lactulose 10 gram/15 mL solution TAKE 30 ML BY MOUTH TWICE DAILY AS NEEDED    lamoTRIgine (LaMICtal) 100 mg tablet 1 tablet, oral, Nightly    levothyroxine (SYNTHROID, LEVOXYL) 88 mcg, oral, Daily    morphine CR (MS CONTIN) 30 mg, oral, Every 8 hours, PRN    omeprazole OTC (PRILOSEC OTC) 20 mg, oral, Daily before breakfast, Do not crush, chew, or split.    Oxervate 0.002 % ophthalmic solution     potassium chloride (Klor-Con) 20 mEq packet 20 mEq, Daily    solifenacin (VESICARE) 5 mg, oral, Daily    spironolactone (Aldactone) 25 mg tablet 1 tablet, oral, Daily    Symbicort 160-4.5 mcg/actuation inhaler 1 puff, 2 times daily    traZODone (DESYREL) 50 mg, Nightly        PHYSICAL EXAMINATION:   GENERAL APPEARANCE: Intubated and sedated  CHEST: Symmetric and non-tender.  INTEGUMENT: Skin warm and dry, without gross excoriationis or lesions.  HEENT: No gross abnormalities of conjunctiva, teeth, gums, oral mucosa  NECK: Supple, no JVD, no bruit. Thyroid not palpable. Carotid upstrokes normal.  NEURO/PSHCY: Intubated and sedated  LUNGS: Very diminished  scattered Rales left side  HEART: S1, S2 regular with 2 out of 6 systolic murmur  ABDOMEN: Soft, nontender, no palpable hepatosplenomegaly, no mases, no bruits. Abdominal aorta not noted to be enlarged.  EXTREMITIES: Warm with good color, no clubbing or cyanois.  1+ edema  PERIPHERAL VASCULAR: Pulses present and equally palpable; 1+ throughout. No femoral bruits      LAB DATA:     CBC:   Results from last 7 days   Lab Units 11/24/24 0338 11/23/24 0402 11/22/24  1020   WBC AUTO x10*3/uL 9.2 13.6* 7.8   RBC AUTO x10*6/uL 2.45* 2.50* 2.72*   HEMOGLOBIN g/dL 7.5* 7.7* 8.4*   HEMATOCRIT % 24.0* 23.7* 26.5*   MCV fL 98 95 97   MCH pg 30.6 30.8 30.9   MCHC g/dL 31.3* 32.5 31.7*   RDW % 16.6* 16.0* 16.0*   PLATELETS AUTO x10*3/uL 113* 141* 162     CMP:    Results from last 7 days   Lab Units 11/24/24 0338 11/23/24 0402 11/22/24  1553   SODIUM mmol/L 139 134* 136   POTASSIUM mmol/L 3.6 3.8 3.8   CHLORIDE mmol/L 88* 84* 85*   CO2 mmol/L >45* 45* >45*   BUN mg/dL 28* 33* 39*   CREATININE mg/dL 0.97 1.08 1.09   GLUCOSE mg/dL 133* 156* 118*   PROTEIN TOTAL g/dL 7.0 7.0 7.0   CALCIUM mg/dL 8.9 8.7 8.8   BILIRUBIN TOTAL mg/dL 0.9 1.2 1.4*   ALK PHOS U/L 86 89 94   AST U/L 20 24 26   ALT U/L 12 12 13     BMP:    Results from last 7 days   Lab Units 11/24/24 0338 11/23/24 0402 11/22/24  1553   SODIUM mmol/L 139 134* 136   POTASSIUM mmol/L 3.6 3.8 3.8   CHLORIDE mmol/L 88* 84* 85*   CO2 mmol/L >45* 45* >45*   BUN mg/dL 28* 33* 39*   CREATININE mg/dL 0.97 1.08 1.09   CALCIUM mg/dL 8.9 8.7 8.8   GLUCOSE mg/dL 133* 156* 118*     Magnesium:  Results from last 7 days   Lab Units 11/24/24  0338 11/23/24  0402 11/22/24  0435   MAGNESIUM mg/dL 2.44* 1.62 1.69     Troponin:    Results from last 7 days   Lab Units 11/20/24  1522   TROPHS ng/L 38*     BNP:   Results from last 7 days   Lab Units 11/20/24  1522   BNP pg/mL 337*     Lipid Panel:         DIAGNOSTIC TESTING:   @No results found for this or any previous visit.    ECG 12  Lead    Result Date: 11/20/2024  AV dual-paced rhythm Abnormal ECG When compared with ECG of 20-NOV-2024 15:27, (unconfirmed) Premature ventricular complexes are no longer Present Vent. rate has decreased BY  13 BPM    XR chest 2 views    Result Date: 11/20/2024  Interpreted By:  Schoenberger, Joseph, STUDY: XR CHEST 2 VIEWS;  11/20/2024 3:07 pm   INDICATION: Signs/Symptoms:sob.     COMPARISON: 03/09/2015   ACCESSION NUMBER(S): [id number]   ORDERING CLINICIAN: WANDA HUANG   FINDINGS: Left subclavian transvenous pacemaker unchanged from prior.       CARDIOMEDIASTINAL SILHOUETTE: Cardiac silhouette remains mildly enlarged. There is persistent or recurrent venous congestion. There are bilateral small pleural effusions with likely some interstitial edema.   LUNGS: There is a rather large opacity in the lower left hemithorax. On the AP radiograph, its contours somewhat rounded in a mass is a consideration though this appearance is not the noted on the lateral radiograph. However, the left hemidiaphragm is also elevated possibly exaggerating this appearance. At minimum, follow-up radiographs are recommended as a the left basal mass is a consideration.   ABDOMEN: No remarkable upper abdominal findings.   BONES: No acute osseous changes.       1.  Moderate congestive failure with interstitial edema. Small bilateral pleural effusions 2. Left basal opacity which on the AP radiograph appears to have somewhat rounded contour superiorly. Underlying mass is a consideration. At a minimum, radiographic follow-up is recommended.       MACRO: None   Signed by: Joseph Schoenberger 11/20/2024 3:13 PM Dictation workstation:   [id number]       XR chest 1 view   Final Result   1.  Improving findings compared to prior exam as discussed above                  MACRO:   None        Signed by: Joseph Schoenberger 11/23/2024 12:11 PM   Dictation workstation:   [id number]      XR chest 1 view   Final Result   1.  Satisfactory  appearance post left jugular venous catheter   placement.   2. Progression of pleural fluids in basal airspace opacities possibly   progressing pneumonia edema.                  MACRO:   None        Signed by: Joseph Schoenberger 11/22/2024 3:02 PM   Dictation workstation:   [id number]      XR chest 1 view   Final Result   1.  Some improvement in left basal opacity.   2. Satisfactory appearance post intubation.                  MACRO:   None        Signed by: Joseph Schoenberger 11/22/2024 1:20 PM   Dictation workstation:   [id number]      CT head wo IV contrast   Final Result   No acute intracranial abnormality or mass effect.        This study was interpreted at [facility].        MACRO:   None        Signed by: Dontae Lujan 11/22/2024 10:15 AM   Dictation workstation:   [id number]      CT chest wo IV contrast   Final Result   Rather than a nodule or mass, I suspect a postobstructive pneumonia   in the left lower lobe. Mucous plugging or aspirate in the left lower   lobe bronchus and majority of its segmental branches with   consolidation and atelectasis towards the left lung base in the lower   lobe and a more nodular distal airways pneumonia in the more superior   left lower lobe that remains fairly well inflated        In the lateral segment middle lobe, another suspected pneumonia along   with some atelectasis, but no upstream large airway abnormality to   account for it        Diffuse airspace ground-glass change in both hemithoraces is probably   a separate process, possibly interstitial and alveolar edema or a   diffuse atypical pneumonia        Mediastinal adenopathy of uncertain etiology        Small bilateral pleural effusions probably too small for   thoracocentesis on both sides. Right side may be partially loculated   as it tracks into the major fissure        Included upper abdomen shows a markedly cirrhotic liver which has   progressed in nodularity and  shrinkage since CT chest 9 March 2015.   Gallstones also present        MACRO:   None        Signed by: Ricky Squires 11/21/2024 2:12 PM   Dictation workstation:   [id number]      Cardiac Device Check - Inpatient         XR chest 2 views   Final Result   1.  Moderate congestive failure with interstitial edema. Small   bilateral pleural effusions   2. Left basal opacity which on the AP radiograph appears to have   somewhat rounded contour superiorly. Underlying mass is a   consideration. At a minimum, radiographic follow-up is recommended.                  MACRO:   None        Signed by: Joseph Schoenberger 11/20/2024 3:13 PM   Dictation workstation:   [id number]          No echocardiogram results found for the past 14 days    RADIOLOGY:     XR chest 1 view   Final Result   1.  Improving findings compared to prior exam as discussed above                  MACRO:   None        Signed by: Joseph Schoenberger 11/23/2024 12:11 PM   Dictation workstation:   [id number]      XR chest 1 view   Final Result   1.  Satisfactory appearance post left jugular venous catheter   placement.   2. Progression of pleural fluids in basal airspace opacities possibly   progressing pneumonia edema.                  MACRO:   None        Signed by: Joseph Schoenberger 11/22/2024 3:02 PM   Dictation workstation:   [id number]      XR chest 1 view   Final Result   1.  Some improvement in left basal opacity.   2. Satisfactory appearance post intubation.                  MACRO:   None        Signed by: Joseph Schoenberger 11/22/2024 1:20 PM   Dictation workstation:   [id number]      CT head wo IV contrast   Final Result   No acute intracranial abnormality or mass effect.        This study was interpreted at [facility].        MACRO:   None        Signed by: Dontae Lujan 11/22/2024 10:15 AM   Dictation workstation:   [id number]      CT chest wo IV contrast   Final Result   Rather than a nodule or mass,  I suspect a postobstructive pneumonia   in the left lower lobe. Mucous plugging or aspirate in the left lower   lobe bronchus and majority of its segmental branches with   consolidation and atelectasis towards the left lung base in the lower   lobe and a more nodular distal airways pneumonia in the more superior   left lower lobe that remains fairly well inflated        In the lateral segment middle lobe, another suspected pneumonia along   with some atelectasis, but no upstream large airway abnormality to   account for it        Diffuse airspace ground-glass change in both hemithoraces is probably   a separate process, possibly interstitial and alveolar edema or a   diffuse atypical pneumonia        Mediastinal adenopathy of uncertain etiology        Small bilateral pleural effusions probably too small for   thoracocentesis on both sides. Right side may be partially loculated   as it tracks into the major fissure        Included upper abdomen shows a markedly cirrhotic liver which has   progressed in nodularity and shrinkage since CT chest 9 March 2015.   Gallstones also present        MACRO:   None        Signed by: Ricky Squires 11/21/2024 2:12 PM   Dictation workstation:   [id number]      Cardiac Device Check - Inpatient         XR chest 2 views   Final Result   1.  Moderate congestive failure with interstitial edema. Small   bilateral pleural effusions   2. Left basal opacity which on the AP radiograph appears to have   somewhat rounded contour superiorly. Underlying mass is a   consideration. At a minimum, radiographic follow-up is recommended.                  MACRO:   None        Signed by: Joseph Schoenberger 11/20/2024 3:13 PM   Dictation workstation:   [id number]          PROBLEM LIST     Patient Active Problem List   Diagnosis    Benign essential hypertension    Dizziness    Paroxysmal atrial fibrillation (Multi)    Pacemaker    Prolonged QT interval    Renal failure    Sleep apnea    Syncope     Shortness of breath    Blepharoconjunctivitis of left eye    Central corneal ulcer of left eye    Combined forms of age-related cataract of left eye    Congestive heart failure    Dry eye syndrome of both eyes    History of surgical procedure    HSV (herpes simplex virus) dendritic keratitis    Hypogonadism male    Hypothyroidism    Keratoconjunctivitis sicca    Hyperopia of both eyes with astigmatism and presbyopia    Meibomian gland dysfunction (MGD) of upper and lower lids of both eyes    Vitreous floaters of both eyes    Morbid obesity due to excess calories (Multi)    Neurotrophic keratitis    Neurotrophic keratoconjunctivitis, left eye    Nocturnal polyuria    Other cirrhosis of liver    Panic attack    Persistent epithelial defect of cornea    Pseudophakia of both eyes    Chronic obstructive pulmonary disease (Multi)    Pulmonary emphysema (Multi)    Punctate keratitis of both eyes    Punctate keratitis of left eye    Recurrent corneal erosion, left    Recurrent erosion of both corneas    Ring corneal ulcer of right eye    Sick sinus syndrome (Multi)    Status post total left knee replacement    Urinary urgency    Arthritis    Vasculitis (CMS-HCC)    Dizziness and giddiness    Presence of cardiac pacemaker    Chronic kidney disease    KANCHAN (obstructive sleep apnea)    Syncope and collapse    S/P AVR (aortic valve replacement)    Abnormal CT scan    Anxiety    Atelectasis    CHF (congestive heart failure), NYHA class I, acute on chronic, combined    Heart murmur    History of CVA (cerebrovascular accident)    Pulmonary HTN (Multi)    Pressure injury of left buttock, stage 3 (Multi)    Severe aortic regurgitation    Recurrent falls    Venous stasis dermatitis of both lower extremities    Tinnitus    Stress hyperglycemia    Acute combined systolic and diastolic HF (heart failure)    Epistaxis    Anemia       ASSESSMENT:   Acute on chronic combined systolic and diastolic heart failure NYHA class III  History of  aortic valve replacement  PAF pacemaker interrogation showed no episodes of atrial fibs or atrial tachycardia  History of CVA  EF 55% 8/24  Hypertension  Dyslipidemia  History of PPM        PLAN:   I have personally interviewed and examined the patient.   I have personally and independently reviewed labs and diagnostic testing.  I have personally verified the elements of the history and physical listed above and changes, if any, are noted.  74-year-old gentleman with a history of severe aortic stenosis status post AVR with a bioprosthetic tissue valve at [facility] in March 2024, hypertension, dyslipidemia, CVA with some residual confusion, GERD and other comorbidities as listed above.  He presented to [facility] ER with complaints of increased shortness of breath confusion and weakness and was subsequently transferred here for further management.  Is a poor historian because of his mental state, but he did deny any ongoing chest pain.  He had a recent follow-up echocardiogram which shows a well-seated prosthetic valve in the aortic position with normal peak and mean gradients.  LV function was normal.  He denies any orthopnea paroxysmal nocturnal dyspnea.     Agree with exam as noted above.  Cardiac exam reveals distant S1 and S2 with no murmurs appreciated.  There is reduced breath sound in the lung bases bilaterally which is worse on the left compared to the right.  There is 1+ bilateral ankle edema.     ASSESSMENT AND PLAN:  1.  Increased shortness of breath: This is likely multifactorial, with possible underlying diastolic heart failure and deconditioning secondary to his CVA.  We will treat with gentle diuresis and rule out possible large pleural effusion based on his clinical exam.  He may require elective thoracocentesis if further chest imaging reveals a large pleural effusion.  2.  Persistent atrial fibrillation: On current medications and will continue with anticoagulation for now which can be held if he requires  thoracocentesis.  3.  S/p CVA with confusion: This appears to be patient's baseline, will defer to primary team for continued management.  AKA    11/21/24  Tele monitoring  PPM interrogation no AT/AF no ventricular rhythm  Eliquis 5 mg p.o. twice daily  Lipitor 40 mg daily  Coreg 3.125 mg p.o. twice daily  Jardiance 10 mg daily  Lasix 40 mg IV every 12  Aldactone 25 mg daily  Add Diovan 40 mg daily  Check magnesium level keep greater than 2.0  Strict intake and output  Daily weights  Recommend respiratory treatment    35-minute visit    Calixto Godinez CNP  [facility]    11/22/24  Continue telemetry monitoring, monitor electrolytes, keep potassium greater than 4 magnesium greater than 2  Interrogation of PPM showed no AT or AF episodes.  No ventricular rhythms identified.  Continue Eliquis.  Continue current medication regimen including GDMT for heart failure management as BP allows  Strict I's and O's and daily weights  Continue gentle diuresis  Apparently after my visit today patient had rapid response called on him for an episode of unresponsiveness and epistaxis.  Patient was intubated by ICU physician.  Transferred to ICU under their care  Will continue to follow along with you      Gerhard Oro [facility]  Adult Gerontology Acute Care Nurse Practitioner  [facility] between [facility] [place]   [facility]  [phone]        Of note, this documentation is completed using the Dragon Dictation system (voice recognition software). There may be spelling and/or grammatical errors that were not corrected prior to final submission.    Please do not hesitate to call with questions.  Electronically signed by AZUL Olivarez, on 11/24/2024 at 11:14 AM  Patient seen and examined in conjunction with LOIDA De La Garza and agree with the evaluation as noted above.  Recent events noted, with patient being transferred  to the ICU following an episode of hypotension and severe hypoxia requiring intubation.  This was after significant bouts of epistaxis requiring nasal packs.  His vitals are stable is intubated and sedated at this time.  We will continue with current supportive treatment, but at this time there are no active cardiac issues.  Is recent prosthetic AVR appears to be functioning normally based on his recent echocardiogram as noted above.  AKA      11/23/24  ICU monitoring  Wean off pressors  Intensivist input thank you very much  Eliquis on hold  Cardiology will continue to follow along    11/24/24  ICU monitoring  ENT input thank  you  Continue to hold the Eliquis  Slowly wean off pressors  Keep magnesium greater than 2.0  Cardiology to follow along please not hesitate call with any questions or concerns

## 2024-11-24 NOTE — CARE PLAN
Problem: Safety - Adult  Goal: Free from fall injury  Outcome: Progressing     Problem: Discharge Planning  Goal: Discharge to home or other facility with appropriate resources  Outcome: Progressing     Problem: Chronic Conditions and Co-morbidities  Goal: Patient's chronic conditions and co-morbidity symptoms are monitored and maintained or improved  Outcome: Progressing     Problem: Skin  Goal: Decreased wound size/increased tissue granulation at next dressing change  Outcome: Progressing  Flowsheets (Taken 11/23/2024 1949)  Decreased wound size/increased tissue granulation at next dressing change:   Promote sleep for wound healing   Protective dressings over bony prominences  Goal: Participates in plan/prevention/treatment measures  Outcome: Progressing  Flowsheets (Taken 11/23/2024 1949)  Participates in plan/prevention/treatment measures: Elevate heels  Goal: Prevent/manage excess moisture  Outcome: Progressing  Flowsheets (Taken 11/23/2024 1949)  Prevent/manage excess moisture:   Monitor for/manage infection if present   Cleanse incontinence/protect with barrier cream   Moisturize dry skin  Goal: Prevent/minimize sheer/friction injuries  Outcome: Progressing  Flowsheets (Taken 11/23/2024 1949)  Prevent/minimize sheer/friction injuries:   Use pull sheet   Turn/reposition every 2 hours/use positioning/transfer devices  Goal: Promote/optimize nutrition  Outcome: Progressing  Flowsheets (Taken 11/23/2024 1949)  Promote/optimize nutrition: Discuss with provider if NPO > 2 days  Goal: Promote skin healing  Outcome: Progressing  Flowsheets (Taken 11/23/2024 1949)  Promote skin healing:   Assess skin/pad under line(s)/device(s)   Turn/reposition every 2 hours/use positioning/transfer devices   The patient's goals for the shift include      The clinical goals for the shift include pt will maintain map >60 throughout shift

## 2024-11-24 NOTE — PROGRESS NOTES
Vancomycin Dosing by Pharmacy- Cessation of Therapy    Consult to pharmacy for vancomycin dosing has been discontinued by the prescriber, pharmacy will sign off at this time.    Please call pharmacy if there are further questions or re-enter a consult if vancomycin is resumed.     Sri Davenport, CharlineD

## 2024-11-24 NOTE — PROGRESS NOTES
Otolaryngology Progress Note  Patient: Antony Goff  Unit/Bed: 01/01-A  YOB: 1950  MRN: [id number]  Acct: [id number]   Admitting Diagnosis: Acute combined systolic and diastolic HF (heart failure) [I50.41]  Date:  11/19/2024  Hospital Day: 5  Attending: Brett Bush MD     Complaint:  No chief complaint on file.       SUBJECTIVE    Patient is intubated and sedated.  Nursing staff has not noticed any bleeding from the nose.        VITALS   Visit Vitals  /52   Pulse 68   Temp 35.4 °C (95.7 °F) (Temporal)   Resp 16        I/O:    Intake/Output Summary (Last 24 hours) at 11/24/2024 1056  Last data filed at 11/24/2024 0600  Gross per 24 hour   Intake 1724.94 ml   Output 1825 ml   Net -100.06 ml        Allergies:  No Known Allergies     PHYSICAL EXAM   Physical Exam  Constitutional:       Comments: Patient is intubated and sedated.   HENT:      Head: Normocephalic and atraumatic.      Nose:      Comments: Frank is in place in the left naris.  This packing was removed.  No bleeding ensued.  Maceration of tissue noted.  Small Surgicel placed.  Eyes:      Extraocular Movements: Extraocular movements intact.      Pupils: Pupils are equal, round, and reactive to light.   Musculoskeletal:      Cervical back: Normal range of motion and neck supple.         Review of Systems  Review of Systems   All other systems reviewed and are negative.      LABS   CBC:   Results from last 7 days   Lab Units 11/24/24  0338 11/23/24  0402 11/22/24  1020   WBC AUTO x10*3/uL 9.2 13.6* 7.8   RBC AUTO x10*6/uL 2.45* 2.50* 2.72*   HEMOGLOBIN g/dL 7.5* 7.7* 8.4*   HEMATOCRIT % 24.0* 23.7* 26.5*   MCV fL 98 95 97   MCH pg 30.6 30.8 30.9   MCHC g/dL 31.3* 32.5 31.7*   RDW % 16.6* 16.0* 16.0*   PLATELETS AUTO x10*3/uL 113* 141* 162     CMP:    Results from last 7 days   Lab Units 11/24/24  0338 11/23/24  0402 11/22/24  1553   SODIUM mmol/L 139 134* 136   POTASSIUM mmol/L 3.6 3.8 3.8   CHLORIDE mmol/L 88* 84* 85*    CO2 mmol/L >45* 45* >45*   BUN mg/dL 28* 33* 39*   CREATININE mg/dL 0.97 1.08 1.09   GLUCOSE mg/dL 133* 156* 118*   PROTEIN TOTAL g/dL 7.0 7.0 7.0   CALCIUM mg/dL 8.9 8.7 8.8   BILIRUBIN TOTAL mg/dL 0.9 1.2 1.4*   ALK PHOS U/L 86 89 94   AST U/L 20 24 26   ALT U/L 12 12 13     BMP:    Results from last 7 days   Lab Units 11/24/24  0338 11/23/24  0402 11/22/24  1553   SODIUM mmol/L 139 134* 136   POTASSIUM mmol/L 3.6 3.8 3.8   CHLORIDE mmol/L 88* 84* 85*   CO2 mmol/L >45* 45* >45*   BUN mg/dL 28* 33* 39*   CREATININE mg/dL 0.97 1.08 1.09   CALCIUM mg/dL 8.9 8.7 8.8   GLUCOSE mg/dL 133* 156* 118*     Magnesium:  Results from last 7 days   Lab Units 11/24/24  0338 11/23/24  0402 11/22/24  0435   MAGNESIUM mg/dL 2.44* 1.62 1.69     Troponin:    Results from last 7 days   Lab Units 11/20/24  1522   TROPHS ng/L 38*     BNP:   Results from last 7 days   Lab Units 11/20/24  1522   BNP pg/mL 337*     Lipid Panel:           Current Facility-Administered Medications:     acetaminophen (Tylenol) tablet 650 mg, 650 mg, oral, q4h PRN, 650 mg at 11/19/24 2346 **OR** acetaminophen (Tylenol) oral liquid 650 mg, 650 mg, oral, q4h PRN **OR** acetaminophen (Tylenol) suppository 650 mg, 650 mg, rectal, q4h PRN, JACQUE Holland-CNP    [Held by provider] apixaban (Eliquis) tablet 5 mg, 5 mg, oral, BID, JACQUE Holland-CNP, 5 mg at 11/22/24 0857    [Held by provider] atorvastatin (Lipitor) tablet 40 mg, 40 mg, oral, Nightly, JACQUE Holland-CNP, 40 mg at 11/21/24 2033    [Held by provider] carvedilol (Coreg) tablet 3.125 mg, 3.125 mg, oral, BID, AZUL Holland, 3.125 mg at 11/22/24 0859    dexmedeTOMIDine (Precedex) 400 mcg in 100 mL (4 mcg/mL) sodium chloride 0.9% infusion, 0-1.5 mcg/kg/hr, intravenous, Continuous, AZUL Holland, Last Rate: 12.11 mL/hr at 11/24/24 1021, 0.61 mcg/kg/hr at 11/24/24 1021    [Held by provider] empagliflozin (Jardiance) tablet 10 mg, 10 mg, oral, Daily, Magda Sun,  JACQUE-CNP, 10 mg at 11/22/24 0859    fentanyl (Sublimaze) 1000 mcg in sodium chloride 0.9% 100 mL (10 mcg/mL) infusion (premix), 0-200 mcg/hr, intravenous, Continuous, Brett Bush MD, Last Rate: 5 mL/hr at 11/24/24 0834, 50 mcg/hr at 11/24/24 0834    fentaNYL PF (Sublimaze) injection 50 mcg, 50 mcg, intravenous, q1h PRN, JACQUE Astudillo-CNP, 50 mcg at 11/22/24 2334    [Held by provider] furosemide (Lasix) injection 40 mg, 40 mg, intravenous, q12h, AZUL Holland, 40 mg at 11/22/24 0412    ipratropium-albuteroL (Duo-Neb) 0.5-2.5 mg/3 mL nebulizer solution 3 mL, 3 mL, nebulization, q4h, AZUL Holland, 3 mL at 11/24/24 0743    ipratropium-albuteroL (Duo-Neb) 0.5-2.5 mg/3 mL nebulizer solution 3 mL, 3 mL, nebulization, q2h PRN, AZUL Holland    lactulose 20 gram/30 mL oral solution 30 g, 30 g, oral, BID PRN, AZUL Holland    [Held by provider] lamoTRIgine (LaMICtal) tablet 100 mg, 100 mg, oral, Nightly, AZUL Holland, 100 mg at 11/21/24 2033    levothyroxine (Synthroid) injection 38 mcg, 38 mcg, intravenous, q24h KENDAL, AZUL Holland, 38 mcg at 11/24/24 0823    [Held by provider] levothyroxine (Synthroid, Levoxyl) tablet 88 mcg, 88 mcg, oral, Daily, AZUL Holland, 88 mcg at 11/22/24 0534    lubricating eye drops ophthalmic solution 1 drop, 1 drop, Both Eyes, TID PRN, AZUL Holland, 1 drop at 11/21/24 1425    [Held by provider] melatonin tablet 10 mg, 10 mg, oral, Nightly PRN, AZUL Holland    [Held by provider] morphine CR (MS Contin) 12 hr tablet 30 mg, 30 mg, oral, q12h KENDAL, AZUL Holland, 30 mg at 11/22/24 0856    morphine injection 2 mg, 2 mg, intravenous, q4h PRN, JACQUE Holland-CNP, 2 mg at 11/22/24 1213    naloxone (Narcan) injection 0.2 mg, 0.2 mg, intravenous, q5 min PRN, AZUL Holland    norepinephrine (Levophed) 8 mg in dextrose 5% 250 mL (0.032 mg/mL) infusion (premix),  0-0.5 mcg/kg/min, intravenous, Continuous, AZUL Astudillo, Last Rate: 1.62 mL/hr at 11/24/24 1000, 0.01 mcg/kg/min at 11/24/24 1000    nystatin (Mycostatin) 100,000 unit/gram powder 1 Application, 1 Application, Topical, BID, AZUL Holland, 1 Application at 11/24/24 0824    oxybutynin (Ditropan) tablet 5 mg, 5 mg, oral, BID, AZUL Holland, 5 mg at 11/24/24 0824    oxygen (O2) therapy, , inhalation, Continuous - Inhalation, Brett Bush MD, 40 percent at 11/24/24 0745    oxymetazoline (Afrin) 0.05 % nasal spray 2 spray, 2 spray, Each Nostril, q6h, AZUL Holland, 2 spray at 11/23/24 1705    pantoprazole (ProtoNix) EC tablet 40 mg, 40 mg, oral, Daily before breakfast **OR** pantoprazole (ProtoNix) injection 40 mg, 40 mg, intravenous, Daily before breakfast, AZUL Holland, 40 mg at 11/24/24 0600    piperacillin-tazobactam (Zosyn) 3.375 g in dextrose (iso) IV 50 mL, 3.375 g, intravenous, q8h, AZUL Holland, Last Rate: 0 mL/hr at 11/24/24 0602, 3.375 g at 11/24/24 1021    polyethylene glycol (Glycolax, Miralax) packet 17 g, 17 g, oral, Daily PRN, AZUL Holland    potassium chloride (Klor-Con) packet 20 mEq, 20 mEq, oral, Daily, AZUL Holland, 20 mEq at 11/24/24 0824    propofol (Diprivan) infusion, 0-50 mcg/kg/min, intravenous, Continuous, AZUL Astudillo, Last Rate: 14.29 mL/hr at 11/24/24 1047, 30 mcg/kg/min at 11/24/24 1047    [Held by provider] spironolactone (Aldactone) tablet 25 mg, 25 mg, oral, Daily, AZUL Holland, 25 mg at 11/22/24 0858    [Held by provider] traZODone (Desyrel) tablet 50 mg, 50 mg, oral, Nightly, AZUL Holland, 50 mg at 11/21/24 2033    [Held by provider] valsartan (Diovan) tablet 40 mg, 40 mg, oral, Daily, AZUL Holland, 40 mg at 11/22/24 0856    ECG 12 Lead    Result Date: 11/24/2024  AV dual-paced rhythm Abnormal ECG When compared with ECG of  20-NOV-2024 15:27, (unconfirmed) Premature ventricular complexes are no longer Present Vent. rate has decreased BY  13 BPM Confirmed by Alcides Coleman ([id number]) on 11/24/2024 9:46:35 AM    XR chest 2 views    Result Date: 11/20/2024  Interpreted By:  Schoenberger, Joseph, STUDY: XR CHEST 2 VIEWS;  11/20/2024 3:07 pm   INDICATION: Signs/Symptoms:sob.     COMPARISON: 03/09/2015   ACCESSION NUMBER(S): [id number]   ORDERING CLINICIAN: WANDA HUANG   FINDINGS: Left subclavian transvenous pacemaker unchanged from prior.       CARDIOMEDIASTINAL SILHOUETTE: Cardiac silhouette remains mildly enlarged. There is persistent or recurrent venous congestion. There are bilateral small pleural effusions with likely some interstitial edema.   LUNGS: There is a rather large opacity in the lower left hemithorax. On the AP radiograph, its contours somewhat rounded in a mass is a consideration though this appearance is not the noted on the lateral radiograph. However, the left hemidiaphragm is also elevated possibly exaggerating this appearance. At minimum, follow-up radiographs are recommended as a the left basal mass is a consideration.   ABDOMEN: No remarkable upper abdominal findings.   BONES: No acute osseous changes.       1.  Moderate congestive failure with interstitial edema. Small bilateral pleural effusions 2. Left basal opacity which on the AP radiograph appears to have somewhat rounded contour superiorly. Underlying mass is a consideration. At a minimum, radiographic follow-up is recommended.       MACRO: None   Signed by: Joseph Schoenberger 11/20/2024 3:13 PM Dictation workstation:   [id number]       No results found for this or any previous visit from the past 1095 days.                   Assessment     Patient Active Problem List   Diagnosis    Benign essential hypertension    Dizziness    Paroxysmal atrial fibrillation (Multi)    Pacemaker    Prolonged QT interval    Renal failure    Sleep apnea    Syncope     Shortness of breath    Blepharoconjunctivitis of left eye    Central corneal ulcer of left eye    Combined forms of age-related cataract of left eye    Congestive heart failure    Dry eye syndrome of both eyes    History of surgical procedure    HSV (herpes simplex virus) dendritic keratitis    Hypogonadism male    Hypothyroidism    Keratoconjunctivitis sicca    Hyperopia of both eyes with astigmatism and presbyopia    Meibomian gland dysfunction (MGD) of upper and lower lids of both eyes    Vitreous floaters of both eyes    Morbid obesity due to excess calories (Multi)    Neurotrophic keratitis    Neurotrophic keratoconjunctivitis, left eye    Nocturnal polyuria    Other cirrhosis of liver    Panic attack    Persistent epithelial defect of cornea    Pseudophakia of both eyes    Chronic obstructive pulmonary disease (Multi)    Pulmonary emphysema (Multi)    Punctate keratitis of both eyes    Punctate keratitis of left eye    Recurrent corneal erosion, left    Recurrent erosion of both corneas    Ring corneal ulcer of right eye    Sick sinus syndrome (Multi)    Status post total left knee replacement    Urinary urgency    Arthritis    Vasculitis (CMS-HCC)    Dizziness and giddiness    Presence of cardiac pacemaker    Chronic kidney disease    KANCHAN (obstructive sleep apnea)    Syncope and collapse    S/P AVR (aortic valve replacement)    Abnormal CT scan    Anxiety    Atelectasis    CHF (congestive heart failure), NYHA class I, acute on chronic, combined    Heart murmur    History of CVA (cerebrovascular accident)    Pulmonary HTN (Multi)    Pressure injury of left buttock, stage 3 (Multi)    Severe aortic regurgitation    Recurrent falls    Venous stasis dermatitis of both lower extremities    Tinnitus    Stress hyperglycemia    Acute combined systolic and diastolic HF (heart failure)    Epistaxis   Anemia      Plan:  I have removed the packing from the left naris and there was no active bleeding.  I placed Surgicel  for dressing.  This usually dissolves.  It can be taken out also was simply by using a forceps.  I will follow patient periodically.     This note was made using a voice recognition system and may contain unintended errors in translation in grammar.  Electronically signed by Miguel Amado MD on 11/24/2024 at 10:56 AM

## 2024-11-24 NOTE — PROGRESS NOTES
[facility]       Date:  11/24/2024  Patient:  Antony Goff  YOB: 1950  MRN:  [id number]   Admit Date:  11/19/2024  ========================================================================================================    No chief complaint on file.    History of Present Illness:  Antony Goff is a 74 y.o. year old male patient with Past Medical History of COPD, chronic hypoxic respiratory failure on 3L NC baseline O2, aortic valve stenosis s/p AVR 3/2024 at [facility], pAfib on Eliquis, remote CVA, GERD, hypothyroidism, chronic back pain on chronic opioids who presented to [facility] ER for evaluation altered mental status and increased SOB, he was determined to have COPDE and CHF exacerbation, CXR significant for pulmonary vascular congestion. He was transferred to [facility] per family request and admitted to [facility]. He was treated with IV lasix, cardiology consulted. 11/22  was called for uncontrollable epistaxis and patient found unresponsive, he was intubated and transferred to ICU.     Interval ICU Events:  11/22: Admitted to MICU-1 s/p intubation. Peak pressure 45-50 on ventilator, Dr Bush performed bedside bronch, clearance of small bloody secretions, ABG/CXR obtained unchanged from previous no PTX, ventilator equipment subsequently exchanged. Sedation with versed infusion. Dr Amado at bedside, insertion of nasal trumpet and clearance of large clot. Vasopressor requirement increasing, norepineprhine/vaso.    Medical History:  Past Medical History:   Diagnosis Date   • Encounter for preprocedural cardiovascular examination     Preop cardiovascular exam   • Encounter for screening for lipoid disorders     Screening for lipoid disorders   • Personal history of other diseases of the circulatory system 10/05/2021    History of cardiac murmur   • Personal history of other endocrine, nutritional and metabolic disease     History of hypokalemia   • Personal history of other  endocrine, nutritional and metabolic disease     History of obesity   • Personal history of other endocrine, nutritional and metabolic disease 08/03/2022    History of obesity   • Personal history of other specified conditions     History of chest pain   • Personal history of other specified conditions     History of fatigue   • Personal history of other specified conditions     History of palpitations   • Presence of cardiac pacemaker     History of cardiac pacemaker   • Repeated falls     Multiple falls   • Tinnitus, left ear 10/05/2021    Tinnitus of left ear     Past Surgical History:   Procedure Laterality Date   • CARDIAC VALVE REPLACEMENT     • CORONARY ARTERY BYPASS GRAFT     • INSERT / REPLACE / REMOVE PACEMAKER     • OTHER SURGICAL HISTORY  10/05/2021    Shoulder surgery   • OTHER SURGICAL HISTORY  10/05/2021    Knee replacement   • OTHER SURGICAL HISTORY  10/05/2021    Cataract surgery   • OTHER SURGICAL HISTORY  10/05/2021    Breast surgery   • OTHER SURGICAL HISTORY  10/05/2021    Pacemaker insertion   • OTHER SURGICAL HISTORY  10/05/2021    Appendectomy   • OTHER SURGICAL HISTORY  10/05/2021    Sinus surgery   • OTHER SURGICAL HISTORY  10/05/2021    Uvulectomy   • OTHER SURGICAL HISTORY  01/28/2022    Colonoscopy   • OTHER SURGICAL HISTORY  01/28/2022    Kidney surgery   • OTHER SURGICAL HISTORY  01/28/2022    Throat surgery   • OTHER SURGICAL HISTORY  01/28/2022    Nose surgery     Medications Prior to Admission   Medication Sig Dispense Refill Last Dose/Taking   • albuterol 90 mcg/actuation inhaler Inhale 2 puffs every 6 hours if needed.   Past Month   • furosemide (Lasix) 40 mg tablet Take 1 tablet (40 mg) by mouth early in the morning..   11/18/2024   • lactulose 10 gram/15 mL solution TAKE 30 ML BY MOUTH TWICE DAILY AS NEEDED   Past Week   • potassium chloride (Klor-Con) 20 mEq packet Take 20 mEq by mouth once daily.   Past Week   • Symbicort 160-4.5 mcg/actuation inhaler Inhale 1 puff 2 times a  day.   Past Week   • traZODone (Desyrel) 50 mg tablet Take 1 tablet (50 mg) by mouth once daily at bedtime. PRN   Past Week   • albuterol 2.5 mg /3 mL (0.083 %) nebulizer solution Inhale. Use as directed PRN      • atorvastatin (Lipitor) 40 mg tablet Take 1 tablet (40 mg) by mouth once daily at bedtime.   11/18/2024   • carvedilol (Coreg) 3.125 mg tablet Take 1 tablet (3.125 mg) by mouth 2 times a day. 180 tablet 3 11/18/2024   • Eliquis 5 mg tablet Take 1 tablet (5 mg) by mouth 2 times a day. 180 tablet 3 11/18/2024   • empagliflozin (Jardiance) 10 mg Take 1 tablet (10 mg) by mouth once daily.   11/18/2024   • lamoTRIgine (LaMICtal) 100 mg tablet Take 1 tablet (100 mg) by mouth once daily at bedtime.   11/18/2024   • levothyroxine (Synthroid, Levoxyl) 88 mcg tablet Take 1 tablet (88 mcg) by mouth once daily.   11/18/2024   • morphine CR (MS Contin) 30 mg 12 hr tablet Take 1 tablet (30 mg) by mouth every 8 hours. PRN   11/18/2024   • omeprazole OTC (PriLOSEC OTC) 20 mg EC tablet Take 1 tablet (20 mg) by mouth once daily in the morning. Take before meals. Do not crush, chew, or split.   11/18/2024   • Oxervate 0.002 % ophthalmic solution    11/18/2024   • solifenacin (VESIcare) 5 mg tablet Take 1 tablet (5 mg) by mouth once daily.   11/18/2024   • spironolactone (Aldactone) 25 mg tablet Take 1 tablet (25 mg) by mouth once daily.   11/18/2024     Patient has no known allergies.  Social History     Tobacco Use   • Smoking status: Former     Types: Cigarettes   • Smokeless tobacco: Never   Substance Use Topics   • Alcohol use: Not Currently   • Drug use: Not Currently     Family History   Problem Relation Name Age of Onset   • Heart failure Mother     • Other (asbestosis) Father     • Lung disease Father     • Thyroid disease Sister         Review of Systems:  14 point review of systems was completed and negative except for those specially mention in my HPI    Physical Exam:    Heart Rate:  [68-99]   Temp:  [35.3 °C  (95.6 °F)-36.1 °C (97 °F)]   Resp:  [9-16]   BP: ()/(40-56)   Weight:  [83.8 kg (184 lb 11.9 oz)-83.9 kg (184 lb 15.5 oz)]   SpO2:  [97 %-99 %]     Physical Exam  Vitals and nursing note reviewed.   Constitutional:       General: He is in acute distress.      Appearance: He is toxic-appearing.   HENT:      Mouth/Throat:      Pharynx: Oropharynx is clear.   Eyes:      Extraocular Movements: Extraocular movements intact.      Pupils: Pupils are equal, round, and reactive to light.   Cardiovascular:      Rate and Rhythm: Regular rhythm. Tachycardia present.      Pulses: Normal pulses.      Heart sounds: Normal heart sounds.      Comments: AV paced  Pulmonary:      Effort: Respiratory distress present.      Breath sounds: Rhonchi present.   Abdominal:      General: There is no distension.      Palpations: Abdomen is soft.      Tenderness: There is no abdominal tenderness.   Musculoskeletal:         General: Swelling present.      Left lower leg: Edema present.   Skin:     General: Skin is warm.      Capillary Refill: Capillary refill takes less than 2 seconds.      Findings: Bruising present.   Neurological:      General: No focal deficit present.       Objective:    I have reviewed all medications, laboratory results, and imaging pertinent for today's encounter    Assessment/Plan:    I am currently managing this critically ill patient for the following problems:    Neuro/Psych/Pain Ctrl/Sedation:  Chronic pain  CVA  CTH negative  Sedation: Stop versed, continue propofol, Precedex  Morphine gtt  SAT when stable  - Continue at least until nasal packing removed - pending 11/24    Respiratory/ENT:  COPD  Chronic hypoxic respiratory failure  C/F Aspiration  Intubated, vent adjustment for high peak pressures  S/P Bronch 11/22  Trend ABG  Duoneb Q4    Cardiovascular:  Circulatory shock - Septic  AVR 3/2024, PPM  Diastolic heart failure  Afib on Eliquis  Hold AC in the setting of acute bleed  Maintain Vasopressors:  Norepi/Vaso  Initiate SDS  Maintain MAP >65  CXR: right sided pleural effusion  Cardiology following    GI:  GERD  NPO  OG to LIWS    Renal/Volume Status (Intra & Extravascular):  Monitor UOP  S/P IVF during resuscitation  Daily RFP, Mg    Endocrine  Hypothyroidism  Continue synthroid  POCT Q6 while NPO    Infectious Disease:  C/F Sepsis  Pneumonia, ARDS  Lactate normal  UA negative  BC pending  MRSA pending  Sputum pending  Continue broad spectrum Vanc/Zosyn    Heme/Onc:  Epistaxis  ENT consulted  Nasal rocket inserted 11/22, maintain until extubated  Afrin  Hgb stable  T&S complete    MSK:  Nystatin to groin  PT/OT when able to participate    Ethics/Code Status:  DNRCCA  Wife and son updated at bedside    :  DVT Prophylaxis: None  GI Prophylaxis: PPI  Bowel Regimen: None  Diet: NPO  CVC: [facility] TLC 11/22  [place]: Right radial 11/22  [place]: External  Restraints: BSW  Dispo: ICU    Critical Care Time:  65 minutes spent in preparing to see patient (I.e. review of medical records), evaluation of diagnostics (I.e. labs, imaging, etc.), documentation, discussing plan of care with patient/ family/ caregiver, and/ or coordination of care with multidisciplinary team. Time does not include completion of procedure time.      Brett Bush MD

## 2024-11-24 NOTE — CARE PLAN
The patient's goals for the shift include      The clinical goals for the shift include Patient will keep map >60 throughout the shift

## 2024-11-25 LAB
ALBUMIN SERPL BCP-MCNC: 2.3 G/DL (ref 3.4–5)
ALBUMIN SERPL BCP-MCNC: 2.3 G/DL (ref 3.4–5)
ALP SERPL-CCNC: 81 U/L (ref 33–136)
ALT SERPL W P-5'-P-CCNC: 12 U/L (ref 10–52)
ANION GAP BLDA CALCULATED.4IONS-SCNC: -1 MMO/L (ref 10–25)
ANION GAP SERPL CALC-SCNC: 9 MMOL/L (ref 10–20)
ANION GAP SERPL CALC-SCNC: ABNORMAL MMOL/L
AST SERPL W P-5'-P-CCNC: 19 U/L (ref 9–39)
BACTERIA SPEC RESP CULT: NORMAL
BASE EXCESS BLDA CALC-SCNC: 22.6 MMOL/L (ref -2–3)
BASOPHILS # BLD AUTO: 0.04 X10*3/UL (ref 0–0.1)
BASOPHILS NFR BLD AUTO: 0.2 %
BILIRUB SERPL-MCNC: 0.9 MG/DL (ref 0–1.2)
BODY TEMPERATURE: ABNORMAL
BUN SERPL-MCNC: 30 MG/DL (ref 6–23)
BUN SERPL-MCNC: 31 MG/DL (ref 6–23)
CA-I BLDA-SCNC: 1.21 MMOL/L (ref 1.1–1.33)
CALCIUM SERPL-MCNC: 9 MG/DL (ref 8.6–10.3)
CALCIUM SERPL-MCNC: 9.1 MG/DL (ref 8.6–10.3)
CHLORIDE BLDA-SCNC: 94 MMOL/L (ref 98–107)
CHLORIDE SERPL-SCNC: 90 MMOL/L (ref 98–107)
CHLORIDE SERPL-SCNC: 91 MMOL/L (ref 98–107)
CO2 SERPL-SCNC: 42 MMOL/L (ref 21–32)
CO2 SERPL-SCNC: >45 MMOL/L (ref 21–32)
CREAT SERPL-MCNC: 0.89 MG/DL (ref 0.5–1.3)
CREAT SERPL-MCNC: 1.11 MG/DL (ref 0.5–1.3)
CRITICAL CALL TIME: 1919
CRITICAL CALLED BY: ABNORMAL
CRITICAL CALLED TO: ABNORMAL
CRITICAL READ BACK: ABNORMAL
EGFRCR SERPLBLD CKD-EPI 2021: 71 ML/MIN/1.73M*2
EGFRCR SERPLBLD CKD-EPI 2021: >90 ML/MIN/1.73M*2
EOSINOPHIL # BLD AUTO: 0.12 X10*3/UL (ref 0–0.4)
EOSINOPHIL NFR BLD AUTO: 0.5 %
ERYTHROCYTE [DISTWIDTH] IN BLOOD BY AUTOMATED COUNT: 16.5 % (ref 11.5–14.5)
ERYTHROCYTE [DISTWIDTH] IN BLOOD BY AUTOMATED COUNT: 16.7 % (ref 11.5–14.5)
GLUCOSE BLDA-MCNC: 123 MG/DL (ref 74–99)
GLUCOSE SERPL-MCNC: 111 MG/DL (ref 74–99)
GLUCOSE SERPL-MCNC: 117 MG/DL (ref 74–99)
GRAM STN SPEC: NORMAL
GRAM STN SPEC: NORMAL
HCO3 BLDA-SCNC: 47.1 MMOL/L (ref 22–26)
HCT VFR BLD AUTO: 24.8 % (ref 41–52)
HCT VFR BLD AUTO: 26 % (ref 41–52)
HCT VFR BLD EST: 38 % (ref 41–52)
HGB BLD-MCNC: 8 G/DL (ref 13.5–17.5)
HGB BLD-MCNC: 8.4 G/DL (ref 13.5–17.5)
HGB BLDA-MCNC: 12.7 G/DL (ref 13.5–17.5)
IMM GRANULOCYTES # BLD AUTO: 0.23 X10*3/UL (ref 0–0.5)
IMM GRANULOCYTES NFR BLD AUTO: 1 % (ref 0–0.9)
INHALED O2 CONCENTRATION: 40 %
LACTATE BLDA-SCNC: 2.9 MMOL/L (ref 0.4–2)
LYMPHOCYTES # BLD AUTO: 1.9 X10*3/UL (ref 0.8–3)
LYMPHOCYTES NFR BLD AUTO: 7.9 %
MAGNESIUM SERPL-MCNC: 2.4 MG/DL (ref 1.6–2.4)
MCH RBC QN AUTO: 30.7 PG (ref 26–34)
MCH RBC QN AUTO: 31.4 PG (ref 26–34)
MCHC RBC AUTO-ENTMCNC: 32.3 G/DL (ref 32–36)
MCHC RBC AUTO-ENTMCNC: 32.3 G/DL (ref 32–36)
MCV RBC AUTO: 95 FL (ref 80–100)
MCV RBC AUTO: 97 FL (ref 80–100)
MONOCYTES # BLD AUTO: 1.48 X10*3/UL (ref 0.05–0.8)
MONOCYTES NFR BLD AUTO: 6.2 %
NEUTROPHILS # BLD AUTO: 20.22 X10*3/UL (ref 1.6–5.5)
NEUTROPHILS NFR BLD AUTO: 84.2 %
NRBC BLD-RTO: 0 /100 WBCS (ref 0–0)
NRBC BLD-RTO: 0 /100 WBCS (ref 0–0)
OXYHGB MFR BLDA: 96.4 % (ref 94–98)
PCO2 BLDA: 48 MM HG (ref 38–42)
PH BLDA: 7.6 PH (ref 7.38–7.42)
PHOSPHATE SERPL-MCNC: <1 MG/DL (ref 2.5–4.9)
PLATELET # BLD AUTO: 116 X10*3/UL (ref 150–450)
PLATELET # BLD AUTO: 237 X10*3/UL (ref 150–450)
PO2 BLDA: 95 MM HG (ref 85–95)
POTASSIUM BLDA-SCNC: 3.5 MMOL/L (ref 3.5–5.3)
POTASSIUM SERPL-SCNC: 3.1 MMOL/L (ref 3.5–5.3)
POTASSIUM SERPL-SCNC: 3.3 MMOL/L (ref 3.5–5.3)
PROT SERPL-MCNC: 6.7 G/DL (ref 6.4–8.2)
RBC # BLD AUTO: 2.55 X10*6/UL (ref 4.5–5.9)
RBC # BLD AUTO: 2.74 X10*6/UL (ref 4.5–5.9)
SAO2 % BLDA: 100 % (ref 94–100)
SODIUM BLDA-SCNC: 137 MMOL/L (ref 136–145)
SODIUM SERPL-SCNC: 139 MMOL/L (ref 136–145)
SODIUM SERPL-SCNC: 140 MMOL/L (ref 136–145)
WBC # BLD AUTO: 13.7 X10*3/UL (ref 4.4–11.3)
WBC # BLD AUTO: 24 X10*3/UL (ref 4.4–11.3)

## 2024-11-25 PROCEDURE — 36415 COLL VENOUS BLD VENIPUNCTURE: CPT

## 2024-11-25 PROCEDURE — 2500000004 HC RX 250 GENERAL PHARMACY W/ HCPCS (ALT 636 FOR OP/ED)

## 2024-11-25 PROCEDURE — 70486 CT MAXILLOFACIAL W/O DYE: CPT | Performed by: RADIOLOGY

## 2024-11-25 PROCEDURE — 71045 X-RAY EXAM CHEST 1 VIEW: CPT | Performed by: RADIOLOGY

## 2024-11-25 PROCEDURE — 2500000001 HC RX 250 WO HCPCS SELF ADMINISTERED DRUGS (ALT 637 FOR MEDICARE OP)

## 2024-11-25 PROCEDURE — 37799 UNLISTED PX VASCULAR SURGERY: CPT

## 2024-11-25 PROCEDURE — 85025 COMPLETE CBC W/AUTO DIFF WBC: CPT

## 2024-11-25 PROCEDURE — 2500000002 HC RX 250 W HCPCS SELF ADMINISTERED DRUGS (ALT 637 FOR MEDICARE OP, ALT 636 FOR OP/ED)

## 2024-11-25 PROCEDURE — 71250 CT THORAX DX C-: CPT | Performed by: RADIOLOGY

## 2024-11-25 PROCEDURE — 2500000004 HC RX 250 GENERAL PHARMACY W/ HCPCS (ALT 636 FOR OP/ED): Performed by: STUDENT IN AN ORGANIZED HEALTH CARE EDUCATION/TRAINING PROGRAM

## 2024-11-25 PROCEDURE — 94640 AIRWAY INHALATION TREATMENT: CPT

## 2024-11-25 PROCEDURE — 85027 COMPLETE CBC AUTOMATED: CPT | Performed by: NURSE PRACTITIONER

## 2024-11-25 PROCEDURE — 37799 UNLISTED PX VASCULAR SURGERY: CPT | Performed by: NURSE PRACTITIONER

## 2024-11-25 PROCEDURE — 74176 CT ABD & PELVIS W/O CONTRAST: CPT | Performed by: RADIOLOGY

## 2024-11-25 PROCEDURE — 99291 CRITICAL CARE FIRST HOUR: CPT

## 2024-11-25 PROCEDURE — 84132 ASSAY OF SERUM POTASSIUM: CPT

## 2024-11-25 PROCEDURE — 2020000001 HC ICU ROOM DAILY

## 2024-11-25 PROCEDURE — 2500000005 HC RX 250 GENERAL PHARMACY W/O HCPCS: Performed by: NURSE PRACTITIONER

## 2024-11-25 PROCEDURE — 83735 ASSAY OF MAGNESIUM: CPT | Performed by: NURSE PRACTITIONER

## 2024-11-25 PROCEDURE — 87040 BLOOD CULTURE FOR BACTERIA: CPT | Mod: ELYLAB

## 2024-11-25 PROCEDURE — 80053 COMPREHEN METABOLIC PANEL: CPT | Performed by: NURSE PRACTITIONER

## 2024-11-25 PROCEDURE — 94003 VENT MGMT INPAT SUBQ DAY: CPT

## 2024-11-25 PROCEDURE — 2500000005 HC RX 250 GENERAL PHARMACY W/O HCPCS

## 2024-11-25 PROCEDURE — 2500000004 HC RX 250 GENERAL PHARMACY W/ HCPCS (ALT 636 FOR OP/ED): Performed by: NURSE PRACTITIONER

## 2024-11-25 PROCEDURE — 99233 SBSQ HOSP IP/OBS HIGH 50: CPT | Performed by: INTERNAL MEDICINE

## 2024-11-25 PROCEDURE — 2500000005 HC RX 250 GENERAL PHARMACY W/O HCPCS: Performed by: STUDENT IN AN ORGANIZED HEALTH CARE EDUCATION/TRAINING PROGRAM

## 2024-11-25 RX ORDER — FENTANYL CITRATE 50 UG/ML
50 INJECTION, SOLUTION INTRAMUSCULAR; INTRAVENOUS ONCE
Status: COMPLETED | OUTPATIENT
Start: 2024-11-25 | End: 2024-11-25

## 2024-11-25 RX ORDER — POTASSIUM CHLORIDE 1.5 G/1.58G
40 POWDER, FOR SOLUTION ORAL ONCE
Status: COMPLETED | OUTPATIENT
Start: 2024-11-25 | End: 2024-11-25

## 2024-11-25 RX ORDER — POLYETHYLENE GLYCOL 3350 17 G/17G
17 POWDER, FOR SOLUTION ORAL DAILY
Status: DISCONTINUED | OUTPATIENT
Start: 2024-11-26 | End: 2024-11-29

## 2024-11-25 RX ORDER — VANCOMYCIN HYDROCHLORIDE 1 G/20ML
INJECTION, POWDER, LYOPHILIZED, FOR SOLUTION INTRAVENOUS DAILY PRN
Status: DISCONTINUED | OUTPATIENT
Start: 2024-11-25 | End: 2024-11-28

## 2024-11-25 ASSESSMENT — COGNITIVE AND FUNCTIONAL STATUS - GENERAL
MOVING TO AND FROM BED TO CHAIR: TOTAL
MOBILITY SCORE: 6
TOILETING: TOTAL
DAILY ACTIVITIY SCORE: 6
MOVING FROM LYING ON BACK TO SITTING ON SIDE OF FLAT BED WITH BEDRAILS: TOTAL
EATING MEALS: TOTAL
DRESSING REGULAR UPPER BODY CLOTHING: TOTAL
TURNING FROM BACK TO SIDE WHILE IN FLAT BAD: TOTAL
STANDING UP FROM CHAIR USING ARMS: TOTAL
HELP NEEDED FOR BATHING: TOTAL
PERSONAL GROOMING: TOTAL
DRESSING REGULAR LOWER BODY CLOTHING: TOTAL
CLIMB 3 TO 5 STEPS WITH RAILING: TOTAL
WALKING IN HOSPITAL ROOM: TOTAL

## 2024-11-25 NOTE — PROGRESS NOTES
Occupational Therapy                 Therapy Communication Note    Patient Name: Antony Goff  MRN: [id number]  Department: [facility]  Room: 01/01-A  Today's Date: 11/25/2024     Discipline: Occupational Therapy    Comment: Pt transferred to ICU on 11/22 after RR was called for uncontrollable epistaxis and patient found unresponsive, he was intubated and transferred to ICU.     Discontinue OT at this time, reorder when medically appropriate for therapy services

## 2024-11-25 NOTE — PROGRESS NOTES
Otolaryngology Progress Note  Patient: Antony Goff  Unit/Bed: 01/01-A  YOB: 1950  MRN: [id number]  Acct: [id number]   Admitting Diagnosis: Acute combined systolic and diastolic HF (heart failure) [I50.41]  Date:  11/19/2024  Hospital Day: 6  Attending: Jose Gregg MD     Complaint:  No chief complaint on file.       SUBJECTIVE    Patient is intubated and sedated.        VITALS   Visit Vitals  BP (!) 101/40   Pulse 68   Temp 36.9 °C (98.4 °F) (Temporal)   Resp 15        I/O:    Intake/Output Summary (Last 24 hours) at 11/25/2024 0747  Last data filed at 11/25/2024 0624  Gross per 24 hour   Intake 1256.38 ml   Output 850 ml   Net 406.38 ml        Allergies:  No Known Allergies     PHYSICAL EXAM   Physical Exam  Constitutional:       Comments:   Patient is sedated and intubated   HENT:      Head: Normocephalic and atraumatic.      Nose:      Comments: Frank is in place in the left naris.  This was removed.  No bleeding ensued.     Mouth/Throat:      Mouth: Mucous membranes are moist.      Comments: Endo tracheal tube and orogastric tube present.  No bleeding per nursing staff.  Musculoskeletal:      Cervical back: Normal range of motion.         Review of Systems  Review of Systems   All other systems reviewed and are negative.      LABS   CBC:   Results from last 7 days   Lab Units 11/25/24  0328 11/24/24  0338 11/23/24  0402   WBC AUTO x10*3/uL 13.7* 9.2 13.6*   RBC AUTO x10*6/uL 2.55* 2.45* 2.50*   HEMOGLOBIN g/dL 8.0* 7.5* 7.7*   HEMATOCRIT % 24.8* 24.0* 23.7*   MCV fL 97 98 95   MCH pg 31.4 30.6 30.8   MCHC g/dL 32.3 31.3* 32.5   RDW % 16.5* 16.6* 16.0*   PLATELETS AUTO x10*3/uL 116* 113* 141*     CMP:    Results from last 7 days   Lab Units 11/25/24  0328 11/24/24  0338 11/23/24  0402   SODIUM mmol/L 140 139 134*   POTASSIUM mmol/L 3.1* 3.6 3.8   CHLORIDE mmol/L 90* 88* 84*   CO2 mmol/L >45* >45* 45*   BUN mg/dL 30* 28* 33*   CREATININE mg/dL 0.89 0.97 1.08   GLUCOSE mg/dL 111* 133*  156*   PROTEIN TOTAL g/dL 6.7 7.0 7.0   CALCIUM mg/dL 9.0 8.9 8.7   BILIRUBIN TOTAL mg/dL 0.9 0.9 1.2   ALK PHOS U/L 81 86 89   AST U/L 19 20 24   ALT U/L 12 12 12     BMP:    Results from last 7 days   Lab Units 11/25/24  0328 11/24/24  0338 11/23/24  0402   SODIUM mmol/L 140 139 134*   POTASSIUM mmol/L 3.1* 3.6 3.8   CHLORIDE mmol/L 90* 88* 84*   CO2 mmol/L >45* >45* 45*   BUN mg/dL 30* 28* 33*   CREATININE mg/dL 0.89 0.97 1.08   CALCIUM mg/dL 9.0 8.9 8.7   GLUCOSE mg/dL 111* 133* 156*     Magnesium:  Results from last 7 days   Lab Units 11/25/24  0328 11/24/24  0338 11/23/24  0402   MAGNESIUM mg/dL 2.40 2.44* 1.62     Troponin:    Results from last 7 days   Lab Units 11/20/24  1522   TROPHS ng/L 38*     BNP:   Results from last 7 days   Lab Units 11/20/24  1522   BNP pg/mL 337*     Lipid Panel:           Current Facility-Administered Medications:     acetaminophen (Tylenol) tablet 650 mg, 650 mg, oral, q4h PRN, 650 mg at 11/19/24 2346 **OR** acetaminophen (Tylenol) oral liquid 650 mg, 650 mg, oral, q4h PRN **OR** acetaminophen (Tylenol) suppository 650 mg, 650 mg, rectal, q4h PRN, Magda Sun, JACQUE-CNP    [Held by provider] apixaban (Eliquis) tablet 5 mg, 5 mg, oral, BID, Magda Sun APRN-CNP, 5 mg at 11/22/24 0857    atorvastatin (Lipitor) tablet 40 mg, 40 mg, oral, Nightly, Darci Martines APRN-CNP, 40 mg at 11/21/24 2033    [Held by provider] carvedilol (Coreg) tablet 3.125 mg, 3.125 mg, oral, BID, JACQUE Holland-CNP, 3.125 mg at 11/22/24 0859    dexmedeTOMIDine (Precedex) 400 mcg in 100 mL (4 mcg/mL) sodium chloride 0.9% infusion, 0-1.5 mcg/kg/hr, intravenous, Continuous, AZUL Holland, Last Rate: 6.15 mL/hr at 11/25/24 0624, 0.31 mcg/kg/hr at 11/25/24 0624    [Held by provider] empagliflozin (Jardiance) tablet 10 mg, 10 mg, oral, Daily, AZUL Holland, 10 mg at 11/22/24 0859    fentanyl (Sublimaze) 1000 mcg in sodium chloride 0.9% 100 mL (10 mcg/mL) infusion (premix),  0-200 mcg/hr, intravenous, Continuous, Brett Bush MD, Last Rate: 2.5 mL/hr at 11/25/24 0624, 25 mcg/hr at 11/25/24 0624    fentaNYL PF (Sublimaze) injection 50 mcg, 50 mcg, intravenous, q1h PRN, JACQUE Astudillo-CNP, 50 mcg at 11/22/24 2334    [Held by provider] furosemide (Lasix) injection 40 mg, 40 mg, intravenous, q12h, JACQUE Holland-CNP, 40 mg at 11/22/24 0412    ipratropium-albuteroL (Duo-Neb) 0.5-2.5 mg/3 mL nebulizer solution 3 mL, 3 mL, nebulization, q4h, AZUL Holland, 3 mL at 11/25/24 0715    ipratropium-albuteroL (Duo-Neb) 0.5-2.5 mg/3 mL nebulizer solution 3 mL, 3 mL, nebulization, q2h PRN, AZUL Holland    lactulose 20 gram/30 mL oral solution 30 g, 30 g, oral, BID PRN, AZUL Holland    lamoTRIgine (LaMICtal) tablet 100 mg, 100 mg, oral, Nightly, JACQUE Retana-CNP, 100 mg at 11/24/24 2250    levothyroxine (Synthroid, Levoxyl) tablet 88 mcg, 88 mcg, oral, Daily, AZUL Retana, 88 mcg at 11/25/24 0600    lubricating eye drops ophthalmic solution 1 drop, 1 drop, Both Eyes, TID PRN, AZUL Holland, 1 drop at 11/21/24 1425    [Held by provider] melatonin tablet 10 mg, 10 mg, oral, Nightly PRN, AZUL Holland    [Held by provider] morphine CR (MS Contin) 12 hr tablet 30 mg, 30 mg, oral, q12h KENDAL, AZUL Retana    morphine injection 2 mg, 2 mg, intravenous, q4h PRN, AZUL Holland, 2 mg at 11/22/24 1213    naloxone (Narcan) injection 0.2 mg, 0.2 mg, intravenous, q5 min PRN, AZUL Holland    norepinephrine (Levophed) 8 mg in dextrose 5% 250 mL (0.032 mg/mL) infusion (premix), 0-0.5 mcg/kg/min, intravenous, Continuous, AZUL Astudillo, Last Rate: 21.1 mL/hr at 11/25/24 0624, 0.13 mcg/kg/min at 11/25/24 0624    nystatin (Mycostatin) 100,000 unit/gram powder 1 Application, 1 Application, Topical, BID, AZUL Holland, 1 Application at 11/24/24 2026     oxybutynin (Ditropan) tablet 5 mg, 5 mg, oral, BID, AZUL Holland, 5 mg at 11/24/24 2026    oxygen (O2) therapy, , inhalation, Continuous - Inhalation, Brett Bush MD, 40 percent at 11/25/24 0716    pantoprazole (ProtoNix) EC tablet 40 mg, 40 mg, oral, Daily before breakfast **OR** pantoprazole (ProtoNix) injection 40 mg, 40 mg, intravenous, Daily before breakfast, AZUL Holland, 40 mg at 11/25/24 0601    piperacillin-tazobactam (Zosyn) 3.375 g in dextrose (iso) IV 50 mL, 3.375 g, intravenous, q8h, AZUL Holland, Stopped at 11/25/24 0619    polyethylene glycol (Glycolax, Miralax) packet 17 g, 17 g, oral, Daily PRN, AZUL Holland    potassium chloride (Klor-Con) packet 20 mEq, 20 mEq, oral, Daily, AZUL Holland, 20 mEq at 11/24/24 0824    propofol (Diprivan) infusion, 0-50 mcg/kg/min, intravenous, Continuous, AZUL Astudillo, Last Rate: 7.15 mL/hr at 11/25/24 0642, 15 mcg/kg/min at 11/25/24 0642    [Held by provider] spironolactone (Aldactone) tablet 25 mg, 25 mg, oral, Daily, AZUL Holland, 25 mg at 11/22/24 0858    traZODone (Desyrel) tablet 50 mg, 50 mg, oral, Nightly, JACQUE Retana-CNP, 50 mg at 11/21/24 2033    [Held by provider] valsartan (Diovan) tablet 40 mg, 40 mg, oral, Daily, JACQUE Holland-CNP, 40 mg at 11/22/24 0856    ECG 12 Lead    Result Date: 11/24/2024  AV dual-paced rhythm Abnormal ECG When compared with ECG of 20-NOV-2024 15:27, (unconfirmed) Premature ventricular complexes are no longer Present Vent. rate has decreased BY  13 BPM Confirmed by Alcides Coleman ([id number]) on 11/24/2024 9:46:35 AM    XR chest 2 views    Result Date: 11/20/2024  Interpreted By:  Schoenberger, Joseph, STUDY: XR CHEST 2 VIEWS;  11/20/2024 3:07 pm   INDICATION: Signs/Symptoms:sob.     COMPARISON: 03/09/2015   ACCESSION NUMBER(S): [id number]   ORDERING CLINICIAN: WANDA HUANG   FINDINGS: Left subclavian transvenous  pacemaker unchanged from prior.       CARDIOMEDIASTINAL SILHOUETTE: Cardiac silhouette remains mildly enlarged. There is persistent or recurrent venous congestion. There are bilateral small pleural effusions with likely some interstitial edema.   LUNGS: There is a rather large opacity in the lower left hemithorax. On the AP radiograph, its contours somewhat rounded in a mass is a consideration though this appearance is not the noted on the lateral radiograph. However, the left hemidiaphragm is also elevated possibly exaggerating this appearance. At minimum, follow-up radiographs are recommended as a the left basal mass is a consideration.   ABDOMEN: No remarkable upper abdominal findings.   BONES: No acute osseous changes.       1.  Moderate congestive failure with interstitial edema. Small bilateral pleural effusions 2. Left basal opacity which on the AP radiograph appears to have somewhat rounded contour superiorly. Underlying mass is a consideration. At a minimum, radiographic follow-up is recommended.       MACRO: None   Signed by: Joseph Schoenberger 11/20/2024 3:13 PM Dictation workstation:   [id number]       No results found for this or any previous visit from the past 1095 days.                   Assessment     Patient Active Problem List   Diagnosis    Benign essential hypertension    Dizziness    Paroxysmal atrial fibrillation (Multi)    Pacemaker    Prolonged QT interval    Renal failure    Sleep apnea    Syncope    Shortness of breath    Blepharoconjunctivitis of left eye    Central corneal ulcer of left eye    Combined forms of age-related cataract of left eye    Congestive heart failure    Dry eye syndrome of both eyes    History of surgical procedure    HSV (herpes simplex virus) dendritic keratitis    Hypogonadism male    Hypothyroidism    Keratoconjunctivitis sicca    Hyperopia of both eyes with astigmatism and presbyopia    Meibomian gland dysfunction (MGD) of upper and lower lids of both eyes     Vitreous floaters of both eyes    Morbid obesity due to excess calories (Multi)    Neurotrophic keratitis    Neurotrophic keratoconjunctivitis, left eye    Nocturnal polyuria    Other cirrhosis of liver    Panic attack    Persistent epithelial defect of cornea    Pseudophakia of both eyes    Chronic obstructive pulmonary disease (Multi)    Pulmonary emphysema (Multi)    Punctate keratitis of both eyes    Punctate keratitis of left eye    Recurrent corneal erosion, left    Recurrent erosion of both corneas    Ring corneal ulcer of right eye    Sick sinus syndrome (Multi)    Status post total left knee replacement    Urinary urgency    Arthritis    Vasculitis (CMS-HCC)    Dizziness and giddiness    Presence of cardiac pacemaker    Chronic kidney disease    KANCHAN (obstructive sleep apnea)    Syncope and collapse    S/P AVR (aortic valve replacement)    Abnormal CT scan    Anxiety    Atelectasis    CHF (congestive heart failure), NYHA class I, acute on chronic, combined    Heart murmur    History of CVA (cerebrovascular accident)    Pulmonary HTN (Multi)    Pressure injury of left buttock, stage 3 (Multi)    Severe aortic regurgitation    Recurrent falls    Venous stasis dermatitis of both lower extremities    Tinnitus    Stress hyperglycemia    Acute combined systolic and diastolic HF (heart failure)    Epistaxis    Anemia          Plan:  I have removed the packing from the left naris.  There was no bleeding.  I will sign off while I am away.  Dr. Seay will be covering for me.        This note was made using a voice recognition system and may contain unintended errors in translation in grammar.  Electronically signed by Miguel Amado MD on 11/25/2024 at 7:47 AM

## 2024-11-25 NOTE — PROGRESS NOTES
[facility] Critical Care Medicine       Date:  11/25/2024  Patient:  Antony Goff  YOB: 1950  MRN:  [id number]   Admit Date:  11/19/2024  ========================================================================================================    No chief complaint on file.    History of Present Illness:  Antony Goff is a 74 y.o. year old male patient with Past Medical History of COPD, chronic hypoxic respiratory failure on 3L NC baseline O2, aortic valve stenosis s/p AVR 3/2024 at [facility], pAfib on Eliquis, remote CVA, GERD, hypothyroidism, chronic back pain on chronic opioids who presented to [facility] ER for evaluation altered mental status and increased SOB, he was determined to have COPDE and CHF exacerbation, CXR significant for pulmonary vascular congestion. He was transferred to [facility] per family request and admitted to [facility]. He was treated with IV lasix, cardiology consulted. 11/22 RR was called for uncontrollable epistaxis and patient found unresponsive, he was intubated and transferred to ICU.     Interval ICU Events:  11/22: Admitted to MICU-1 s/p intubation. Peak pressure 45-50 on ventilator, Dr Bush performed bedside bronch, clearance of small bloody secretions, ABG/CXR obtained unchanged from previous no PTX, ventilator equipment subsequently exchanged. Sedation with versed infusion. Dr Amado at bedside, insertion of nasal trumpet and clearance of large clot. Vasopressor requirement increasing, norepineprhine/vaso.    11/23: Pressers weaned, down to minimal vent settings. Remain intubated today with nasal packing in. ENT plan to remove tomorrow    11/24: Pending ENT nasal packing removal. If no further bleeding -> SAT/SBT. Pressors now only low dose in setting of sedation. Continue on zosyn for HAP    11/25: Plan for SBT today. Packing removed by ENT, surgicall inserted and no further bleeding. Wean vasopressors as sedation is decreased.    Medical History:  Past Medical History:    Diagnosis Date    Encounter for preprocedural cardiovascular examination     Preop cardiovascular exam    Encounter for screening for lipoid disorders     Screening for lipoid disorders    Personal history of other diseases of the circulatory system 10/05/2021    History of cardiac murmur    Personal history of other endocrine, nutritional and metabolic disease     History of hypokalemia    Personal history of other endocrine, nutritional and metabolic disease     History of obesity    Personal history of other endocrine, nutritional and metabolic disease 08/03/2022    History of obesity    Personal history of other specified conditions     History of chest pain    Personal history of other specified conditions     History of fatigue    Personal history of other specified conditions     History of palpitations    Presence of cardiac pacemaker     History of cardiac pacemaker    Repeated falls     Multiple falls    Tinnitus, left ear 10/05/2021    Tinnitus of left ear     Past Surgical History:   Procedure Laterality Date    CARDIAC VALVE REPLACEMENT      CORONARY ARTERY BYPASS GRAFT      INSERT / REPLACE / REMOVE PACEMAKER      OTHER SURGICAL HISTORY  10/05/2021    Shoulder surgery    OTHER SURGICAL HISTORY  10/05/2021    Knee replacement    OTHER SURGICAL HISTORY  10/05/2021    Cataract surgery    OTHER SURGICAL HISTORY  10/05/2021    Breast surgery    OTHER SURGICAL HISTORY  10/05/2021    Pacemaker insertion    OTHER SURGICAL HISTORY  10/05/2021    Appendectomy    OTHER SURGICAL HISTORY  10/05/2021    Sinus surgery    OTHER SURGICAL HISTORY  10/05/2021    Uvulectomy    OTHER SURGICAL HISTORY  01/28/2022    Colonoscopy    OTHER SURGICAL HISTORY  01/28/2022    Kidney surgery    OTHER SURGICAL HISTORY  01/28/2022    Throat surgery    OTHER SURGICAL HISTORY  01/28/2022    Nose surgery     Medications Prior to Admission   Medication Sig Dispense Refill Last Dose/Taking    albuterol 90 mcg/actuation inhaler Inhale 2  puffs every 6 hours if needed.   Past Month    furosemide (Lasix) 40 mg tablet Take 1 tablet (40 mg) by mouth early in the morning..   11/18/2024    lactulose 10 gram/15 mL solution TAKE 30 ML BY MOUTH TWICE DAILY AS NEEDED   Past Week    potassium chloride (Klor-Con) 20 mEq packet Take 20 mEq by mouth once daily.   Past Week    Symbicort 160-4.5 mcg/actuation inhaler Inhale 1 puff 2 times a day.   Past Week    traZODone (Desyrel) 50 mg tablet Take 1 tablet (50 mg) by mouth once daily at bedtime. PRN   Past Week    albuterol 2.5 mg /3 mL (0.083 %) nebulizer solution Inhale. Use as directed PRN       atorvastatin (Lipitor) 40 mg tablet Take 1 tablet (40 mg) by mouth once daily at bedtime.   11/18/2024    carvedilol (Coreg) 3.125 mg tablet Take 1 tablet (3.125 mg) by mouth 2 times a day. 180 tablet 3 11/18/2024    Eliquis 5 mg tablet Take 1 tablet (5 mg) by mouth 2 times a day. 180 tablet 3 11/18/2024    empagliflozin (Jardiance) 10 mg Take 1 tablet (10 mg) by mouth once daily.   11/18/2024    lamoTRIgine (LaMICtal) 100 mg tablet Take 1 tablet (100 mg) by mouth once daily at bedtime.   11/18/2024    levothyroxine (Synthroid, Levoxyl) 88 mcg tablet Take 1 tablet (88 mcg) by mouth once daily.   11/18/2024    morphine CR (MS Contin) 30 mg 12 hr tablet Take 1 tablet (30 mg) by mouth every 8 hours. PRN   11/18/2024    omeprazole OTC (PriLOSEC OTC) 20 mg EC tablet Take 1 tablet (20 mg) by mouth once daily in the morning. Take before meals. Do not crush, chew, or split.   11/18/2024    Oxervate 0.002 % ophthalmic solution    11/18/2024    solifenacin (VESIcare) 5 mg tablet Take 1 tablet (5 mg) by mouth once daily.   11/18/2024    spironolactone (Aldactone) 25 mg tablet Take 1 tablet (25 mg) by mouth once daily.   11/18/2024     Patient has no known allergies.  Social History     Tobacco Use    Smoking status: Former     Types: Cigarettes    Smokeless tobacco: Never   Substance Use Topics    Alcohol use: Not Currently    Drug  use: Not Currently     Family History   Problem Relation Name Age of Onset    Heart failure Mother      Other (asbestosis) Father      Lung disease Father      Thyroid disease Sister         Review of Systems:  14 point review of systems was completed and negative except for those specially mention in my HPI    Physical Exam:    Heart Rate:  []   Temp:  [35.3 °C (95.5 °F)-36.9 °C (98.4 °F)]   Resp:  [15-21]   BP: ()/(40-47)   Weight:  [83.8 kg (184 lb 11.9 oz)]   SpO2:  [93 %-99 %]     Physical Exam  Vitals and nursing note reviewed.   Constitutional:       General: He is in acute distress.      Appearance: He is toxic-appearing.   HENT:      Mouth/Throat:      Pharynx: Oropharynx is clear.   Eyes:      Extraocular Movements: Extraocular movements intact.      Pupils: Pupils are equal, round, and reactive to light.   Cardiovascular:      Rate and Rhythm: Regular rhythm. Tachycardia present.      Pulses: Normal pulses.      Heart sounds: Normal heart sounds.      Comments: AV paced  Pulmonary:      Effort: Respiratory distress present.      Breath sounds: Rhonchi present.   Abdominal:      General: There is no distension.      Palpations: Abdomen is soft.      Tenderness: There is no abdominal tenderness.   Musculoskeletal:         General: Swelling present.      Left lower leg: Edema present.   Skin:     General: Skin is warm.      Capillary Refill: Capillary refill takes less than 2 seconds.      Findings: Bruising present.   Neurological:      General: No focal deficit present.         Objective:    I have reviewed all medications, laboratory results, and imaging pertinent for today's encounter    Assessment/Plan:    I am currently managing this critically ill patient for the following problems:    Neuro/Psych/Pain Ctrl/Sedation:  Chronic pain  CVA  CTH negative  Sedation: Propofol, Fentanyl, Precedex  SAT today, can maintain Precedex if needed  Resume chronic opioids    Respiratory/ENT:  COPD  Chronic  hypoxic respiratory failure  Aspiration  Intubated, SBT today  S/P Bronch 11/22  Duoneb Q4    Cardiovascular:  Circulatory shock - Septic  AVR 3/2024, PPM  Diastolic heart failure  Afib on Eliquis  Hold AC in the setting of acute bleed  Maintain Vasopressors: Norepi  Initiate SDS  Maintain MAP >65  CXR: right sided pleural effusion  Cardiology following    GI:  GERD  NPO  OG to LIWS    Renal/Volume Status (Intra & Extravascular):  Monitor UOP  S/P IVF during resuscitation  Daily RFP, Mg    Endocrine  Hypothyroidism  Continue synthroid  POCT Q6 while NPO    Infectious Disease:  C/F Sepsis  Pneumonia  Lactate normal  UA negative  BC NGTD  MRSA negative, DC vanco  Sputum pending  Continue broad spectrum Zosyn for 5 days    Heme/Onc:  Epistaxis resolved  ENT consulted  Packing removed by ENT, s/p surgicell    MSK:  Nystatin to groin  PT/OT when able to participate    Ethics/Code Status:  DNRCCA/DNI once extubated    :  DVT Prophylaxis: None  GI Prophylaxis: PPI  Bowel Regimen: None  Diet: NPO  CVC: [facility] TLC 11/22  [place]: Right radial 11/22  [place]: External  Restraints: BSW  Dispo: ICU    Critical Care Time:  40 minutes spent in preparing to see patient (I.e. review of medical records), evaluation of diagnostics (I.e. labs, imaging, etc.), documentation, discussing plan of care with patient/ family/ caregiver, and/ or coordination of care with multidisciplinary team. Time does not include completion of procedure time.      Magda Sun, APRN-CNP

## 2024-11-25 NOTE — PROGRESS NOTES
[facility] Progress Note           Rounding CLAUDIA/Cardiologist:  Gerhard Oro, JACQUE-WINNIE, Dr. Mayberry  Primary Cardiologist: Dr. Armani Mayberry    Date:  11/25/2024  Patient:  Antony Goff  YOB: 1950  MRN:  [id number]   Admit Date:  11/19/2024      SUBJECTIVE:    11/25/24  Remains intubated and sedated.  Still on norepinephrine at 0.13 mics per kilogram per minute.  Telemetry shows paced in the 60s.  BP stable.  40% FiO2 on ventilator.    11/24/24  Intubated and sedated in the ICU  Slowly wean off pressors  Telemetry is paced  Cardiology continue to follow along    11/23/24  Intubated and sedated in the ICU  Apparently yesterday patient developed severe epistaxis intubated taken to the ICU currently on pressors  Telemetry is paced  Slowly wean off pressors    11/22/24  Eval at bedside this morning.  Denies any complaints.  Attempting to eat breakfast.  Remains on O2.  Telemetry shows dual paced rhythm.  Fluid balance -1660    11/21/24  Patient is awake and alert sitting up in chair answering all my questions this morning.  He states that his shortness of breath is very much improved.  Well-informed of plan of care.  He states he wants to go home in the next day or 2.  EKG dual paced  Telemetry is paced  Daily weights ordered    Cardiac history  8/24 echo  CONCLUSIONS:   - Exam indication: Limited echo - Re-evaluation of heart failure   - Left ventricular systolic function is normal. EF = 60 ± 5% (visual est.)   - The right ventricle is dilated. Right ventricular systolic function is low   normal.   - The visualized aorta is dilated with a maximal dimension of 4.1 cm.   - There is moderate (2+) tricuspid valve regurgitation.   - Epic prosthetic aortic valve (size #27). The peak gradient is 18 mmHg, the mean   gradient is 9 mmHg and the dimensionless valve index is 0.79. Prior peak/mean   gradients were 21/11 mmHg.   - Estimated right ventricular systolic pressure is 59 mmHg consistent  with   moderate pulmonary hypertension. Estimated right atrial pressure is 3 mmHg based   on IVC assessment. Estimated right ventricular systolic pressure is 59 mmHg   consistent with moderate pulmonary hypertension. Estimated right atrial pressure   is 3 mmHg based on IVC assessment.   - Exam was compared with the prior  echocardiographic exam performed on   04/26/2024. There is no significant changes.      3/28/24  Operations during Hospitalization:   3/28/2024: AVR (#27 Epic plus), LAAC, left side MAZE Procedure, Epicardial lead LV pacing lead tunneled to left chest and generator exchanged for CRT-P      VITALS:     Vitals:    11/25/24 0500 11/25/24 0600 11/25/24 0620 11/25/24 0622   BP:       Pulse: 68 68 68 68   Resp: 16 17 21 15   Temp:  36.9 °C (98.4 °F)     TempSrc:  Temporal     SpO2: 95% 95% 98% 97%   Weight:  83.8 kg (184 lb 11.9 oz)     Height:           Intake/Output Summary (Last 24 hours) at 11/25/2024 1040  Last data filed at 11/25/2024 0624  Gross per 24 hour   Intake 1256.38 ml   Output 850 ml   Net 406.38 ml       Wt Readings from Last 4 Encounters:   11/25/24 83.8 kg (184 lb 11.9 oz)   02/07/24 124 kg (274 lb)   02/01/23 124 kg (274 lb)   08/03/22 130 kg (287 lb)       CURRENT HOSPITAL MEDICATIONS:   [Held by provider] apixaban, 5 mg, oral, BID  atorvastatin, 40 mg, oral, Nightly  [Held by provider] carvedilol, 3.125 mg, oral, BID  [Held by provider] empagliflozin, 10 mg, oral, Daily  [Held by provider] furosemide, 40 mg, intravenous, q12h  ipratropium-albuteroL, 3 mL, nebulization, q4h  lamoTRIgine, 100 mg, oral, Nightly  levothyroxine, 88 mcg, oral, Daily  [Held by provider] morphine CR, 30 mg, oral, q12h KENDAL  nystatin, 1 Application, Topical, BID  oxybutynin, 5 mg, oral, BID  oxygen, , inhalation, Continuous - Inhalation  pantoprazole, 40 mg, oral, Daily before breakfast   Or  pantoprazole, 40 mg, intravenous, Daily before breakfast  piperacillin-tazobactam, 3.375 g, intravenous,  q8h  potassium chloride, 20 mEq, oral, Daily  [Held by provider] spironolactone, 25 mg, oral, Daily  traZODone, 50 mg, oral, Nightly  [Held by provider] valsartan, 40 mg, oral, Daily      dexmedeTOMIDine, 0-1.5 mcg/kg/hr, Last Rate: 0.31 mcg/kg/hr (11/25/24 0624)  fentaNYL, 0-200 mcg/hr, Last Rate: 25 mcg/hr (11/25/24 0624)  norepinephrine, 0-0.5 mcg/kg/min, Last Rate: 0.13 mcg/kg/min (11/25/24 0624)  propofol, 0-50 mcg/kg/min, Last Rate: 15 mcg/kg/min (11/25/24 0642)      Current Outpatient Medications   Medication Instructions    albuterol 2.5 mg /3 mL (0.083 %) nebulizer solution inhalation, Use as directed PRN    albuterol 90 mcg/actuation inhaler 2 puffs, Every 6 hours PRN    atorvastatin (Lipitor) 40 mg tablet 1 tablet, oral, Nightly    carvedilol (COREG) 3.125 mg, oral, 2 times daily    Eliquis 5 mg, oral, 2 times daily    empagliflozin (Jardiance) 10 mg 1 tablet, oral, Daily    furosemide (Lasix) 40 mg tablet 1 tablet, Daily (0630)    lactulose 10 gram/15 mL solution TAKE 30 ML BY MOUTH TWICE DAILY AS NEEDED    lamoTRIgine (LaMICtal) 100 mg tablet 1 tablet, oral, Nightly    levothyroxine (SYNTHROID, LEVOXYL) 88 mcg, oral, Daily    morphine CR (MS CONTIN) 30 mg, oral, Every 8 hours, PRN    omeprazole OTC (PRILOSEC OTC) 20 mg, oral, Daily before breakfast, Do not crush, chew, or split.    Oxervate 0.002 % ophthalmic solution     potassium chloride (Klor-Con) 20 mEq packet 20 mEq, Daily    solifenacin (VESICARE) 5 mg, oral, Daily    spironolactone (Aldactone) 25 mg tablet 1 tablet, oral, Daily    Symbicort 160-4.5 mcg/actuation inhaler 1 puff, 2 times daily    traZODone (DESYREL) 50 mg, Nightly        PHYSICAL EXAMINATION:   GENERAL APPEARANCE: Intubated and sedated  CHEST: Symmetric and non-tender.  INTEGUMENT: Skin warm and dry, without gross excoriationis or lesions.  HEENT: No gross abnormalities of conjunctiva, teeth, gums, oral mucosa  NECK: Supple, no JVD, no bruit. Thyroid not palpable. Carotid  upstrokes normal.  NEURO/PSHCY: Intubated and sedated  LUNGS: Very diminished scattered Rales left side  HEART: S1, S2 regular with 2 out of 6 systolic murmur  ABDOMEN: Soft, nontender, no palpable hepatosplenomegaly, no mases, no bruits. Abdominal aorta not noted to be enlarged.  EXTREMITIES: Warm with good color, no clubbing or cyanois.  1+ edema  PERIPHERAL VASCULAR: Pulses present and equally palpable; 1+ throughout. No femoral bruits      LAB DATA:     CBC:   Results from last 7 days   Lab Units 11/25/24 0328 11/24/24 0338 11/23/24  0402   WBC AUTO x10*3/uL 13.7* 9.2 13.6*   RBC AUTO x10*6/uL 2.55* 2.45* 2.50*   HEMOGLOBIN g/dL 8.0* 7.5* 7.7*   HEMATOCRIT % 24.8* 24.0* 23.7*   MCV fL 97 98 95   MCH pg 31.4 30.6 30.8   MCHC g/dL 32.3 31.3* 32.5   RDW % 16.5* 16.6* 16.0*   PLATELETS AUTO x10*3/uL 116* 113* 141*     CMP:    Results from last 7 days   Lab Units 11/25/24 0328 11/24/24 0338 11/23/24  0402   SODIUM mmol/L 140 139 134*   POTASSIUM mmol/L 3.1* 3.6 3.8   CHLORIDE mmol/L 90* 88* 84*   CO2 mmol/L >45* >45* 45*   BUN mg/dL 30* 28* 33*   CREATININE mg/dL 0.89 0.97 1.08   GLUCOSE mg/dL 111* 133* 156*   PROTEIN TOTAL g/dL 6.7 7.0 7.0   CALCIUM mg/dL 9.0 8.9 8.7   BILIRUBIN TOTAL mg/dL 0.9 0.9 1.2   ALK PHOS U/L 81 86 89   AST U/L 19 20 24   ALT U/L 12 12 12     BMP:    Results from last 7 days   Lab Units 11/25/24 0328 11/24/24 0338 11/23/24  0402   SODIUM mmol/L 140 139 134*   POTASSIUM mmol/L 3.1* 3.6 3.8   CHLORIDE mmol/L 90* 88* 84*   CO2 mmol/L >45* >45* 45*   BUN mg/dL 30* 28* 33*   CREATININE mg/dL 0.89 0.97 1.08   CALCIUM mg/dL 9.0 8.9 8.7   GLUCOSE mg/dL 111* 133* 156*     Magnesium:  Results from last 7 days   Lab Units 11/25/24  0328 11/24/24  0338 11/23/24  0402   MAGNESIUM mg/dL 2.40 2.44* 1.62     Troponin:    Results from last 7 days   Lab Units 11/20/24  1522   TROPHS ng/L 38*     BNP:   Results from last 7 days   Lab Units 11/20/24  1522   BNP pg/mL 337*     Lipid Panel:          DIAGNOSTIC TESTING:   @No results found for this or any previous visit.    ECG 12 Lead    Result Date: 11/20/2024  AV dual-paced rhythm Abnormal ECG When compared with ECG of 20-NOV-2024 15:27, (unconfirmed) Premature ventricular complexes are no longer Present Vent. rate has decreased BY  13 BPM    XR chest 2 views    Result Date: 11/20/2024  Interpreted By:  Schoenberger, Joseph, STUDY: XR CHEST 2 VIEWS;  11/20/2024 3:07 pm   INDICATION: Signs/Symptoms:sob.     COMPARISON: 03/09/2015   ACCESSION NUMBER(S): [id number]   ORDERING CLINICIAN: WANDA HUANG   FINDINGS: Left subclavian transvenous pacemaker unchanged from prior.       CARDIOMEDIASTINAL SILHOUETTE: Cardiac silhouette remains mildly enlarged. There is persistent or recurrent venous congestion. There are bilateral small pleural effusions with likely some interstitial edema.   LUNGS: There is a rather large opacity in the lower left hemithorax. On the AP radiograph, its contours somewhat rounded in a mass is a consideration though this appearance is not the noted on the lateral radiograph. However, the left hemidiaphragm is also elevated possibly exaggerating this appearance. At minimum, follow-up radiographs are recommended as a the left basal mass is a consideration.   ABDOMEN: No remarkable upper abdominal findings.   BONES: No acute osseous changes.       1.  Moderate congestive failure with interstitial edema. Small bilateral pleural effusions 2. Left basal opacity which on the AP radiograph appears to have somewhat rounded contour superiorly. Underlying mass is a consideration. At a minimum, radiographic follow-up is recommended.       MACRO: None   Signed by: Joseph Schoenberger 11/20/2024 3:13 PM Dictation workstation:   [id number]       XR chest 1 view   Final Result   No change from 23 November 2024        MACRO:   None        Signed by: Ricky Squires 11/25/2024 8:00 AM   Dictation workstation:   [id number]      XR chest 1 view   Final  Result   1.  Improving findings compared to prior exam as discussed above                  MACRO:   None        Signed by: Joseph Schoenberger 11/23/2024 12:11 PM   Dictation workstation:   [id number]      XR chest 1 view   Final Result   1.  Satisfactory appearance post left jugular venous catheter   placement.   2. Progression of pleural fluids in basal airspace opacities possibly   progressing pneumonia edema.                  MACRO:   None        Signed by: Joseph Schoenberger 11/22/2024 3:02 PM   Dictation workstation:   [id number]      XR chest 1 view   Final Result   1.  Some improvement in left basal opacity.   2. Satisfactory appearance post intubation.                  MACRO:   None        Signed by: Joseph Schoenberger 11/22/2024 1:20 PM   Dictation workstation:   [id number]      CT head wo IV contrast   Final Result   No acute intracranial abnormality or mass effect.        This study was interpreted at [facility].        MACRO:   None        Signed by: Dontae Lujan 11/22/2024 10:15 AM   Dictation workstation:   [id number]      CT chest wo IV contrast   Final Result   Rather than a nodule or mass, I suspect a postobstructive pneumonia   in the left lower lobe. Mucous plugging or aspirate in the left lower   lobe bronchus and majority of its segmental branches with   consolidation and atelectasis towards the left lung base in the lower   lobe and a more nodular distal airways pneumonia in the more superior   left lower lobe that remains fairly well inflated        In the lateral segment middle lobe, another suspected pneumonia along   with some atelectasis, but no upstream large airway abnormality to   account for it        Diffuse airspace ground-glass change in both hemithoraces is probably   a separate process, possibly interstitial and alveolar edema or a   diffuse atypical pneumonia        Mediastinal adenopathy of uncertain etiology        Small bilateral  pleural effusions probably too small for   thoracocentesis on both sides. Right side may be partially loculated   as it tracks into the major fissure        Included upper abdomen shows a markedly cirrhotic liver which has   progressed in nodularity and shrinkage since CT chest 9 March 2015.   Gallstones also present        MACRO:   None        Signed by: Ricky Squires 11/21/2024 2:12 PM   Dictation workstation:   [id number]      Cardiac Device Check - Inpatient         XR chest 2 views   Final Result   1.  Moderate congestive failure with interstitial edema. Small   bilateral pleural effusions   2. Left basal opacity which on the AP radiograph appears to have   somewhat rounded contour superiorly. Underlying mass is a   consideration. At a minimum, radiographic follow-up is recommended.                  MACRO:   None        Signed by: Joseph Schoenberger 11/20/2024 3:13 PM   Dictation workstation:   [id number]          No echocardiogram results found for the past 14 days    RADIOLOGY:     XR chest 1 view   Final Result   No change from 23 November 2024        MACRO:   None        Signed by: Ricky Squires 11/25/2024 8:00 AM   Dictation workstation:   [id number]      XR chest 1 view   Final Result   1.  Improving findings compared to prior exam as discussed above                  MACRO:   None        Signed by: Joseph Schoenberger 11/23/2024 12:11 PM   Dictation workstation:   [id number]      XR chest 1 view   Final Result   1.  Satisfactory appearance post left jugular venous catheter   placement.   2. Progression of pleural fluids in basal airspace opacities possibly   progressing pneumonia edema.                  MACRO:   None        Signed by: Joseph Schoenberger 11/22/2024 3:02 PM   Dictation workstation:   [id number]      XR chest 1 view   Final Result   1.  Some improvement in left basal opacity.   2. Satisfactory appearance post intubation.                  MACRO:   None        Signed by: Angel  Schoenberger 11/22/2024 1:20 PM   Dictation workstation:   [id number]      CT head wo IV contrast   Final Result   No acute intracranial abnormality or mass effect.        This study was interpreted at [facility].        MACRO:   None        Signed by: Dontae Lujan 11/22/2024 10:15 AM   Dictation workstation:   [id number]      CT chest wo IV contrast   Final Result   Rather than a nodule or mass, I suspect a postobstructive pneumonia   in the left lower lobe. Mucous plugging or aspirate in the left lower   lobe bronchus and majority of its segmental branches with   consolidation and atelectasis towards the left lung base in the lower   lobe and a more nodular distal airways pneumonia in the more superior   left lower lobe that remains fairly well inflated        In the lateral segment middle lobe, another suspected pneumonia along   with some atelectasis, but no upstream large airway abnormality to   account for it        Diffuse airspace ground-glass change in both hemithoraces is probably   a separate process, possibly interstitial and alveolar edema or a   diffuse atypical pneumonia        Mediastinal adenopathy of uncertain etiology        Small bilateral pleural effusions probably too small for   thoracocentesis on both sides. Right side may be partially loculated   as it tracks into the major fissure        Included upper abdomen shows a markedly cirrhotic liver which has   progressed in nodularity and shrinkage since CT chest 9 March 2015.   Gallstones also present        MACRO:   None        Signed by: Ricky Squires 11/21/2024 2:12 PM   Dictation workstation:   [id number]      Cardiac Device Check - Inpatient         XR chest 2 views   Final Result   1.  Moderate congestive failure with interstitial edema. Small   bilateral pleural effusions   2. Left basal opacity which on the AP radiograph appears to have   somewhat rounded contour superiorly. Underlying mass is a    consideration. At a minimum, radiographic follow-up is recommended.                  MACRO:   None        Signed by: Joseph Schoenberger 11/20/2024 3:13 PM   Dictation workstation:   [id number]          PROBLEM LIST     Patient Active Problem List   Diagnosis    Benign essential hypertension    Dizziness    Paroxysmal atrial fibrillation (Multi)    Pacemaker    Prolonged QT interval    Renal failure    Sleep apnea    Syncope    Shortness of breath    Blepharoconjunctivitis of left eye    Central corneal ulcer of left eye    Combined forms of age-related cataract of left eye    Congestive heart failure    Dry eye syndrome of both eyes    History of surgical procedure    HSV (herpes simplex virus) dendritic keratitis    Hypogonadism male    Hypothyroidism    Keratoconjunctivitis sicca    Hyperopia of both eyes with astigmatism and presbyopia    Meibomian gland dysfunction (MGD) of upper and lower lids of both eyes    Vitreous floaters of both eyes    Morbid obesity due to excess calories (Multi)    Neurotrophic keratitis    Neurotrophic keratoconjunctivitis, left eye    Nocturnal polyuria    Other cirrhosis of liver    Panic attack    Persistent epithelial defect of cornea    Pseudophakia of both eyes    Chronic obstructive pulmonary disease (Multi)    Pulmonary emphysema (Multi)    Punctate keratitis of both eyes    Punctate keratitis of left eye    Recurrent corneal erosion, left    Recurrent erosion of both corneas    Ring corneal ulcer of right eye    Sick sinus syndrome (Multi)    Status post total left knee replacement    Urinary urgency    Arthritis    Vasculitis (CMS-HCC)    Dizziness and giddiness    Presence of cardiac pacemaker    Chronic kidney disease    KANCHAN (obstructive sleep apnea)    Syncope and collapse    S/P AVR (aortic valve replacement)    Abnormal CT scan    Anxiety    Atelectasis    CHF (congestive heart failure), NYHA class I, acute on chronic, combined    Heart murmur    History of CVA  (cerebrovascular accident)    Pulmonary HTN (Multi)    Pressure injury of left buttock, stage 3 (Multi)    Severe aortic regurgitation    Recurrent falls    Venous stasis dermatitis of both lower extremities    Tinnitus    Stress hyperglycemia    Acute combined systolic and diastolic HF (heart failure)    Epistaxis    Anemia       ASSESSMENT:   Acute on chronic combined systolic and diastolic heart failure NYHA class III  History of aortic valve replacement  PAF pacemaker interrogation showed no episodes of atrial fibs or atrial tachycardia  History of CVA  EF 55% 8/24  Hypertension  Dyslipidemia  History of PPM        PLAN:   I have personally interviewed and examined the patient.   I have personally and independently reviewed labs and diagnostic testing.  I have personally verified the elements of the history and physical listed above and changes, if any, are noted.  74-year-old gentleman with a history of severe aortic stenosis status post AVR with a bioprosthetic tissue valve at [facility] in March 2024, hypertension, dyslipidemia, CVA with some residual confusion, GERD and other comorbidities as listed above.  He presented to [facility] ER with complaints of increased shortness of breath confusion and weakness and was subsequently transferred here for further management.  Is a poor historian because of his mental state, but he did deny any ongoing chest pain.  He had a recent follow-up echocardiogram which shows a well-seated prosthetic valve in the aortic position with normal peak and mean gradients.  LV function was normal.  He denies any orthopnea paroxysmal nocturnal dyspnea.     Agree with exam as noted above.  Cardiac exam reveals distant S1 and S2 with no murmurs appreciated.  There is reduced breath sound in the lung bases bilaterally which is worse on the left compared to the right.  There is 1+ bilateral ankle edema.     ASSESSMENT AND PLAN:  1.  Increased shortness of breath: This is likely multifactorial, with  possible underlying diastolic heart failure and deconditioning secondary to his CVA.  We will treat with gentle diuresis and rule out possible large pleural effusion based on his clinical exam.  He may require elective thoracocentesis if further chest imaging reveals a large pleural effusion.  2.  Persistent atrial fibrillation: On current medications and will continue with anticoagulation for now which can be held if he requires thoracocentesis.  3.  S/p CVA with confusion: This appears to be patient's baseline, will defer to primary team for continued management.  AKA    11/21/24  Tele monitoring  PPM interrogation no AT/AF no ventricular rhythm  Eliquis 5 mg p.o. twice daily  Lipitor 40 mg daily  Coreg 3.125 mg p.o. twice daily  Jardiance 10 mg daily  Lasix 40 mg IV every 12  Aldactone 25 mg daily  Add Diovan 40 mg daily  Check magnesium level keep greater than 2.0  Strict intake and output  Daily weights  Recommend respiratory treatment    35-minute visit    Calixto Godinez CNP  [facility]    11/22/24  Continue telemetry monitoring, monitor electrolytes, keep potassium greater than 4 magnesium greater than 2  Interrogation of PPM showed no AT or AF episodes.  No ventricular rhythms identified.  Continue Eliquis.  Continue current medication regimen including GDMT for heart failure management as BP allows  Strict I's and O's and daily weights  Continue gentle diuresis  Apparently after my visit today patient had rapid response called on him for an episode of unresponsiveness and epistaxis.  Patient was intubated by ICU physician.  Transferred to ICU under their care  Will continue to follow along with you      Gerhard Oro [facility]  Adult Gerontology Acute Care Nurse Practitioner  [facility] [place]   [facility]  [phone]        Of note, this documentation is completed using the Dragon Dictation  system (voice recognition software). There may be spelling and/or grammatical errors that were not corrected prior to final submission.    Please do not hesitate to call with questions.  Electronically signed by AZUL Garrido, on 11/25/2024 at 10:40 AM  Patient seen and examined in conjunction with JACQUE De La Garza/CNP and agree with the evaluation as noted above.  Recent events noted, with patient being transferred to the ICU following an episode of hypotension and severe hypoxia requiring intubation.  This was after significant bouts of epistaxis requiring nasal packs.  His vitals are stable is intubated and sedated at this time.  We will continue with current supportive treatment, but at this time there are no active cardiac issues.  Is recent prosthetic AVR appears to be functioning normally based on his recent echocardiogram as noted above.  AKA      11/23/24  ICU monitoring  Wean off pressors  Intensivist input thank you very much  Eliquis on hold  Cardiology will continue to follow along    11/24/24  ICU monitoring  ENT input thank  you  Continue to hold the Eliquis  Slowly wean off pressors  Keep magnesium greater than 2.0  Cardiology to follow along please not hesitate call with any questions or concerns    11/25/24  Continue ICU management and monitoring  Wean off vent as tolerated, defer to ICU team  Telemetry shows paced in the 60s  Wean off vasopressors as BP allows, keep MAP greater than 65  Monitor electrolytes, keep potassium greater than 4 and magnesium greater than 2  Sedation vacation  Appreciate ENT recommendation  Continue to monitor strict I's and O's and daily weights  Will continue to follow along with you    Gerhard Oro [facility]  Adult Gerontology Acute Care Nurse Practitioner  [facility] [place]   [facility]  [phone]    Patient seen and examined in conjunction with JACQUE De La Garza/CNP and agree with the  evaluation as noted above.  Patient remains in status quo, intubated and sedated with stable hemodynamics.  Telemetry reveals paced rhythm in the 60s with no significant ventricular arrhythmias.  At this time, we will continue with current supportive treatment and defer to ENT for continued management of his epistaxis which appears to have resolved at this time.  AKA

## 2024-11-25 NOTE — PROGRESS NOTES
Physical Therapy                 Therapy Communication Note    Patient Name: Antony Goff  MRN: [id number]  Department: [facility]  Room: 01/01-A  Today's Date: 11/25/2024     Discipline: Physical Therapy    Missed Visit Reason: Missed Visit Reason:  (medical hold)    Missed Time: Attempt 7:51    Comment:   Pt transferred to ICU on 11/22 after RR was called for uncontrollable epistaxis and patient found unresponsive, he was intubated and transferred to ICU.      Hold PT at this time until patient medically appropriate for therapy

## 2024-11-25 NOTE — CARE PLAN
Problem: Safety - Adult  Goal: Free from fall injury  Outcome: Progressing     Problem: Discharge Planning  Goal: Discharge to home or other facility with appropriate resources  Outcome: Progressing     Problem: Chronic Conditions and Co-morbidities  Goal: Patient's chronic conditions and co-morbidity symptoms are monitored and maintained or improved  Outcome: Progressing     Problem: Skin  Goal: Decreased wound size/increased tissue granulation at next dressing change  Outcome: Progressing  Flowsheets (Taken 11/24/2024 1936)  Decreased wound size/increased tissue granulation at next dressing change: Promote sleep for wound healing  Goal: Participates in plan/prevention/treatment measures  Outcome: Progressing  Flowsheets (Taken 11/24/2024 1936)  Participates in plan/prevention/treatment measures: Elevate heels  Goal: Prevent/manage excess moisture  Outcome: Progressing  Flowsheets (Taken 11/24/2024 1936)  Prevent/manage excess moisture:   Monitor for/manage infection if present   Cleanse incontinence/protect with barrier cream  Goal: Prevent/minimize sheer/friction injuries  Outcome: Progressing  Flowsheets (Taken 11/24/2024 1936)  Prevent/minimize sheer/friction injuries:   Use pull sheet   Turn/reposition every 2 hours/use positioning/transfer devices  Goal: Promote/optimize nutrition  Outcome: Progressing  Flowsheets (Taken 11/24/2024 1936)  Promote/optimize nutrition: Discuss with provider if NPO > 2 days  Goal: Promote skin healing  Outcome: Progressing  Flowsheets (Taken 11/24/2024 1936)  Promote skin healing:   Turn/reposition every 2 hours/use positioning/transfer devices   Assess skin/pad under line(s)/device(s)   The patient's goals for the shift include      The clinical goals for the shift include pt will maintain map >60 throughout shift

## 2024-11-25 NOTE — CONSULTS
"Nutrition Initial Assessment:   Nutrition Assessment         Patient is a 74 y.o. male presenting with acute combined systolic and diastolic HF      Nutrition History:  Food and Nutrient History: RD consulted d/t intubated without nutrition support. Intubated on 11/22 s/p rapid response following being found unresponsive and wtih epistaxis. Has a history of COPD, remote CVA, GERD, hypothyroidism, chronic back pain, heart failure. According to EMR, is quite [place] and has confusion at baseline. Remains intubated, with plans for SBT today. If unable to extubate, recommend starting TF. Once able to extubate, consider SLP eval and starting cardiac diet when medically appropriate.  Vitamin/Herbal Supplement Use: potassium  chloride. Of note, is on Lasix per home medication list.  Food Allergies/Intolerances:  None  GI Symptoms: None  Oral Problems:  missing teeth. Per ICU interdisciplinary rounds, concerns for aspiration prior to intubation. Consider SLP eval once extubated.       Anthropometrics:  Height: 175.3 cm (5' 9\")   Weight: 83.8 kg (184 lb 11.9 oz)   BMI (Calculated): 27.27             Weight History:     Wt Readings from Last 10 Encounters:   11/25/24 83.8 kg (184 lb 11.9 oz)   02/07/24 124 kg (274 lb)   02/01/23 124 kg (274 lb)   08/03/22 130 kg (287 lb)   12/16/21 126 kg (278 lb)   10/05/21 125 kg (276 lb)   11/01/19 131 kg (288 lb 5.8 oz)           Weight Change %:  Weight History / % Weight Change: 1/12/24: 116.1 kg. 2/7/24: 124 kg. 3/13/24: 121.1 kg. 9/3/24: 91.6 kg.  Significant Weight Loss: Yes  Interpretation of Weight Loss: >20% in 1 year (27.8% wt loss x 10 months, 8.5% wt loss x 2 months)    Nutrition Focused Physical Exam Findings:  defer: constraints of critical care    Edema:  Edema: +1 trace  Edema Location: BLE per nursing assessment  Physical Findings:  Skin: Positive (PI leg (unclear stage, not documented in flowsheets). PI stage 2 on left buttock)    Nutrition Significant Labs:  BMP Trend: "   Results from last 7 days   Lab Units 11/25/24  0328 11/24/24  0338 11/23/24  0402 11/22/24  1553   GLUCOSE mg/dL 111* 133* 156* 118*   CALCIUM mg/dL 9.0 8.9 8.7 8.8   SODIUM mmol/L 140 139 134* 136   POTASSIUM mmol/L 3.1* 3.6 3.8 3.8   CO2 mmol/L >45* >45* 45* >45*   CHLORIDE mmol/L 90* 88* 84* 85*   BUN mg/dL 30* 28* 33* 39*   CREATININE mg/dL 0.89 0.97 1.08 1.09        Nutrition Specific Medications:  Reviewed. On Synthroid. Propofol at 7.1 ml/hr, providing 187 kcal. Levo 0.13 mcg/kg/min.    I/O:   Last BM Date: 11/18/24;      Dietary Orders (From admission, onward)       Start     Ordered    11/22/24 1019  May Participate in Room Service With Assistance  ( ROOM SERVICE MAY PARTICIPATE WITH ASSISTANCE)  Once        Question:  .  Answer:  Yes    11/22/24 1018    11/22/24 1017  NPO Diet; Effective now  Diet effective now         11/22/24 1016                     Estimated Needs:   Total Energy Estimated Needs (kCal): 2095 kCal  Method for Estimating Needs: 25 kcal/kg  Total Protein Estimated Needs (g): 126 g  Method for Estimating Needs: 101-126g (1.2-1.5 g/kg)  Total Fluid Estimated Needs (mL): 2095 mL  Method for Estimating Needs: 1 ml/kcal or per MD        Nutrition Diagnosis   Malnutrition Diagnosis  Patient has Malnutrition Diagnosis: No (insufficient data to diagnose)    Nutrition Diagnosis  Patient has Nutrition Diagnosis: Yes  Diagnosis Status (1): New  Nutrition Diagnosis 1: Inadequate oral intake  Related to (1): acute illness requiring intubation  As Evidenced by (1): NPO       Nutrition Interventions/Recommendations         Nutrition Prescription:  Individualized Nutrition Prescription Provided for : If remains intubated, recommend starting enteral nutrition when medically appropriate. If extubated, consider SLP eval and initiation of cardiac diet when medically indicated        Nutrition Interventions:   Interventions: Enteral intake  Enteral Intake: Modify composition of enteral nutrition, Modify  rate of enteral nutrition, Modify schedule of enteral nutrition, Feeding tube flush  Goal: If unable to extubate, consider start TF Jevity 1.5 at 20 ml/hr and advance as tolerated  by 10 ml q4 hours to goal rate of  55 ml/hr via OG x22 hours/day, hold 1 hour before and 1 hour after Synthroid (at goal rate, TF provides 1815 kcal, 77 g protein, and 920 ml free H2O). 125 ml flushes q4h or per MD.    Collaboration and Referral of Nutrition Care: Collaboration by nutrition professional with other providers  Goal: ICU interdisciplinary rounds    Nutrition Education:      Not appropriate d/t intubated.    Nutrition Monitoring and Evaluation   Food/Nutrient Related History Monitoring  Additional Plans: Monitor for start of nutrition: Enteral nutrition if unable to extubate, diet if able to extubate.    Body Composition/Growth/Weight History  Monitoring and Evaluation Plan: Weight  Weight: Measured weight  Criteria: Maintain stable wt    Biochemical Data, Medical Tests and Procedures  Monitoring and Evaluation Plan: Electrolyte/renal panel, Glucose/endocrine profile  Electrolyte and Renal Panel: Sodium, Phosphorus, Potassium  Criteria: Electrolytes WNL  Glucose/Endocrine Profile: Glucose, casual  Criteria: BG within desired limits    Nutrition Focused Physical Findings  Monitoring and Evaluation Plan: Skin  Skin: Impaired wound healing  Criteria: Promote wound healing through adequate nutrition         Time Spent (min): 45 minutes     audio

## 2024-11-26 LAB
ALBUMIN SERPL BCP-MCNC: 2.2 G/DL (ref 3.4–5)
ALP SERPL-CCNC: 111 U/L (ref 33–136)
ALT SERPL W P-5'-P-CCNC: 13 U/L (ref 10–52)
ANION GAP SERPL CALC-SCNC: 9 MMOL/L (ref 10–20)
APPEARANCE UR: CLEAR
AST SERPL W P-5'-P-CCNC: 24 U/L (ref 9–39)
BASOPHILS # BLD AUTO: 0.03 X10*3/UL (ref 0–0.1)
BASOPHILS NFR BLD AUTO: 0.2 %
BILIRUB SERPL-MCNC: 1.5 MG/DL (ref 0–1.2)
BILIRUB UR STRIP.AUTO-MCNC: NEGATIVE MG/DL
BUN SERPL-MCNC: 29 MG/DL (ref 6–23)
CALCIUM SERPL-MCNC: 8.7 MG/DL (ref 8.6–10.3)
CHLORIDE SERPL-SCNC: 92 MMOL/L (ref 98–107)
CO2 SERPL-SCNC: 41 MMOL/L (ref 21–32)
COLOR UR: YELLOW
CREAT SERPL-MCNC: 1.14 MG/DL (ref 0.5–1.3)
EGFRCR SERPLBLD CKD-EPI 2021: 68 ML/MIN/1.73M*2
EOSINOPHIL # BLD AUTO: 0.05 X10*3/UL (ref 0–0.4)
EOSINOPHIL NFR BLD AUTO: 0.3 %
ERYTHROCYTE [DISTWIDTH] IN BLOOD BY AUTOMATED COUNT: 16.5 % (ref 11.5–14.5)
GLUCOSE SERPL-MCNC: 164 MG/DL (ref 74–99)
GLUCOSE UR STRIP.AUTO-MCNC: ABNORMAL MG/DL
HCT VFR BLD AUTO: 25 % (ref 41–52)
HGB BLD-MCNC: 8 G/DL (ref 13.5–17.5)
IMM GRANULOCYTES # BLD AUTO: 0.18 X10*3/UL (ref 0–0.5)
IMM GRANULOCYTES NFR BLD AUTO: 1.2 % (ref 0–0.9)
KETONES UR STRIP.AUTO-MCNC: NEGATIVE MG/DL
LEUKOCYTE ESTERASE UR QL STRIP.AUTO: NEGATIVE
LYMPHOCYTES # BLD AUTO: 1.77 X10*3/UL (ref 0.8–3)
LYMPHOCYTES NFR BLD AUTO: 12 %
MAGNESIUM SERPL-MCNC: 2.12 MG/DL (ref 1.6–2.4)
MCH RBC QN AUTO: 30.2 PG (ref 26–34)
MCHC RBC AUTO-ENTMCNC: 32 G/DL (ref 32–36)
MCV RBC AUTO: 94 FL (ref 80–100)
MONOCYTES # BLD AUTO: 0.91 X10*3/UL (ref 0.05–0.8)
MONOCYTES NFR BLD AUTO: 6.2 %
NEUTROPHILS # BLD AUTO: 11.83 X10*3/UL (ref 1.6–5.5)
NEUTROPHILS NFR BLD AUTO: 80.1 %
NITRITE UR QL STRIP.AUTO: NEGATIVE
NRBC BLD-RTO: 0 /100 WBCS (ref 0–0)
PH UR STRIP.AUTO: 8.5 [PH]
PHOSPHATE SERPL-MCNC: 2.4 MG/DL (ref 2.5–4.9)
PLATELET # BLD AUTO: 153 X10*3/UL (ref 150–450)
POTASSIUM SERPL-SCNC: 3.5 MMOL/L (ref 3.5–5.3)
PROT SERPL-MCNC: 6.6 G/DL (ref 6.4–8.2)
PROT UR STRIP.AUTO-MCNC: ABNORMAL MG/DL
RBC # BLD AUTO: 2.65 X10*6/UL (ref 4.5–5.9)
RBC # UR STRIP.AUTO: ABNORMAL /UL
RBC #/AREA URNS AUTO: ABNORMAL /HPF
SODIUM SERPL-SCNC: 138 MMOL/L (ref 136–145)
SP GR UR STRIP.AUTO: 1.02
UROBILINOGEN UR STRIP.AUTO-MCNC: NORMAL MG/DL
VANCOMYCIN SERPL-MCNC: 21.8 UG/ML (ref 5–20)
VANCOMYCIN SERPL-MCNC: 25.9 UG/ML (ref 5–20)
WBC # BLD AUTO: 14.8 X10*3/UL (ref 4.4–11.3)
WBC #/AREA URNS AUTO: ABNORMAL /HPF

## 2024-11-26 PROCEDURE — 99291 CRITICAL CARE FIRST HOUR: CPT

## 2024-11-26 PROCEDURE — 2500000002 HC RX 250 W HCPCS SELF ADMINISTERED DRUGS (ALT 637 FOR MEDICARE OP, ALT 636 FOR OP/ED)

## 2024-11-26 PROCEDURE — 2500000004 HC RX 250 GENERAL PHARMACY W/ HCPCS (ALT 636 FOR OP/ED): Performed by: NURSE PRACTITIONER

## 2024-11-26 PROCEDURE — 2500000004 HC RX 250 GENERAL PHARMACY W/ HCPCS (ALT 636 FOR OP/ED)

## 2024-11-26 PROCEDURE — 37799 UNLISTED PX VASCULAR SURGERY: CPT

## 2024-11-26 PROCEDURE — 2500000001 HC RX 250 WO HCPCS SELF ADMINISTERED DRUGS (ALT 637 FOR MEDICARE OP)

## 2024-11-26 PROCEDURE — 80202 ASSAY OF VANCOMYCIN: CPT

## 2024-11-26 PROCEDURE — 80053 COMPREHEN METABOLIC PANEL: CPT | Performed by: NURSE PRACTITIONER

## 2024-11-26 PROCEDURE — 85025 COMPLETE CBC W/AUTO DIFF WBC: CPT

## 2024-11-26 PROCEDURE — 84100 ASSAY OF PHOSPHORUS: CPT

## 2024-11-26 PROCEDURE — 94640 AIRWAY INHALATION TREATMENT: CPT

## 2024-11-26 PROCEDURE — 99233 SBSQ HOSP IP/OBS HIGH 50: CPT | Performed by: INTERNAL MEDICINE

## 2024-11-26 PROCEDURE — 2500000005 HC RX 250 GENERAL PHARMACY W/O HCPCS

## 2024-11-26 PROCEDURE — 2020000001 HC ICU ROOM DAILY

## 2024-11-26 PROCEDURE — 81001 URINALYSIS AUTO W/SCOPE: CPT

## 2024-11-26 PROCEDURE — 83735 ASSAY OF MAGNESIUM: CPT | Performed by: NURSE PRACTITIONER

## 2024-11-26 PROCEDURE — 2500000005 HC RX 250 GENERAL PHARMACY W/O HCPCS: Performed by: NURSE PRACTITIONER

## 2024-11-26 PROCEDURE — 94003 VENT MGMT INPAT SUBQ DAY: CPT

## 2024-11-26 PROCEDURE — 2500000005 HC RX 250 GENERAL PHARMACY W/O HCPCS: Performed by: STUDENT IN AN ORGANIZED HEALTH CARE EDUCATION/TRAINING PROGRAM

## 2024-11-26 RX ORDER — POTASSIUM CHLORIDE 1.5 G/1.58G
40 POWDER, FOR SOLUTION ORAL ONCE
Status: COMPLETED | OUTPATIENT
Start: 2024-11-26 | End: 2024-11-26

## 2024-11-26 RX ORDER — VANCOMYCIN HYDROCHLORIDE 1 G/200ML
1000 INJECTION, SOLUTION INTRAVENOUS EVERY 24 HOURS
Status: DISCONTINUED | OUTPATIENT
Start: 2024-11-26 | End: 2024-11-26

## 2024-11-26 RX ORDER — VANCOMYCIN HYDROCHLORIDE 1 G/200ML
1000 INJECTION, SOLUTION INTRAVENOUS EVERY 24 HOURS
Status: DISCONTINUED | OUTPATIENT
Start: 2024-11-26 | End: 2024-11-28

## 2024-11-26 ASSESSMENT — COGNITIVE AND FUNCTIONAL STATUS - GENERAL
DAILY ACTIVITIY SCORE: 6
WALKING IN HOSPITAL ROOM: TOTAL
MOVING TO AND FROM BED TO CHAIR: TOTAL
DRESSING REGULAR UPPER BODY CLOTHING: TOTAL
TURNING FROM BACK TO SIDE WHILE IN FLAT BAD: TOTAL
EATING MEALS: TOTAL
DRESSING REGULAR LOWER BODY CLOTHING: TOTAL
PERSONAL GROOMING: TOTAL
CLIMB 3 TO 5 STEPS WITH RAILING: TOTAL
MOVING FROM LYING ON BACK TO SITTING ON SIDE OF FLAT BED WITH BEDRAILS: TOTAL
MOBILITY SCORE: 6
HELP NEEDED FOR BATHING: TOTAL
TOILETING: TOTAL
STANDING UP FROM CHAIR USING ARMS: TOTAL

## 2024-11-26 ASSESSMENT — PAIN - FUNCTIONAL ASSESSMENT: PAIN_FUNCTIONAL_ASSESSMENT: CPOT (CRITICAL CARE PAIN OBSERVATION TOOL)

## 2024-11-26 ASSESSMENT — PAIN SCALES - GENERAL: PAINLEVEL_OUTOF10: 2

## 2024-11-26 NOTE — CONSULTS
Vancomycin Dosing by Pharmacy- INITIAL    Antony Goff is a 74 y.o. year old male who Pharmacy has been consulted for vancomycin dosing for other sinusitis/other ENT . Based on the patient's indication and renal status this patient will be dosed based on a goal AUC of 400-600.     Renal function is currently stable.    Visit Vitals  BP (!) 101/40   Pulse 68   Temp (!) 38.5 °C (101.3 °F)   Resp 18        Lab Results   Component Value Date    CREATININE 1.11 2024    CREATININE 0.89 2024    CREATININE 0.97 2024    CREATININE 1.08 2024        Patient weight is as follows:   Vitals:    24 0600   Weight: 83.8 kg (184 lb 11.9 oz)       Cultures:  Susceptibility data for the encounter in last 14 days.  Collected Specimen Info Organism   24 Fluid from Tracheal Aspirate Normal throat mark         I/O last 3 completed shifts:  In: 1256.4 (15 mL/kg) [I.V.:1086.4 (13 mL/kg); NG/GT:120; IV Piggyback:50]  Out: 1450 (17.3 mL/kg) [Urine:1450 (0.5 mL/kg/hr)]  Weight: 83.8 kg   I/O during current shift:  I/O this shift:  In: 390.8 [I.V.:390.8]  Out: 1000 [Urine:1000]    Temp (24hrs), Av.2 °C (98.9 °F), Min:36 °C (96.8 °F), Max:38.5 °C (101.3 °F)         Assessment/Plan     Patient has already been given a loading dose of 2000 mg on  and 1250mg on   Will initiate vancomycin maintenance,  1250 mg every 24 hours.    This dosing regimen is predicted by [employer] to result in the following pharmacokinetic parameters:  Regimen: 1250 mg IV every 24 hours.  Start time: 20:00 on 2024  Exposure target: AUC24 (range)400-600 mg/L.hr   [id number]: 510 mg/L.hr  AUC24,ss: 582 mg/L.hr  Probability of AUC24 > 400: 100 %  Ctrough,ss: 16.1 mg/L  Probability of Ctrough,ss > 20: 13 %      Follow-up level will be ordered on 24 at 0300 and 24 at 0800 unless clinically indicated sooner.  Will continue to monitor renal function daily while on vancomycin and order serum creatinine at  least every 48 hours if not already ordered.  Follow for continued vancomycin needs, clinical response, and signs/symptoms of toxicity.       Angela Perez, PharmD

## 2024-11-26 NOTE — PROGRESS NOTES
Care transitions follow up :  Pt Remains intubated and sedated. Still on norepinephrine 40% FiO2 on ventilator support continued.  SBT today per review.    Will continue to follow for dc needs.  Yet TBD.

## 2024-11-26 NOTE — PROGRESS NOTES
Vancomycin Dosing by Pharmacy- FOLLOW UP    Antony Goff is a 74 y.o. year old male who Pharmacy has been consulted for vancomycin dosing for other sinusitis/ENT . Based on the patient's indication and renal status this patient is being dosed based on a goal AUC of 400-600.     Renal function is currently declining.    Current vancomycin dose: 1250 mg given every 24 hours    Estimated vancomycin AUC on current dose: 702 mg/L.hr     Visit Vitals  /55   Pulse 71   Temp 37.1 °C (98.7 °F) (Temporal)   Resp (!) 29        Lab Results   Component Value Date    CREATININE 1.14 2024    CREATININE 1.11 2024    CREATININE 0.89 2024    CREATININE 0.97 2024        Patient weight is as follows:   Vitals:    24 0600   Weight: 83.8 kg (184 lb 11.9 oz)       Cultures:  Susceptibility data for the encounter in last 14 days.  Collected Specimen Info Organism   24 Fluid from Tracheal Aspirate Normal throat mark        I/O last 3 completed shifts:  In: 1256.4 (15 mL/kg) [I.V.:1086.4 (13 mL/kg); NG/GT:120; IV Piggyback:50]  Out: 1450 (17.3 mL/kg) [Urine:1450 (0.5 mL/kg/hr)]  Weight: 83.8 kg   I/O during current shift:  I/O this shift:  In: 1876.2 [I.V.:696.2; NG/GT:180; IV Piggyback:1000]  Out: 1000 [Urine:1000]    Temp (24hrs), Av.4 °C (99.3 °F), Min:36.9 °C (98.4 °F), Max:38.5 °C (101.3 °F)      Assessment/Plan    Above goal AUC. Orders placed for new vancomcyin regimen of 1000 mg every 24 hours to begin at 2100, 24.    This dosing regimen is predicted by [employer] to result in the following pharmacokinetic parameters:  Loading dose: N/A  Regimen: 1000 mg IV every 24 hours.  Start time: 21:10 on 2024  Exposure target: AUC24 (range)400-600 mg/L.hr   [id number]: 474 mg/L.hr  AUC24,ss: 555 mg/L.hr  Probability of AUC24 > 400: 100 %  Ctrough,ss: 16.7 mg/L  Probability of Ctrough,ss > 20: 16 %    The next level will be obtained on 24 at 1000. May be obtained sooner if  clinically indicated.   Will continue to monitor renal function daily while on vancomycin and order serum creatinine at least every 48 hours if not already ordered.  Follow for continued vancomycin needs, clinical response, and signs/symptoms of toxicity.       Cristina Barry, PharmD

## 2024-11-26 NOTE — PROGRESS NOTES
[facility] Critical Care Medicine       Date:  11/26/2024  Patient:  Antony Goff  YOB: 1950  MRN:  [id number]   Admit Date:  11/19/2024  ========================================================================================================    No chief complaint on file.    History of Present Illness:  Antony Goff is a 74 y.o. year old male patient with Past Medical History of COPD, chronic hypoxic respiratory failure on 3L NC baseline O2, aortic valve stenosis s/p AVR 3/2024 at [facility], pAfib on Eliquis, remote CVA, GERD, hypothyroidism, chronic back pain on chronic opioids who presented to [facility] ER for evaluation altered mental status and increased SOB, he was determined to have COPDE and CHF exacerbation, CXR significant for pulmonary vascular congestion. [place] [facility] [place] and admitted to [facility]. He was treated with IV lasix, cardiology consulted. 11/22 RR was called for uncontrollable epistaxis and patient found unresponsive, he was intubated and transferred to ICU.     Interval ICU Events:  11/22: Admitted to MICU-1 s/p intubation. Peak pressure 45-50 on ventilator, Dr Bush performed bedside bronch, clearance of small bloody secretions, ABG/CXR obtained unchanged from previous no PTX, ventilator equipment subsequently exchanged. Sedation with versed infusion. Dr Amado at bedside, insertion of nasal trumpet and clearance of large clot. Vasopressor requirement increasing, norepineprhine/vaso.    11/23: Pressers weaned, down to minimal vent settings. Remain intubated today with nasal packing in. ENT plan to remove tomorrow    11/24: Pending ENT nasal packing removal. If no further bleeding -> SAT/SBT. Pressors now only low dose in setting of sedation. Continue on zosyn for HAP    11/25: Plan for SBT today. Packing removed by ENT, surgicall inserted and no further bleeding. Wean vasopressors as sedation is decreased.    11/26: Febrile overnight with increasing  vasopressor requirements, repeat CT with chronic left maxillary sinus occlusion, consolidative opacities of bilateral lung bases worsening. Remains on precedex, norepinephrine weaning. Attempt SAT/SBT today    Medical History:  Past Medical History:   Diagnosis Date    Encounter for preprocedural cardiovascular examination     Preop cardiovascular exam    Encounter for screening for lipoid disorders     Screening for lipoid disorders    Personal history of other diseases of the circulatory system 10/05/2021    History of cardiac murmur    Personal history of other endocrine, nutritional and metabolic disease     History of hypokalemia    Personal history of other endocrine, nutritional and metabolic disease     History of obesity    Personal history of other endocrine, nutritional and metabolic disease 08/03/2022    History of obesity    Personal history of other specified conditions     History of chest pain    Personal history of other specified conditions     History of fatigue    Personal history of other specified conditions     History of palpitations    Presence of cardiac pacemaker     History of cardiac pacemaker    Repeated falls     Multiple falls    Tinnitus, left ear 10/05/2021    Tinnitus of left ear     Past Surgical History:   Procedure Laterality Date    CARDIAC VALVE REPLACEMENT      CORONARY ARTERY BYPASS GRAFT      INSERT / REPLACE / REMOVE PACEMAKER      OTHER SURGICAL HISTORY  10/05/2021    Shoulder surgery    OTHER SURGICAL HISTORY  10/05/2021    Knee replacement    OTHER SURGICAL HISTORY  10/05/2021    Cataract surgery    OTHER SURGICAL HISTORY  10/05/2021    Breast surgery    OTHER SURGICAL HISTORY  10/05/2021    Pacemaker insertion    OTHER SURGICAL HISTORY  10/05/2021    Appendectomy    OTHER SURGICAL HISTORY  10/05/2021    Sinus surgery    OTHER SURGICAL HISTORY  10/05/2021    Uvulectomy    OTHER SURGICAL HISTORY  01/28/2022    Colonoscopy    OTHER SURGICAL HISTORY  01/28/2022    Kidney  surgery    OTHER SURGICAL HISTORY  01/28/2022    Throat surgery    OTHER SURGICAL HISTORY  01/28/2022    Nose surgery     Medications Prior to Admission   Medication Sig Dispense Refill Last Dose/Taking    albuterol 90 mcg/actuation inhaler Inhale 2 puffs every 6 hours if needed.   Past Month    furosemide (Lasix) 40 mg tablet Take 1 tablet (40 mg) by mouth early in the morning..   11/18/2024    lactulose 10 gram/15 mL solution TAKE 30 ML BY MOUTH TWICE DAILY AS NEEDED   Past Week    potassium chloride (Klor-Con) 20 mEq packet Take 20 mEq by mouth once daily.   Past Week    Symbicort 160-4.5 mcg/actuation inhaler Inhale 1 puff 2 times a day.   Past Week    traZODone (Desyrel) 50 mg tablet Take 1 tablet (50 mg) by mouth once daily at bedtime. PRN   Past Week    albuterol 2.5 mg /3 mL (0.083 %) nebulizer solution Inhale. Use as directed PRN       atorvastatin (Lipitor) 40 mg tablet Take 1 tablet (40 mg) by mouth once daily at bedtime.   11/18/2024    carvedilol (Coreg) 3.125 mg tablet Take 1 tablet (3.125 mg) by mouth 2 times a day. 180 tablet 3 11/18/2024    Eliquis 5 mg tablet Take 1 tablet (5 mg) by mouth 2 times a day. 180 tablet 3 11/18/2024    empagliflozin (Jardiance) 10 mg Take 1 tablet (10 mg) by mouth once daily.   11/18/2024    lamoTRIgine (LaMICtal) 100 mg tablet Take 1 tablet (100 mg) by mouth once daily at bedtime.   11/18/2024    levothyroxine (Synthroid, Levoxyl) 88 mcg tablet Take 1 tablet (88 mcg) by mouth once daily.   11/18/2024    morphine CR (MS Contin) 30 mg 12 hr tablet Take 1 tablet (30 mg) by mouth every 8 hours. PRN   11/18/2024    omeprazole OTC (PriLOSEC OTC) 20 mg EC tablet Take 1 tablet (20 mg) by mouth once daily in the morning. Take before meals. Do not crush, chew, or split.   11/18/2024    Oxervate 0.002 % ophthalmic solution    11/18/2024    solifenacin (VESIcare) 5 mg tablet Take 1 tablet (5 mg) by mouth once daily.   11/18/2024    spironolactone (Aldactone) 25 mg tablet Take 1  tablet (25 mg) by mouth once daily.   11/18/2024     Patient has no known allergies.  Social History     Tobacco Use    Smoking status: Former     Types: Cigarettes    Smokeless tobacco: Never   Substance Use Topics    Alcohol use: Not Currently    Drug use: Not Currently     Family History   Problem Relation Name Age of Onset    Heart failure Mother      Other (asbestosis) Father      Lung disease Father      Thyroid disease Sister         Review of Systems:  14 point review of systems was completed and negative except for those specially mention in my HPI    Physical Exam:    Heart Rate:  [68-82]   Temp:  [36.7 °C (98.1 °F)-38.5 °C (101.3 °F)]   Resp:  [9-40]   BP: (128-148)/(50-58)   SpO2:  [72 %-97 %]     Physical Exam  Vitals and nursing note reviewed.   Constitutional:       General: He is in acute distress.      Appearance: He is toxic-appearing.   HENT:      Mouth/Throat:      Pharynx: Oropharynx is clear.   Eyes:      Extraocular Movements: Extraocular movements intact.      Pupils: Pupils are equal, round, and reactive to light.   Cardiovascular:      Rate and Rhythm: Regular rhythm. Tachycardia present.      Pulses: Normal pulses.      Heart sounds: Normal heart sounds.      Comments: AV paced  Pulmonary:      Effort: Respiratory distress present.      Breath sounds: Rhonchi present.   Abdominal:      General: There is no distension.      Palpations: Abdomen is soft.      Tenderness: There is no abdominal tenderness.   Musculoskeletal:         General: Swelling present.      Left lower leg: Edema present.   Skin:     General: Skin is warm.      Capillary Refill: Capillary refill takes less than 2 seconds.      Findings: Bruising present.   Neurological:      General: No focal deficit present.         Objective:    I have reviewed all medications, laboratory results, and imaging pertinent for today's encounter    Assessment/Plan:    I am currently managing this critically ill patient for the following  problems:    Neuro/Psych/Pain Ctrl/Sedation:  Chronic pain  CVA  CTH negative  Sedation: Precedex  SAT today, can maintain Precedex if needed  Resume chronic opioids    Respiratory/ENT:  COPD  Chronic hypoxic respiratory failure  Aspiration  Intubated, SBT today  S/P Bronch 11/22  Consider repeat bronch today if fail SBT  Duoneb Q4    Cardiovascular:  Circulatory shock - Septic  AVR 3/2024, PPM  Diastolic heart failure  Afib on Eliquis  Hold AC in the setting of acute bleed  Maintain Vasopressors: Norepi  Maintain MAP >65  Cardiology following    GI:  GERD  NPO  OG to LIWS  TF rec if not extubated today    Renal/Volume Status (Intra & Extravascular):  Monitor UOP -1.4L overnight  Uptrending Cr  Daily RFP, Mg    Endocrine  Hypothyroidism  Continue synthroid  POCT Q6 while NPO    Infectious Disease:  C/F Sepsis  Pneumonia  Lactate 2.9  UA negative, repeat 11/26  BC NGTD, repeat 11/25  Sputum 11/26  Broadened ATBs, continue zosyn/vanco    Heme/Onc:  Epistaxis resolved  ENT consulted  Packing removed by ENT, s/p surgicell    MSK:  Nystatin to groin  PT/OT when able to participate    Ethics/Code Status:  DNRCCA/DNI once extubated    :  DVT Prophylaxis: None  GI Prophylaxis: PPI  Bowel Regimen: None  Diet: NPO, TF today  CVC: LIJ TLC 11/22  Odell: Right radial 11/22  Mandel: External  Restraints: BSW  Dispo: ICU    Critical Care Time:  40 minutes spent in preparing to see patient (I.e. review of medical records), evaluation of diagnostics (I.e. labs, imaging, etc.), documentation, discussing plan of care with patient/ family/ caregiver, and/ or coordination of care with multidisciplinary team. Time does not include completion of procedure time.      Magda Sun, APRN-CNP

## 2024-11-26 NOTE — CARE PLAN
The patient's goals for the shift include      The clinical goals for the shift include Patient will wean from Levophed while maintaining MAP 60-70mmHg throughout shift.    Over the shift, the patient did not make progress toward the following goals. Barriers to progression include multifocal pneumonia. Recommendations to address these barriers include zosyn and vancomycin per orders, panculture patient.    Problem: Pain  Goal: Takes deep breaths with improved pain control throughout the shift  Outcome: Not Progressing  Goal: Walks with improved pain control throughout the shift  Outcome: Not Progressing  Goal: Performs ADL's with improved pain control throughout shift  Outcome: Not Progressing  Goal: Participates in PT with improved pain control throughout the shift  Outcome: Not Progressing     Problem: Knowledge Deficit  Goal: Patient/family/caregiver demonstrates understanding of disease process, treatment plan, medications, and discharge instructions  Outcome: Not Progressing  Flowsheets (Taken 11/26/2024 3603)  Patient/family/caregiver demonstrates understanding of disease process, treatment plan, medications, and discharge instructions:   Complete learning assessment and assess knowledge base   Provide teaching via preferred learning methods   Provide teaching at level of understanding     Problem: Nutrition  Goal: Less than 5 days NPO/clear liquids  Outcome: Not Progressing  Goal: Adequate PO fluid intake  Outcome: Not Progressing

## 2024-11-26 NOTE — CARE PLAN
Problem: Safety - Adult  Goal: Free from fall injury  Outcome: Progressing     Problem: Discharge Planning  Goal: Discharge to home or other facility with appropriate resources  Outcome: Progressing     Problem: Chronic Conditions and Co-morbidities  Goal: Patient's chronic conditions and co-morbidity symptoms are monitored and maintained or improved  Outcome: Progressing     Problem: Skin  Goal: Decreased wound size/increased tissue granulation at next dressing change  Outcome: Progressing  Flowsheets (Taken 11/25/2024 1926)  Decreased wound size/increased tissue granulation at next dressing change: Promote sleep for wound healing  Goal: Participates in plan/prevention/treatment measures  Outcome: Progressing  Flowsheets (Taken 11/25/2024 1926)  Participates in plan/prevention/treatment measures: Elevate heels  Goal: Prevent/manage excess moisture  Outcome: Progressing  Flowsheets (Taken 11/25/2024 1926)  Prevent/manage excess moisture:   Cleanse incontinence/protect with barrier cream   Monitor for/manage infection if present   Moisturize dry skin  Goal: Prevent/minimize sheer/friction injuries  Outcome: Progressing  Flowsheets (Taken 11/25/2024 1926)  Prevent/minimize sheer/friction injuries:   Use pull sheet   Turn/reposition every 2 hours/use positioning/transfer devices  Goal: Promote/optimize nutrition  Outcome: Progressing  Flowsheets (Taken 11/25/2024 1926)  Promote/optimize nutrition: Discuss with provider if NPO > 2 days  Goal: Promote skin healing  Outcome: Progressing  Flowsheets (Taken 11/25/2024 1926)  Promote skin healing:   Turn/reposition every 2 hours/use positioning/transfer devices   Assess skin/pad under line(s)/device(s)   The patient's goals for the shift include      The clinical goals for the shift include map will remain >than 60

## 2024-11-26 NOTE — PROGRESS NOTES
[facility] Heart Progress Note           Rounding CLAUDIA/Cardiologist:  Vinicio Godinez, APRN-CNP, Dr. Mayberry  Primary Cardiologist: Dr. Armani Mayberry    Date:  11/26/2024  Patient:  Antony Goff  YOB: 1950  MRN:  [id number]   Admit Date:  11/19/2024  -    SUBJECTIVE:    11/26/24  Patient sedated and intubated.  Telemetry is paced  Slowly weaning off vasopressors  Cardiology continue to follow along    11/25/24  Remains intubated and sedated.  Still on norepinephrine at 0.13 mics per kilogram per minute.  Telemetry shows paced in the 60s.  BP stable.  40% FiO2 on ventilator.    11/24/24  Intubated and sedated in the ICU  Slowly wean off pressors  Telemetry is paced  Cardiology continue to follow along    11/23/24  Intubated and sedated in the ICU  Apparently yesterday patient developed severe epistaxis intubated taken to the ICU currently on pressors  Telemetry is paced  Slowly wean off pressors    11/22/24  Eval at bedside this morning.  Denies any complaints.  Attempting to eat breakfast.  Remains on O2.  Telemetry shows dual paced rhythm.  Fluid balance -1660    11/21/24  Patient is awake and alert sitting up in chair answering all my questions this morning.  He states that his shortness of breath is very much improved.  Well-informed of plan of care.  He states he wants to go home in the next day or 2.  EKG dual paced  Telemetry is paced  Daily weights ordered    Cardiac history  8/24 echo  CONCLUSIONS:   - Exam indication: Limited echo - Re-evaluation of heart failure   - Left ventricular systolic function is normal. EF = 60 ± 5% (visual est.)   - The right ventricle is dilated. Right ventricular systolic function is low   normal.   - The visualized aorta is dilated with a maximal dimension of 4.1 cm.   - There is moderate (2+) tricuspid valve regurgitation.   - Epic prosthetic aortic valve (size #27). The peak gradient is 18 mmHg, the mean   gradient is 9 mmHg and the dimensionless  valve index is 0.79. Prior peak/mean   gradients were 21/11 mmHg.   - Estimated right ventricular systolic pressure is 59 mmHg consistent with   moderate pulmonary hypertension. Estimated right atrial pressure is 3 mmHg based   on IVC assessment. Estimated right ventricular systolic pressure is 59 mmHg   consistent with moderate pulmonary hypertension. Estimated right atrial pressure   is 3 mmHg based on IVC assessment.   - Exam was compared with the prior  echocardiographic exam performed on   04/26/2024. There is no significant changes.      3/28/24  Operations during Hospitalization:   3/28/2024: AVR (#27 Epic plus), LAAC, left side MAZE Procedure, Epicardial lead LV pacing lead tunneled to left chest and generator exchanged for CRT-P      VITALS:     Vitals:    11/26/24 1030 11/26/24 1031 11/26/24 1032 11/26/24 1100   BP:       Pulse: 68   68   Resp: 19   21   Temp:       TempSrc:       SpO2: 99% 99% 99% 99%   Weight:       Height:           Intake/Output Summary (Last 24 hours) at 11/26/2024 1105  Last data filed at 11/26/2024 0646  Gross per 24 hour   Intake 1954.98 ml   Output 2000 ml   Net -45.02 ml       Wt Readings from Last 4 Encounters:   11/25/24 83.8 kg (184 lb 11.9 oz)   02/07/24 124 kg (274 lb)   02/01/23 124 kg (274 lb)   08/03/22 130 kg (287 lb)       CURRENT HOSPITAL MEDICATIONS:   [Held by provider] apixaban, 5 mg, oral, BID  atorvastatin, 40 mg, oral, Nightly  [Held by provider] carvedilol, 3.125 mg, oral, BID  [Held by provider] empagliflozin, 10 mg, oral, Daily  [Held by provider] furosemide, 40 mg, intravenous, q12h  ipratropium-albuteroL, 3 mL, nebulization, q4h  lamoTRIgine, 100 mg, oral, Nightly  levothyroxine, 88 mcg, oral, Daily  [Held by provider] morphine CR, 30 mg, oral, q12h KENDAL  nystatin, 1 Application, Topical, BID  oxybutynin, 5 mg, oral, BID  oxygen, , inhalation, Continuous - Inhalation  pantoprazole, 40 mg, oral, Daily before breakfast   Or  pantoprazole, 40 mg, intravenous,  Daily before breakfast  piperacillin-tazobactam, 3.375 g, intravenous, q8h  polyethylene glycol, 17 g, oral, Daily  potassium chloride, 20 mEq, oral, Daily  potassium phosphate, 15 mmol, intravenous, Once  [Held by provider] spironolactone, 25 mg, oral, Daily  traZODone, 50 mg, oral, Nightly  [Held by provider] valsartan, 40 mg, oral, Daily  vancomycin, 1,000 mg, intravenous, q24h      dexmedeTOMIDine, 0-1.5 mcg/kg/hr, Last Rate: 0.77 mcg/kg/hr (11/26/24 1009)  norepinephrine, 0-0.5 mcg/kg/min, Last Rate: 0.06 mcg/kg/min (11/26/24 1030)      Current Outpatient Medications   Medication Instructions    albuterol 2.5 mg /3 mL (0.083 %) nebulizer solution inhalation, Use as directed PRN    albuterol 90 mcg/actuation inhaler 2 puffs, Every 6 hours PRN    atorvastatin (Lipitor) 40 mg tablet 1 tablet, oral, Nightly    carvedilol (COREG) 3.125 mg, oral, 2 times daily    Eliquis 5 mg, oral, 2 times daily    empagliflozin (Jardiance) 10 mg 1 tablet, oral, Daily    furosemide (Lasix) 40 mg tablet 1 tablet, Daily (0630)    lactulose 10 gram/15 mL solution TAKE 30 ML BY MOUTH TWICE DAILY AS NEEDED    lamoTRIgine (LaMICtal) 100 mg tablet 1 tablet, oral, Nightly    levothyroxine (SYNTHROID, LEVOXYL) 88 mcg, oral, Daily    morphine CR (MS CONTIN) 30 mg, oral, Every 8 hours, PRN    omeprazole OTC (PRILOSEC OTC) 20 mg, oral, Daily before breakfast, Do not crush, chew, or split.    Oxervate 0.002 % ophthalmic solution     potassium chloride (Klor-Con) 20 mEq packet 20 mEq, Daily    solifenacin (VESICARE) 5 mg, oral, Daily    spironolactone (Aldactone) 25 mg tablet 1 tablet, oral, Daily    Symbicort 160-4.5 mcg/actuation inhaler 1 puff, 2 times daily    traZODone (DESYREL) 50 mg, Nightly        PHYSICAL EXAMINATION:   GENERAL APPEARANCE: Intubated and sedated  CHEST: Symmetric and non-tender.  INTEGUMENT: Skin warm and dry, without gross excoriationis or lesions.  HEENT: No gross abnormalities of conjunctiva, teeth, gums, oral  mucosa  NECK: Supple, no JVD, no bruit. Thyroid not palpable. Carotid upstrokes normal.  NEURO/PSHCY: Intubated and sedated  LUNGS: Very diminished scattered Rales left side  HEART: S1, S2 regular with 2 out of 6 systolic murmur  ABDOMEN: Soft, nontender, no palpable hepatosplenomegaly, no mases, no bruits. Abdominal aorta not noted to be enlarged.  EXTREMITIES: Warm with good color, no clubbing or cyanois.  1+ edema  PERIPHERAL VASCULAR: Pulses present and equally palpable; 1+ throughout. No femoral bruits      LAB DATA:     CBC:   Results from last 7 days   Lab Units 11/26/24 0330 11/25/24 1825 11/25/24 0328   WBC AUTO x10*3/uL 14.8* 24.0* 13.7*   RBC AUTO x10*6/uL 2.65* 2.74* 2.55*   HEMOGLOBIN g/dL 8.0* 8.4* 8.0*   HEMATOCRIT % 25.0* 26.0* 24.8*   MCV fL 94 95 97   MCH pg 30.2 30.7 31.4   MCHC g/dL 32.0 32.3 32.3   RDW % 16.5* 16.7* 16.5*   PLATELETS AUTO x10*3/uL 153 237 116*     CMP:    Results from last 7 days   Lab Units 11/26/24 0330 11/25/24 1825 11/25/24 0328 11/24/24 0338   SODIUM mmol/L 138 139 140 139   POTASSIUM mmol/L 3.5 3.3* 3.1* 3.6   CHLORIDE mmol/L 92* 91* 90* 88*   CO2 mmol/L 41* 42* >45* >45*   BUN mg/dL 29* 31* 30* 28*   CREATININE mg/dL 1.14 1.11 0.89 0.97   GLUCOSE mg/dL 164* 117* 111* 133*   PROTEIN TOTAL g/dL 6.6  --  6.7 7.0   CALCIUM mg/dL 8.7 9.1 9.0 8.9   BILIRUBIN TOTAL mg/dL 1.5*  --  0.9 0.9   ALK PHOS U/L 111  --  81 86   AST U/L 24  --  19 20   ALT U/L 13  --  12 12     BMP:    Results from last 7 days   Lab Units 11/26/24 0330 11/25/24  1825 11/25/24  0328   SODIUM mmol/L 138 139 140   POTASSIUM mmol/L 3.5 3.3* 3.1*   CHLORIDE mmol/L 92* 91* 90*   CO2 mmol/L 41* 42* >45*   BUN mg/dL 29* 31* 30*   CREATININE mg/dL 1.14 1.11 0.89   CALCIUM mg/dL 8.7 9.1 9.0   GLUCOSE mg/dL 164* 117* 111*     Magnesium:  Results from last 7 days   Lab Units 11/26/24  0330 11/25/24  0328 11/24/24  0338   MAGNESIUM mg/dL 2.12 2.40 2.44*     Troponin:    Results from last 7 days   Lab Units  11/20/24  1522   TROPHS ng/L 38*     BNP:   Results from last 7 days   Lab Units 11/20/24  1522   BNP pg/mL 337*     Lipid Panel:         DIAGNOSTIC TESTING:   @No results found for this or any previous visit.    ECG 12 Lead    Result Date: 11/20/2024  AV dual-paced rhythm Abnormal ECG When compared with ECG of 20-NOV-2024 15:27, (unconfirmed) Premature ventricular complexes are no longer Present Vent. rate has decreased BY  13 BPM    XR chest 2 views    Result Date: 11/20/2024  Interpreted By:  Schoenberger, Joseph, STUDY: XR CHEST 2 VIEWS;  11/20/2024 3:07 pm   INDICATION: Signs/Symptoms:sob.     COMPARISON: 03/09/2015   ACCESSION NUMBER(S): [id number]   ORDERING CLINICIAN: WANDA HUANG   FINDINGS: Left subclavian transvenous pacemaker unchanged from prior.       CARDIOMEDIASTINAL SILHOUETTE: Cardiac silhouette remains mildly enlarged. There is persistent or recurrent venous congestion. There are bilateral small pleural effusions with likely some interstitial edema.   LUNGS: There is a rather large opacity in the lower left hemithorax. On the AP radiograph, its contours somewhat rounded in a mass is a consideration though this appearance is not the noted on the lateral radiograph. However, the left hemidiaphragm is also elevated possibly exaggerating this appearance. At minimum, follow-up radiographs are recommended as a the left basal mass is a consideration.   ABDOMEN: No remarkable upper abdominal findings.   BONES: No acute osseous changes.       1.  Moderate congestive failure with interstitial edema. Small bilateral pleural effusions 2. Left basal opacity which on the AP radiograph appears to have somewhat rounded contour superiorly. Underlying mass is a consideration. At a minimum, radiographic follow-up is recommended.       MACRO: None   Signed by: Joseph Schoenberger 11/20/2024 3:13 PM Dictation workstation:   [id number]       CT chest abdomen pelvis wo IV contrast   Final Result   There are  patchy ground-glass opacities scattered throughout the   lungs bilaterally. More focal consolidative opacities are present in   the lung bases left greater than right with similar to slightly   worsened aeration compared to prior imaging concerning for multifocal   pneumonia..        Loculated right pleural effusion, similar compared to prior imaging.        Cholelithiasis. No CT evidence for acute cholecystitis.        Cirrhotic morphology of the liver. Small volume ascites.        Nonobstructing stones in the kidneys bilaterally measuring up to 2 mm   on the right and 3 mm on the left.        Gastric wall thickening versus underdistention. Correlate for   symptoms of gastritis.        Diffuse colonic wall thickening and adjacent stranding most   compatible with colitis.        MACRO:   None.        Signed by: Evan Finkelstein 11/26/2024 3:58 AM   Dictation workstation:   [id number]      CT maxillofacial bones wo IV contrast   Final Result   Complete opacification of the left maxillary sinus with reactive   osteitis suggesting a chronic process. No frothy secretions or fluid   levels throughout the sinuses to suggest acute sinusitis.             MACRO:   None.        Signed by: Evan Finkelstein 11/26/2024 3:41 AM   Dictation workstation:   [id number]      XR chest 1 view   Final Result   No change from 23 November 2024        MACRO:   None        Signed by: Ricky Squires 11/25/2024 8:00 AM   Dictation workstation:   [id number]      XR chest 1 view   Final Result   1.  Improving findings compared to prior exam as discussed above                  MACRO:   None        Signed by: Joseph Schoenberger 11/23/2024 12:11 PM   Dictation workstation:   [id number]      XR chest 1 view   Final Result   1.  Satisfactory appearance post left jugular venous catheter   placement.   2. Progression of pleural fluids in basal airspace opacities possibly   progressing pneumonia edema.                  MACRO:   None        Signed  by: Joseph Schoenberger 11/22/2024 3:02 PM   Dictation workstation:   [id number]      XR chest 1 view   Final Result   1.  Some improvement in left basal opacity.   2. Satisfactory appearance post intubation.                  MACRO:   None        Signed by: Joseph Schoenberger 11/22/2024 1:20 PM   Dictation workstation:   [id number]      CT head wo IV contrast   Final Result   No acute intracranial abnormality or mass effect.        This study was interpreted at [facility].        MACRO:   None        Signed by: Dontae Lujan 11/22/2024 10:15 AM   Dictation workstation:   [id number]      CT chest wo IV contrast   Final Result   Rather than a nodule or mass, I suspect a postobstructive pneumonia   in the left lower lobe. Mucous plugging or aspirate in the left lower   lobe bronchus and majority of its segmental branches with   consolidation and atelectasis towards the left lung base in the lower   lobe and a more nodular distal airways pneumonia in the more superior   left lower lobe that remains fairly well inflated        In the lateral segment middle lobe, another suspected pneumonia along   with some atelectasis, but no upstream large airway abnormality to   account for it        Diffuse airspace ground-glass change in both hemithoraces is probably   a separate process, possibly interstitial and alveolar edema or a   diffuse atypical pneumonia        Mediastinal adenopathy of uncertain etiology        Small bilateral pleural effusions probably too small for   thoracocentesis on both sides. Right side may be partially loculated   as it tracks into the major fissure        Included upper abdomen shows a markedly cirrhotic liver which has   progressed in nodularity and shrinkage since CT chest 9 March 2015.   Gallstones also present        MACRO:   None        Signed by: Ricky Squires 11/21/2024 2:12 PM   Dictation workstation:   [id number]      Cardiac Device Check -  Inpatient   Final Result      XR chest 2 views   Final Result   1.  Moderate congestive failure with interstitial edema. Small   bilateral pleural effusions   2. Left basal opacity which on the AP radiograph appears to have   somewhat rounded contour superiorly. Underlying mass is a   consideration. At a minimum, radiographic follow-up is recommended.                  MACRO:   None        Signed by: Joseph Schoenberger 11/20/2024 3:13 PM   Dictation workstation:   [id number]          No echocardiogram results found for the past 14 days    RADIOLOGY:     CT chest abdomen pelvis wo IV contrast   Final Result   There are patchy ground-glass opacities scattered throughout the   lungs bilaterally. More focal consolidative opacities are present in   the lung bases left greater than right with similar to slightly   worsened aeration compared to prior imaging concerning for multifocal   pneumonia..        Loculated right pleural effusion, similar compared to prior imaging.        Cholelithiasis. No CT evidence for acute cholecystitis.        Cirrhotic morphology of the liver. Small volume ascites.        Nonobstructing stones in the kidneys bilaterally measuring up to 2 mm   on the right and 3 mm on the left.        Gastric wall thickening versus underdistention. Correlate for   symptoms of gastritis.        Diffuse colonic wall thickening and adjacent stranding most   compatible with colitis.        MACRO:   None.        Signed by: Evan Finkelstein 11/26/2024 3:58 AM   Dictation workstation:   [id number]      CT maxillofacial bones wo IV contrast   Final Result   Complete opacification of the left maxillary sinus with reactive   osteitis suggesting a chronic process. No frothy secretions or fluid   levels throughout the sinuses to suggest acute sinusitis.             MACRO:   None.        Signed by: Evan Finkelstein 11/26/2024 3:41 AM   Dictation workstation:   [id number]      XR chest 1 view   Final Result   No  change from 23 November 2024        MACRO:   None        Signed by: Ricky Squires 11/25/2024 8:00 AM   Dictation workstation:   [id number]      XR chest 1 view   Final Result   1.  Improving findings compared to prior exam as discussed above                  MACRO:   None        Signed by: Joseph Schoenberger 11/23/2024 12:11 PM   Dictation workstation:   [id number]      XR chest 1 view   Final Result   1.  Satisfactory appearance post left jugular venous catheter   placement.   2. Progression of pleural fluids in basal airspace opacities possibly   progressing pneumonia edema.                  MACRO:   None        Signed by: Joseph Schoenberger 11/22/2024 3:02 PM   Dictation workstation:   [id number]      XR chest 1 view   Final Result   1.  Some improvement in left basal opacity.   2. Satisfactory appearance post intubation.                  MACRO:   None        Signed by: Joseph Schoenberger 11/22/2024 1:20 PM   Dictation workstation:   [id number]      CT head wo IV contrast   Final Result   No acute intracranial abnormality or mass effect.        This study was interpreted at [facility].        MACRO:   None        Signed by: Dontae Lujan 11/22/2024 10:15 AM   Dictation workstation:   [id number]      CT chest wo IV contrast   Final Result   Rather than a nodule or mass, I suspect a postobstructive pneumonia   in the left lower lobe. Mucous plugging or aspirate in the left lower   lobe bronchus and majority of its segmental branches with   consolidation and atelectasis towards the left lung base in the lower   lobe and a more nodular distal airways pneumonia in the more superior   left lower lobe that remains fairly well inflated        In the lateral segment middle lobe, another suspected pneumonia along   with some atelectasis, but no upstream large airway abnormality to   account for it        Diffuse airspace ground-glass change in both hemithoraces is probably   a  separate process, possibly interstitial and alveolar edema or a   diffuse atypical pneumonia        Mediastinal adenopathy of uncertain etiology        Small bilateral pleural effusions probably too small for   thoracocentesis on both sides. Right side may be partially loculated   as it tracks into the major fissure        Included upper abdomen shows a markedly cirrhotic liver which has   progressed in nodularity and shrinkage since CT chest 9 March 2015.   Gallstones also present        MACRO:   None        Signed by: Ricky Squires 11/21/2024 2:12 PM   Dictation workstation:   [id number]      Cardiac Device Check - Inpatient   Final Result      XR chest 2 views   Final Result   1.  Moderate congestive failure with interstitial edema. Small   bilateral pleural effusions   2. Left basal opacity which on the AP radiograph appears to have   somewhat rounded contour superiorly. Underlying mass is a   consideration. At a minimum, radiographic follow-up is recommended.                  MACRO:   None        Signed by: Joseph Schoenberger 11/20/2024 3:13 PM   Dictation workstation:   [id number]          PROBLEM LIST     Patient Active Problem List   Diagnosis    Benign essential hypertension    Dizziness    Paroxysmal atrial fibrillation (Multi)    Pacemaker    Prolonged QT interval    Renal failure    Sleep apnea    Syncope    Shortness of breath    Blepharoconjunctivitis of left eye    Central corneal ulcer of left eye    Combined forms of age-related cataract of left eye    Congestive heart failure    Dry eye syndrome of both eyes    History of surgical procedure    HSV (herpes simplex virus) dendritic keratitis    Hypogonadism male    Hypothyroidism    Keratoconjunctivitis sicca    Hyperopia of both eyes with astigmatism and presbyopia    Meibomian gland dysfunction (MGD) of upper and lower lids of both eyes    Vitreous floaters of both eyes    Morbid obesity due to excess calories (Multi)    Neurotrophic keratitis     Neurotrophic keratoconjunctivitis, left eye    Nocturnal polyuria    Other cirrhosis of liver    Panic attack    Persistent epithelial defect of cornea    Pseudophakia of both eyes    Chronic obstructive pulmonary disease (Multi)    Pulmonary emphysema (Multi)    Punctate keratitis of both eyes    Punctate keratitis of left eye    Recurrent corneal erosion, left    Recurrent erosion of both corneas    Ring corneal ulcer of right eye    Sick sinus syndrome (Multi)    Status post total left knee replacement    Urinary urgency    Arthritis    Vasculitis (CMS-HCC)    Dizziness and giddiness    Presence of cardiac pacemaker    Chronic kidney disease    KANCHAN (obstructive sleep apnea)    Syncope and collapse    S/P AVR (aortic valve replacement)    Abnormal CT scan    Anxiety    Atelectasis    CHF (congestive heart failure), NYHA class I, acute on chronic, combined    Heart murmur    History of CVA (cerebrovascular accident)    Pulmonary HTN (Multi)    Pressure injury of left buttock, stage 3 (Multi)    Severe aortic regurgitation    Recurrent falls    Venous stasis dermatitis of both lower extremities    Tinnitus    Stress hyperglycemia    Acute combined systolic and diastolic HF (heart failure)    Epistaxis    Anemia       ASSESSMENT:   Acute on chronic combined systolic and diastolic heart failure NYHA class III  History of aortic valve replacement  PAF pacemaker interrogation showed no episodes of atrial fibs or atrial tachycardia  History of CVA  EF 55% 8/24  Hypertension  Dyslipidemia  History of PPM        PLAN:   I have personally interviewed and examined the patient.   I have personally and independently reviewed labs and diagnostic testing.  I have personally verified the elements of the history and physical listed above and changes, if any, are noted.  74-year-old gentleman with a history of severe aortic stenosis status post AVR with a bioprosthetic tissue valve at [facility] in March 2024, hypertension,  dyslipidemia, CVA with some residual confusion, GERD and other comorbidities as listed above.  He presented to [facility] ER with complaints of increased shortness of breath confusion and weakness and was subsequently transferred here for further management.  Is a poor historian because of his mental state, but he did deny any ongoing chest pain.  He had a recent follow-up echocardiogram which shows a well-seated prosthetic valve in the aortic position with normal peak and mean gradients.  LV function was normal.  He denies any orthopnea paroxysmal nocturnal dyspnea.     Agree with exam as noted above.  Cardiac exam reveals distant S1 and S2 with no murmurs appreciated.  There is reduced breath sound in the lung bases bilaterally which is worse on the left compared to the right.  There is 1+ bilateral ankle edema.     ASSESSMENT AND PLAN:  1.  Increased shortness of breath: This is likely multifactorial, with possible underlying diastolic heart failure and deconditioning secondary to his CVA.  We will treat with gentle diuresis and rule out possible large pleural effusion based on his clinical exam.  He may require elective thoracocentesis if further chest imaging reveals a large pleural effusion.  2.  Persistent atrial fibrillation: On current medications and will continue with anticoagulation for now which can be held if he requires thoracocentesis.  3.  S/p CVA with confusion: This appears to be patient's baseline, will defer to primary team for continued management.  AKA    11/21/24  Tele monitoring  PPM interrogation no AT/AF no ventricular rhythm  Eliquis 5 mg p.o. twice daily  Lipitor 40 mg daily  Coreg 3.125 mg p.o. twice daily  Jardiance 10 mg daily  Lasix 40 mg IV every 12  Aldactone 25 mg daily  Add Diovan 40 mg daily  Check magnesium level keep greater than 2.0  Strict intake and output  Daily weights  Recommend respiratory treatment    35-minute visit    Calixto Godinez [facility]  [facility]    11/22/24  Continue telemetry monitoring, monitor electrolytes, keep potassium greater than 4 magnesium greater than 2  Interrogation of PPM showed no AT or AF episodes.  No ventricular rhythms identified.  Continue Eliquis.  Continue current medication regimen including GDMT for heart failure management as BP allows  Strict I's and O's and daily weights  Continue gentle diuresis  Apparently after my visit today patient had rapid response called on him for an episode of unresponsiveness and epistaxis.  Patient was intubated by ICU physician.  Transferred to ICU under their care  Will continue to follow along with you      Gerhard Oro [facility]  Adult Gerontology Acute Care Nurse Practitioner  [facility] [place]   [facility]  [phone]        Of note, this documentation is completed using the Dragon Dictation system (voice recognition software). There may be spelling and/or grammatical errors that were not corrected prior to final submission.    Please do not hesitate to call with questions.  Electronically signed by JACQUE Olivarez-CNP, on 11/26/2024 at 11:05 AM  Patient seen and examined in conjunction with JACQUE De La Garza/CNP and agree with the evaluation as noted above.  Recent events noted, with patient being transferred to the ICU following an episode of hypotension and severe hypoxia requiring intubation.  This was after significant bouts of epistaxis requiring nasal packs.  His vitals are stable is intubated and sedated at this time.  We will continue with current supportive treatment, but at this time there are no active cardiac issues.  Is recent prosthetic AVR appears to be functioning normally based on his recent echocardiogram as noted above.  AKA      11/23/24  ICU monitoring  Wean off pressors  Intensivist input thank you very much  Eliquis on hold  Cardiology will continue to follow  along    11/24/24  ICU monitoring  ENT input thank  you  Continue to hold the Eliquis  Slowly wean off pressors  Keep magnesium greater than 2.0  Cardiology to follow along please not hesitate call with any questions or concerns    11/25/24  Continue ICU management and monitoring  Wean off vent as tolerated, defer to ICU team  Telemetry shows paced in the 60s  Wean off vasopressors as BP allows, keep MAP greater than 65  Monitor electrolytes, keep potassium greater than 4 and magnesium greater than 2  Sedation vacation  Appreciate ENT recommendation  Continue to monitor strict I's and O's and daily weights  Will continue to follow along with you      11/26/24  ICU monitoring  Wean off vent as tolerated, defer to ICU team  Slowly wean off vasopressors  Keep magnesium greater than 2.0  ENT input thank you very much  Further recommendations  per Dr Mayberry    Patient seen and examined in conjunction with JACQUE Chase and agree with the evaluation as noted above.  Patient remains in status quo, intubated sedated with no significant ventricular arrhythmias overnight.  There has not been any recurrences of epistaxis.  His cardiac status is otherwise stable, will continue to follow with supportive care.  AKA

## 2024-11-27 LAB
ALBUMIN SERPL BCP-MCNC: 2.1 G/DL (ref 3.4–5)
ALP SERPL-CCNC: 93 U/L (ref 33–136)
ALT SERPL W P-5'-P-CCNC: 11 U/L (ref 10–52)
ANION GAP BLDA CALCULATED.4IONS-SCNC: 4 MMO/L (ref 10–25)
ANION GAP BLDA CALCULATED.4IONS-SCNC: 6 MMO/L (ref 10–25)
ANION GAP SERPL CALC-SCNC: 8 MMOL/L (ref 10–20)
AST SERPL W P-5'-P-CCNC: 20 U/L (ref 9–39)
BACTERIA BLD CULT: NORMAL
BACTERIA BLD CULT: NORMAL
BASE EXCESS BLDA CALC-SCNC: 11.2 MMOL/L (ref -2–3)
BASE EXCESS BLDA CALC-SCNC: 14.3 MMOL/L (ref -2–3)
BASOPHILS # BLD AUTO: 0.01 X10*3/UL (ref 0–0.1)
BASOPHILS NFR BLD AUTO: 0.1 %
BILIRUB SERPL-MCNC: 1.2 MG/DL (ref 0–1.2)
BODY TEMPERATURE: ABNORMAL
BODY TEMPERATURE: ABNORMAL
BUN SERPL-MCNC: 23 MG/DL (ref 6–23)
CA-I BLDA-SCNC: 1.21 MMOL/L (ref 1.1–1.33)
CA-I BLDA-SCNC: 1.24 MMOL/L (ref 1.1–1.33)
CALCIUM SERPL-MCNC: 8.4 MG/DL (ref 8.6–10.3)
CHLORIDE BLDA-SCNC: 102 MMOL/L (ref 98–107)
CHLORIDE BLDA-SCNC: 99 MMOL/L (ref 98–107)
CHLORIDE SERPL-SCNC: 97 MMOL/L (ref 98–107)
CLARITY FLD: ABNORMAL
CO2 SERPL-SCNC: 38 MMOL/L (ref 21–32)
COLOR FLD: ABNORMAL
CREAT SERPL-MCNC: 0.99 MG/DL (ref 0.5–1.3)
EGFRCR SERPLBLD CKD-EPI 2021: 81 ML/MIN/1.73M*2
EOSINOPHIL # BLD AUTO: 0.29 X10*3/UL (ref 0–0.4)
EOSINOPHIL NFR BLD AUTO: 4 %
ERYTHROCYTE [DISTWIDTH] IN BLOOD BY AUTOMATED COUNT: 16.1 % (ref 11.5–14.5)
GLUCOSE BLDA-MCNC: 110 MG/DL (ref 74–99)
GLUCOSE BLDA-MCNC: 121 MG/DL (ref 74–99)
GLUCOSE SERPL-MCNC: 108 MG/DL (ref 74–99)
HCO3 BLDA-SCNC: 36.3 MMOL/L (ref 22–26)
HCO3 BLDA-SCNC: 39 MMOL/L (ref 22–26)
HCT VFR BLD AUTO: 22.6 % (ref 41–52)
HCT VFR BLD EST: 24 % (ref 41–52)
HCT VFR BLD EST: 24 % (ref 41–52)
HGB BLD-MCNC: 7.3 G/DL (ref 13.5–17.5)
HGB BLDA-MCNC: 8.1 G/DL (ref 13.5–17.5)
HGB BLDA-MCNC: 8.1 G/DL (ref 13.5–17.5)
IMM GRANULOCYTES # BLD AUTO: 0.06 X10*3/UL (ref 0–0.5)
IMM GRANULOCYTES NFR BLD AUTO: 0.8 % (ref 0–0.9)
INHALED O2 CONCENTRATION: 40 %
INHALED O2 CONCENTRATION: 40 %
LACTATE BLDA-SCNC: 1.1 MMOL/L (ref 0.4–2)
LACTATE BLDA-SCNC: 1.2 MMOL/L (ref 0.4–2)
LYMPHOCYTES # BLD AUTO: 0.82 X10*3/UL (ref 0.8–3)
LYMPHOCYTES NFR BLD AUTO: 11.2 %
MAGNESIUM SERPL-MCNC: 1.9 MG/DL (ref 1.6–2.4)
MCH RBC QN AUTO: 30.8 PG (ref 26–34)
MCHC RBC AUTO-ENTMCNC: 32.3 G/DL (ref 32–36)
MCV RBC AUTO: 95 FL (ref 80–100)
MONOCYTES # BLD AUTO: 0.34 X10*3/UL (ref 0.05–0.8)
MONOCYTES NFR BLD AUTO: 4.7 %
NEUTROPHILS # BLD AUTO: 5.78 X10*3/UL (ref 1.6–5.5)
NEUTROPHILS NFR BLD AUTO: 79.2 %
NRBC BLD-RTO: 0 /100 WBCS (ref 0–0)
OXYHGB MFR BLDA: 95.8 % (ref 94–98)
OXYHGB MFR BLDA: 96 % (ref 94–98)
PCO2 BLDA: 50 MM HG (ref 38–42)
PCO2 BLDA: 51 MM HG (ref 38–42)
PEEP CMH2O: 5 CM H2O
PH BLDA: 7.46 PH (ref 7.38–7.42)
PH BLDA: 7.5 PH (ref 7.38–7.42)
PHOSPHATE SERPL-MCNC: 3 MG/DL (ref 2.5–4.9)
PLATELET # BLD AUTO: 107 X10*3/UL (ref 150–450)
PO2 BLDA: 126 MM HG (ref 85–95)
PO2 BLDA: 127 MM HG (ref 85–95)
POTASSIUM BLDA-SCNC: 3.5 MMOL/L (ref 3.5–5.3)
POTASSIUM BLDA-SCNC: 4.2 MMOL/L (ref 3.5–5.3)
POTASSIUM SERPL-SCNC: 3.3 MMOL/L (ref 3.5–5.3)
PROT SERPL-MCNC: 6.4 G/DL (ref 6.4–8.2)
RBC # BLD AUTO: 2.37 X10*6/UL (ref 4.5–5.9)
RBC # FLD AUTO: ABNORMAL /UL
SAO2 % BLDA: 100 % (ref 94–100)
SAO2 % BLDA: 99 % (ref 94–100)
SODIUM BLDA-SCNC: 138 MMOL/L (ref 136–145)
SODIUM BLDA-SCNC: 140 MMOL/L (ref 136–145)
SODIUM SERPL-SCNC: 140 MMOL/L (ref 136–145)
TIDAL VOLUME: 375 ML
VANCOMYCIN SERPL-MCNC: 19.3 UG/ML (ref 5–20)
VENTILATOR RATE: 10 BPM
WBC # BLD AUTO: 7.3 X10*3/UL (ref 4.4–11.3)
WBC # FLD AUTO: ABNORMAL /UL

## 2024-11-27 PROCEDURE — 0B9J8ZX DRAINAGE OF LEFT LOWER LUNG LOBE, VIA NATURAL OR ARTIFICIAL OPENING ENDOSCOPIC, DIAGNOSTIC: ICD-10-PCS | Performed by: STUDENT IN AN ORGANIZED HEALTH CARE EDUCATION/TRAINING PROGRAM

## 2024-11-27 PROCEDURE — 2500000004 HC RX 250 GENERAL PHARMACY W/ HCPCS (ALT 636 FOR OP/ED)

## 2024-11-27 PROCEDURE — 2500000001 HC RX 250 WO HCPCS SELF ADMINISTERED DRUGS (ALT 637 FOR MEDICARE OP)

## 2024-11-27 PROCEDURE — 94640 AIRWAY INHALATION TREATMENT: CPT

## 2024-11-27 PROCEDURE — 84132 ASSAY OF SERUM POTASSIUM: CPT | Performed by: NURSE PRACTITIONER

## 2024-11-27 PROCEDURE — 37799 UNLISTED PX VASCULAR SURGERY: CPT | Performed by: NURSE PRACTITIONER

## 2024-11-27 PROCEDURE — 94681 O2 UPTK CO2 OUTP % O2 XTRC: CPT

## 2024-11-27 PROCEDURE — 94667 MNPJ CHEST WALL 1ST: CPT

## 2024-11-27 PROCEDURE — 31624 DX BRONCHOSCOPE/LAVAGE: CPT | Performed by: STUDENT IN AN ORGANIZED HEALTH CARE EDUCATION/TRAINING PROGRAM

## 2024-11-27 PROCEDURE — 2500000001 HC RX 250 WO HCPCS SELF ADMINISTERED DRUGS (ALT 637 FOR MEDICARE OP): Performed by: NURSE PRACTITIONER

## 2024-11-27 PROCEDURE — 87506 IADNA-DNA/RNA PROBE TQ 6-11: CPT | Mod: ELYLAB | Performed by: NURSE PRACTITIONER

## 2024-11-27 PROCEDURE — 2500000005 HC RX 250 GENERAL PHARMACY W/O HCPCS: Performed by: STUDENT IN AN ORGANIZED HEALTH CARE EDUCATION/TRAINING PROGRAM

## 2024-11-27 PROCEDURE — 87206 SMEAR FLUORESCENT/ACID STAI: CPT | Mod: ELYLAB | Performed by: STUDENT IN AN ORGANIZED HEALTH CARE EDUCATION/TRAINING PROGRAM

## 2024-11-27 PROCEDURE — 83735 ASSAY OF MAGNESIUM: CPT | Performed by: NURSE PRACTITIONER

## 2024-11-27 PROCEDURE — 85025 COMPLETE CBC W/AUTO DIFF WBC: CPT

## 2024-11-27 PROCEDURE — 80202 ASSAY OF VANCOMYCIN: CPT

## 2024-11-27 PROCEDURE — 37799 UNLISTED PX VASCULAR SURGERY: CPT

## 2024-11-27 PROCEDURE — 2500000002 HC RX 250 W HCPCS SELF ADMINISTERED DRUGS (ALT 637 FOR MEDICARE OP, ALT 636 FOR OP/ED)

## 2024-11-27 PROCEDURE — 94003 VENT MGMT INPAT SUBQ DAY: CPT

## 2024-11-27 PROCEDURE — 84132 ASSAY OF SERUM POTASSIUM: CPT | Performed by: STUDENT IN AN ORGANIZED HEALTH CARE EDUCATION/TRAINING PROGRAM

## 2024-11-27 PROCEDURE — 87077 CULTURE AEROBIC IDENTIFY: CPT | Mod: ELYLAB | Performed by: STUDENT IN AN ORGANIZED HEALTH CARE EDUCATION/TRAINING PROGRAM

## 2024-11-27 PROCEDURE — 87102 FUNGUS ISOLATION CULTURE: CPT | Mod: ELYLAB | Performed by: STUDENT IN AN ORGANIZED HEALTH CARE EDUCATION/TRAINING PROGRAM

## 2024-11-27 PROCEDURE — 2500000005 HC RX 250 GENERAL PHARMACY W/O HCPCS

## 2024-11-27 PROCEDURE — 5A0945A ASSISTANCE WITH RESPIRATORY VENTILATION, 24-96 CONSECUTIVE HOURS, HIGH NASAL FLOW/VELOCITY: ICD-10-PCS | Performed by: STUDENT IN AN ORGANIZED HEALTH CARE EDUCATION/TRAINING PROGRAM

## 2024-11-27 PROCEDURE — 84100 ASSAY OF PHOSPHORUS: CPT

## 2024-11-27 PROCEDURE — 87077 CULTURE AEROBIC IDENTIFY: CPT | Mod: ELYLAB

## 2024-11-27 PROCEDURE — 2500000004 HC RX 250 GENERAL PHARMACY W/ HCPCS (ALT 636 FOR OP/ED): Performed by: STUDENT IN AN ORGANIZED HEALTH CARE EDUCATION/TRAINING PROGRAM

## 2024-11-27 PROCEDURE — 2020000001 HC ICU ROOM DAILY

## 2024-11-27 PROCEDURE — 99291 CRITICAL CARE FIRST HOUR: CPT

## 2024-11-27 PROCEDURE — 89050 BODY FLUID CELL COUNT: CPT | Mod: ELYLAB | Performed by: STUDENT IN AN ORGANIZED HEALTH CARE EDUCATION/TRAINING PROGRAM

## 2024-11-27 RX ORDER — POTASSIUM CHLORIDE 1.5 G/1.58G
20 POWDER, FOR SOLUTION ORAL ONCE
Status: COMPLETED | OUTPATIENT
Start: 2024-11-27 | End: 2024-11-27

## 2024-11-27 RX ORDER — LIDOCAINE HYDROCHLORIDE 20 MG/ML
20 INJECTION, SOLUTION EPIDURAL; INFILTRATION; INTRACAUDAL; PERINEURAL ONCE
Status: COMPLETED | OUTPATIENT
Start: 2024-11-27 | End: 2024-11-27

## 2024-11-27 RX ORDER — LIDOCAINE HYDROCHLORIDE 10 MG/ML
10 INJECTION, SOLUTION INFILTRATION; PERINEURAL ONCE
Status: DISCONTINUED | OUTPATIENT
Start: 2024-11-27 | End: 2024-11-27

## 2024-11-27 ASSESSMENT — COGNITIVE AND FUNCTIONAL STATUS - GENERAL
MOBILITY SCORE: 6
STANDING UP FROM CHAIR USING ARMS: TOTAL
PERSONAL GROOMING: TOTAL
DAILY ACTIVITIY SCORE: 6
TOILETING: TOTAL
TURNING FROM BACK TO SIDE WHILE IN FLAT BAD: TOTAL
CLIMB 3 TO 5 STEPS WITH RAILING: TOTAL
MOVING FROM LYING ON BACK TO SITTING ON SIDE OF FLAT BED WITH BEDRAILS: TOTAL
EATING MEALS: TOTAL
MOVING TO AND FROM BED TO CHAIR: TOTAL
DRESSING REGULAR UPPER BODY CLOTHING: TOTAL
DRESSING REGULAR LOWER BODY CLOTHING: TOTAL
HELP NEEDED FOR BATHING: TOTAL
WALKING IN HOSPITAL ROOM: TOTAL

## 2024-11-27 ASSESSMENT — PAIN SCALES - GENERAL
PAINLEVEL_OUTOF10: 0 - NO PAIN

## 2024-11-27 ASSESSMENT — PAIN - FUNCTIONAL ASSESSMENT
PAIN_FUNCTIONAL_ASSESSMENT: 0-10
PAIN_FUNCTIONAL_ASSESSMENT: 0-10

## 2024-11-27 NOTE — NURSING NOTE
Spo2 decreased to 80%. Suctioned via ETT with 100% per ventilator. Thick moderate tan secretions noted. Oral suction performed with minimal secretions noted. Spo2 increased to 94%. Will continue to monitor patient and notify Dr Gregg.

## 2024-11-27 NOTE — CARE PLAN
The patient's goals for the shift include      The clinical goals for the shift include Patient will be hemodyanamically stable throughout shift

## 2024-11-27 NOTE — PROGRESS NOTES
11/27- Patient remains intubated/  vented but is responsive, nursing adjusting sedation. Bronchoscopy this date.No visitor at bedside currently

## 2024-11-27 NOTE — PROCEDURES
Bronchoscopy procedure note    Date of procedure: 11/27/2024    Surgeon: Jose Gregg MD    Procedure performed: Bronchoscopy with BAL left lower lobe and therapeutic mucous plug removal    Indications:  intermittent hypoxemia with turning, dense consolidation on chest imaging    Sedation: Precedex infusion running for the duration of the procedure    Analgesia: Lidocaine 1% preservative-free 10 cc    Respiratory support: Mechanical ventilation    Consent: Emergent    Description of the procedure: Procedure was performed in the ICU.  Timeout was called, patient was correct identified using name, date of birth and medical record number is corresponding to the wrist bracelet in the medical chart.  After reviewing the indications for procedure everyone was in agreement and the timeout ended.    With the patient having frequent oxygen desaturations and intermittent necessity for high O2 requirements, the decision was made to proceed with emergent bronchoscopy for therapeutic mucous plug removal.  The scope was introduced via the patient's endotracheal tube.  Upon entry into the trachea there was a moderate amount of thick secretions appreciated.  These were suctioned using aliquots of normal saline.  The leon itself was noted to be sharp with minimal friability of the overlying mucosa.    Left bronchial tree was examined first.  Again, moderate thick secretions were noted throughout the left mainstem bronchus as well as proximal portions of the left upper lobe.  Using suction and aliquots of normal saline we were able to remove the majority of these central airway secretions.  The left upper lobe was fairly clear of mucous plugging with patent airways, no intrinsic narrowing, extrinsic compression or endobronchial lesion identified.  Left lower lobe however had extensive mucous plugging involving the superior and basilar segments.  Going segment by segment aliquots of normal saline were used to remove the mucous  plug.  BAL was performed of the basilar segment of the left lower lobe.  60 cc was instilled with return of about 20 cc of cloudy, slightly red appearing fluid.  This was sent for the usual studies.    The right bronchial tree was then examined.  Similar findings were appreciated but not to the same extent as the left bronchial tree.  Scattered areas of mucous plugging were noted posteriorly in the right lower lobe otherwise the airways were noted to be patent, free of intrinsic narrowing, extrinsic compression or endobronchial lesion.    This around this time that the patient started having increased coughing during the exam.  I went back to the left bronchial tree and removed additional mucous plugs.  Once the airways were adequately cleared the prior scope was then removed and the procedure ended.    Impressions:  Extensive mucous plugging involving the left lower lobe  Successful BAL of left lower lobe    Estimated blood loss: None    Jose Gregg MD, MSBS   Pulmonary/Critical Care  Cell: [phone]

## 2024-11-27 NOTE — CARE PLAN
Problem: Safety - Adult  Goal: Free from fall injury  Outcome: Progressing     Problem: Discharge Planning  Goal: Discharge to home or other facility with appropriate resources  Outcome: Progressing     Problem: Chronic Conditions and Co-morbidities  Goal: Patient's chronic conditions and co-morbidity symptoms are monitored and maintained or improved  Outcome: Progressing     Problem: Skin  Goal: Decreased wound size/increased tissue granulation at next dressing change  Outcome: Progressing  Goal: Participates in plan/prevention/treatment measures  Outcome: Progressing  Goal: Prevent/manage excess moisture  Outcome: Progressing  Goal: Prevent/minimize sheer/friction injuries  Outcome: Progressing  Goal: Promote/optimize nutrition  Outcome: Progressing  Goal: Promote skin healing  Outcome: Progressing     Problem: Pain  Goal: Takes deep breaths with improved pain control throughout the shift  Outcome: Progressing  Goal: Turns in bed with improved pain control throughout the shift  Outcome: Progressing  Goal: Walks with improved pain control throughout the shift  Outcome: Progressing  Goal: Performs ADL's with improved pain control throughout shift  Outcome: Progressing  Goal: Participates in PT with improved pain control throughout the shift  Outcome: Progressing  Goal: Free from opioid side effects throughout the shift  Outcome: Progressing  Goal: Free from acute confusion related to pain meds throughout the shift  Outcome: Progressing     Problem: Heart Failure  Goal: Improved gas exchange this shift  Outcome: Progressing  Goal: Improved urinary output this shift  Outcome: Progressing  Goal: Reduction in peripheral edema within 24 hours  Outcome: Progressing  Goal: Report improvement of dyspnea/breathlessness this shift  Outcome: Progressing  Goal: Weight from fluid excess reduced over 2-3 days, then stabilize  Outcome: Progressing  Goal: Increase self care and/or family involvement in 24 hours  Outcome:  Progressing     Problem: Safety - Medical Restraint  Goal: Remains free of injury from restraints (Restraint for Interference with Medical Device)  Outcome: Progressing  Goal: Free from restraint(s) (Restraint for Interference with Medical Device)  Outcome: Progressing     Problem: Fall/Injury  Goal: Not fall by end of shift  Outcome: Progressing  Goal: Be free from injury by end of the shift  Outcome: Progressing  Goal: Verbalize understanding of personal risk factors for fall in the hospital  Outcome: Progressing  Goal: Verbalize understanding of risk factor reduction measures to prevent injury from fall in the home  Outcome: Progressing  Goal: Use assistive devices by end of the shift  Outcome: Progressing  Goal: Pace activities to prevent fatigue by end of the shift  Outcome: Progressing     Problem: Nutrition  Goal: Less than 5 days NPO/clear liquids  Outcome: Progressing  Goal: Adequate PO fluid intake  Outcome: Progressing  Goal: BG  mg/dL  Outcome: Progressing  Goal: Electrolytes WNL  Outcome: Progressing  Goal: Promote healing  Outcome: Progressing  Goal: Maintain stable weight  Outcome: Progressing

## 2024-11-27 NOTE — NURSING NOTE
Pt extubated by RT LEONARDA Gonzales per order. Pt placed on high flow at 40%. Pt alert, oriented to person, place. Pt moved up in bed and repositioned. Will continue to monitor patient closely.

## 2024-11-27 NOTE — PROGRESS NOTES
Vancomycin Dosing by Pharmacy- FOLLOW UP    Antony Goff is a 74 y.o. year old male who Pharmacy has been consulted for vancomycin dosing for pneumonia. Based on the patient's indication and renal status this patient is being dosed based on a goal AUC of 400-600.     Renal function is currently stable.    Current vancomycin dose: 1000 mg given every 24 hours    Estimated vancomycin AUC on current dose: 534 mg/L.hr     Visit Vitals  /52   Pulse 68   Temp 36.5 °C (97.7 °F) (Temporal)   Resp (!) 28        Lab Results   Component Value Date    CREATININE 0.99 2024    CREATININE 1.14 2024    CREATININE 1.11 2024    CREATININE 0.89 2024        Patient weight is as follows:   Vitals:    24 0600   Weight: 84.6 kg (186 lb 8.2 oz)       Cultures:  Susceptibility data for the encounter in last 14 days.  Collected Specimen Info Organism   24 Fluid from Tracheal Aspirate Normal throat mark        I/O last 3 completed shifts:  In: 3000 (35.5 mL/kg) [I.V.:1265 (15 mL/kg); NG/GT:180; IV Piggyback:1555]  Out: 3125 (36.9 mL/kg) [Urine:3125 (1 mL/kg/hr)]  Weight: 84.6 kg   I/O during current shift:  No intake/output data recorded.    Temp (24hrs), Av.4 °C (97.6 °F), Min:36.1 °C (97 °F), Max:36.7 °C (98 °F)      Assessment/Plan    Within goal AUC range. Continue current vancomycin regimen.    This dosing regimen is predicted by [employer] to result in the following pharmacokinetic parameters:  Regimen: 1000 mg IV every 24 hours.  Start time: 21:06 on 2024  Exposure target: AUC24 (range)400-600 mg/L.hr   POI09-54: 527 mg/L.hr  AUC24,ss: 534 mg/L.hr  Probability of AUC24 > 400: 100 %  Ctrough,ss: 15.9 mg/L  Probability of Ctrough,ss > 20: 7 %      The next level will be obtained on  at AM labs. May be obtained sooner if clinically indicated.   Will continue to monitor renal function daily while on vancomycin and order serum creatinine at least every 48 hours if not already  ordered.  Follow for continued vancomycin needs, clinical response, and signs/symptoms of toxicity.       Sri Davenport, PharmD

## 2024-11-27 NOTE — PROGRESS NOTES
[facility] Critical Care Medicine       Date:  11/27/2024  Patient:  Antony Goff  YOB: 1950  MRN:  [id number]   Admit Date:  11/19/2024  ========================================================================================================    No chief complaint on file.    History of Present Illness:  Antony Goff is a 74 y.o. year old male patient with Past Medical History of COPD, chronic hypoxic respiratory failure on 3L NC baseline O2, aortic valve stenosis s/p AVR 3/2024 at [facility], pAfib on Eliquis, remote CVA, GERD, hypothyroidism, chronic back pain on chronic opioids who presented to [facility] ER for evaluation altered mental status and increased SOB, he was determined to have COPDE and CHF exacerbation, CXR significant for pulmonary vascular congestion. He was transferred to [facility] per family request and admitted to [facility]. He was treated with IV lasix, cardiology consulted. 11/22 RR was called for uncontrollable epistaxis and patient found unresponsive, he was intubated and transferred to ICU.     Interval ICU Events:  11/22: Admitted to MICU-1 s/p intubation. Peak pressure 45-50 on ventilator, Dr Bush performed bedside bronch, clearance of small bloody secretions, ABG/CXR obtained unchanged from previous no PTX, ventilator equipment subsequently exchanged. Sedation with versed infusion. Dr Amado at bedside, insertion of nasal trumpet and clearance of large clot. Vasopressor requirement increasing, norepineprhine/vaso.    11/23: Pressers weaned, down to minimal vent settings. Remain intubated today with nasal packing in. ENT plan to remove tomorrow    11/24: Pending ENT nasal packing removal. If no further bleeding -> SAT/SBT. Pressors now only low dose in setting of sedation. Continue on zosyn for HAP    11/25: Plan for SBT today. Packing removed by ENT, surgicall inserted and no further bleeding. Wean vasopressors as sedation is decreased.    11/26: Febrile overnight with increasing  vasopressor requirements, repeat CT with chronic left maxillary sinus occlusion, consolidative opacities of bilateral lung bases worsening. Remains on precedex, norepinephrine weaning. Attempt SAT/SBT today    11/27: Failed SBT yeseterday due to tachypnea; desaturation today to SpO2 80%, large amount of secretions, plan for Bronchoscopy today. Follow resp culture, continue broad spectrum Vanc/Zosyn.    Medical History:  Past Medical History:   Diagnosis Date    Encounter for preprocedural cardiovascular examination     Preop cardiovascular exam    Encounter for screening for lipoid disorders     Screening for lipoid disorders    Personal history of other diseases of the circulatory system 10/05/2021    History of cardiac murmur    Personal history of other endocrine, nutritional and metabolic disease     History of hypokalemia    Personal history of other endocrine, nutritional and metabolic disease     History of obesity    Personal history of other endocrine, nutritional and metabolic disease 08/03/2022    History of obesity    Personal history of other specified conditions     History of chest pain    Personal history of other specified conditions     History of fatigue    Personal history of other specified conditions     History of palpitations    Presence of cardiac pacemaker     History of cardiac pacemaker    Repeated falls     Multiple falls    Tinnitus, left ear 10/05/2021    Tinnitus of left ear     Past Surgical History:   Procedure Laterality Date    CARDIAC VALVE REPLACEMENT      CORONARY ARTERY BYPASS GRAFT      INSERT / REPLACE / REMOVE PACEMAKER      OTHER SURGICAL HISTORY  10/05/2021    Shoulder surgery    OTHER SURGICAL HISTORY  10/05/2021    Knee replacement    OTHER SURGICAL HISTORY  10/05/2021    Cataract surgery    OTHER SURGICAL HISTORY  10/05/2021    Breast surgery    OTHER SURGICAL HISTORY  10/05/2021    Pacemaker insertion    OTHER SURGICAL HISTORY  10/05/2021    Appendectomy    OTHER  SURGICAL HISTORY  10/05/2021    Sinus surgery    OTHER SURGICAL HISTORY  10/05/2021    Uvulectomy    OTHER SURGICAL HISTORY  01/28/2022    Colonoscopy    OTHER SURGICAL HISTORY  01/28/2022    Kidney surgery    OTHER SURGICAL HISTORY  01/28/2022    Throat surgery    OTHER SURGICAL HISTORY  01/28/2022    Nose surgery     Medications Prior to Admission   Medication Sig Dispense Refill Last Dose/Taking    albuterol 90 mcg/actuation inhaler Inhale 2 puffs every 6 hours if needed.   Past Month    furosemide (Lasix) 40 mg tablet Take 1 tablet (40 mg) by mouth early in the morning..   11/18/2024    lactulose 10 gram/15 mL solution TAKE 30 ML BY MOUTH TWICE DAILY AS NEEDED   Past Week    potassium chloride (Klor-Con) 20 mEq packet Take 20 mEq by mouth once daily.   Past Week    Symbicort 160-4.5 mcg/actuation inhaler Inhale 1 puff 2 times a day.   Past Week    traZODone (Desyrel) 50 mg tablet Take 1 tablet (50 mg) by mouth once daily at bedtime. PRN   Past Week    albuterol 2.5 mg /3 mL (0.083 %) nebulizer solution Inhale. Use as directed PRN       atorvastatin (Lipitor) 40 mg tablet Take 1 tablet (40 mg) by mouth once daily at bedtime.   11/18/2024    carvedilol (Coreg) 3.125 mg tablet Take 1 tablet (3.125 mg) by mouth 2 times a day. 180 tablet 3 11/18/2024    Eliquis 5 mg tablet Take 1 tablet (5 mg) by mouth 2 times a day. 180 tablet 3 11/18/2024    empagliflozin (Jardiance) 10 mg Take 1 tablet (10 mg) by mouth once daily.   11/18/2024    lamoTRIgine (LaMICtal) 100 mg tablet Take 1 tablet (100 mg) by mouth once daily at bedtime.   11/18/2024    levothyroxine (Synthroid, Levoxyl) 88 mcg tablet Take 1 tablet (88 mcg) by mouth once daily.   11/18/2024    morphine CR (MS Contin) 30 mg 12 hr tablet Take 1 tablet (30 mg) by mouth every 8 hours. PRN   11/18/2024    omeprazole OTC (PriLOSEC OTC) 20 mg EC tablet Take 1 tablet (20 mg) by mouth once daily in the morning. Take before meals. Do not crush, chew, or split.   11/18/2024     Oxervate 0.002 % ophthalmic solution    11/18/2024    solifenacin (VESIcare) 5 mg tablet Take 1 tablet (5 mg) by mouth once daily.   11/18/2024    spironolactone (Aldactone) 25 mg tablet Take 1 tablet (25 mg) by mouth once daily.   11/18/2024     Patient has no known allergies.  Social History     Tobacco Use    Smoking status: Former     Types: Cigarettes    Smokeless tobacco: Never   Substance Use Topics    Alcohol use: Not Currently    Drug use: Not Currently     Family History   Problem Relation Name Age of Onset    Heart failure Mother      Other (asbestosis) Father      Lung disease Father      Thyroid disease Sister         Review of Systems:  14 point review of systems was completed and negative except for those specially mention in my HPI    Physical Exam:    Heart Rate:  [68-76]   Temp:  [36.1 °C (97 °F)-37.1 °C (98.8 °F)]   Resp:  [12-34]   Weight:  [84.6 kg (186 lb 8.2 oz)]   SpO2:  [95 %-100 %]     Physical Exam  Vitals and nursing note reviewed.   HENT:      Mouth/Throat:      Pharynx: Oropharynx is clear.   Eyes:      Extraocular Movements: Extraocular movements intact.      Pupils: Pupils are equal, round, and reactive to light.   Cardiovascular:      Rate and Rhythm: Regular rhythm. Tachycardia present.      Pulses: Normal pulses.      Heart sounds: Normal heart sounds.      Comments: AV paced  Pulmonary:      Effort: Pulmonary effort is normal.      Breath sounds: Rhonchi and rales present.   Abdominal:      General: There is no distension.      Palpations: Abdomen is soft.      Tenderness: There is no abdominal tenderness.   Musculoskeletal:         General: Swelling present.      Left lower leg: Edema present.   Skin:     General: Skin is warm.      Capillary Refill: Capillary refill takes less than 2 seconds.      Findings: Bruising present.   Neurological:      General: No focal deficit present.         Objective:    I have reviewed all medications, laboratory results, and imaging pertinent  for today's encounter    Assessment/Plan:    I am currently managing this critically ill patient for the following problems:    Neuro/Psych/Pain Ctrl/Sedation:  Chronic pain  CVA  CTH negative  Sedation: Precedex  Passes SAT on minimal Precedex  Resume chronic opioids    Respiratory/ENT:  COPD  Chronic hypoxic respiratory failure  Aspiration  Intubated, SBT today  S/P Bronch 11/22  Repeat Bronch 11/27  Duoneb Q4    Cardiovascular:  Circulatory shock - Septic  AVR 3/2024, PPM  Diastolic heart failure  Afib on Eliquis  Hold AC in the setting of acute bleed  Maintain Vasopressors: Norepi  Maintain MAP >65  Cardiology following    GI:  GERD  NPO  OG to LIWS  Initiate TF today     Renal/Volume Status (Intra & Extravascular):  Monitor UOP  Daily RFP, Mg    Endocrine  Hypothyroidism  Continue synthroid  POCT Q6 while NPO    Infectious Disease:  Pneumonia  UA negative, repeat 11/26  BC NGTD, repeat 11/25  Sputum 11/26 negative  Follow BAL culture 11/27  Broadened ATBs, continue zosyn/vanco    Heme/Onc:  Epistaxis resolved  ENT consulted  Packing removed by ENT, s/p surgicell    MSK:  Nystatin to groin  PT/OT when able to participate    Ethics/Code Status:  DNRCCA/DNI once extubated    :  DVT Prophylaxis: None  GI Prophylaxis: PPI  Bowel Regimen: None  Diet: NPO, TF today  CVC: LIJ TLC 11/22  [place]: Right radial 11/22  [place]: External  Restraints: BSW  Dispo: ICU    Critical Care Time:  40 minutes spent in preparing to see patient (I.e. review of medical records), evaluation of diagnostics (I.e. labs, imaging, etc.), documentation, discussing plan of care with patient/ family/ caregiver, and/ or coordination of care with multidisciplinary team. Time does not include completion of procedure time.      Magda Sun, APRN-CNP

## 2024-11-28 LAB
ALBUMIN SERPL BCP-MCNC: 2.2 G/DL (ref 3.4–5)
ALP SERPL-CCNC: 87 U/L (ref 33–136)
ALT SERPL W P-5'-P-CCNC: 13 U/L (ref 10–52)
ANION GAP BLDA CALCULATED.4IONS-SCNC: 8 MMO/L (ref 10–25)
ANION GAP SERPL CALC-SCNC: 10 MMOL/L (ref 10–20)
ANION GAP SERPL CALC-SCNC: 12 MMOL/L (ref 10–20)
AST SERPL W P-5'-P-CCNC: 25 U/L (ref 9–39)
BASE EXCESS BLDA CALC-SCNC: 8.8 MMOL/L (ref -2–3)
BASOPHILS # BLD AUTO: 0.02 X10*3/UL (ref 0–0.1)
BASOPHILS NFR BLD AUTO: 0.2 %
BILIRUB SERPL-MCNC: 1.1 MG/DL (ref 0–1.2)
BODY TEMPERATURE: ABNORMAL
BUN SERPL-MCNC: 20 MG/DL (ref 6–23)
BUN SERPL-MCNC: 21 MG/DL (ref 6–23)
CA-I BLDA-SCNC: 1.24 MMOL/L (ref 1.1–1.33)
CALCIUM SERPL-MCNC: 8.5 MG/DL (ref 8.6–10.3)
CALCIUM SERPL-MCNC: 8.5 MG/DL (ref 8.6–10.3)
CHLORIDE BLDA-SCNC: 103 MMOL/L (ref 98–107)
CHLORIDE SERPL-SCNC: 100 MMOL/L (ref 98–107)
CHLORIDE SERPL-SCNC: 99 MMOL/L (ref 98–107)
CO2 SERPL-SCNC: 33 MMOL/L (ref 21–32)
CO2 SERPL-SCNC: 36 MMOL/L (ref 21–32)
CREAT SERPL-MCNC: 0.95 MG/DL (ref 0.5–1.3)
CREAT SERPL-MCNC: 1.02 MG/DL (ref 0.5–1.3)
EGFRCR SERPLBLD CKD-EPI 2021: 78 ML/MIN/1.73M*2
EGFRCR SERPLBLD CKD-EPI 2021: 85 ML/MIN/1.73M*2
EOSINOPHIL # BLD AUTO: 0.31 X10*3/UL (ref 0–0.4)
EOSINOPHIL NFR BLD AUTO: 3.6 %
ERYTHROCYTE [DISTWIDTH] IN BLOOD BY AUTOMATED COUNT: 16.2 % (ref 11.5–14.5)
FLOW: ABNORMAL
GLUCOSE BLD MANUAL STRIP-MCNC: 76 MG/DL (ref 74–99)
GLUCOSE BLD MANUAL STRIP-MCNC: 81 MG/DL (ref 74–99)
GLUCOSE BLDA-MCNC: 89 MG/DL (ref 74–99)
GLUCOSE SERPL-MCNC: 83 MG/DL (ref 74–99)
GLUCOSE SERPL-MCNC: 89 MG/DL (ref 74–99)
HCO3 BLDA-SCNC: 34.4 MMOL/L (ref 22–26)
HCT VFR BLD AUTO: 23 % (ref 41–52)
HCT VFR BLD EST: 26 % (ref 41–52)
HGB BLD-MCNC: 7.2 G/DL (ref 13.5–17.5)
HGB BLDA-MCNC: 8.5 G/DL (ref 13.5–17.5)
IMM GRANULOCYTES # BLD AUTO: 0.1 X10*3/UL (ref 0–0.5)
IMM GRANULOCYTES NFR BLD AUTO: 1.2 % (ref 0–0.9)
INHALED O2 CONCENTRATION: 35 %
LACTATE BLDA-SCNC: 1 MMOL/L (ref 0.4–2)
LYMPHOCYTES # BLD AUTO: 0.9 X10*3/UL (ref 0.8–3)
LYMPHOCYTES NFR BLD AUTO: 10.4 %
MAGNESIUM SERPL-MCNC: 1.87 MG/DL (ref 1.6–2.4)
MCH RBC QN AUTO: 30.5 PG (ref 26–34)
MCHC RBC AUTO-ENTMCNC: 31.3 G/DL (ref 32–36)
MCV RBC AUTO: 98 FL (ref 80–100)
MONOCYTES # BLD AUTO: 0.43 X10*3/UL (ref 0.05–0.8)
MONOCYTES NFR BLD AUTO: 5 %
NEUTROPHILS # BLD AUTO: 6.92 X10*3/UL (ref 1.6–5.5)
NEUTROPHILS NFR BLD AUTO: 79.6 %
NRBC BLD-RTO: 0 /100 WBCS (ref 0–0)
OXYHGB MFR BLDA: 86.2 % (ref 94–98)
PCO2 BLDA: 53 MM HG (ref 38–42)
PH BLDA: 7.42 PH (ref 7.38–7.42)
PHOSPHATE SERPL-MCNC: 2.7 MG/DL (ref 2.5–4.9)
PLATELET # BLD AUTO: 111 X10*3/UL (ref 150–450)
PO2 BLDA: 58 MM HG (ref 85–95)
POTASSIUM BLDA-SCNC: 4.7 MMOL/L (ref 3.5–5.3)
POTASSIUM SERPL-SCNC: 3.3 MMOL/L (ref 3.5–5.3)
POTASSIUM SERPL-SCNC: 3.7 MMOL/L (ref 3.5–5.3)
PROT SERPL-MCNC: 6.6 G/DL (ref 6.4–8.2)
RBC # BLD AUTO: 2.36 X10*6/UL (ref 4.5–5.9)
SAO2 % BLDA: 90 % (ref 94–100)
SODIUM BLDA-SCNC: 141 MMOL/L (ref 136–145)
SODIUM SERPL-SCNC: 141 MMOL/L (ref 136–145)
SODIUM SERPL-SCNC: 142 MMOL/L (ref 136–145)
WBC # BLD AUTO: 8.7 X10*3/UL (ref 4.4–11.3)

## 2024-11-28 PROCEDURE — 2500000002 HC RX 250 W HCPCS SELF ADMINISTERED DRUGS (ALT 637 FOR MEDICARE OP, ALT 636 FOR OP/ED)

## 2024-11-28 PROCEDURE — 2500000001 HC RX 250 WO HCPCS SELF ADMINISTERED DRUGS (ALT 637 FOR MEDICARE OP)

## 2024-11-28 PROCEDURE — 2500000005 HC RX 250 GENERAL PHARMACY W/O HCPCS: Performed by: STUDENT IN AN ORGANIZED HEALTH CARE EDUCATION/TRAINING PROGRAM

## 2024-11-28 PROCEDURE — 84132 ASSAY OF SERUM POTASSIUM: CPT

## 2024-11-28 PROCEDURE — 2500000005 HC RX 250 GENERAL PHARMACY W/O HCPCS

## 2024-11-28 PROCEDURE — 80048 BASIC METABOLIC PNL TOTAL CA: CPT | Mod: CCI

## 2024-11-28 PROCEDURE — 37799 UNLISTED PX VASCULAR SURGERY: CPT | Performed by: NURSE PRACTITIONER

## 2024-11-28 PROCEDURE — 2500000004 HC RX 250 GENERAL PHARMACY W/ HCPCS (ALT 636 FOR OP/ED): Performed by: NURSE PRACTITIONER

## 2024-11-28 PROCEDURE — 2500000004 HC RX 250 GENERAL PHARMACY W/ HCPCS (ALT 636 FOR OP/ED)

## 2024-11-28 PROCEDURE — 84075 ASSAY ALKALINE PHOSPHATASE: CPT | Performed by: NURSE PRACTITIONER

## 2024-11-28 PROCEDURE — 80053 COMPREHEN METABOLIC PANEL: CPT | Performed by: NURSE PRACTITIONER

## 2024-11-28 PROCEDURE — 84100 ASSAY OF PHOSPHORUS: CPT

## 2024-11-28 PROCEDURE — 37799 UNLISTED PX VASCULAR SURGERY: CPT

## 2024-11-28 PROCEDURE — 82947 ASSAY GLUCOSE BLOOD QUANT: CPT

## 2024-11-28 PROCEDURE — 2020000001 HC ICU ROOM DAILY

## 2024-11-28 PROCEDURE — 99291 CRITICAL CARE FIRST HOUR: CPT

## 2024-11-28 PROCEDURE — 94640 AIRWAY INHALATION TREATMENT: CPT

## 2024-11-28 PROCEDURE — 92610 EVALUATE SWALLOWING FUNCTION: CPT | Mod: GN

## 2024-11-28 PROCEDURE — 83735 ASSAY OF MAGNESIUM: CPT | Performed by: NURSE PRACTITIONER

## 2024-11-28 PROCEDURE — 85025 COMPLETE CBC W/AUTO DIFF WBC: CPT

## 2024-11-28 RX ORDER — POTASSIUM CHLORIDE 14.9 MG/ML
20 INJECTION INTRAVENOUS ONCE
Status: COMPLETED | OUTPATIENT
Start: 2024-11-28 | End: 2024-11-28

## 2024-11-28 RX ORDER — MAGNESIUM SULFATE HEPTAHYDRATE 40 MG/ML
2 INJECTION, SOLUTION INTRAVENOUS ONCE
Status: COMPLETED | OUTPATIENT
Start: 2024-11-28 | End: 2024-11-28

## 2024-11-28 RX ORDER — POTASSIUM CHLORIDE 29.8 MG/ML
40 INJECTION INTRAVENOUS ONCE
Status: COMPLETED | OUTPATIENT
Start: 2024-11-28 | End: 2024-11-28

## 2024-11-28 RX ORDER — LEVOTHYROXINE SODIUM 20 UG/ML
50 INJECTION, SOLUTION INTRAVENOUS
Status: DISCONTINUED | OUTPATIENT
Start: 2024-11-29 | End: 2024-11-29

## 2024-11-28 RX ORDER — OLANZAPINE 10 MG/1
5 TABLET, ORALLY DISINTEGRATING ORAL NIGHTLY
Status: DISCONTINUED | OUTPATIENT
Start: 2024-11-28 | End: 2024-11-29

## 2024-11-28 RX ORDER — MORPHINE SULFATE 2 MG/ML
2 INJECTION, SOLUTION INTRAMUSCULAR; INTRAVENOUS EVERY 4 HOURS PRN
Status: DISCONTINUED | OUTPATIENT
Start: 2024-11-28 | End: 2024-11-29

## 2024-11-28 RX ORDER — NOREPINEPHRINE BITARTRATE/D5W 8 MG/250ML
0-.5 PLASTIC BAG, INJECTION (ML) INTRAVENOUS CONTINUOUS
Status: DISCONTINUED | OUTPATIENT
Start: 2024-11-28 | End: 2024-11-29

## 2024-11-28 ASSESSMENT — PAIN - FUNCTIONAL ASSESSMENT
PAIN_FUNCTIONAL_ASSESSMENT: 0-10
PAIN_FUNCTIONAL_ASSESSMENT: 0-10

## 2024-11-28 ASSESSMENT — PAIN SCALES - GENERAL
PAINLEVEL_OUTOF10: 0 - NO PAIN
PAINLEVEL_OUTOF10: 0 - NO PAIN

## 2024-11-28 NOTE — CARE PLAN
Problem: Safety - Adult  Goal: Free from fall injury  Outcome: Progressing     Problem: Discharge Planning  Goal: Discharge to home or other facility with appropriate resources  Outcome: Progressing     Problem: Chronic Conditions and Co-morbidities  Goal: Patient's chronic conditions and co-morbidity symptoms are monitored and maintained or improved  Outcome: Progressing     Problem: Skin  Goal: Decreased wound size/increased tissue granulation at next dressing change  Outcome: Progressing  Flowsheets (Taken 11/27/2024 1951)  Decreased wound size/increased tissue granulation at next dressing change:   Promote sleep for wound healing   Protective dressings over bony prominences  Goal: Participates in plan/prevention/treatment measures  Outcome: Progressing  Flowsheets (Taken 11/27/2024 1951)  Participates in plan/prevention/treatment measures:   Discuss with provider PT/OT consult   Elevate heels  Goal: Prevent/manage excess moisture  Outcome: Progressing  Flowsheets (Taken 11/27/2024 1951)  Prevent/manage excess moisture:   Cleanse incontinence/protect with barrier cream   Monitor for/manage infection if present  Goal: Prevent/minimize sheer/friction injuries  Outcome: Progressing  Flowsheets (Taken 11/27/2024 1951)  Prevent/minimize sheer/friction injuries:   Use pull sheet   Turn/reposition every 2 hours/use positioning/transfer devices  Goal: Promote/optimize nutrition  Outcome: Progressing  Flowsheets (Taken 11/27/2024 1951)  Promote/optimize nutrition: Discuss with provider if NPO > 2 days  Goal: Promote skin healing  Outcome: Progressing  Flowsheets (Taken 11/27/2024 1951)  Promote skin healing:   Turn/reposition every 2 hours/use positioning/transfer devices   Assess skin/pad under line(s)/device(s)   Protective dressings over bony prominences     Problem: Fall/Injury  Goal: Not fall by end of shift  Outcome: Progressing  Goal: Be free from injury by end of the shift  Outcome: Progressing   The patient's  goals for the shift include      The clinical goals for the shift include pts map will be >60 throughout shift

## 2024-11-28 NOTE — PROGRESS NOTES
Vancomycin Dosing by Pharmacy- Cessation of Therapy    Consult to pharmacy for vancomycin dosing has been discontinued by the prescriber, pharmacy will sign off at this time.    Please call pharmacy if there are further questions or re-enter a consult if vancomycin is resumed.     CHAMP Raymundo. Ph.

## 2024-11-28 NOTE — PROGRESS NOTES
[facility] Critical Care Medicine       Date:  11/28/2024  Patient:  Antony Goff  YOB: 1950  MRN:  [id number]   Admit Date:  11/19/2024  ========================================================================================================    No chief complaint on file.    History of Present Illness:  Antony Goff is a 74 y.o. year old male patient with Past Medical History of COPD, chronic hypoxic respiratory failure on 3L NC baseline O2, aortic valve stenosis s/p AVR 3/2024 at [facility], pAfib on Eliquis, remote CVA, GERD, hypothyroidism, chronic back pain on chronic opioids who presented to [facility] ER for evaluation altered mental status and increased SOB, he was determined to have COPDE and CHF exacerbation, CXR significant for pulmonary vascular congestion. [place] [facility] [place] and admitted to [facility]. He was treated with IV lasix, cardiology consulted. 11/22 RR was called for uncontrollable epistaxis and patient found unresponsive, he was intubated and transferred to ICU.     Interval ICU Events:  11/22: Admitted to MICU-1 s/p intubation. Peak pressure 45-50 on ventilator, Dr Bush performed bedside bronch, clearance of small bloody secretions, ABG/CXR obtained unchanged from previous no PTX, ventilator equipment subsequently exchanged. Sedation with versed infusion. Dr Amado at bedside, insertion of nasal trumpet and clearance of large clot. Vasopressor requirement increasing, norepineprhine/vaso.    11/23: Pressers weaned, down to minimal vent settings. Remain intubated today with nasal packing in. ENT plan to remove tomorrow    11/24: Pending ENT nasal packing removal. If no further bleeding -> SAT/SBT. Pressors now only low dose in setting of sedation. Continue on zosyn for HAP    11/25: Plan for SBT today. Packing removed by ENT, surgicall inserted and no further bleeding. Wean vasopressors as sedation is decreased.    11/26: Febrile overnight with increasing  vasopressor requirements, repeat CT with chronic left maxillary sinus occlusion, consolidative opacities of bilateral lung bases worsening. Remains on precedex, norepinephrine weaning. Attempt SAT/SBT today    11/27: Failed SBT yeseterday due to tachypnea; desaturation today to SpO2 80%, large amount of secretions, plan for Bronchoscopy today. Follow resp culture, continue broad spectrum Vanc/Zosyn.    11/28: Extubated yesterday, on Airvo 60L/40%. Vasopressors off. SLP recommending MBS, maintain NPO.    Medical History:  Past Medical History:   Diagnosis Date    Encounter for preprocedural cardiovascular examination     Preop cardiovascular exam    Encounter for screening for lipoid disorders     Screening for lipoid disorders    Personal history of other diseases of the circulatory system 10/05/2021    History of cardiac murmur    Personal history of other endocrine, nutritional and metabolic disease     History of hypokalemia    Personal history of other endocrine, nutritional and metabolic disease     History of obesity    Personal history of other endocrine, nutritional and metabolic disease 08/03/2022    History of obesity    Personal history of other specified conditions     History of chest pain    Personal history of other specified conditions     History of fatigue    Personal history of other specified conditions     History of palpitations    Presence of cardiac pacemaker     History of cardiac pacemaker    Repeated falls     Multiple falls    Tinnitus, left ear 10/05/2021    Tinnitus of left ear     Past Surgical History:   Procedure Laterality Date    CARDIAC VALVE REPLACEMENT      CORONARY ARTERY BYPASS GRAFT      INSERT / REPLACE / REMOVE PACEMAKER      OTHER SURGICAL HISTORY  10/05/2021    Shoulder surgery    OTHER SURGICAL HISTORY  10/05/2021    Knee replacement    OTHER SURGICAL HISTORY  10/05/2021    Cataract surgery    OTHER SURGICAL HISTORY  10/05/2021    Breast surgery    OTHER SURGICAL HISTORY   10/05/2021    Pacemaker insertion    OTHER SURGICAL HISTORY  10/05/2021    Appendectomy    OTHER SURGICAL HISTORY  10/05/2021    Sinus surgery    OTHER SURGICAL HISTORY  10/05/2021    Uvulectomy    OTHER SURGICAL HISTORY  01/28/2022    Colonoscopy    OTHER SURGICAL HISTORY  01/28/2022    Kidney surgery    OTHER SURGICAL HISTORY  01/28/2022    Throat surgery    OTHER SURGICAL HISTORY  01/28/2022    Nose surgery     Medications Prior to Admission   Medication Sig Dispense Refill Last Dose/Taking    albuterol 90 mcg/actuation inhaler Inhale 2 puffs every 6 hours if needed.   Past Month    furosemide (Lasix) 40 mg tablet Take 1 tablet (40 mg) by mouth early in the morning..   11/18/2024    lactulose 10 gram/15 mL solution TAKE 30 ML BY MOUTH TWICE DAILY AS NEEDED   Past Week    potassium chloride (Klor-Con) 20 mEq packet Take 20 mEq by mouth once daily.   Past Week    Symbicort 160-4.5 mcg/actuation inhaler Inhale 1 puff 2 times a day.   Past Week    traZODone (Desyrel) 50 mg tablet Take 1 tablet (50 mg) by mouth once daily at bedtime. PRN   Past Week    albuterol 2.5 mg /3 mL (0.083 %) nebulizer solution Inhale. Use as directed PRN       atorvastatin (Lipitor) 40 mg tablet Take 1 tablet (40 mg) by mouth once daily at bedtime.   11/18/2024    carvedilol (Coreg) 3.125 mg tablet Take 1 tablet (3.125 mg) by mouth 2 times a day. 180 tablet 3 11/18/2024    Eliquis 5 mg tablet Take 1 tablet (5 mg) by mouth 2 times a day. 180 tablet 3 11/18/2024    empagliflozin (Jardiance) 10 mg Take 1 tablet (10 mg) by mouth once daily.   11/18/2024    lamoTRIgine (LaMICtal) 100 mg tablet Take 1 tablet (100 mg) by mouth once daily at bedtime.   11/18/2024    levothyroxine (Synthroid, Levoxyl) 88 mcg tablet Take 1 tablet (88 mcg) by mouth once daily.   11/18/2024    morphine CR (MS Contin) 30 mg 12 hr tablet Take 1 tablet (30 mg) by mouth every 8 hours. PRN   11/18/2024    omeprazole OTC (PriLOSEC OTC) 20 mg EC tablet Take 1 tablet (20 mg)  by mouth once daily in the morning. Take before meals. Do not crush, chew, or split.   11/18/2024    Oxervate 0.002 % ophthalmic solution    11/18/2024    solifenacin (VESIcare) 5 mg tablet Take 1 tablet (5 mg) by mouth once daily.   11/18/2024    spironolactone (Aldactone) 25 mg tablet Take 1 tablet (25 mg) by mouth once daily.   11/18/2024     Patient has no known allergies.  Social History     Tobacco Use    Smoking status: Former     Types: Cigarettes    Smokeless tobacco: Never   Substance Use Topics    Alcohol use: Not Currently    Drug use: Not Currently     Family History   Problem Relation Name Age of Onset    Heart failure Mother      Other (asbestosis) Father      Lung disease Father      Thyroid disease Sister         Review of Systems:  14 point review of systems was completed and negative except for those specially mention in my HPI    Physical Exam:    Heart Rate:  [68-71]   Temp:  [35.7 °C (96.3 °F)-36.3 °C (97.3 °F)]   Resp:  [11-37]   BP: (127-141)/(45-55)   Weight:  [84.6 kg (186 lb 8.2 oz)]   SpO2:  [93 %-100 %]     Physical Exam  Vitals and nursing note reviewed.   HENT:      Mouth/Throat:      Pharynx: Oropharynx is clear.   Eyes:      Extraocular Movements: Extraocular movements intact.      Pupils: Pupils are equal, round, and reactive to light.   Cardiovascular:      Rate and Rhythm: Regular rhythm. Tachycardia present.      Pulses: Normal pulses.      Heart sounds: Normal heart sounds.      Comments: AV paced  Pulmonary:      Effort: Pulmonary effort is normal.      Breath sounds: Rhonchi and rales present.   Abdominal:      General: There is no distension.      Palpations: Abdomen is soft.      Tenderness: There is no abdominal tenderness.   Musculoskeletal:         General: Swelling present.      Left lower leg: Edema present.   Skin:     General: Skin is warm.      Capillary Refill: Capillary refill takes less than 2 seconds.      Findings: Bruising present.   Neurological:      General:  No focal deficit present.         Objective:    I have reviewed all medications, laboratory results, and imaging pertinent for today's encounter    Assessment/Plan:    I am currently managing this critically ill patient for the following problems:    Neuro/Psych/Pain Ctrl/Sedation:  Chronic pain  CVA  CTH negative  Resume chronic opioids    Respiratory/ENT:  COPD  Chronic hypoxic respiratory failure  Aspiration  Extubated 11/27  Airvo 60L/40%  Wean O2 for SpO2 >92%  Continue with CPT, Pulm hygiene  Duoneb Q4    Cardiovascular:  Circulatory shock - Septic  AVR 3/2024, PPM  Diastolic heart failure  Afib on Eliquis  Hold AC in the setting of acute bleed  Maintain MAP >65  Cardiology signed off, reconsult if needed for diuresis    GI:  GERD  Diarrhea  NPO  MBS tomorrow  SLP following  Holding bowel regimen  Stool panel pending    Renal/Volume Status (Intra & Extravascular):  Monitor UOP  Daily RFP, Mg    Endocrine  Hypothyroidism  Continue synthroid, switched to IV while NPO  POCT Q6 while NPO    Infectious Disease:  Pneumonia  UA, BC, MRSA negative  Sputum 11/27, prelim gram negative  Follow BAL culture 11/27  Continue Zosyn  Awaiting final culture results    Heme/Onc:  Epistaxis resolved  ENT consulted  Packing removed by ENT, s/p surgicell    MSK:  Nystatin to groin  PT/OT when able to participate  Podiatry consult     Ethics/Code Status:  DNRCCA/DNI     :  DVT Prophylaxis: None  GI Prophylaxis: PPI  Bowel Regimen: None  Diet: NPO, pending speech eval  CVC: [facility] TLC 11/22  Danni: Right radial 11/22  Mandel: External  Restraints: None  Dispo: ICU, weaning O2    Critical Care Time:  40 minutes spent in preparing to see patient (I.e. review of medical records), evaluation of diagnostics (I.e. labs, imaging, etc.), documentation, discussing plan of care with patient/ family/ caregiver, and/ or coordination of care with multidisciplinary team. Time does not include completion of procedure time.      Magda ROME  JACQUE Sun-CNP

## 2024-11-28 NOTE — PROGRESS NOTES
Inpatient Speech-Language Pathology Clinical Swallow Evaluation       Patient Name: Antony Goff  MRN: [id number]  : 1950  Today's Date: 24  Time Calculation  Start Time: 1145  Stop Time: 1215  Time Calculation (min): 30 min     Room:     Assessment:     Consistencies Trialed: Consistencies Trialed: Thin (IDDSI Level 0) - Cup, Thin (IDDSI Level 0) - Spoon, Ice Chips, Pureed/extremely thick (IDDSI Level 4), Regular (IDDSI Level 7)   Oral Motor: Within Functional Limits  Lingual Strength: Within Functional Limits   Dentition: Edentulous         Assessment Results:   DYSPHAGIA EVALUATION: (Consistencies attempted: Puree, gram cracker trial, thin liquids via cup and straws)    Oral Status: Patient was edentulous.  No asymmetry noted.  Oral cavity had mild secretions throughout.  Oral care completed.  Oral cavity clean clear and moist post oral care.    Oral Phase: Patient's oral phase of the swallow characterized by significant oral delay with pudding trial.  Patient had to take 3 to 4 breaths with applesauce in oral cavity prior to swallow.  With dontrell cracker trial patient had mastication between breaks for breathing and eventually had to spit out dontrell cracker mashed into pieces.  No leakage of thin trials seen out of oral cavity with ice chips or thin liquids via cup.    Pharyngeal Phase: Patient's pharyngeal phase of the swallow characterized by visible laryngeal elevation however patient had wet vocal quality and breath quality post all p.o. trials.      DIAGNOSTIC IMPRESSIONS:  Patient presents with a moderate to severe oral phase dysphagia secondary to oral holding due to shortness of breath and decreased ability to masticate solid foods.  Patient also presents with signs and symptoms of pharyngeal phase dysphagia.  Recommending that a modified barium swallow study be completed prior to diet upgrade to fully assess the physiology of the swallow and rule out aspiration.  Also  recommending that patient have aggressive oral care 3-4 times daily and that the Jules Freewater protocol be used with patient.    Medical Staff Made Aware: Yes   Spoke with nurse practitioner and intensivist regarding patient's clinical swallow evaluation and recommendations for continued n.p.o. with use of Jules Freewater protocol and recommendation for modified barium swallow study to be completed tomorrow.  Are in agreement with this plan pending patient's ability to tolerate coming to radiology for modified barium swallow study.     Plan:     SLP Plan: Skilled SLP Skilled speech therapy for dysphagia treatment is warranted in order to provide training and instruction regarding the use of compensatory swallow strategies, oropharyngeal strengthening exercises, and pt/caregiver education in order to reduce risk of aspiration, dehydration and malnutrition.  SLP Frequency: 4x per week   Duration: 30 days   Next Treatment Priority: MBS when able   Discussed POC: Patient, Nursing, Physician   Discussed Risks/Benefits: Yes, Patient, Nursing, Physician   Patient/Caregiver Agreeable: Yes   SLP Discharge Recommendations: unable to determine at this time, refer to subsequent notes     Subjective:      Was awake alert and sitting upright in his bed watching TV when speech therapist came into the room.  He had mild hearing impairment was able to answer questions with mild increase in volume.     General Visit Information:     Pt. Admitted patient admitted with change in mental status, shortness of breath, COPD exacerbation, CHF exacerbation, and chest x-ray revealing pulmonary vascular congestion.  Past medical history: Patient has past medical history of COPD, chronic hypoxic respiratory failure, aortic valve stenosis, status post AVR 3/[facility] at [facility], A-fib, CVA, GERD, hypothyroidism, and chronic back pain.                Reason for Referral: Dysphagia      Current Diet : P.o.   Prior to Session Communication: Bedside  nurse      Pain:     Pain Assessment  Pain Assessment: 0-10  0-10 (Numeric) Pain Score: 0 - No pain      Baseline Assessment:     Respiratory Status: Oxygen via nasal cannula, Other (Comment) (High flow Airvo cannula)   Patient Positioning: Upright in Bed      Goals:     Patient will be able to complete oral care 2-3 times daily to improve his oral status and reduce development of pneumonia.  Start Date: 11/28/24  Progress: Required oral care to be completed by speech-language pathologist.  Status: Continue goal    2.  Patient will be able to use Jules Freewater protocol with 100% accuracy.  Start Date: 11/28/24  Progress: Patient tolerated ice chips without coughing.  He did have a cough with thin liquids via spoon and cup.  Status: Continue goal    *Additional goals to be determined at modified barium swallow study.        In Patient Education:   Pt. educated on safe swallow strategies, role of SLP, S/S of aspiration to be aware of, risks of aspiration, current swallow function, recommended diet, recommendation of Modified Barium swallow study and procedure  Patient gave verbal understanding

## 2024-11-29 VITALS
SYSTOLIC BLOOD PRESSURE: 85 MMHG | WEIGHT: 182.32 LBS | DIASTOLIC BLOOD PRESSURE: 37 MMHG | RESPIRATION RATE: 40 BRPM | BODY MASS INDEX: 27 KG/M2 | HEIGHT: 69 IN | OXYGEN SATURATION: 94 % | HEART RATE: 70 BPM | TEMPERATURE: 97.3 F

## 2024-11-29 PROBLEM — I50.41 ACUTE COMBINED SYSTOLIC AND DIASTOLIC HF (HEART FAILURE): Status: RESOLVED | Noted: 2024-01-01 | Resolved: 2024-01-01

## 2024-11-29 PROBLEM — D64.9 ANEMIA: Status: RESOLVED | Noted: 2024-01-01 | Resolved: 2024-01-01

## 2024-11-29 LAB
ALBUMIN SERPL BCP-MCNC: 2.4 G/DL (ref 3.4–5)
ALP SERPL-CCNC: 91 U/L (ref 33–136)
ALT SERPL W P-5'-P-CCNC: 13 U/L (ref 10–52)
ANION GAP BLDA CALCULATED.4IONS-SCNC: 10 MMO/L (ref 10–25)
ANION GAP SERPL CALC-SCNC: 12 MMOL/L (ref 10–20)
AST SERPL W P-5'-P-CCNC: 25 U/L (ref 9–39)
BASE EXCESS BLDA CALC-SCNC: 6.7 MMOL/L (ref -2–3)
BASOPHILS # BLD AUTO: 0.06 X10*3/UL (ref 0–0.1)
BASOPHILS NFR BLD AUTO: 0.4 %
BILIRUB SERPL-MCNC: 1 MG/DL (ref 0–1.2)
BODY TEMPERATURE: ABNORMAL
BUN SERPL-MCNC: 24 MG/DL (ref 6–23)
C COLI+JEJ+UPSA DNA STL QL NAA+PROBE: NOT DETECTED
CA-I BLDA-SCNC: 1.3 MMOL/L (ref 1.1–1.33)
CALCIUM SERPL-MCNC: 8.8 MG/DL (ref 8.6–10.3)
CHLORIDE BLDA-SCNC: 104 MMOL/L (ref 98–107)
CHLORIDE SERPL-SCNC: 102 MMOL/L (ref 98–107)
CO2 SERPL-SCNC: 33 MMOL/L (ref 21–32)
CREAT SERPL-MCNC: 1.1 MG/DL (ref 0.5–1.3)
CRITICAL CALL TIME: 1445
CRITICAL CALLED BY: ABNORMAL
CRITICAL CALLED TO: ABNORMAL
CRITICAL READ BACK: ABNORMAL
EC STX1 GENE STL QL NAA+PROBE: NOT DETECTED
EC STX2 GENE STL QL NAA+PROBE: NOT DETECTED
EGFRCR SERPLBLD CKD-EPI 2021: 71 ML/MIN/1.73M*2
EOSINOPHIL # BLD AUTO: 0.1 X10*3/UL (ref 0–0.4)
EOSINOPHIL NFR BLD AUTO: 0.7 %
ERYTHROCYTE [DISTWIDTH] IN BLOOD BY AUTOMATED COUNT: 16.4 % (ref 11.5–14.5)
FUNGUS SPEC CULT: NORMAL
FUNGUS SPEC FUNGUS STN: NORMAL
GLUCOSE BLD MANUAL STRIP-MCNC: 79 MG/DL (ref 74–99)
GLUCOSE BLD MANUAL STRIP-MCNC: 83 MG/DL (ref 74–99)
GLUCOSE BLD MANUAL STRIP-MCNC: 83 MG/DL (ref 74–99)
GLUCOSE BLD MANUAL STRIP-MCNC: 86 MG/DL (ref 74–99)
GLUCOSE BLD MANUAL STRIP-MCNC: 96 MG/DL (ref 74–99)
GLUCOSE BLDA-MCNC: 103 MG/DL (ref 74–99)
GLUCOSE SERPL-MCNC: 86 MG/DL (ref 74–99)
HCO3 BLDA-SCNC: 35 MMOL/L (ref 22–26)
HCT VFR BLD AUTO: 24.5 % (ref 41–52)
HCT VFR BLD EST: 24 % (ref 41–52)
HGB BLD-MCNC: 7.7 G/DL (ref 13.5–17.5)
HGB BLDA-MCNC: 8 G/DL (ref 13.5–17.5)
IMM GRANULOCYTES # BLD AUTO: 0.17 X10*3/UL (ref 0–0.5)
IMM GRANULOCYTES NFR BLD AUTO: 1.1 % (ref 0–0.9)
INHALED O2 CONCENTRATION: 50 %
LACTATE BLDA-SCNC: 1.1 MMOL/L (ref 0.4–2)
LACTATE SERPL-SCNC: 0.9 MMOL/L (ref 0.4–2)
LYMPHOCYTES # BLD AUTO: 1.47 X10*3/UL (ref 0.8–3)
LYMPHOCYTES NFR BLD AUTO: 9.7 %
MAGNESIUM SERPL-MCNC: 2.33 MG/DL (ref 1.6–2.4)
MCH RBC QN AUTO: 31.3 PG (ref 26–34)
MCHC RBC AUTO-ENTMCNC: 31.4 G/DL (ref 32–36)
MCV RBC AUTO: 100 FL (ref 80–100)
MONOCYTES # BLD AUTO: 0.86 X10*3/UL (ref 0.05–0.8)
MONOCYTES NFR BLD AUTO: 5.7 %
NEUTROPHILS # BLD AUTO: 12.44 X10*3/UL (ref 1.6–5.5)
NEUTROPHILS NFR BLD AUTO: 82.4 %
NOROVIRUS GI + GII RNA STL NAA+PROBE: NOT DETECTED
NRBC BLD-RTO: 0 /100 WBCS (ref 0–0)
OXYHGB MFR BLDA: 92.2 % (ref 94–98)
PCO2 BLDA: 78 MM HG (ref 38–42)
PH BLDA: 7.26 PH (ref 7.38–7.42)
PHOSPHATE SERPL-MCNC: 3.5 MG/DL (ref 2.5–4.9)
PLATELET # BLD AUTO: 239 X10*3/UL (ref 150–450)
PO2 BLDA: 69 MM HG (ref 85–95)
POTASSIUM BLDA-SCNC: 4.6 MMOL/L (ref 3.5–5.3)
POTASSIUM SERPL-SCNC: 4.2 MMOL/L (ref 3.5–5.3)
PROT SERPL-MCNC: 7.1 G/DL (ref 6.4–8.2)
RBC # BLD AUTO: 2.46 X10*6/UL (ref 4.5–5.9)
RV RNA STL NAA+PROBE: NOT DETECTED
SALMONELLA DNA STL QL NAA+PROBE: NOT DETECTED
SAO2 % BLDA: 95 % (ref 94–100)
SHIGELLA DNA SPEC QL NAA+PROBE: NOT DETECTED
SODIUM BLDA-SCNC: 144 MMOL/L (ref 136–145)
SODIUM SERPL-SCNC: 143 MMOL/L (ref 136–145)
V CHOLERAE DNA STL QL NAA+PROBE: NOT DETECTED
VANCOMYCIN SERPL-MCNC: 16.8 UG/ML (ref 5–20)
WBC # BLD AUTO: 15.1 X10*3/UL (ref 4.4–11.3)
Y ENTEROCOL DNA STL QL NAA+PROBE: NOT DETECTED

## 2024-11-29 PROCEDURE — 37799 UNLISTED PX VASCULAR SURGERY: CPT | Performed by: NURSE PRACTITIONER

## 2024-11-29 PROCEDURE — 2500000004 HC RX 250 GENERAL PHARMACY W/ HCPCS (ALT 636 FOR OP/ED)

## 2024-11-29 PROCEDURE — 37799 UNLISTED PX VASCULAR SURGERY: CPT

## 2024-11-29 PROCEDURE — 85025 COMPLETE CBC W/AUTO DIFF WBC: CPT

## 2024-11-29 PROCEDURE — 82947 ASSAY GLUCOSE BLOOD QUANT: CPT

## 2024-11-29 PROCEDURE — 2020000001 HC ICU ROOM DAILY

## 2024-11-29 PROCEDURE — 2500000004 HC RX 250 GENERAL PHARMACY W/ HCPCS (ALT 636 FOR OP/ED): Mod: JZ

## 2024-11-29 PROCEDURE — 80053 COMPREHEN METABOLIC PANEL: CPT | Performed by: NURSE PRACTITIONER

## 2024-11-29 PROCEDURE — 84100 ASSAY OF PHOSPHORUS: CPT

## 2024-11-29 PROCEDURE — 71045 X-RAY EXAM CHEST 1 VIEW: CPT | Performed by: RADIOLOGY

## 2024-11-29 PROCEDURE — 94660 CPAP INITIATION&MGMT: CPT

## 2024-11-29 PROCEDURE — P9047 ALBUMIN (HUMAN), 25%, 50ML: HCPCS

## 2024-11-29 PROCEDURE — 2500000002 HC RX 250 W HCPCS SELF ADMINISTERED DRUGS (ALT 637 FOR MEDICARE OP, ALT 636 FOR OP/ED)

## 2024-11-29 PROCEDURE — 92611 MOTION FLUOROSCOPY/SWALLOW: CPT | Mod: GN

## 2024-11-29 PROCEDURE — 84132 ASSAY OF SERUM POTASSIUM: CPT | Performed by: STUDENT IN AN ORGANIZED HEALTH CARE EDUCATION/TRAINING PROGRAM

## 2024-11-29 PROCEDURE — 2500000005 HC RX 250 GENERAL PHARMACY W/O HCPCS: Performed by: STUDENT IN AN ORGANIZED HEALTH CARE EDUCATION/TRAINING PROGRAM

## 2024-11-29 PROCEDURE — 94640 AIRWAY INHALATION TREATMENT: CPT

## 2024-11-29 PROCEDURE — P9045 ALBUMIN (HUMAN), 5%, 250 ML: HCPCS | Mod: JZ

## 2024-11-29 PROCEDURE — 74230 X-RAY XM SWLNG FUNCJ C+: CPT | Performed by: RADIOLOGY

## 2024-11-29 PROCEDURE — 99238 HOSP IP/OBS DSCHRG MGMT 30/<: CPT | Performed by: NURSE PRACTITIONER

## 2024-11-29 PROCEDURE — 92526 ORAL FUNCTION THERAPY: CPT | Mod: GN

## 2024-11-29 PROCEDURE — 99291 CRITICAL CARE FIRST HOUR: CPT

## 2024-11-29 PROCEDURE — 83605 ASSAY OF LACTIC ACID: CPT

## 2024-11-29 PROCEDURE — 80202 ASSAY OF VANCOMYCIN: CPT

## 2024-11-29 PROCEDURE — 83735 ASSAY OF MAGNESIUM: CPT | Performed by: NURSE PRACTITIONER

## 2024-11-29 RX ORDER — THIAMINE HYDROCHLORIDE 100 MG/ML
100 INJECTION, SOLUTION INTRAMUSCULAR; INTRAVENOUS DAILY
Status: DISCONTINUED | OUTPATIENT
Start: 2024-11-29 | End: 2024-11-29

## 2024-11-29 RX ORDER — MORPHINE SULFATE 2 MG/ML
2 INJECTION, SOLUTION INTRAMUSCULAR; INTRAVENOUS
Status: DISCONTINUED | OUTPATIENT
Start: 2024-11-29 | End: 2024-11-30 | Stop reason: HOSPADM

## 2024-11-29 RX ORDER — MORPHINE SULFATE 2 MG/ML
2 INJECTION, SOLUTION INTRAMUSCULAR; INTRAVENOUS EVERY 4 HOURS PRN
Status: DISCONTINUED | OUTPATIENT
Start: 2024-11-29 | End: 2024-11-30 | Stop reason: HOSPADM

## 2024-11-29 RX ORDER — ENOXAPARIN SODIUM 100 MG/ML
40 INJECTION SUBCUTANEOUS DAILY
Status: DISCONTINUED | OUTPATIENT
Start: 2024-11-29 | End: 2024-11-29

## 2024-11-29 RX ORDER — ALBUMIN HUMAN 50 G/1000ML
SOLUTION INTRAVENOUS
Status: COMPLETED
Start: 2024-11-29 | End: 2024-11-29

## 2024-11-29 RX ORDER — GLYCOPYRROLATE 0.2 MG/ML
0.2 INJECTION INTRAMUSCULAR; INTRAVENOUS EVERY 4 HOURS PRN
Status: DISCONTINUED | OUTPATIENT
Start: 2024-11-29 | End: 2024-11-30 | Stop reason: HOSPADM

## 2024-11-29 RX ORDER — LORAZEPAM 2 MG/ML
2 INJECTION INTRAMUSCULAR EVERY 10 MIN PRN
Status: DISCONTINUED | OUTPATIENT
Start: 2024-11-29 | End: 2024-11-30 | Stop reason: HOSPADM

## 2024-11-29 RX ORDER — MORPHINE SULFATE 2 MG/ML
2 INJECTION, SOLUTION INTRAMUSCULAR; INTRAVENOUS EVERY 4 HOURS PRN
Status: DISCONTINUED | OUTPATIENT
Start: 2024-11-29 | End: 2024-11-29

## 2024-11-29 RX ORDER — MORPHINE SULFATE 4 MG/ML
4 INJECTION, SOLUTION INTRAMUSCULAR; INTRAVENOUS
Status: DISCONTINUED | OUTPATIENT
Start: 2024-11-29 | End: 2024-11-30 | Stop reason: HOSPADM

## 2024-11-29 RX ORDER — ALBUMIN HUMAN 50 G/1000ML
25 SOLUTION INTRAVENOUS ONCE
Status: COMPLETED | OUTPATIENT
Start: 2024-11-29 | End: 2024-11-29

## 2024-11-29 RX ORDER — ALBUMIN HUMAN 250 G/1000ML
12.5 SOLUTION INTRAVENOUS ONCE
Status: COMPLETED | OUTPATIENT
Start: 2024-11-29 | End: 2024-11-29

## 2024-11-29 ASSESSMENT — PAIN SCALES - GENERAL
PAINLEVEL_OUTOF10: 0 - NO PAIN

## 2024-11-29 ASSESSMENT — PAIN - FUNCTIONAL ASSESSMENT
PAIN_FUNCTIONAL_ASSESSMENT: PAINAD (PAIN ASSESSMENT IN ADVANCED DEMENTIA SCALE)
PAIN_FUNCTIONAL_ASSESSMENT: 0-10

## 2024-11-29 NOTE — PROGRESS NOTES
"Nutrition Follow Up Assessment:   Nutrition Assessment         Patient is a 74 y.o. male presenting with acute combined systolic and diastolic HF      Nutrition History:  Food and Nutrient History: RD follow-up. Extubated 11/27. Pt states has not eaten well for weeks to months PTA, though unable to provide additional nutrition history at time of RD visit. Was on non-rebreather and only nodding head yes/no to questioning. S/p MBS today (11/29), with SLP recommending NPO and alternative means of nutrition. Discussed corpak with pt, though following discussion pt not sure would want this. Informed PA, RN, and intensivist. ICU PA informed RD that pt continues to refuse corpak following additional discussions. ICU RN trained in placing corpaks via Cortrak machine to place corpak if pt becomes agreeable. To provide TF recommendations below in case pt changes mind on corpak placement.  Food Allergies/Intolerances:  None  GI Symptoms:  diarrhea per nursing documentation  Oral Problems:  NPO s/p MBS on 11/29       Anthropometrics:  Height: 175.3 cm (5' 9\")   Weight: 82.7 kg (182 lb 5.1 oz)   BMI (Calculated): 26.91             Weight Change %:  Weight History / % Weight Change: up 0.8 kg since last RD visit    Nutrition Focused Physical Exam Findings:    Subcutaneous Fat Loss:   Orbital Fat Pads: Severe (dark circles, hollowing and loose skin)  Buccal Fat Pads: Severe (hollow, sunken and narrow face)  Triceps: Severe (negligible fat tissue)  Muscle Wasting:  Temporalis: Severe (hollowed scooping depression)  Pectoralis (Clavicular Region): Severe (protruding prominent clavicle)  Deltoid/Trapezius: Severe (squared shoulders, acromion process prominent)  Interosseous: Severe (depressed area between thumb and forefinger)  Edema:  Edema: +1 trace  Edema Location: BLE per nursing assessment  Physical Findings:  Skin: Positive (PI right leg (unclear stage, not documented in flowsheets). PI stage 2 on left buttock)    Nutrition " Significant Labs:  BMP Trend:   Results from last 7 days   Lab Units 11/29/24  0315 11/28/24  1335 11/28/24  0334 11/27/24  0408   GLUCOSE mg/dL 86 83 89 108*   CALCIUM mg/dL 8.8 8.5* 8.5* 8.4*   SODIUM mmol/L 143 141 142 140   POTASSIUM mmol/L 4.2 3.7 3.3* 3.3*   CO2 mmol/L 33* 33* 36* 38*   CHLORIDE mmol/L 102 100 99 97*   BUN mg/dL 24* 21 20 23   CREATININE mg/dL 1.10 1.02 0.95 0.99    , A1C:  Lab Results   Component Value Date    HGBA1C 5.2 03/09/2022   , Renal Lab Trend:   Results from last 7 days   Lab Units 11/29/24 0315 11/28/24 1335 11/28/24 0334 11/27/24  0408   POTASSIUM mmol/L 4.2 3.7 3.3* 3.3*   PHOSPHORUS mg/dL 3.5  --  2.7 3.0   SODIUM mmol/L 143 141 142 140   MAGNESIUM mg/dL 2.33  --  1.87 1.90   EGFR mL/min/1.73m*2 71 78 85 81   BUN mg/dL 24* 21 20 23   CREATININE mg/dL 1.10 1.02 0.95 0.99        Nutrition Specific Medications:  Reviewed. Levo 0.01 mcg/kg/min. Synthroid injection.     I/O:   Last BM Date: 11/29/24; Stool Appearance: Loose (11/29/24 0800)    Dietary Orders (From admission, onward)       Start     Ordered    11/22/24 1019  May Participate in Room Service With Assistance  ( ROOM SERVICE MAY PARTICIPATE WITH ASSISTANCE)  Once        Question:  .  Answer:  Yes    11/22/24 1018                     Estimated Needs:   Total Energy Estimated Needs (kCal): 2065 kCal  Method for Estimating Needs: 25 kcal/kg  Total Protein Estimated Needs (g): 99 g  Method for Estimating Needs: 1.2 g/kg  Total Fluid Estimated Needs (mL): 2065 mL  Method for Estimating Needs: 1 ml/kcal or per MD        Nutrition Diagnosis   Malnutrition Diagnosis  Patient has Malnutrition Diagnosis: Yes  Diagnosis Status: New  Malnutrition Diagnosis: Severe malnutrition related to chronic disease or condition  As Evidenced by: likely not meeting >75% estimated energy needs x 1 month, >7.5% wt loss x 3 months, >20% wt loss x 1 year, severe muscle losses, severe fat losses    Nutrition Diagnosis  Patient has Nutrition  Diagnosis: Yes  Diagnosis Status (1): Ongoing  Nutrition Diagnosis 1: Inadequate oral intake  Related to (1): swallowing dysfunction  As Evidenced by (1): NPO per SLP recommendations       Nutrition Interventions/Recommendations         Nutrition Prescription:  Individualized Nutrition Prescription Provided for : NPO per SLP. Pt currently refusing corpak and TF, if becomes agreeable see below for recommendations.  Recommend start 100 mg IV thiamine d/t risk for refeeding syndrome. Once corpak placed, consider changing to PO thiamine. ICU PA to order.  Recommend monitor potassium, phosphorus, and magnesium every 12 hours and MD replace lytes as needed.        Nutrition Interventions:   Goal: If pt becomes agreeable to corpak, recommend start TF Jevity 1.5 at 20 ml/hr and advance by 10 ml q12 hours until goal rate of 65 ml/hr is met. Hold TF 1 hour before and 1 hour after Synthroid tablet dose. At goal rate of 65 ml/hr x22 hours, TF provides 2145 kcal, 91g protein, 1087 ml free water. Flush 150 ml q 4 hours or per MD  Additional Interventions: If synthroid remains IV instead of tablet, recommend: start Jevity 1.5 at 20 ml/hr and advance by 10 ml q12 hours until goal rate of 60 ml/hr x 24 hours is met. TF at goal rate provides 2160 kcal, 92g progin, 1094 ml free water. Flush 150 ml q4 hours or per MD.  Once TF started, if potassium, phosphorus, or magnesium become low, recommend MD add electrolyte replacement prior to advancing TF.    Collaboration and Referral of Nutrition Care: Collaboration by nutrition professional with other providers  Goal: ICU interdisciplinary rounds; secure chat to RN; ICU PA; intensivist    Nutrition Education:      Discussed need for corpak and what to expect during placement. Reviewed with pt that corpak is a temporary means of alternative nutrition and stays in place for up to 6 weeks, if needed. Discussed what to expect with TF regimen. Following discussion, pt expressed being unsure if  would want alternative means of nutrition. ICU team to discuss further with pt.    Nutrition Monitoring and Evaluation   Food/Nutrient Related History Monitoring  Additional Plans: Monitor for start of nutrition: Enteral nutrition if pt agreeable    Body Composition/Growth/Weight History  Monitoring and Evaluation Plan: Weight  Weight: Measured weight  Criteria: Maintain stable wt - ongoing    Biochemical Data, Medical Tests and Procedures  Monitoring and Evaluation Plan: Electrolyte/renal panel, Glucose/endocrine profile  Electrolyte and Renal Panel: Sodium, Phosphorus, Potassium  Criteria: Electrolytes WNL - met  Glucose/Endocrine Profile: Glucose, casual  Criteria: BG within desired limits - met    Nutrition Focused Physical Findings  Monitoring and Evaluation Plan: Skin  Skin: Impaired wound healing  Criteria: Promote wound healing through adequate nutrition - ongoing         Time Spent (min): 30 minutes

## 2024-11-29 NOTE — PROGRESS NOTES
[facility]       Date:  11/29/2024  Patient:  Antony Goff  YOB: 1950  MRN:  [id number]   Admit Date:  11/19/2024  ========================================================================================================    No chief complaint on file.    History of Present Illness:  Antony Goff is a 74 y.o. year old male patient with Past Medical History of COPD, chronic hypoxic respiratory failure on 3L NC baseline O2, aortic valve stenosis s/p AVR 3/2024 at [facility], pAfib on Eliquis, remote CVA, GERD, hypothyroidism, chronic back pain on chronic opioids who presented to [facility] ER for evaluation altered mental status and increased SOB, he was determined to have COPDE and CHF exacerbation, CXR significant for pulmonary vascular congestion. He was transferred to [facility] per family request and admitted to [facility]. He was treated with IV lasix, cardiology consulted. 11/22 RR was called for uncontrollable epistaxis and patient found unresponsive, he was intubated and transferred to ICU.     Interval ICU Events:  11/22: Admitted to MICU-1 s/p intubation. Peak pressure 45-50 on ventilator, Dr Bush performed bedside bronch, clearance of small bloody secretions, ABG/CXR obtained unchanged from previous no PTX, ventilator equipment subsequently exchanged. Sedation with versed infusion. Dr Amado at bedside, insertion of nasal trumpet and clearance of large clot. Vasopressor requirement increasing, norepineprhine/vaso.    11/23: Pressers weaned, down to minimal vent settings. Remain intubated today with nasal packing in. ENT plan to remove tomorrow    11/24: Pending ENT nasal packing removal. If no further bleeding -> SAT/SBT. Pressors now only low dose in setting of sedation. Continue on zosyn for HAP    11/25: Plan for SBT today. Packing removed by ENT, surgicall inserted and no further bleeding. Wean vasopressors as sedation is decreased.    11/26: Febrile overnight with increasing  vasopressor requirements, repeat CT with chronic left maxillary sinus occlusion, consolidative opacities of bilateral lung bases worsening. Remains on precedex, norepinephrine weaning. Attempt SAT/SBT today    11/27: Failed SBT yeseterday due to tachypnea; desaturation today to SpO2 80%, large amount of secretions, plan for Bronchoscopy today. Follow resp culture, continue broad spectrum Vanc/Zosyn.    11/28: Extubated yesterday, on Airvo 60L/40%. Vasopressors off. SLP recommending MBS, maintain NPO.    11/29: patient failed MBS today, recommending Corpak placement; patient appears volume overloaded on CXR, consider diuresis in the afternoon if BP can tolerate     Medical History:  Past Medical History:   Diagnosis Date    Encounter for preprocedural cardiovascular examination     Preop cardiovascular exam    Encounter for screening for lipoid disorders     Screening for lipoid disorders    Personal history of other diseases of the circulatory system 10/05/2021    History of cardiac murmur    Personal history of other endocrine, nutritional and metabolic disease     History of hypokalemia    Personal history of other endocrine, nutritional and metabolic disease     History of obesity    Personal history of other endocrine, nutritional and metabolic disease 08/03/2022    History of obesity    Personal history of other specified conditions     History of chest pain    Personal history of other specified conditions     History of fatigue    Personal history of other specified conditions     History of palpitations    Presence of cardiac pacemaker     History of cardiac pacemaker    Repeated falls     Multiple falls    Tinnitus, left ear 10/05/2021    Tinnitus of left ear     Past Surgical History:   Procedure Laterality Date    CARDIAC VALVE REPLACEMENT      CORONARY ARTERY BYPASS GRAFT      INSERT / REPLACE / REMOVE PACEMAKER      OTHER SURGICAL HISTORY  10/05/2021    Shoulder surgery    OTHER SURGICAL HISTORY   10/05/2021    Knee replacement    OTHER SURGICAL HISTORY  10/05/2021    Cataract surgery    OTHER SURGICAL HISTORY  10/05/2021    Breast surgery    OTHER SURGICAL HISTORY  10/05/2021    Pacemaker insertion    OTHER SURGICAL HISTORY  10/05/2021    Appendectomy    OTHER SURGICAL HISTORY  10/05/2021    Sinus surgery    OTHER SURGICAL HISTORY  10/05/2021    Uvulectomy    OTHER SURGICAL HISTORY  01/28/2022    Colonoscopy    OTHER SURGICAL HISTORY  01/28/2022    Kidney surgery    OTHER SURGICAL HISTORY  01/28/2022    Throat surgery    OTHER SURGICAL HISTORY  01/28/2022    Nose surgery     Medications Prior to Admission   Medication Sig Dispense Refill Last Dose/Taking    albuterol 90 mcg/actuation inhaler Inhale 2 puffs every 6 hours if needed.   Past Month    furosemide (Lasix) 40 mg tablet Take 1 tablet (40 mg) by mouth early in the morning..   11/18/2024    lactulose 10 gram/15 mL solution TAKE 30 ML BY MOUTH TWICE DAILY AS NEEDED   Past Week    potassium chloride (Klor-Con) 20 mEq packet Take 20 mEq by mouth once daily.   Past Week    Symbicort 160-4.5 mcg/actuation inhaler Inhale 1 puff 2 times a day.   Past Week    traZODone (Desyrel) 50 mg tablet Take 1 tablet (50 mg) by mouth once daily at bedtime. PRN   Past Week    albuterol 2.5 mg /3 mL (0.083 %) nebulizer solution Inhale. Use as directed PRN       atorvastatin (Lipitor) 40 mg tablet Take 1 tablet (40 mg) by mouth once daily at bedtime.   11/18/2024    carvedilol (Coreg) 3.125 mg tablet Take 1 tablet (3.125 mg) by mouth 2 times a day. 180 tablet 3 11/18/2024    Eliquis 5 mg tablet Take 1 tablet (5 mg) by mouth 2 times a day. 180 tablet 3 11/18/2024    empagliflozin (Jardiance) 10 mg Take 1 tablet (10 mg) by mouth once daily.   11/18/2024    lamoTRIgine (LaMICtal) 100 mg tablet Take 1 tablet (100 mg) by mouth once daily at bedtime.   11/18/2024    levothyroxine (Synthroid, Levoxyl) 88 mcg tablet Take 1 tablet (88 mcg) by mouth once daily.   11/18/2024     morphine CR (MS Contin) 30 mg 12 hr tablet Take 1 tablet (30 mg) by mouth every 8 hours. PRN   11/18/2024    omeprazole OTC (PriLOSEC OTC) 20 mg EC tablet Take 1 tablet (20 mg) by mouth once daily in the morning. Take before meals. Do not crush, chew, or split.   11/18/2024    Oxervate 0.002 % ophthalmic solution    11/18/2024    solifenacin (VESIcare) 5 mg tablet Take 1 tablet (5 mg) by mouth once daily.   11/18/2024    spironolactone (Aldactone) 25 mg tablet Take 1 tablet (25 mg) by mouth once daily.   11/18/2024     Patient has no known allergies.  Social History     Tobacco Use    Smoking status: Former     Types: Cigarettes    Smokeless tobacco: Never   Substance Use Topics    Alcohol use: Not Currently    Drug use: Not Currently     Family History   Problem Relation Name Age of Onset    Heart failure Mother      Other (asbestosis) Father      Lung disease Father      Thyroid disease Sister         Review of Systems:  14 point review of systems was completed and negative except for those specially mention in my HPI    Physical Exam:    Heart Rate:  [68-74]   Temp:  [36.1 °C (97 °F)-36.6 °C (97.9 °F)]   Resp:  [23-55]   Weight:  [82.7 kg (182 lb 5.1 oz)]   SpO2:  [91 %-100 %]     Physical Exam  Vitals and nursing note reviewed.   HENT:      Mouth/Throat:      Pharynx: Oropharynx is clear.   Eyes:      Extraocular Movements: Extraocular movements intact.      Pupils: Pupils are equal, round, and reactive to light.   Cardiovascular:      Rate and Rhythm: Regular rhythm. Tachycardia present.      Pulses: Normal pulses.      Heart sounds: Normal heart sounds.      Comments: AV paced  Pulmonary:      Effort: Pulmonary effort is normal.      Breath sounds: Rhonchi and rales present.   Abdominal:      General: There is no distension.      Palpations: Abdomen is soft.      Tenderness: There is no abdominal tenderness.   Musculoskeletal:         General: Swelling present.      Left lower leg: Edema present.   Skin:      General: Skin is warm.      Capillary Refill: Capillary refill takes less than 2 seconds.      Findings: Bruising present.   Neurological:      General: No focal deficit present.       Objective:  I have reviewed all medications, laboratory results, and imaging pertinent for today's encounter    Assessment/Plan:  I am currently managing this critically ill patient for the following problems:    Neuro/Psych/Pain Ctrl/Sedation:  Chronic pain  CVA  CTH negative  Resume chronic opioids    Respiratory/ENT:  COPD  Chronic hypoxic respiratory failure  Aspiration  Extubated 11/27  Airvo 50L/44%  Wean O2 for SpO2 >92%  Continue with CPT, Pulm hygiene  Duoneb Q4    Cardiovascular:  Circulatory shock - Septic  AVR 3/2024, PPM  Diastolic heart failure  Afib on Eliquis  Hold AC in the setting of acute bleed  Levophed on and off, fluid resuscitation to keep levo off    Maintain MAP >65  Cardiology signed off, reconsult if needed for diuresis    GI:  GERD  Diarrhea  NPO  MBS failed, will need Corpak placement  high risk for refeeding syndrome, will need mag, phos, and K labs Q12   SLP following  Holding bowel regimen due to loose stools   Stool panel negative   Cdiff PCR pending     Renal/Volume Status (Intra & Extravascular):  Monitor UOP  Daily RFP, Mg    Endocrine  Hypothyroidism  Continue synthroid, switched to IV while NPO  POCT Q6 while NPO    Infectious Disease:  Pneumonia  Possible Cdiff  WBC increased from day prior to 15.1  UA, BC, MRSA negative  Sputum 11/27, prelim gram negative  Follow BAL culture 11/27  Continue Zosyn  Awaiting final culture results    Heme/Onc:  Epistaxis resolved  ENT consulted  Packing removed by ENT, s/p surgicell  Hgb 7.7  PLT improved to 239  DVT ppx with lovenox resumed     MSK:  Nystatin to groin  PT/OT when able to participate  Podiatry consult     Ethics/Code Status:  DNRCCA/DNI     :  DVT Prophylaxis: None  GI Prophylaxis: PPI  Bowel Regimen: None  Diet: NPO per speech    CVC: [facility]J TLC 11/22  Danni: Right radial 11/22  Mandel: External  Restraints: None  Dispo: ICU, weaning O2    Critical Care Time:  40 minutes spent in preparing to see patient (I.e. review of medical records), evaluation of diagnostics (I.e. labs, imaging, etc.), documentation, discussing plan of care with patient/ family/ caregiver, and/ or coordination of care with multidisciplinary team. Time does not include completion of procedure time.      Discussed patient plan of care with attending physician Dr. Jose Gregg.   11/29/24 at 1:38 PM - Sowmya Oshea PA-C

## 2024-11-29 NOTE — PROGRESS NOTES
Speech-Language Pathology    Inpatient Modified Barium Swallow Study    Patient Name: Antony Goff  MRN: [id number]  : 1950  Today's Date: 24  Time Calculation  Start Time: 930  Stop Time: 1000  Time Calculation (min): 30 min      -A    Modified Barium Swallow Study completed. Informed verbal consent obtained prior to completion of exam. Trials of thin,  puree, were given.  Additional consistencies not tested due to severity of patient's aspiration and risk of pulmonary compromise with continued testing.    Modified Barium Swallow Study completed. Informed verbal consent obtained prior to completion of exam. The study was completed per protocol with various liquid barium consistencies, pudding, solids and a 13mm barium tablet. A 1.9 cm or .75 inch (outer diameter) ring was placed on the chin in the lateral view and on the lateral, left side of the neck in the a-p view in order to complete objective measurements during swallowing. The anatomic structures and function of the oropharynx, larynx, hypopharynx and cervical esophagus were evaluated.      SLP: Len Good SLP   Contact info: [employer] secure chat; phone: [phone]      Reason for Referral: Patient having signs symptoms aspiration during clinical swallow evaluation that was completed yesterday.  A modified barium swallow study was ordered to further assess the physiology of the swallow.  Patient Hx: Pt. Admitted patient admitted with change in mental status, shortness of breath, COPD exacerbation, CHF exacerbation, and chest x-ray revealing pulmonary vascular congestion.  Past medical history: Patient has past medical history of COPD, chronic hypoxic respiratory failure, aortic valve stenosis, status post AVR 3/2024 at [facility], A-fib, CVA, GERD, hypothyroidism, and chronic back pain.  Respiratory Status: Patient had nonrebreather mask in place during modified barium swallow study but has been wearing Airvo high flow nasal cannula in  ICU unit.  Current diet: N.p.o.    Pain:  Pain Scale: 0-10  Ratin    FINAL SPEECH RECOMMENDATIONS    DIET:   N.p.o. with alternative means of nutrition and hydration    STRATEGIES:  - Complete oral care frequently throughout the day  -Jules Free water protocol  (patient may have ice chips and small spoon sips of thin H2O after oral care is completed multiple times daily)    Plan:  Treatment/Interventions: Pharyngeal exercises, Patient/family education, Bolus trials, Compensatory strategy training, Diet tolerance/advancement  SLP Plan: Skilled SLP warranted  SLP Frequency: 4x per week  Duration:    Discussed POC: Patient  Discussed Risks/Benefits: Yes  Patient/Caregiver Agreeable: Yes    Short term goals established 24:     1Patient will be able to complete oral care 2-3 times daily to improve his oral status and reduce development of pneumonia.  Start Date: 24  Progress: Required oral care to be completed by speech-language pathologist.  Status: Continue goal     2.  Patient will be able to use Jules Freewater protocol with 100% accuracy.  Start Date: 24  Progress: Patient tolerated ice chips without coughing.  He did have a cough with thin liquids via spoon and cup.  Status: Continue goal    3.  Patient will be able to complete base of tongue techniques 2-3 times per day independently.  Start Date: 24  Progress: Base of tongue techniques will be completed with patient later today at bedside.  See treatment note for details.  Status: Continue goal    Patient will be able to complete airway protection techniques 2-3 times per day independently  Start date: Initiate at future treatment session  Progress: N/A  Status: Continue goal    Long term goals 24:   Patient will tolerate the least restrictive diet without overt difficulty or further pulmonary compromise by time of discharge.    Education Provided: Results and recommendations per MBSS, with video review; recommendations and  POC at this time. Verbal understanding and agreement given on all accounts.     Treatment Provided Today: ST treatment session will be completed later today at bedside with patient.  See progress note for details.     Mechanics of the Swallow Summary:  ORAL PHASE:  Lip Closure - Interlabial escape/no progression to anterior lip   Tongue Control During Bolus Hold - Escape to lateral buccal cavity and/or floor of mouth   Bolus prep/mastication - Mastication not assessed   Bolus transport/lingual motion - Minimal to no tongue motion with no A-P movement of the bolus  Oral residue - Residue collection on oral structure     PHARYNGEAL PHASE:  Initiation of pharyngeal swallow - Bolus head at pit of pyriforms   Soft palate elevation - Trace column of contrast or air between soft palate and pharyngeal wall   Laryngeal elevation - Partial superior movement of thyroid cartilage and/or partial approximation of arytenoids to epiglottic petiole   Anterior hyoid excursion - No anterior movement   Epiglottic movement - No inversion   Laryngeal vestibule closure - None - wide column of air / contrast in laryngeal vestibule   Pharyngeal stripping wave -  Absent   Pharyngeal contraction (A/P view) - Not tested       Pharyngoesophageal segment opening - Minimal distension/incomplete duration with marked obstruction of flow of bolus   Tongue base retraction - Wide column of contrast or air between tongue base and pharyngeal wall   Pharyngeal residue - Majority of contrast within or on the pharyngeal structures     ESOPHAGEAL PHASE:  Esophageal clearance -  20 mL thin liquid trial Not evaluated   Puree consistency trial Not evaluated   Barium tablet trial Not evaluated       SLP Impressions with Severity Rating:   Pt presents with profound oropharyngeal dysphagia upon completion of modified barium swallow study this date. Swallowing physiology is detailed above. Impairments most impacting swallowing safety and efficiency include poor  tongue control during bolus hold, poor bolus transport/lingual motion, significant delay in the initiation of the pharyngeal swallow, absent epiglottic inversion, poor laryngeal vestibule closure and significant obstruction of the flow of barium contrast through the pharyngoesophageal segment resulting in severe pharyngeal residual. Patient demonstrated penetration with aspiration followed by coughing with both thin liquids and pudding trial.. No further trials were attempted due to the to the severity of aspiration seen with both thin and pudding trials.  Is already significantly compromised respiratorily and additional trials were not recommended at this time.  Recommending alternative means of nutrition and hydration.  Also recommending aggressive oral care be completed 3-4 times daily and use of Jules Freewater protocol with patient.  Spoke with intensivist team regarding patient's modified barium swallow study results and recommendations for alternative means of nutrition and hydration.  Corpak to be placed by nursing staff and alternative means of nutrition and hydration to be initiated by dietitian.    Strategies attempted-   Effortful swallow   Double swallow      OUTCOME MEASURES:  Functional Oral Intake Scale  Functional Oral Intake Scale: Level 1        nothing by mouth       Rosenbek's Penetration Aspiration Scale  Thin Liquids: 7. OVERT ASPIRATION, material is not cleared - contrast passes glottis, visible residue, W/pt response  Before the Swallow  During the Swallow  After the Swallow  Puree: 7. OVERT ASPIRATION, material is not cleared - contrast passes glottis, visible residue, W/pt response  Before the Swallow  During the Swallow  After the Swallow    This portion of the study signed by STEVEN Dial on 11/29/24.

## 2024-11-29 NOTE — NURSING NOTE
This RRN to accompany pt to MBS. Pt attached to [employer] for travel with 100% NRB.

## 2024-11-29 NOTE — SIGNIFICANT EVENT
Patient respiratory status declining requiring BiPAP. Pressor requirements continue to rise despite fluid administration. Had a discussion with patient's family about respiratory status and with a continued decline, would need re-intubation. Expressed that if he were to be re-intubated, the combination of his inability to clear secretions as evidenced by his MBS this am and his COPD would make it difficult for him to come off the ventilator, likely leading to tracheostomy. Family stated that the patient would never want tracheostomy, nor the rehab that would accompany it. After more discussion, the family observes that the patient is suffering and would like to make him comfort care once more family can come to the bedside.     11/29/24 at 5:33 PM - Sowmya Oshea PA-C

## 2024-11-29 NOTE — DISCHARGE SUMMARY
Discharge Diagnosis  .     Issues Requiring Follow-Up  None.     Test Results Pending At Discharge  Pending Labs       Order Current Status    Lactate Collected (24 [id number])    Blood Culture Preliminary result    Blood Culture Preliminary result    Fungal Culture/Smear Preliminary result    Respiratory Culture/Smear Preliminary result    Respiratory Culture/Smear Preliminary result          Hospital Course  Patient was admitted to the hospital medicine service as a transfer from [facility] for COPD and acute on chronic heart failure exacerbation. Patient became increasingly volume overloaded with pulmonary vascular congestion. A rapid response was called on the floor when the patient had significant epistaxis, was found unresponsive and required intubation, bronchoscopy, nasal trumpet/clearance of large clot, and pressors. Patient was able to be weaned from pressors and extubated to Airvo on . Patient had been weaned off pressors and sent for MBS which he had failed. SLP recommended Corpak placement. However, the patient's hemodynamic and respiratory status declined throughout the day. GOC discussion took place with the family while patient was escalated to BiPAP, and placed on norepinephrine/vasopressin despite fluid administration. Family decided against re-intubation and to call more family in to be at patient's bedside while the patient transitioned to comfort care.     Pertinent Physical Exam At Time of Discharge  No pulses. No heart beat. No air passing throughout the lung fields. TOD 24 @ 2211.     Outpatient Follow-Up  Future Appointments   Date Time Provider Department Center   2025  1:40 PM [facility] [place]   2025  2:30 PM Bhargav Velásquez MD [id number] [place]       Sowmya Oshea PA-C

## 2024-11-29 NOTE — PROGRESS NOTES
Inpatient Speech Language Pathology Treatment Note     Patient Name: Antony Goff  MRN: [id number]  : 1950  Today's Date: 24  Time Calculation  Start Time: 1405  Stop Time: 1415  Time Calculation (min): 10 min     Room:-A    Assessment::     Patient seen for a brief dysphagia treatment session this afternoon post modified barium swallow study.  Dietitian indicated that patient was refusing Corpak placement after modified barium swallow study today.  Nurse indicated that patient's son was going to talk with his mother regarding plan for Corpak placement.  Patient had residual barium in oral cavity.  Oral care was completed with patient.  Spoke with patient regarding recommendations from modified barium swallow study for n.p.o. with use of Jules Freewater protocol and temporary alternative means of nutrition and hydration.  Patient stated that he was in agreement with Corpak placement.  Oral cavity clean and clear.  Patient was able to tolerate several sips of thin H2O via spoon but had wet vocal quality post swallow.  Indicated to patient that oral care was important to complete to 4 times per day to reduce his risk of developing pneumonia.  Did not work on base of tongue techniques with patient today due to increased shortness of breath post oral care and trials of thin H2O.  Continue with plan.    Plan:     Skilled speech therapy for dysphagia treatment continues to be warranted to provide training and instruction regarding the use of compensatory swallow strategies, to complete oropharyngeal strengthening exercises, for pt/caregiver education in order to reduce risk of aspiration, dehydration and malnutrition. , to assess tolerance of diet , to determine ability to upgrade diet after PO trials with SLP  SLP TX Plan: Continue Plan of Care  SLP Frequency: 4x per week  Discussed POC: Patient, Nursing  Discussed Risks/Benefits: Patient, Nursing  Patient/Caregiver Agreeable: Yes       Pain:      Pain Assessment  Pain Assessment: 0-10  0-10 (Numeric) Pain Score: 0 - No pain       Subjective:     Pt. seen at bedside for skilled dysphagia treatment.   Prior to Session Communication: Bedside nurse        Oxygen status:     highflow nasal cannula    RECOMMENDATIONS:     Solid Diet Recommendations: NPO  Liquid Diet Recommendations: Free Water Protocol after oral care, Ice chips after oral care  Compensatory strategies: Oral care completed 3-4 times daily  Medication administration: None oral         Goals:     1. Patient will be able to complete oral care 2-3 times daily to improve his oral status and reduce development of pneumonia.  Start Date: 11/28/24  Progress: Required oral care to be completed by speech-language pathologist.  Status: Continue goal     2.  Patient will be able to use Jules Freewater protocol with 100% accuracy.  Start Date: 11/28/24  Progress: Patient tolerated ice chips without coughing.  He did have a cough with thin liquids via spoon and cup.  Status: Continue goal     3.  Patient will be able to complete base of tongue techniques 2-3 times per day independently.  Start Date: 11/29/24  Progress: Base of tongue techniques will be completed with patient later today at bedside.  See treatment note for details.  Status: Continue goal     Patient will be able to complete airway protection techniques 2-3 times per day independently  Start date: Initiate at future treatment session  Progress: N/A  Status: Continue goal      Education:     Pt. given skilled instruction on n.p.o. with Jules Freewater protocol and recommendation for alternative means of nutrition and hydration.  Pt. was unable to demonstrate understanding, needs further instruction

## 2024-11-29 NOTE — PROGRESS NOTES
Respiratory Therapy Note  ABG results- critical sent to doctor     Latest Reference Range & Units 11/29/24 14:43   POCT pH, Arterial 7.38 - 7.42 pH 7.26 (L)   POCT pCO2, Arterial 38 - 42 mm Hg 78 (HH)   POCT pO2, Arterial 85 - 95 mm Hg 69 (L)   POCT SO2, Arterial 94 - 100 % 95   POCT Oxy Hemoglobin, Arterial 94.0 - 98.0 % 92.2 (L)   POCT Hematocrit Calculated, Arterial 41.0 - 52.0 % 24.0 (L)   POCT Sodium, Arterial 136 - 145 mmol/L 144   POCT Potassium, Arterial 3.5 - 5.3 mmol/L 4.6   POCT Chloride, Arterial 98 - 107 mmol/L 104   POCT Ionized Calcium, Arterial 1.10 - 1.33 mmol/L 1.30   POCT Glucose, Arterial 74 - 99 mg/dL 103 (H)   POCT Lactate, Arterial 0.4 - 2.0 mmol/L 1.1   POCT Base Excess, Arterial -2.0 - 3.0 mmol/L 6.7 (H)   POCT HCO3 Calculated, Arterial 22.0 - 26.0 mmol/L 35.0 (H)   POCT Hemoglobin, Arterial 13.5 - 17.5 g/dL 8.0 (L)   POCT Anion Gap, Arterial 10 - 25 mmo/L 10   (HH): Data is critically high  (L): Data is abnormally low  (H): Data is abnormally high

## 2024-11-29 NOTE — SIGNIFICANT EVENT
Patient has increased working of breathing and O2 dropped to mid 80's.  RT at bedside to help suction.  Patient recovered to low 90's and Dr. Gregg updated on new ABG results.  New orders received.

## 2024-11-29 NOTE — PROGRESS NOTES
Extubated on 11/27. Currently on Airvo 60%/50L. Home baseline oxygen is 3L NC. Speech recommending MBS. Will need PT/OT re ordered once more stable.

## 2024-11-30 LAB
BACTERIA BLD CULT: NORMAL
BACTERIA BLD CULT: NORMAL
BACTERIA SPEC RESP CULT: ABNORMAL
GRAM STN SPEC: ABNORMAL

## 2024-12-03 LAB
FUNGUS SPEC CULT: NORMAL
FUNGUS SPEC FUNGUS STN: NORMAL

## 2024-12-09 LAB
FUNGUS SPEC CULT: NORMAL
FUNGUS SPEC FUNGUS STN: NORMAL

## 2024-12-16 LAB
FUNGUS SPEC CULT: NORMAL
FUNGUS SPEC FUNGUS STN: NORMAL
